# Patient Record
Sex: MALE | Race: WHITE | NOT HISPANIC OR LATINO | Employment: FULL TIME | URBAN - METROPOLITAN AREA
[De-identification: names, ages, dates, MRNs, and addresses within clinical notes are randomized per-mention and may not be internally consistent; named-entity substitution may affect disease eponyms.]

---

## 2020-03-12 ENCOUNTER — HOSPITAL ENCOUNTER (OUTPATIENT)
Dept: URGENT CARE | Facility: CLINIC | Age: 30
Discharge: HOME | End: 2020-03-12
Attending: FAMILY MEDICINE

## 2020-03-14 LAB — BACTERIA SPEC AEROBE CULT: NORMAL

## 2020-03-16 ENCOUNTER — HOSPITAL ENCOUNTER (OUTPATIENT)
Dept: URGENT CARE | Facility: CLINIC | Age: 30
Discharge: HOME | End: 2020-03-16

## 2020-03-18 LAB — BACTERIA SPEC AEROBE CULT: NORMAL

## 2020-03-21 ENCOUNTER — HOSPITAL ENCOUNTER (OUTPATIENT)
Dept: URGENT CARE | Facility: CLINIC | Age: 30
Discharge: HOME | End: 2020-03-21

## 2020-03-23 LAB — BACTERIA SPEC AEROBE CULT: NORMAL

## 2020-07-28 ENCOUNTER — TELEMEDICINE CONVERTED (OUTPATIENT)
Dept: GASTROENTEROLOGY | Facility: CLINIC | Age: 30
End: 2020-07-28
Attending: NURSE PRACTITIONER

## 2020-08-06 ENCOUNTER — HOSPITAL ENCOUNTER (OUTPATIENT)
Dept: ULTRASOUND IMAGING | Facility: HOSPITAL | Age: 30
Discharge: HOME | End: 2020-08-06
Attending: NURSE PRACTITIONER

## 2020-08-21 ENCOUNTER — HOSPITAL ENCOUNTER (OUTPATIENT)
Dept: URGENT CARE | Facility: CLINIC | Age: 30
Discharge: HOME | End: 2020-08-21

## 2020-09-25 ENCOUNTER — OFFICE VISIT CONVERTED (OUTPATIENT)
Dept: ORTHOPEDIC SURGERY | Facility: CLINIC | Age: 30
End: 2020-09-25
Attending: ORTHOPAEDIC SURGERY

## 2020-09-25 ENCOUNTER — CONVERSION ENCOUNTER (OUTPATIENT)
Dept: ORTHOPEDIC SURGERY | Facility: CLINIC | Age: 30
End: 2020-09-25

## 2020-10-14 ENCOUNTER — OFFICE VISIT CONVERTED (OUTPATIENT)
Dept: ORTHOPEDIC SURGERY | Facility: CLINIC | Age: 30
End: 2020-10-14
Attending: ORTHOPAEDIC SURGERY

## 2020-10-14 ENCOUNTER — CONVERSION ENCOUNTER (OUTPATIENT)
Dept: ORTHOPEDIC SURGERY | Facility: CLINIC | Age: 30
End: 2020-10-14

## 2020-10-20 ENCOUNTER — OFFICE VISIT CONVERTED (OUTPATIENT)
Dept: GASTROENTEROLOGY | Facility: CLINIC | Age: 30
End: 2020-10-20
Attending: NURSE PRACTITIONER

## 2021-05-10 NOTE — H&P
History and Physical      Patient Name: Arslan Butler   Patient ID: [id number]   Sex: Male   YOB: 1990    Primary Care Provider: Lee Grace MD   Referring Provider: Lee Grace MD    Visit Date: September 25, 2020    Provider: Dandy Rubio MD   Location: [facility]   Location Address: [address] [place] [address]  [id number]   Location Phone: [phone]          Chief Complaint  · Left Shoulder Pain      History Of Present Illness  Arslan Butler is a 29 year old /White male who presents today to [facility].      Patient presents today with a chief complaint of left shoulder pain. Patient presents today with XRAY of their left shoulder. Patient has a history of 2014 and 2016 shoulder arthroscopies. Patient complains of weakness in his left shoulder, patient feels like there is numbness and tingling but denies any neck pain.            Past Medical History  Anxiety and depression; Ulcer         Past Surgical History  Colonoscopy; Rotator Cuff repair         Medication List  Claritin 10 mg oral tablet; dicyclomine 10 mg oral capsule; Klonopin 0.5 mg oral tablet; Lamictal 25 mg oral tablet; lisinopril 5 mg oral tablet; metformin 1,000 mg oral tablet; omeprazole 20 mg oral capsule,delayed release(DR/EC); Ozempic 0.25 mg or 0.5 mg(2 mg/1.5 mL) subcutaneous pen injector; Vitamin D3 125 mcg (5,000 unit) oral tablet         Allergy List  Bactrim       Allergies Reconciled  Family Medical History  *No Known Family History         Social History  Alcohol Use (Never); lives with spouse; .; Recreational Drug Use (Never); Tobacco (Never); Unemployed.         Review of Systems  · Constitutional  o Denies  o : fever, chills, weight loss  · Cardiovascular  o Denies  o : chest pain, shortness of breath  · Gastrointestinal  o Denies  o : liver disease, heartburn, nausea, blood in stools  · Genitourinary  o Denies  o : painful urination, blood in  "urine  · Integument  o Denies  o : rash, itching  · Neurologic  o Denies  o : headache, weakness, loss of consciousness  · Musculoskeletal  o Denies  o : painful, swollen joints  · Psychiatric  o Denies  o : drug/alcohol addiction, anxiety, depression      Vitals  Date Time BP Position Site L\R Cuff Size HR RR TEMP (F) WT  HT  BMI kg/m2 BSA m2 O2 Sat        09/25/2020 03:21 PM      105 - R   360lbs 4oz 6'  4\" 43.85 2.96 93 %          Physical Examination  · Constitutional  o Appearance  o : well developed, well-nourished, no obvious deformities present  · Head and Face  o Head  o :   § Inspection  § : normocephalic  o Face  o :   § Inspection  § : no facial lesions  · Eyes  o Conjunctivae  o : conjunctivae normal  o Sclerae  o : sclerae white  · Ears, Nose, Mouth and Throat  o Ears  o :   § External Ears  § : appearance within normal limits  § Hearing  § : intact  o Nose  o :   § External Nose  § : appearance normal  · Neck  o Inspection/Palpation  o : normal appearance  o Range of Motion  o : full range of motion  · Respiratory  o Respiratory Effort  o : breathing unlabored  o Inspection of Chest  o : normal appearance  o Auscultation of Lungs  o : no audible wheezing or rales  · Cardiovascular  o Heart  o : regular rate  · Gastrointestinal  o Abdominal Examination  o : soft and non-tender  · Skin and Subcutaneous Tissue  o General Inspection  o : intact, no rashes  · Psychiatric  o General  o : Alert and oriented x3  o Judgement and Insight  o : judgment and insight intact  o Mood and Affect  o : mood normal, affect appropriate  · Left Shoulder  o Inspection  o : Sensation grossly intact. Neurovascular intact. Pulses are pleasant. No swelling, skin discoloration or atrophy. Well healed scars. Full ROM of shoulder. Weakness in strength. Patient has tremors and weakness when lifting his shoulder. RTC testing with weakness. Deltoid testing with weakness.   · Imaging  o Imaging  o : [XRAY LEFT SHOULDER] Subtle " degenerative change of the acromioclavicular joint, otherwise negative plain film assessment left shoulder.           Assessment  · Left shoulder pain, unspecified chronicity     719.41/M25.512      Plan  · Medications  o Medications have been Reconciled  o Transition of Care or Provider Policy  · Instructions  o Dr. Rubio saw and examined the patient and agrees with plan.   o X-rays reviewed by Dr. Rubio.  o Reviewed the patient's Past Medical, Social, and Family history as well as the ROS at today's visit, no changes.  o Call or return if worsening symptoms.  o Follow up after MRI.  o This note was transcribed by Adriana Jhaveri. rachel  o Discussed diagnosis and treatment options with the patient. Patient opted to get an MRI and will follow-up after getting results.             Electronically Signed by: Adriana Jhaveri-, Other -Author on September 28, 2020 09:58:15 AM  Electronically Co-signed by: Dandy Rubio MD -Reviewer on September 28, 2020 11:48:55 AM

## 2021-05-13 NOTE — PROGRESS NOTES
Progress Note      Patient Name: Arslan Butler   Patient ID: [id number]   Sex: Male   YOB: 1990    Primary Care Provider: Lee Grace MD   Referring Provider: Lee Grace MD    Visit Date: October 20, 2020    Provider: CORTNEY Kelley   Location: [facility]   Location Address: [address] [place] [address], [address] [place] [address]  [id number]   Location Phone: [phone]          Chief Complaint  · Follow up Abdominal pain       History Of Present Illness     Mr. Butler presents for follow-up of generalized abdominal pain, diarrhea, elevated LFTs, heartburn, rectal bleeding and morbid obesity.    7/29/2020 stool culture-normal.  [facility] IBD serology-not consistent with IBD.  CDF-negative occult blood stool-negative  Acute hepatitis panel-nonreactive  Iron profile: Total iron 67, iron saturation 20%.  CBC: Hemoglobin 15, hematocrit 44.2, platelets 219.  CMP: Creatinine 1.01, alk phos 86, ALT 66, AST 41, total bilirubin 0.6.    8/6/2020 right upper quadrant sound-diffuse fatty infiltration of the liver with liver coarsening.    ER visit reviewed from 8/21/2020: CBC: WBC 5.86, hemoglobin 14.9, hematocrit 44.9, platelets 248.  CMP: Creatinine 1.1, alk phos 75, ALT 46, AST 32, total bilirubin 0.3.  Lipase 28.    He reports that he's continuing to experience 3-4 loose bowel movements daily.  He reports that he experiences fecal urgency following meals and usually has a bowel movement following all meals.  States that rectal bleeding is resolved.  Reports pain that occurs often on left side of abdomen.      He is taking metformin daily with food.       Past Medical History  Anxiety and depression; Ulcer         Past Surgical History  Colonoscopy; Rotator Cuff repair         Medication List  Claritin 10 mg oral tablet; Klonopin 0.5 mg oral tablet; Lamictal 25 mg oral tablet; lisinopril 5 mg oral tablet; metformin 1,000 mg oral tablet; omeprazole 20 mg oral capsule,delayed  "release(DR/EC); Ozempic 0.25 mg or 0.5 mg(2 mg/1.5 mL) subcutaneous pen injector         Allergy List  Bactrim       Allergies Reconciled  Family Medical History  *No Known Family History         Social History  Alcohol Use (Never); lives with spouse; .; Recreational Drug Use (Never); Tobacco (Never); Unemployed.         Review of Systems  · Constitutional  o Denies  o : chills, fever  · Cardiovascular  o Denies  o : chest pain, irregular heart beats  · Respiratory  o Denies  o : cough, shortness of breath  · Gastrointestinal  o Admits  o : see HPI   · Endocrine  o Denies  o : weight gain, weight loss      Vitals  Date Time BP Position Site L\R Cuff Size HR RR TEMP (F) WT  HT  BMI kg/m2 BSA m2 O2 Sat FR L/min FiO2 HC       10/20/2020 01:47 /78 Sitting      98.2 352lbs 6oz 6'  4\" 42.89 2.93             Physical Examination  · Constitutional  o Appearance  o : Healthy-appearing, awake and alert in no acute distress  · Head and Face  o Head  o : Normocephalic with no worriesome skin lesions  · Eyes  o Vision  o :   § Visual Fields  § : eyes move symmetrical in all directions  o Sclerae  o : sclerae anicteric  o Pupils and Irises  o : pupils equal and symmetrical  · Neck  o Inspection/Palpation  o : Trachea is midline, no adenopathy  · Respiratory  o Respiratory Effort  o : Breathing is unlabored.  o Inspection of Chest  o : normal appearance  o Auscultation of Lungs  o : Chest is clear to auscultation bilaterally.  · Cardiovascular  o Heart  o :   § Auscultation of Heart  § : no murmurs, rubs, or gallops  o Peripheral Vascular System  o :   § Extremities  § : no cyanosis, clubbing or edema;   · Gastrointestinal  o Abdominal Examination  o : Abdomen is soft, nontender to palpation, with normal active bowel sounds, no appreciable hepatosplenomegaly.  o Digital Rectal Exam  o : deferred  · Skin and Subcutaneous Tissue  o General Inspection  o : without focal lesions; turgor is " normal  · Psychiatric  o General  o : Alert and oriented x3  o Mood and Affect  o : Mood and affect are appropriate to circumstances  · Extremities  o Extremities  o : No edema, no cyanosis          Assessment  · Abdominal Pain, LLQ     789.04/R10.32  · Diarrhea     787.91/R19.7      Plan  · Medications  o Apriso 0.375 gram oral capsule,extended release 24hr   SIG: take 4 capsules (1,500 mg) by oral route once daily in the morning for 30 days   DISP: (120) Capsule with 2 refills  Prescribed on 10/20/2020     o Medications have been Reconciled  · Instructions  o Information given on current diagnoses. Will try mesalamine given hx of colitis on previous colonoscopy.   · Disposition  o Follow up 2 months            Electronically Signed by: CORTNEY Kelley -Author on October 21, 2020 10:08:38 PM

## 2021-05-13 NOTE — PROGRESS NOTES
Progress Note      Patient Name: Arslan Butler   Patient ID: [id number]   Sex: Male   YOB: 1990    Primary Care Provider: Lee Grace MD   Referring Provider: Lee Grace MD    Visit Date: July 28, 2020    Provider: CORTNEY Kelley   Location: [facility]   Location Address: [address] [place] [address], [address]  [place], [place]  [id number]   Location Phone: [phone]          Chief Complaint     f/u LLQ pain, rectal bleeding and colitis       History Of Present Illness  Video Conferencing Visit  Arslan Butler is a 29 year old /White male who is presenting for evaluation via video conferencing via zoom. Verbal consent obtained before beginning visit.   The following staff were present during this visit: Janee Isabel MA      He was last evaluated in our office 10/2017.      07/12/2017 colonoscopy-multiple diffuse aphthous ulcers were seen in the distal sigmoid colon, rectosigmoid junction and rectum. Scattered aphthous ulceration continuous from 25 cm to the anal verge. Remaining colon appeared normal. Pathology-focal active colitis and lymphoid aggregates.    IBD serology was ordered, but was not completed.  He has been lost to f/u since that time.      He reports having diarrhea 3-4 times per day.  Describes stool to vary from a #6-7 on the [facility] stool chart.   Admits rectal bleeding frequently when wiping.  When evaluated in 2017, he was prescribed uceris foam and reports that it helped some.      He reports burning pain in stomach upon waking in the mornings.  PCP prescribed omeprazole and he reports improvement with this.      Admits abdominal cramping that he states is present most of the time.  No current medications for this.      Elevated liver enzymes - no ETOH, no illicit drug use or tattoos.    10/2020 CMP: Alk phos 98, AST 71, , total bilirubin 0.8.  Hemoglobin A1c 11.6       Past Medical History  Anxiety and depression; Ulcer         Past Surgical  "History  Colonoscopy; Rotator Cuff repair         Medication List  Claritin 10 mg oral tablet; Klonopin 0.5 mg oral tablet; Lamictal 25 mg oral tablet; lisinopril 5 mg oral tablet; metformin 1,000 mg oral tablet; omeprazole 20 mg oral capsule,delayed release(DR/EC); Ozempic 0.25 mg or 0.5 mg(2 mg/1.5 mL) subcutaneous pen injector; Vitamin D3 125 mcg (5,000 unit) oral tablet         Allergy List  Bactri         Family Medical History  *No Known Family History         Social History  Alcohol Use (Never); lives with spouse; .; Recreational Drug Use (Never); Tobacco (Never); Unemployed.         Review of Systems  · Constitutional  o Denies  o : chills, fever  · Cardiovascular  o Denies  o : chest pain, irregular heart beats  · Respiratory  o Denies  o : cough, shortness of breath  · Gastrointestinal  o Admits  o : see HPI   · Endocrine  o Denies  o : weight gain, weight loss      Vitals  Date Time BP Position Site L\R Cuff Size HR RR TEMP (F) WT  HT  BMI kg/m2 BSA m2 O2 Sat        07/28/2020 09:30 AM         369lbs 16oz 6'  4\" 45.04 3           Physical Examination  · Constitutional  o Appearance  o : morbidly obese, well developed, no obvious deformities present  · Head and Face  o Head  o : Normocephalic with no worriesome skin lesions  · Eyes  o Vision  o :   § Visual Fields  § : eyes move symmetrical in all directions  o Sclerae  o : sclerae anicteric  o Pupils and Irises  o : pupils equal and symmetrical  · Neck  o Inspection/Palpation  o : normal appearance, trachea midline  · Respiratory  o Respiratory Effort  o : Breathing is unlabored.  · Skin and Subcutaneous Tissue  o General Inspection  o : no lesions present, no areas of discoloration  · Psychiatric  o General  o : Alert and oriented x3  o Mood and Affect  o : Mood and affect are appropriate to circumstances          Assessment  · Abdominal Pain, Generalized     789.07/R10.84  · Diarrhea     787.91/R19.7  · Elevated " LFTs     790.6/R94.5  · Heartburn     787.1/R12  · Rectal Bleeding     569.3/K62.5  · Morbid obesity with BMI of 45.0-49.9, adult       Morbid (severe) obesity due to excess calories     278.01/E66.01  Body mass index (BMI) 45.0-49.9, adult     278.01/Z68.42      Plan  · Orders  o CBC with Auto Diff [facility] (03283) - - 07/28/2020  o CMP [facility] (66405) - - 07/28/2020  o C difficile Toxigenic Assay (PCR) [facility] (79025) - - 07/28/2020  o Lactoferrin (Fecal) Qualitative (61351) - - 07/28/2020  o Giardia and Cryptosporidium Enzyme Immunoassay [facility] (90993, 75732) - - 07/28/2020  o Stool culture and sensitivity (26686) - - 07/28/2020  o Occult blood (07589) - - 07/28/2020  o RUQ US (right upper quadrant ultrasound) (14335) - - 07/28/2020  o Iron + transferrin (TIBC, calculated % saturation) (04161) - - 07/28/2020  o Ferritin ser/plas (26359) - - 07/28/2020  o VIKY (antinuclear antibody profile) by enzyme immunoassay (13685) - - 07/28/2020  o Anti-mitochondrial antibody assay (77003) - - 07/28/2020  o Anti-Smooth Muscle Antibody (ASMA) [facility] (99839) - - 07/28/2020  o Serum immunofixation electrophoresis (52707) - - 07/28/2020  o Alpha-1-Antitrypsin/Phenotype [facility] (01247, 25669) - - 07/28/2020  o PT (06947) - - 07/28/2020  o Acute hepatitis panel (HAV IgM, HbcAb IgM, HbsAg, HCV) (02892, 18935, 06598, 99089, 52970) - - 07/28/2020  o [facility] (41861, 63548, 85011, 27577, 88139, 49764) - - 07/28/2020  · Medications  o dicyclomine 10 mg oral capsule   SIG: take 1 capsule by oral route 4 times a day prn abdominal cramping   DISP: (120) capsules with 2 refills  Prescribed on 07/28/2020     · Instructions  o Information given on current diagnoses. Discussed with patient importance of completing tests as ordered.  · Disposition  o Follow up 2 months            Electronically Signed by: CORTNEY Kelley -Author on July 28, 2020 01:17:47 PM

## 2021-05-13 NOTE — PROGRESS NOTES
Progress Note      Patient Name: Arslan Butler   Patient ID: [id number]   Sex: Male   YOB: 1990    Primary Care Provider: Lee Grace MD   Referring Provider: Lee Grace MD    Visit Date: October 14, 2020    Provider: Dandy Rubio MD   Location: [facility]   Location Address: [address] [place] [address]  [id number]   Location Phone: [phone]          Chief Complaint  · Left Shoulder Pain      History Of Present Illness  Arslan Butler is a 30 year old /White male who presents today to [facility].      Patient presents today with a follow-up of left shoulder pain. Patient has a history of 2014 and 2016 shoulder arthroscopies. Patient complains of weakness in his left shoulder, patient feels like there is numbness and tingling but denies any neck pain. Patient has been having pain and tenderness in his biceps. Patient presents today with MRI results of their left shoulder. Patient has been having left shoulder pain for 3 years. Patient states that over the course of 3 years his whole left arm has become weak.       Past Medical History  Anxiety and depression; Ulcer         Past Surgical History  Colonoscopy; Rotator Cuff repair         Medication List  Claritin 10 mg oral tablet; dicyclomine 10 mg oral capsule; Klonopin 0.5 mg oral tablet; Lamictal 25 mg oral tablet; lisinopril 5 mg oral tablet; metformin 1,000 mg oral tablet; omeprazole 20 mg oral capsule,delayed release(DR/EC); Ozempic 0.25 mg or 0.5 mg(2 mg/1.5 mL) subcutaneous pen injector; Vitamin D3 125 mcg (5,000 unit) oral tablet         Allergy List  Bactrim       Allergies Reconciled  Family Medical History  *No Known Family History         Social History  Alcohol Use (Never); lives with spouse; .; Recreational Drug Use (Never); Tobacco (Never); Unemployed.         Review of Systems  · Constitutional  o Denies  o : fever, chills, weight loss  · Cardiovascular  o Denies  o : chest pain,  "shortness of breath  · Gastrointestinal  o Denies  o : liver disease, heartburn, nausea, blood in stools  · Genitourinary  o Denies  o : painful urination, blood in urine  · Integument  o Denies  o : rash, itching  · Neurologic  o Denies  o : headache, weakness, loss of consciousness  · Musculoskeletal  o Denies  o : painful, swollen joints  · Psychiatric  o Denies  o : drug/alcohol addiction, anxiety, depression      Vitals  Date Time BP Position Site L\R Cuff Size HR RR TEMP (F) WT  HT  BMI kg/m2 BSA m2 O2 Sat FR L/min FiO2        10/14/2020 08:05 AM      102 - R   360lbs 0oz 6'  4\" 43.82 2.96 96 %            Physical Examination  · Constitutional  o Appearance  o : well developed, well-nourished, no obvious deformities present  · Head and Face  o Head  o :   § Inspection  § : normocephalic  o Face  o :   § Inspection  § : no facial lesions  · Eyes  o Conjunctivae  o : conjunctivae normal  o Sclerae  o : sclerae white  · Ears, Nose, Mouth and Throat  o Ears  o :   § External Ears  § : appearance within normal limits  § Hearing  § : intact  o Nose  o :   § External Nose  § : appearance normal  · Neck  o Inspection/Palpation  o : normal appearance  o Range of Motion  o : full range of motion  · Respiratory  o Respiratory Effort  o : breathing unlabored  o Inspection of Chest  o : normal appearance  o Auscultation of Lungs  o : no audible wheezing or rales  · Cardiovascular  o Heart  o : regular rate  · Gastrointestinal  o Abdominal Examination  o : soft and non-tender  · Skin and Subcutaneous Tissue  o General Inspection  o : intact, no rashes  · Psychiatric  o General  o : Alert and oriented x3  o Judgement and Insight  o : judgment and insight intact  o Mood and Affect  o : mood normal, affect appropriate  · Left Shoulder  o Inspection  o : Sensation grossly intact. Neurovascular intact. Pulses normal. No swelling and skin discoloration. Atrophy of left shoulder. Well healed scars. Weak strength. Patient has " tremors and weakness when lifting shoulder. RTC weakness. Deltoid weakness. Full ROM. Weakness with empty can testing. Weakness with drop arm testing. Skin intact. Good tone of deltoid, biceps, triceps, wrist extensors, and wrist flexors. Tender Biceps tendon.   · Imaging  o Imaging  o : [MRI] 1. Mild supraspinatus and infraspinatus tendinopathy. No evidence of a rotator cuff tear. However, there is diffuse fatty atrophy of visualized supraspinatus muscle. Correlation for any history of chronic denervation, brachial plexopathy, or Parsonage-Franklin syndrome recommended. Remainder of rotator cuff musculature is within normal limits. Correlation with operative history also be of benefit. 2. No significant labral pathology. 3. Mild AC joint arthrosis without evidence of significant outlet impingement or bursal inflammation. 4. Small area of fatty atrophy involving the lteral deltoid, which may reflect the sequelae of prior surgery or chronic denervation.           Assessment  · Left shoulder pain, unspecified chronicity     719.41/M25.512  · Shoulder weakness, left     719.61/R29.898      Plan  · Medications  o Medications have been Reconciled  o Transition of Care or Provider Policy  · Instructions  o Dr. Rubio saw and examined the patient and agrees with plan.   o X-rays reviewed by Dr. Rubio.  o Reviewed the patient's Past Medical, Social, and Family history as well as the ROS at today's visit, no changes.  o Call or return if worsening symptoms.  o Follow Up PRN.  o This note was transcribed by Adriana Jhaveri. rachel  o Discussed diagnosis and treatment options with the patient. Patient referred to a neurologist.             Electronically Signed by: Adriana Jhaveri-, Other -Author on October 15, 2020 01:58:19 PM  Electronically Co-signed by: Dandy Rubio MD -Reviewer on October 16, 2020 12:06:15 AM

## 2021-05-14 VITALS — OXYGEN SATURATION: 93 % | BODY MASS INDEX: 38.36 KG/M2 | HEART RATE: 105 BPM | HEIGHT: 76 IN | WEIGHT: 315 LBS

## 2021-05-14 VITALS
WEIGHT: 315 LBS | TEMPERATURE: 98.2 F | HEIGHT: 76 IN | DIASTOLIC BLOOD PRESSURE: 78 MMHG | BODY MASS INDEX: 38.36 KG/M2 | SYSTOLIC BLOOD PRESSURE: 132 MMHG

## 2021-05-14 VITALS — HEIGHT: 76 IN | BODY MASS INDEX: 38.36 KG/M2 | OXYGEN SATURATION: 96 % | WEIGHT: 315 LBS | HEART RATE: 102 BPM

## 2021-05-15 VITALS — WEIGHT: 315 LBS | HEIGHT: 76 IN | BODY MASS INDEX: 38.36 KG/M2

## 2021-12-17 ENCOUNTER — OFFICE VISIT (OUTPATIENT)
Dept: FAMILY MEDICINE CLINIC | Facility: CLINIC | Age: 31
End: 2021-12-17

## 2021-12-17 VITALS
HEIGHT: 74 IN | WEIGHT: 315 LBS | SYSTOLIC BLOOD PRESSURE: 120 MMHG | OXYGEN SATURATION: 96 % | DIASTOLIC BLOOD PRESSURE: 80 MMHG | TEMPERATURE: 97.3 F | BODY MASS INDEX: 40.43 KG/M2 | HEART RATE: 110 BPM

## 2021-12-17 DIAGNOSIS — I10 ESSENTIAL HYPERTENSION: Primary | ICD-10-CM

## 2021-12-17 DIAGNOSIS — E66.01 CLASS 3 SEVERE OBESITY WITH SERIOUS COMORBIDITY AND BODY MASS INDEX (BMI) OF 40.0 TO 44.9 IN ADULT, UNSPECIFIED OBESITY TYPE (HCC): ICD-10-CM

## 2021-12-17 DIAGNOSIS — Z11.59 NEED FOR HEPATITIS C SCREENING TEST: ICD-10-CM

## 2021-12-17 DIAGNOSIS — R40.0 DAYTIME SOMNOLENCE: ICD-10-CM

## 2021-12-17 DIAGNOSIS — R53.83 FATIGUE, UNSPECIFIED TYPE: ICD-10-CM

## 2021-12-17 DIAGNOSIS — R06.83 SNORING: ICD-10-CM

## 2021-12-17 DIAGNOSIS — E78.5 HYPERLIPIDEMIA, UNSPECIFIED HYPERLIPIDEMIA TYPE: ICD-10-CM

## 2021-12-17 DIAGNOSIS — F32.A ANXIETY AND DEPRESSION: ICD-10-CM

## 2021-12-17 DIAGNOSIS — E11.9 ENCOUNTER FOR DIABETIC FOOT EXAM (HCC): ICD-10-CM

## 2021-12-17 DIAGNOSIS — F31.30 BIPOLAR AFFECTIVE DISORDER, CURRENT EPISODE DEPRESSED, CURRENT EPISODE SEVERITY UNSPECIFIED (HCC): ICD-10-CM

## 2021-12-17 DIAGNOSIS — F41.9 ANXIETY AND DEPRESSION: ICD-10-CM

## 2021-12-17 DIAGNOSIS — E11.9 TYPE 2 DIABETES MELLITUS WITHOUT COMPLICATION, WITHOUT LONG-TERM CURRENT USE OF INSULIN (HCC): ICD-10-CM

## 2021-12-17 DIAGNOSIS — N52.9 ERECTILE DYSFUNCTION, UNSPECIFIED ERECTILE DYSFUNCTION TYPE: ICD-10-CM

## 2021-12-17 PROBLEM — E66.9 OBESITY: Status: ACTIVE | Noted: 2021-12-15

## 2021-12-17 PROBLEM — F31.9 BIPOLAR DISORDER: Status: ACTIVE | Noted: 2021-12-15

## 2021-12-17 PROBLEM — K21.9 GASTROESOPHAGEAL REFLUX DISEASE: Status: ACTIVE | Noted: 2021-12-15

## 2021-12-17 PROCEDURE — 99213 OFFICE O/P EST LOW 20 MIN: CPT | Performed by: NURSE PRACTITIONER

## 2021-12-17 RX ORDER — LORATADINE 10 MG/1
10 TABLET ORAL DAILY
COMMUNITY
End: 2022-01-14 | Stop reason: SDUPTHER

## 2021-12-17 RX ORDER — LITHIUM CARBONATE 450 MG
450 TABLET, EXTENDED RELEASE ORAL 2 TIMES DAILY
COMMUNITY
End: 2022-03-11

## 2021-12-17 RX ORDER — PROPRANOLOL HYDROCHLORIDE 10 MG/1
10 TABLET ORAL 3 TIMES DAILY
COMMUNITY
End: 2022-03-11

## 2021-12-17 RX ORDER — LISINOPRIL 10 MG/1
10 TABLET ORAL DAILY
COMMUNITY
End: 2022-03-11

## 2021-12-17 RX ORDER — BUPROPION HYDROCHLORIDE 150 MG/1
150 TABLET ORAL DAILY
COMMUNITY
End: 2022-08-05 | Stop reason: SDUPTHER

## 2021-12-17 RX ORDER — SEMAGLUTIDE 1.34 MG/ML
0.5 INJECTION, SOLUTION SUBCUTANEOUS WEEKLY
COMMUNITY
End: 2021-12-30 | Stop reason: SDUPTHER

## 2021-12-17 RX ORDER — OMEPRAZOLE 20 MG/1
20 CAPSULE, DELAYED RELEASE ORAL DAILY
COMMUNITY
End: 2022-01-14 | Stop reason: SDUPTHER

## 2021-12-17 RX ORDER — DIAZEPAM 5 MG/1
5 TABLET ORAL EVERY 12 HOURS SCHEDULED
COMMUNITY
End: 2021-12-30

## 2021-12-17 NOTE — PROGRESS NOTES
Chief Complaint  Fatigue    Subjective            Arslan Butler presents to [facility]  History of Present Illness     Establish care    He was hospitalized for 7 days in October due to SI. He has anxiety, depression, and bipolar disorder. He has been seeing psychiatry since 2019. Still under the care of psychiatry. He sees Ekta Benson at [facility] in [facility]. He is also seeing Teresa at [facility] for counseling. He is seeing Ekta every month for medication adjustments, and seeing Teresa 2-3 times per month.     He recently weaned off of Prozac. Has previously taken lamictal, seroquel, Rexulti, Lexapro, depakot, effexor, sertraline, Celexa.     His PHQ-9 score is 24 - he feels like his depression symptoms are stable. When they put him on lithium he noticed improvement in SI - but now having a lot of fatigue, and he has noticed a tremor as well. He is also having ED, which is affecting his relationship with his wife.    He does have trouble sleeping. He snores. He has never had a sleep study. He can be sitting on the couch, even just after having a full night of sleep, and just fall asleep. He has never fallen asleep driving, but he has noticed himself dozing while driving.     He has HTN and dyslipidemia, as well as DM. He was diagnosed with DM about three years ago. His last A1C was less than 6% to the best of his knowledge. His last labs were in October while in the hospital. He checks his blood sugar fasting daily - 80 to 100. At night it is always less than 160.     PHQ-9 Depression Screening  Little interest or pleasure in doing things? 3   Feeling down, depressed, or hopeless? 3   Trouble falling or staying asleep, or sleeping too much? 3   Feeling tired or having little energy? 3   Poor appetite or overeating? 3   Feeling bad about yourself - or that you are a failure or have let yourself or your family down? 3   Trouble concentrating on things, such as reading the newspaper or  watching television? 3   Moving or speaking so slowly that other people could have noticed? Or the opposite - being so fidgety or restless that you have been moving around a lot more than usual? 3   Thoughts that you would be better off dead, or of hurting yourself in some way? 0   PHQ-9 Total Score 24   If you checked off any problems, how difficult have these problems made it for you to do your work, take care of things at home, or get along with other people? Extremely dIfficult     PHQ-9 Total Score: 24    Past Medical History:   Diagnosis Date   • Anxiety and depression 12/17/2021   • Bipolar disorder (HCC) 12/15/2021   • Gastroesophageal reflux disease 12/15/2021   • Hyperlipidemia 12/15/2021   • Obesity 12/15/2021       Allergies   Allergen Reactions   • Sulfamethoxazole-Trimethoprim Hives        Past Surgical History:   Procedure Laterality Date   • ROTATOR CUFF REPAIR Left     has had it done twice     • TONSILLECTOMY          Social History     Tobacco Use   • Smoking status: Never Smoker   • Smokeless tobacco: Never Used   Vaping Use   • Vaping Use: Every day   • Substances: Nicotine, Flavoring   • Devices: Disposable, Refillable tank   Substance Use Topics   • Alcohol use: Not on file   • Drug use: Not on file       Family History   Problem Relation Age of Onset   • Heart attack Maternal Grandfather    • Diabetes Paternal Grandmother         Health Maintenance Due   Topic Date Due   • URINE MICROALBUMIN  Never done   • ANNUAL PHYSICAL  Never done   • Pneumococcal Vaccine 0-64 (1 of 2 - PPSV23) Never done   • TDAP/TD VACCINES (1 - Tdap) Never done   • HEPATITIS C SCREENING  Never done   • HEMOGLOBIN A1C  Never done   • DIABETIC EYE EXAM  Never done   • LIPID PANEL  Never done        Current Outpatient Medications on File Prior to Visit   Medication Sig   • buPROPion XL (WELLBUTRIN XL) 150 MG 24 hr tablet Take 150 mg by mouth Daily.   • diazePAM (VALIUM) 5 MG tablet Take 5 mg by mouth Every 12 (Twelve)  "Hours.   • lisinopril (PRINIVIL,ZESTRIL) 10 MG tablet Take 10 mg by mouth Daily.   • lithium (ESKALITH) 450 MG CR tablet Take 450 mg by mouth 2 (Two) Times a Day.   • loratadine (Claritin) 10 MG tablet Take 10 mg by mouth Daily.   • metFORMIN (GLUCOPHAGE) 1000 MG tablet Take 1,000 mg by mouth 2 (Two) Times a Day.   • omeprazole (priLOSEC) 20 MG capsule Take 20 mg by mouth Daily.   • propranolol (INDERAL) 10 MG tablet Take 10 mg by mouth 3 (Three) Times a Day.   • Semaglutide,0.25 or 0.5MG/DOS, (Ozempic, 0.25 or 0.5 MG/DOSE,) 2 MG/1.5ML solution pen-injector Inject 0.5 mL under the skin into the appropriate area as directed 1 (One) Time Per Week.   • SIMVASTATIN PO Take  by mouth.     No current facility-administered medications on file prior to visit.       Immunization History   Administered Date(s) Administered   • COVID-19 (MODERNA) 1st, 2nd, 3rd Dose Only 04/10/2021, 05/05/2021, 08/19/2021, 09/17/2021   • DTP 03/11/1996   • Hep B, Adolescent or Pediatric 06/22/2001, 07/23/2001, 08/02/2002   • MMR 03/11/1996   • OPV 03/11/1996       Review of Systems     Objective     /80   Pulse 110   Temp 97.3 °F (36.3 °C)   Ht 188 cm (74\")   Wt (!) 154 kg (340 lb)   SpO2 96%   BMI 43.65 kg/m²       Physical Exam  Vitals reviewed.   Constitutional:       General: He is not in acute distress.     Appearance: Normal appearance. He is well-developed. He is obese.   HENT:      Head: Normocephalic and atraumatic.   Eyes:      General: No scleral icterus.     Conjunctiva/sclera: Conjunctivae normal.   Neck:      Thyroid: No thyroid mass, thyromegaly or thyroid tenderness.      Vascular: No carotid bruit.      Trachea: Trachea normal.   Cardiovascular:      Rate and Rhythm: Normal rate and regular rhythm.      Pulses: Normal pulses.           Dorsalis pedis pulses are 2+ on the right side and 2+ on the left side.        Posterior tibial pulses are 2+ on the right side and 2+ on the left side.      Heart sounds: No murmur " heard.      Pulmonary:      Effort: Pulmonary effort is normal.      Breath sounds: Normal breath sounds. No wheezing, rhonchi or rales.   Musculoskeletal:         General: Normal range of motion.      Cervical back: Normal range of motion and neck supple.      Right lower leg: No edema.      Left lower leg: No edema.      Right foot: Normal range of motion. No deformity or bunion.      Left foot: Normal range of motion. No deformity or bunion.   Feet:      Right foot:      Protective Sensation: 10 sites tested. 10 sites sensed.      Skin integrity: Skin integrity normal. No ulcer, blister, callus or dry skin.      Toenail Condition: Right toenails are normal.      Left foot:      Protective Sensation: 10 sites tested. 10 sites sensed.      Skin integrity: Skin integrity normal. No ulcer, blister, callus or dry skin.      Toenail Condition: Left toenails are normal.      Comments:      Lymphadenopathy:      Cervical: No cervical adenopathy.   Skin:     General: Skin is warm and dry.   Neurological:      Mental Status: He is alert and oriented to person, place, and time.   Psychiatric:         Mood and Affect: Mood and affect normal.         Behavior: Behavior normal.         Thought Content: Thought content normal.         Judgment: Judgment normal.         Result Review :     The following data was reviewed by: CORTNEY Acosta on 12/17/2021:    Data reviewed: Radiologic studies ;   XR Spine Lumbar 2 or 3 View (03/22/2019 23:38)  XR Knee 3 View Left (03/22/2019 23:40)  XR Knee 3 View Left (10/30/2019 12:50)  XR Spine Lumbar 2 or 3 View (10/30/2019 13:44)           Assessment and Plan      Diagnoses and all orders for this visit:    1. Essential hypertension (Primary)  -     Comprehensive Metabolic Panel  -     Microalbumin / Creatinine Urine Ratio - Urine, Clean Catch    2. Hyperlipidemia, unspecified hyperlipidemia type  -     Comprehensive Metabolic Panel  -     Lipid Panel    3. Type 2 diabetes mellitus  without complication, without long-term current use of insulin (HCC)  -     Comprehensive Metabolic Panel  -     Hemoglobin A1c    4. Encounter for diabetic foot exam (HCC)    5. Anxiety and depression    6. Bipolar affective disorder, current episode depressed, current episode severity unspecified (HCC)    7. Class 3 severe obesity with serious comorbidity and body mass index (BMI) of 40.0 to 44.9 in adult, unspecified obesity type (HCC)  -     Ambulatory Referral to Sleep Medicine  -     Vitamin D 25 Hydroxy    8. Fatigue, unspecified type  -     Ambulatory Referral to Sleep Medicine  -     CBC Auto Differential  -     TSH+Free T4  -     Testosterone, Free, Total  -     Vitamin B12 & Folate  -     Iron Profile  -     Ferritin    9. Snoring  -     Ambulatory Referral to Sleep Medicine    10. Daytime somnolence  -     Ambulatory Referral to Sleep Medicine    11. Erectile dysfunction, unspecified erectile dysfunction type  -     Testosterone, Free, Total    12. Need for hepatitis C screening test  -     Hepatitis C Antibody            Follow Up     Return in about 10 days (around 12/27/2021).     We will discuss his labs at his follow up appointment.     I discussed with the patient that his fatigue could be multifactorial.  His fatigue could be related to his medication changes, but also untreated obstructive sleep apnea.  I am going to refer for sleep study to further assess.  Also advised that while ED could be a side effect of his psychiatric medication, ED could also be from poorly controlled diabetes, sleep apnea, or other causes.    Patient was given instructions and counseling regarding his condition or for health maintenance advice. Please see specific information pulled into the AVS if appropriate.     Arslan Butler  reports that he has never smoked. He has never used smokeless tobacco.

## 2021-12-23 ENCOUNTER — CLINICAL SUPPORT (OUTPATIENT)
Dept: FAMILY MEDICINE CLINIC | Facility: CLINIC | Age: 31
End: 2021-12-23

## 2021-12-23 DIAGNOSIS — I10 ESSENTIAL HYPERTENSION: ICD-10-CM

## 2021-12-23 LAB
25(OH)D3 SERPL-MCNC: 24.5 NG/ML (ref 30–100)
ALBUMIN SERPL-MCNC: 4.3 G/DL (ref 3.5–5.2)
ALBUMIN/GLOB SERPL: 1.4 G/DL
ALP SERPL-CCNC: 69 U/L (ref 39–117)
ALT SERPL W P-5'-P-CCNC: 53 U/L (ref 1–41)
ANION GAP SERPL CALCULATED.3IONS-SCNC: 14.4 MMOL/L (ref 5–15)
AST SERPL-CCNC: 32 U/L (ref 1–40)
BASOPHILS # BLD AUTO: 0.02 10*3/MM3 (ref 0–0.2)
BASOPHILS NFR BLD AUTO: 0.3 % (ref 0–1.5)
BILIRUB SERPL-MCNC: 0.5 MG/DL (ref 0–1.2)
BUN SERPL-MCNC: 11 MG/DL (ref 6–20)
BUN/CREAT SERPL: 16.7 (ref 7–25)
CALCIUM SPEC-SCNC: 9.6 MG/DL (ref 8.6–10.5)
CHLORIDE SERPL-SCNC: 103 MMOL/L (ref 98–107)
CHOLEST SERPL-MCNC: 123 MG/DL (ref 0–200)
CO2 SERPL-SCNC: 21.6 MMOL/L (ref 22–29)
CREAT SERPL-MCNC: 0.66 MG/DL (ref 0.76–1.27)
DEPRECATED RDW RBC AUTO: 41.5 FL (ref 37–54)
EOSINOPHIL # BLD AUTO: 0.14 10*3/MM3 (ref 0–0.4)
EOSINOPHIL NFR BLD AUTO: 2.3 % (ref 0.3–6.2)
ERYTHROCYTE [DISTWIDTH] IN BLOOD BY AUTOMATED COUNT: 13 % (ref 12.3–15.4)
FERRITIN SERPL-MCNC: 279 NG/ML (ref 30–400)
FOLATE SERPL-MCNC: 5.34 NG/ML (ref 4.78–24.2)
GFR SERPL CREATININE-BSD FRML MDRD: 142 ML/MIN/1.73
GLOBULIN UR ELPH-MCNC: 3 GM/DL
GLUCOSE SERPL-MCNC: 102 MG/DL (ref 65–99)
HBA1C MFR BLD: 5.65 % (ref 4.8–5.6)
HCT VFR BLD AUTO: 42.4 % (ref 37.5–51)
HCV AB SER DONR QL: NORMAL
HDLC SERPL-MCNC: 30 MG/DL (ref 40–60)
HGB BLD-MCNC: 14.5 G/DL (ref 13–17.7)
IMM GRANULOCYTES # BLD AUTO: 0.02 10*3/MM3 (ref 0–0.05)
IMM GRANULOCYTES NFR BLD AUTO: 0.3 % (ref 0–0.5)
IRON 24H UR-MRATE: 72 MCG/DL (ref 59–158)
IRON SATN MFR SERPL: 16 % (ref 20–50)
LDLC SERPL CALC-MCNC: 57 MG/DL (ref 0–100)
LDLC/HDLC SERPL: 1.64 {RATIO}
LYMPHOCYTES # BLD AUTO: 1.67 10*3/MM3 (ref 0.7–3.1)
LYMPHOCYTES NFR BLD AUTO: 27.6 % (ref 19.6–45.3)
MCH RBC QN AUTO: 29.9 PG (ref 26.6–33)
MCHC RBC AUTO-ENTMCNC: 34.2 G/DL (ref 31.5–35.7)
MCV RBC AUTO: 87.4 FL (ref 79–97)
MONOCYTES # BLD AUTO: 0.57 10*3/MM3 (ref 0.1–0.9)
MONOCYTES NFR BLD AUTO: 9.4 % (ref 5–12)
NEUTROPHILS NFR BLD AUTO: 3.62 10*3/MM3 (ref 1.7–7)
NEUTROPHILS NFR BLD AUTO: 60.1 % (ref 42.7–76)
NRBC BLD AUTO-RTO: 0 /100 WBC (ref 0–0.2)
PLATELET # BLD AUTO: 295 10*3/MM3 (ref 140–450)
PMV BLD AUTO: 11.2 FL (ref 6–12)
POTASSIUM SERPL-SCNC: 4.5 MMOL/L (ref 3.5–5.2)
PROT SERPL-MCNC: 7.3 G/DL (ref 6–8.5)
RBC # BLD AUTO: 4.85 10*6/MM3 (ref 4.14–5.8)
SODIUM SERPL-SCNC: 139 MMOL/L (ref 136–145)
T4 FREE SERPL-MCNC: 1.38 NG/DL (ref 0.93–1.7)
TIBC SERPL-MCNC: 443 MCG/DL (ref 298–536)
TRANSFERRIN SERPL-MCNC: 297 MG/DL (ref 200–360)
TRIGL SERPL-MCNC: 219 MG/DL (ref 0–150)
TSH SERPL DL<=0.05 MIU/L-ACNC: 2.49 UIU/ML (ref 0.27–4.2)
VIT B12 BLD-MCNC: 439 PG/ML (ref 211–946)
VLDLC SERPL-MCNC: 36 MG/DL (ref 5–40)
WBC NRBC COR # BLD: 6.04 10*3/MM3 (ref 3.4–10.8)

## 2021-12-23 PROCEDURE — 84403 ASSAY OF TOTAL TESTOSTERONE: CPT | Performed by: NURSE PRACTITIONER

## 2021-12-23 PROCEDURE — 82306 VITAMIN D 25 HYDROXY: CPT | Performed by: NURSE PRACTITIONER

## 2021-12-23 PROCEDURE — 83540 ASSAY OF IRON: CPT | Performed by: NURSE PRACTITIONER

## 2021-12-23 PROCEDURE — 86803 HEPATITIS C AB TEST: CPT | Performed by: NURSE PRACTITIONER

## 2021-12-23 PROCEDURE — 83036 HEMOGLOBIN GLYCOSYLATED A1C: CPT | Performed by: NURSE PRACTITIONER

## 2021-12-23 PROCEDURE — 82607 VITAMIN B-12: CPT | Performed by: NURSE PRACTITIONER

## 2021-12-23 PROCEDURE — 82728 ASSAY OF FERRITIN: CPT | Performed by: NURSE PRACTITIONER

## 2021-12-23 PROCEDURE — 84466 ASSAY OF TRANSFERRIN: CPT | Performed by: NURSE PRACTITIONER

## 2021-12-23 PROCEDURE — 36415 COLL VENOUS BLD VENIPUNCTURE: CPT | Performed by: FAMILY MEDICINE

## 2021-12-23 PROCEDURE — 84439 ASSAY OF FREE THYROXINE: CPT | Performed by: NURSE PRACTITIONER

## 2021-12-23 PROCEDURE — 82570 ASSAY OF URINE CREATININE: CPT | Performed by: NURSE PRACTITIONER

## 2021-12-23 PROCEDURE — 80050 GENERAL HEALTH PANEL: CPT | Performed by: NURSE PRACTITIONER

## 2021-12-23 PROCEDURE — 82746 ASSAY OF FOLIC ACID SERUM: CPT | Performed by: NURSE PRACTITIONER

## 2021-12-23 PROCEDURE — 80061 LIPID PANEL: CPT | Performed by: NURSE PRACTITIONER

## 2021-12-23 PROCEDURE — 82043 UR ALBUMIN QUANTITATIVE: CPT | Performed by: NURSE PRACTITIONER

## 2021-12-23 PROCEDURE — 84402 ASSAY OF FREE TESTOSTERONE: CPT | Performed by: NURSE PRACTITIONER

## 2021-12-23 NOTE — PROGRESS NOTES
Venipuncture Blood Specimen Collection  Venipuncture performed in left arm by Cassia Cole with good hemostasis. Patient tolerated the procedure well without complications.   12/23/21   Cassia Cole

## 2021-12-24 LAB
ALBUMIN UR-MCNC: <1.2 MG/DL
CREAT UR-MCNC: 165.2 MG/DL
MICROALBUMIN/CREAT UR: NORMAL MG/G{CREAT}

## 2021-12-27 LAB
TESTOST FREE SERPL-MCNC: 7.5 PG/ML (ref 8.7–25.1)
TESTOST SERPL-MCNC: 208 NG/DL (ref 264–916)

## 2021-12-27 NOTE — PROGRESS NOTES
Has follow up on 12/30 - will discuss at follow up    A1c is pre-diabetes    Vitamin D insufficient    ALT elevated    Lipids abnl    Iron saturation low, but CBC normal

## 2021-12-28 NOTE — PROGRESS NOTES
We will discuss at his follow up appointment - testosterone is low - will offer referral to urology, but they may want sleep study first (prior to replacing).

## 2021-12-30 ENCOUNTER — OFFICE VISIT (OUTPATIENT)
Dept: FAMILY MEDICINE CLINIC | Facility: CLINIC | Age: 31
End: 2021-12-30

## 2021-12-30 VITALS
BODY MASS INDEX: 43.65 KG/M2 | WEIGHT: 315 LBS | OXYGEN SATURATION: 98 % | DIASTOLIC BLOOD PRESSURE: 84 MMHG | HEART RATE: 97 BPM | SYSTOLIC BLOOD PRESSURE: 134 MMHG | TEMPERATURE: 96.9 F

## 2021-12-30 DIAGNOSIS — M54.50 LUMBAR BACK PAIN: ICD-10-CM

## 2021-12-30 DIAGNOSIS — R79.89 LOW TESTOSTERONE: ICD-10-CM

## 2021-12-30 DIAGNOSIS — R74.01 ELEVATED ALT MEASUREMENT: ICD-10-CM

## 2021-12-30 DIAGNOSIS — E61.1 IRON DEFICIENCY: ICD-10-CM

## 2021-12-30 DIAGNOSIS — E78.5 DYSLIPIDEMIA: ICD-10-CM

## 2021-12-30 DIAGNOSIS — E55.9 VITAMIN D INSUFFICIENCY: ICD-10-CM

## 2021-12-30 DIAGNOSIS — G89.29 CHRONIC LEFT SHOULDER PAIN: ICD-10-CM

## 2021-12-30 DIAGNOSIS — Z98.890 HISTORY OF SHOULDER SURGERY: ICD-10-CM

## 2021-12-30 DIAGNOSIS — M25.512 CHRONIC LEFT SHOULDER PAIN: ICD-10-CM

## 2021-12-30 DIAGNOSIS — E11.9 TYPE 2 DIABETES MELLITUS WITHOUT COMPLICATION, WITHOUT LONG-TERM CURRENT USE OF INSULIN (HCC): Primary | ICD-10-CM

## 2021-12-30 PROCEDURE — 99214 OFFICE O/P EST MOD 30 MIN: CPT | Performed by: NURSE PRACTITIONER

## 2021-12-30 RX ORDER — SIMVASTATIN 20 MG
20 TABLET ORAL NIGHTLY
Qty: 90 TABLET | Refills: 0 | Status: SHIPPED | OUTPATIENT
Start: 2021-12-30 | End: 2022-03-14

## 2021-12-30 RX ORDER — METHYLPREDNISOLONE 4 MG/1
TABLET ORAL
Qty: 1 EACH | Refills: 0 | Status: SHIPPED | OUTPATIENT
Start: 2021-12-30 | End: 2022-01-13

## 2021-12-30 RX ORDER — SEMAGLUTIDE 1.34 MG/ML
0.5 INJECTION, SOLUTION SUBCUTANEOUS WEEKLY
Qty: 3 PEN | Refills: 1 | Status: SHIPPED | OUTPATIENT
Start: 2021-12-30 | End: 2022-03-11 | Stop reason: SDUPTHER

## 2021-12-30 RX ORDER — MULTIVIT-MIN/IRON/FOLIC ACID/K 18-600-40
2000 CAPSULE ORAL DAILY
Qty: 90 CAPSULE | Refills: 0 | Status: SHIPPED | OUTPATIENT
Start: 2021-12-30 | End: 2022-03-10

## 2021-12-30 NOTE — PROGRESS NOTES
Chief Complaint  Hypertension (2 WEEK FOLLOW UP) and Fatigue    Subjective            Arslan Butler presents to [facility]  History of Present Illness     Follow up on labs     Testosterone was low - both free and total. He would like to see urology, especially in the setting of ED and fatigue. Has a sleep study consult on January 28, 2022.    A1c demonstrates good control of DM - he is currently taking metformin 1000mg BID and injecting Ozempic weekly - 0.5mg. He does need refills of those.     His vitamin D was in the insufficiency range - he is not currently taking a vitamin D supplement.     ALT was elevated, but reports (+) history of NAFLD.     He has dyslipidemia - elevated triglycerides and low HDL - he was taking simvastatin, but he has been out of that for two months. Uncertain of the dose - it was only given to him while hospitalized.     Iron saturation was low at 16% but no anemia on CBC, other iron indices normal, and ferritin normal.     Chronic left shoulder pain - he was working for [employer] in 2014 when a car door fell onto it. He ended up having surgery - they cleaned up the joint (bursitis) and did a rotator cuff repair. 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 He has been out of work for the past two years. He recent applied for disability for his mental health and his chronic shoulder and back pain, but was denied. He is going to try to go back to work to see how he does. He has been advised to try by his , but he isn't really sure what he is going to do.     He also has chronic lower back pain - since about 2017 - he isn't sure how he hurt his back, it just started hurting during that time. He has been told that he has DDD, ruptured disc, and 'sciatica'. When his back flares he will typically get a steroid shot. He states it is starting to flare. He currently denies any numbness or tingling in the  LEs. He states that when he gets into a flare he can have those symptoms. He denies any loss of bowel or bladder.       Past Medical History:   Diagnosis Date   • Anxiety and depression 12/17/2021   • Bipolar disorder (HCC) 12/15/2021   • Gastroesophageal reflux disease 12/15/2021   • Hyperlipidemia 12/15/2021   • Obesity 12/15/2021       Allergies   Allergen Reactions   • Sulfamethoxazole-Trimethoprim Hives        Past Surgical History:   Procedure Laterality Date   • ROTATOR CUFF REPAIR Left     has had it done twice     • TONSILLECTOMY          Social History     Tobacco Use   • Smoking status: Never Smoker   • Smokeless tobacco: Never Used   Vaping Use   • Vaping Use: Every day   • Substances: Nicotine, Flavoring   • Devices: Disposable, Refillable tank   Substance Use Topics   • Alcohol use: Not on file   • Drug use: Not on file       Family History   Problem Relation Age of Onset   • Heart attack Maternal Grandfather    • Diabetes Paternal Grandmother         Health Maintenance Due   Topic Date Due   • ANNUAL PHYSICAL  Never done   • Pneumococcal Vaccine 0-64 (1 of 2 - PPSV23) Never done   • TDAP/TD VACCINES (1 - Tdap) Never done   • DIABETIC EYE EXAM  Never done        Current Outpatient Medications on File Prior to Visit   Medication Sig   • buPROPion XL (WELLBUTRIN XL) 150 MG 24 hr tablet Take 150 mg by mouth Daily.   • lisinopril (PRINIVIL,ZESTRIL) 10 MG tablet Take 10 mg by mouth Daily.   • lithium (ESKALITH) 450 MG CR tablet Take 450 mg by mouth 2 (Two) Times a Day.   • loratadine (Claritin) 10 MG tablet Take 10 mg by mouth Daily.   • omeprazole (priLOSEC) 20 MG capsule Take 20 mg by mouth Daily.   • propranolol (INDERAL) 10 MG tablet Take 10 mg by mouth 3 (Three) Times a Day.   • [DISCONTINUED] metFORMIN (GLUCOPHAGE) 1000 MG tablet Take 1,000 mg by mouth 2 (Two) Times a Day.   • [DISCONTINUED] Semaglutide,0.25 or 0.5MG/DOS, (Ozempic, 0.25 or 0.5 MG/DOSE,) 2 MG/1.5ML solution pen-injector Inject 0.5 mL  under the skin into the appropriate area as directed 1 (One) Time Per Week.   • [DISCONTINUED] SIMVASTATIN PO Take  by mouth.   • [DISCONTINUED] diazePAM (VALIUM) 5 MG tablet Take 5 mg by mouth Every 12 (Twelve) Hours.     No current facility-administered medications on file prior to visit.       Immunization History   Administered Date(s) Administered   • COVID-19 (MODERNA) 1st, 2nd, 3rd Dose Only 04/10/2021, 05/05/2021, 08/19/2021, 09/17/2021   • DTP 03/11/1996   • Hep B, Adolescent or Pediatric 06/22/2001, 07/23/2001, 08/02/2002   • MMR 03/11/1996   • OPV 03/11/1996       Review of Systems     Objective     /84   Pulse 97   Temp 96.9 °F (36.1 °C)   Wt (!) 154 kg (340 lb)   SpO2 98%   BMI 43.65 kg/m²       Physical Exam  Vitals reviewed.   Constitutional:       General: He is not in acute distress.     Appearance: Normal appearance. He is well-developed. He is obese.   HENT:      Head: Normocephalic and atraumatic.   Eyes:      General: No scleral icterus.     Conjunctiva/sclera: Conjunctivae normal.   Neck:      Vascular: No carotid bruit.      Trachea: Trachea normal.   Cardiovascular:      Rate and Rhythm: Normal rate and regular rhythm.      Pulses: Normal pulses.      Heart sounds: No murmur heard.      Pulmonary:      Effort: Pulmonary effort is normal.      Breath sounds: Normal breath sounds. No wheezing or rhonchi.   Musculoskeletal:         General: Normal range of motion.      Cervical back: Normal range of motion and neck supple.      Right lower leg: No edema.      Left lower leg: No edema.      Comments: No gross shoulder deformity on the left. His ROM is impaired. He cannot extend his arm greater than 90 degrees. When he does hold his arm outstretched he shakes. Reports ROM is painful. TTP of the left shoulder.     Lumbar spine is without any gross deformity. Alignment appears normal. NTTP along the midline spine. He is TTP along the paraspinal muscles bilaterally.    Lymphadenopathy:       Cervical: No cervical adenopathy.   Skin:     General: Skin is warm and dry.   Neurological:      Mental Status: He is alert and oriented to person, place, and time.   Psychiatric:         Mood and Affect: Mood and affect normal.         Behavior: Behavior normal.         Thought Content: Thought content normal.         Judgment: Judgment normal.         Result Review :     The following data was reviewed by: CORTNEY Acosta on 12/30/2021:    CBC Auto Differential (12/23/2021 08:21)  Comprehensive Metabolic Panel (12/23/2021 08:21)  Hemoglobin A1c (12/23/2021 08:21)  Lipid Panel (12/23/2021 08:21)  TSH+Free T4 (12/23/2021 08:21)  Microalbumin / Creatinine Urine Ratio - Urine, Clean Catch (12/23/2021 08:21)  Testosterone, Free, Total (12/23/2021 08:21)  Vitamin B12 & Folate (12/23/2021 08:21)  Iron Profile (12/23/2021 08:21)  Ferritin (12/23/2021 08:21)  Vitamin D 25 Hydroxy (12/23/2021 08:21)  Hepatitis C Antibody (12/23/2021 08:21)      Data reviewed: Radiologic studies :   XR Spine Lumbar 2 or 3 View (03/22/2019 23:38)  XR Spine Lumbar 2 or 3 View (10/30/2019 13:44)  XR Shoulder 2+ View Left (In Office) (12/30/2021 13:49)  XR Spine Lumbar 2 or 3 View (In Office) (12/30/2021 13:49)         Assessment and Plan      Diagnoses and all orders for this visit:    1. Type 2 diabetes mellitus without complication, without long-term current use of insulin (HCC) (Primary)  -     metFORMIN (GLUCOPHAGE) 1000 MG tablet; Take 1 tablet by mouth 2 (Two) Times a Day With Meals.  Dispense: 180 tablet; Refill: 1  -     Semaglutide,0.25 or 0.5MG/DOS, (Ozempic, 0.25 or 0.5 MG/DOSE,) 2 MG/1.5ML solution pen-injector; Inject 0.5 mg under the skin into the appropriate area as directed 1 (One) Time Per Week.  Dispense: 3 pen; Refill: 1    2. Low testosterone  -     Ambulatory Referral to Urology    3. Vitamin D insufficiency  -     Cholecalciferol (Vitamin D) 50 MCG (2000 UT) capsule; Take 1 capsule by mouth Daily.  Dispense: 90  capsule; Refill: 0    4. Elevated ALT measurement  Comments:  has NAFLD - will monitor    5. Dyslipidemia  -     simvastatin (Zocor) 20 MG tablet; Take 1 tablet by mouth Every Night.  Dispense: 90 tablet; Refill: 0    6. Iron deficiency  Comments:  w/o anemia - other iron indices normal, including ferritin. will monitor for now    7. Chronic left shoulder pain  -     XR Shoulder 2+ View Left (In Office)  -     Ambulatory Referral to Orthopedic Surgery  -     MRI Shoulder Left Without Contrast; Future    8. History of shoulder surgery  -     Ambulatory Referral to Orthopedic Surgery  -     MRI Shoulder Left Without Contrast; Future    9. Lumbar back pain  -     XR Spine Lumbar 2 or 3 View (In Office)  -     methylPREDNISolone (MEDROL) 4 MG dose pack; Take as directed on package instructions.  Dispense: 1 each; Refill: 0            Follow Up     Return in about 3 months (around 3/30/2022) for Recheck.     Shoulder x-ray with no acute findings.  Lumbar spine x-ray with no acute findings.  I am going to attempt to order MRI of the left shoulder without contrast and refer to orthopedic surgery for further evaluation of his complaints.  For his lower back pain we will give a steroid pack at this time.  He can alternate Tylenol and ibuprofen over-the-counter.  Follow-up if no improvement; otherwise, we will see him back in approximately 3 months to repeat labs.    In the interim, he will follow through with sleep study evaluation and urology referral.  He will also continue psychiatric care with psychiatry.    Patient was given instructions and counseling regarding his condition or for health maintenance advice. Please see specific information pulled into the AVS if appropriate.     Arslan Butler  reports that he has never smoked. He has never used smokeless tobacco.

## 2022-01-12 ENCOUNTER — HOSPITAL ENCOUNTER (OUTPATIENT)
Dept: MRI IMAGING | Facility: HOSPITAL | Age: 32
Discharge: HOME | End: 2022-01-12
Admitting: NURSE PRACTITIONER

## 2022-01-12 DIAGNOSIS — Z98.890 HISTORY OF SHOULDER SURGERY: ICD-10-CM

## 2022-01-12 DIAGNOSIS — G89.29 CHRONIC LEFT SHOULDER PAIN: ICD-10-CM

## 2022-01-12 DIAGNOSIS — M25.512 CHRONIC LEFT SHOULDER PAIN: ICD-10-CM

## 2022-01-12 PROCEDURE — 73221 MRI JOINT UPR EXTREM W/O DYE: CPT

## 2022-01-13 ENCOUNTER — OFFICE VISIT (OUTPATIENT)
Dept: UROLOGY | Facility: CLINIC | Age: 32
End: 2022-01-13

## 2022-01-13 VITALS
SYSTOLIC BLOOD PRESSURE: 111 MMHG | HEIGHT: 76 IN | BODY MASS INDEX: 38.36 KG/M2 | TEMPERATURE: 98.6 F | HEART RATE: 76 BPM | WEIGHT: 315 LBS | DIASTOLIC BLOOD PRESSURE: 84 MMHG

## 2022-01-13 DIAGNOSIS — F31.75 BIPOLAR DISORDER, IN PARTIAL REMISSION, MOST RECENT EPISODE DEPRESSED: Primary | ICD-10-CM

## 2022-01-13 DIAGNOSIS — F52.4 PREMATURE EJACULATION: ICD-10-CM

## 2022-01-13 DIAGNOSIS — R79.89 LOW TESTOSTERONE: ICD-10-CM

## 2022-01-13 DIAGNOSIS — E66.9 OBESITY WITH BODY MASS INDEX GREATER THAN 30: ICD-10-CM

## 2022-01-13 DIAGNOSIS — N52.2 DRUG-INDUCED ERECTILE DYSFUNCTION: ICD-10-CM

## 2022-01-13 LAB
BILIRUB BLD-MCNC: NEGATIVE MG/DL
CLARITY, POC: CLEAR
COLOR UR: YELLOW
EXPIRATION DATE: NORMAL
GLUCOSE UR STRIP-MCNC: NEGATIVE MG/DL
KETONES UR QL: NEGATIVE
LEUKOCYTE EST, POC: NEGATIVE
Lab: NORMAL
NITRITE UR-MCNC: NEGATIVE MG/ML
PH UR: 6 [PH] (ref 5–8)
PROT UR STRIP-MCNC: NEGATIVE MG/DL
RBC # UR STRIP: NEGATIVE /UL
SP GR UR: 1.03 (ref 1–1.03)
UROBILINOGEN UR QL: NORMAL

## 2022-01-13 PROCEDURE — 99214 OFFICE O/P EST MOD 30 MIN: CPT | Performed by: NURSE PRACTITIONER

## 2022-01-13 RX ORDER — TADALAFIL 5 MG/1
TABLET ORAL
Qty: 30 TABLET | Refills: 4 | Status: SHIPPED | OUTPATIENT
Start: 2022-01-13 | End: 2022-05-20 | Stop reason: SDUPTHER

## 2022-01-13 RX ORDER — ALPRAZOLAM 0.5 MG/1
TABLET ORAL
COMMUNITY
Start: 2022-01-03 | End: 2022-03-11

## 2022-01-13 RX ORDER — TADALAFIL 5 MG/1
TABLET ORAL
Qty: 30 TABLET | Refills: 3 | Status: SHIPPED | OUTPATIENT
Start: 2022-01-13 | End: 2022-01-13 | Stop reason: SDUPTHER

## 2022-01-13 NOTE — PROGRESS NOTES
"Chief Complaint  Abnormal Lab (low testosterone level)    Subjective          Arslan Butler 31 y.o.very pleasant  male presents to [facility]  Referred per CORTNEY Banegas for low testosterone level found during routine health screening labs.  His total testosterone level was 208 on 12/23/2021.  Patient's main concern voiced was that of erectile dysfunction.  He reports having always had problems with premature ejaculation since very young age; however, in the past several months he has noticed difficulty obtaining and maintaining an erection.  Prior to that he reports did not have problems obtaining an erection.  When questioned on any changes in medical history or in health, he began  medications lithium and propanolol in October for his bipolar disorder and history of panic attacks. Patient during these episodes of severe depression, he can sleep for long periods of time.  Admits to problems sleeping during the night and naps during the day.  He has been on lisinopril for hypertension for approximately 1 year.  Patient's BMI is 41.6 currently around 342 pounds. He has lost some weight from 400 pounds previously.  He and his wife do plan on having more children and they have been attempting to get pregnant. Denies any past family history of prostate cancer. No history of recurrent uti or hematuria. Denies any previous  surgeries or testicular problems.     Review of Systems   Constitutional: Negative for chills and fever.   Respiratory: Negative.    Cardiovascular: Negative for chest pain.   Genitourinary: Negative for difficulty urinating, dysuria, hematuria, penile pain, scrotal swelling and testicular pain.   Psychiatric/Behavioral: Positive for sleep disturbance. Negative for suicidal ideas.        History of Bipolar disorder with depression      Objective   Vital Signs:   /84   Pulse 76   Temp 98.6 °F (37 °C)   Ht 193 cm (76\")   Wt (!) 155 kg (342 lb)   BMI 41.63 " kg/m²      Past Surgical History:   Procedure Laterality Date   • ROTATOR CUFF REPAIR Left     has had it done twice     • TONSILLECTOMY          Physical Exam  Vitals and nursing note reviewed.   Constitutional:       General: He is not in acute distress.     Appearance: He is well-developed and well-groomed. He is obese. He is not ill-appearing or toxic-appearing.      Comments: Ambulates without difficulty   Cardiovascular:      Rate and Rhythm: Normal rate and regular rhythm.   Pulmonary:      Effort: Pulmonary effort is normal.      Breath sounds: Normal breath sounds.   Abdominal:      General: Bowel sounds are normal. There is no distension.      Palpations: Abdomen is soft. There is no mass.      Tenderness: There is no abdominal tenderness. There is no guarding or rebound.      Hernia: No hernia is present. There is no hernia in the left inguinal area or right inguinal area.   Genitourinary:     Penis: Normal. No erythema, tenderness, discharge, swelling or lesions.       Testes: Normal.      Epididymis:      Right: Normal. No tenderness.      Left: Normal. No tenderness.   Musculoskeletal:         General: Normal range of motion.   Lymphadenopathy:      Lower Body: No right inguinal adenopathy. No left inguinal adenopathy.   Skin:     General: Skin is warm and dry.   Neurological:      Mental Status: He is alert and oriented to person, place, and time.   Psychiatric:         Mood and Affect: Mood normal.         Behavior: Behavior normal. Behavior is cooperative.         Thought Content: Thought content normal.         Judgment: Judgment normal.        Result Review :        POC Urinalysis Dipstick, Automated (01/13/2022 09:38)              Assessment and Plan    Diagnoses and all orders for this visit:    1. Low testosterone (Primary)  -     POC Urinalysis Dipstick, Automated    2. Drug-induced erectile dysfunction  -     Discontinue: tadalafil (CIALIS) 5 MG tablet; Take 1 tablet po daily  Dispense: 30  tablet; Refill: 3  -     tadalafil (CIALIS) 5 MG tablet; Take 1 tablet po daily  Dispense: 30 tablet; Refill: 4    3. Bipolar disorder, in partial remission, most recent episode depressed (HCC)    4. Premature ejaculation    5. Obesity with body mass index greater than 30    I will check full testosterone panel with associated labs to evaluate if possible candidate for possible anastrozole or clomid. Due to patient age and wanting to have children, testosterone would not be advised if needed since it may cause problems with fertility. Obesity primary causative factor for low testosterone levels in younger men. Physical exam normal and testes no abnormality and not atrophic. Encouraged improving diet with healthier foods and expecially encouraged physical activity which will help with weight loss and also with mood and depression. Advised to have sleep study as planned and discussed sleep hygiene. To not nap during day and try to maintain a regular sleep schedule. Also discussed premature ejaculation which is a different issue from difficulty obtain attaining erection.  I have approved provided him with educational information on techniques to prevent premature ejaculation.  We will try on low-dose tadalafil for his erection issues.  Instructions provided along with risk.  I have also included a lithium level and labs to be drawn to be sure that reference for the future to be sure not affected by tadalafil dosing.  I have also discussed side effects of beta-blockers such as propanolol and antihypertensive on vascular resistance and contributive factors of erectile dysfunction.  However with weight loss, often issues with hypertension resolve.  He will discuss the propanolol with his mental health provider since it does not help his anxiety.  I will see him back in 3 weeks to see how the medication and techniques help with erectile quality and then we will discuss if possible option of anastrozole.  If his estrogen  levels are elevated, Clomid may be also an option.  Fatigue also may be associated with severe depression.    Follow Up   Return in about 3 weeks (around 2/3/2022).  Patient was given instructions and counseling regarding his condition or for health maintenance advice. Please see specific information pulled into the AVS if appropriate.     Ariana Huynh, APRN

## 2022-01-14 ENCOUNTER — TELEPHONE (OUTPATIENT)
Dept: FAMILY MEDICINE CLINIC | Facility: CLINIC | Age: 32
End: 2022-01-14

## 2022-01-14 ENCOUNTER — LAB (OUTPATIENT)
Dept: LAB | Facility: HOSPITAL | Age: 32
End: 2022-01-14

## 2022-01-14 DIAGNOSIS — J30.9 ALLERGIC RHINITIS, UNSPECIFIED SEASONALITY, UNSPECIFIED TRIGGER: ICD-10-CM

## 2022-01-14 DIAGNOSIS — R79.89 LOW TESTOSTERONE: ICD-10-CM

## 2022-01-14 DIAGNOSIS — E66.9 OBESITY WITH BODY MASS INDEX GREATER THAN 30: ICD-10-CM

## 2022-01-14 DIAGNOSIS — F52.4 PREMATURE EJACULATION: ICD-10-CM

## 2022-01-14 DIAGNOSIS — F31.75 BIPOLAR DISORDER, IN PARTIAL REMISSION, MOST RECENT EPISODE DEPRESSED: ICD-10-CM

## 2022-01-14 DIAGNOSIS — K21.9 GASTROESOPHAGEAL REFLUX DISEASE, UNSPECIFIED WHETHER ESOPHAGITIS PRESENT: Primary | ICD-10-CM

## 2022-01-14 DIAGNOSIS — N52.2 DRUG-INDUCED ERECTILE DYSFUNCTION: ICD-10-CM

## 2022-01-14 LAB
FSH SERPL-ACNC: 2.6 MIU/ML
LH SERPL-ACNC: 3.77 MIU/ML
PROLACTIN SERPL-MCNC: 14.2 NG/ML (ref 4.04–15.2)

## 2022-01-14 PROCEDURE — 83001 ASSAY OF GONADOTROPIN (FSH): CPT

## 2022-01-14 PROCEDURE — 36415 COLL VENOUS BLD VENIPUNCTURE: CPT

## 2022-01-14 PROCEDURE — 84410 TESTOSTERONE BIOAVAILABLE: CPT

## 2022-01-14 PROCEDURE — 83002 ASSAY OF GONADOTROPIN (LH): CPT

## 2022-01-14 PROCEDURE — 84270 ASSAY OF SEX HORMONE GLOBUL: CPT

## 2022-01-14 PROCEDURE — 84146 ASSAY OF PROLACTIN: CPT

## 2022-01-14 PROCEDURE — 82672 ASSAY OF ESTROGEN: CPT

## 2022-01-14 PROCEDURE — 84402 ASSAY OF FREE TESTOSTERONE: CPT

## 2022-01-14 PROCEDURE — 84403 ASSAY OF TOTAL TESTOSTERONE: CPT

## 2022-01-14 RX ORDER — OMEPRAZOLE 20 MG/1
20 CAPSULE, DELAYED RELEASE ORAL DAILY
Qty: 90 CAPSULE | Refills: 1 | Status: SHIPPED | OUTPATIENT
Start: 2022-01-14 | End: 2022-06-13 | Stop reason: SDUPTHER

## 2022-01-14 RX ORDER — LORATADINE 10 MG/1
10 TABLET ORAL DAILY
Qty: 90 TABLET | Refills: 1 | Status: SHIPPED | OUTPATIENT
Start: 2022-01-14 | End: 2022-06-13 | Stop reason: SDUPTHER

## 2022-01-14 NOTE — TELEPHONE ENCOUNTER
Caller: Arslan Butler    Relationship: Self    Best call back number: [phone]    Requested Prescriptions: loratadine (Claritin) 10 MG tablet AND omeprazole (priLOSEC) 20 MG capsule  Requested Prescriptions      No prescriptions requested or ordered in this encounter        Pharmacy where request should be sent:     [facility] [address] [place] [address] - [phone]  - [phone]   [phone]     Additional details provided by patient: YES TOTALLY OUT    Does the patient have less than a 3 day supply:  [x] Yes  [] No    Yoel Lopez Rep   01/14/22 10:14 EST       PATIENT IS NOW USING [facility] SO PLEASE REMOVE [facility] FROM PROFILE

## 2022-01-16 LAB
SHBG SERPL-SCNC: 16.5 NMOL/L (ref 16.5–55.9)
TESTOST FREE SERPL-MCNC: 7.1 PG/ML (ref 8.7–25.1)
TESTOST SERPL-MCNC: 324 NG/DL (ref 264–916)

## 2022-01-17 ENCOUNTER — OFFICE VISIT (OUTPATIENT)
Dept: ORTHOPEDIC SURGERY | Facility: CLINIC | Age: 32
End: 2022-01-17

## 2022-01-17 VITALS — BODY MASS INDEX: 38.36 KG/M2 | HEIGHT: 76 IN | OXYGEN SATURATION: 97 % | WEIGHT: 315 LBS | HEART RATE: 87 BPM

## 2022-01-17 DIAGNOSIS — R20.0 LEFT ARM NUMBNESS: Primary | ICD-10-CM

## 2022-01-17 DIAGNOSIS — M75.102 TEAR OF LEFT ROTATOR CUFF, UNSPECIFIED TEAR EXTENT, UNSPECIFIED WHETHER TRAUMATIC: ICD-10-CM

## 2022-01-17 PROCEDURE — 99213 OFFICE O/P EST LOW 20 MIN: CPT | Performed by: STUDENT IN AN ORGANIZED HEALTH CARE EDUCATION/TRAINING PROGRAM

## 2022-01-17 NOTE — PROGRESS NOTES
Chief Complaint  Initial Evaluation and Pain of the Left Shoulder    Subjective          Arslan Butler presents to [facility] for   History of Present Illness    Arslan presents for evaluation of his left shoulder.  He has a long history related to his left shoulder.  He had an injury at work where he caught a door that was falling and tore his left rotator cuff.  He had surgery in 2014 and a repeat shoulder arthroscopy in 2016, both of which were performed in [place].  He describes persistent pain and weakness in the arm since his injury.  He did not notice much relief with surgical management.  He also describes radiating numbness and pain extending into the hand.  He has weakness with  in the hand.  He has tremulous motions with the arm.  He denies any new falls or trauma.  He is not currently employed.  He is right-hand dominant.  He previously saw Dr. Rubio and was referred to neurology.  He reports he has not had an EMG study performed.  He has since had a repeat MRI of his left shoulder.    Allergies   Allergen Reactions   • Sulfamethoxazole-Trimethoprim Hives        Social History     Socioeconomic History   • Marital status:    Tobacco Use   • Smoking status: Never Smoker   • Smokeless tobacco: Never Used   Vaping Use   • Vaping Use: Some days   • Substances: Nicotine, Flavoring   • Devices: Disposable, Refillable tank   Substance and Sexual Activity   • Alcohol use: Yes     Comment: ocassionally    • Drug use: Never   • Sexual activity: Defer        I reviewed the patient's chief complaint, history of present illness, review of systems, past medical history, surgical history, family history, social history, medications, and allergy list.     REVIEW OF SYSTEMS    Constitutional: Denies fevers, chills, weight loss  Cardiovascular: Denies chest pain, shortness of breath  Skin: Denies rashes, acute skin changes  Neurologic: Denies headache, loss of consciousness  MSK:  "Left shoulder pain, paresthesia      Objective   Vital Signs:   Pulse 87   Ht 193 cm (76\")   Wt (!) 155 kg (342 lb)   SpO2 97%   BMI 41.63 kg/m²     Body mass index is 41.63 kg/m².    Physical Exam    General: Alert. No acute distress.   Cervical spine: No pain with cervical range of motion  Left upper extremity: Well-healed arthroscopy portals about the shoulder.  Hypersensitive to light touch about the left shoulder.  Active elevation to 90 degrees.  With assistance can elevate to 160 degrees with increasing pain and shaking of the arm with elevation.  External rotation to 30 degrees.  Internal rotation to the lower lumbar spine.  4- out of 5 with rotator cuff testing.  Pain and shaking with rotator cuff testing.  Sensation intact in the axillary nerve distribution.  Paresthesias in the hand but sensation remains intact in the median, radial, ulnar nerve distribution.  Full active finger range of motion.  Negative Kiersten.  Palpable radial pulse.    Procedures    Imaging Results (Most Recent)     None                   Assessment and Plan    Diagnoses and all orders for this visit:    1. Left arm numbness (Primary)  -     EMG & Nerve Conduction Test; Future    2. Tear of left rotator cuff, unspecified tear extent, unspecified whether traumatic        Arslan has a history of a left rotator cuff tear with subsequent arthroscopic surgery x2 performed in [place].  The most recent surgery was 2016.  He has persistent pain, stiffness, and paresthesias in the left upper extremity.  He also has a shaking/tremulousness that presents itself with attempted active elevation.  His repeat MRI shows severe fatty atrophy of the supraspinatus muscle belly.  He also has tearing at the posterior aspect of the supraspinatus tendon near the infraspinatus junction.  There appears to be labral tearing within the shoulder as well.  He has a challenging presentation overall.  I do think there may be a nerve component.  We will " order an EMG study.  We briefly discussed his fatty atrophy and the challenges that presents.  He may require muscle transfer or SCR in the future.  We will review his EMG results and discuss additional treatment when he returns.  No x-rays are needed.    Call or return if symptoms worsen or patient has any concerns.   .     Scribed for Arian Steen MD by Arian Steen MD  01/17/2022   08:57 EST         Follow Up   Return for After EMG results .  Patient was given instructions and counseling regarding his condition or for health maintenance advice. Please see specific information pulled into the AVS if appropriate.       I have personally performed the services described in this document as scribed by the above individual and it is both accurate and complete.     Arian Steen MD  01/17/22  12:20 EST

## 2022-01-20 ENCOUNTER — PATIENT ROUNDING (BHMG ONLY) (OUTPATIENT)
Dept: UROLOGY | Facility: CLINIC | Age: 32
End: 2022-01-20

## 2022-01-20 LAB — ESTROGEN SERPL-MCNC: 48 PG/ML (ref 56–213)

## 2022-01-20 NOTE — PROGRESS NOTES
January 20, 2022    Hello, may I speak with Arslan Butler?    My name is Yojana    I am  with [facility]  [address]  [address]  [place] [address] [phone].    Before we get started may I verify your date of birth? 1990    I am calling to officially welcome you to our practice and ask about your recent visit. Is this a good time to talk? No, left message    Tell me about your visit with us. What things went well? left message       We're always looking for ways to make our patients' experiences even better. Do you have recommendations on ways we may improve?      Overall were you satisfied with your first visit to our practice?       I appreciate you taking the time to speak with me today. Is there anything else I can do for you?       Thank you, and have a great day.

## 2022-01-22 LAB
TESTOST SERPL-MCNC: 313 NG/DL
TESTOSTERONE.FREE+WB MFR SERPL: 50.4 %
TESTOSTERONE.FREE+WB SERPL-MCNC: 158 NG/DL

## 2022-01-28 ENCOUNTER — OFFICE VISIT (OUTPATIENT)
Dept: SLEEP MEDICINE | Facility: HOSPITAL | Age: 32
End: 2022-01-28

## 2022-01-28 VITALS
TEMPERATURE: 97.3 F | OXYGEN SATURATION: 97 % | SYSTOLIC BLOOD PRESSURE: 131 MMHG | DIASTOLIC BLOOD PRESSURE: 88 MMHG | HEIGHT: 72 IN | BODY MASS INDEX: 42.66 KG/M2 | WEIGHT: 315 LBS | HEART RATE: 88 BPM

## 2022-01-28 DIAGNOSIS — E66.01 MORBID OBESITY: ICD-10-CM

## 2022-01-28 DIAGNOSIS — G47.8 NON-RESTORATIVE SLEEP: ICD-10-CM

## 2022-01-28 DIAGNOSIS — G47.10 HYPERSOMNIA: ICD-10-CM

## 2022-01-28 DIAGNOSIS — G47.30 SLEEP APNEA, UNSPECIFIED TYPE: Primary | ICD-10-CM

## 2022-01-28 PROCEDURE — 99214 OFFICE O/P EST MOD 30 MIN: CPT | Performed by: FAMILY MEDICINE

## 2022-01-28 PROCEDURE — G0463 HOSPITAL OUTPT CLINIC VISIT: HCPCS | Performed by: FAMILY MEDICINE

## 2022-01-28 NOTE — PROGRESS NOTES
[facility] New Patient/Consultation       Reason for Consultation: Fatigue snoring daytime somnolence      Patient Care Team:  Mandy Bonilla APRN as PCP - General (Nurse Practitioner)  Jose Lennon MD as Consulting Physician (Sleep Medicine)      History of present illness:  Thank you for asking me to see your patient.  The patient is a 31 y.o. male with hyperlipidemia type 2 diabetes mellitus GERD anxiety depression bipolar disorder presents with concern for sleep disorder.  Patient reports hypersomnia nonrestorative sleep snoring witnessed apneas waking with morning headache waking with dry mouth setups to sleep during the day nocturia up to 3 times a night and more sleepy when he increases sleep time.  Has gained 50 to 100 pounds in the past 5 years.  No history of prior sleep study or tonsillectomy.  Has difficulty driving due to sleepiness.  No family history of sleep apnea he is aware of.  Patient is morbidly obese with BMI 47.5.    Sleep Schedule:  Bed time: 10 PM to 12 AM  Sleep latency: 20 minutes to 1 hour  Wake time: Every couple of hours  Average hours slept: Varies  Non-restorative sleep: Yes  Number of naps per day: 1-2  Rotating shifts?:  No  Nocturia: Up to 3 times a night  Electronics in bedroom: No    Excessive daytime sleepiness or drowsiness:Y  Any accidents at work due to sleepiness in the last 5 years:Y  Any difficulty driving due to sleepiness or being drowsy: Y  Weight changed in the last 5 years:Y - GAINED  LBS    Snoring:Y  Witnessed apneas:Y  Have you ever awakened gasping for breath, coughing, choking or respiratory discomfort: N  Morning headaches: Y  Awaken with a sore throat or dry mouth: Y    Any reports of leg jerking at night: N  Urge sensations: N  Does pain disrupt sleep: N  Sweating during sleep: N  Teeth grinding: N    Any sudden episodes of sleep during the day: Y  Sleep paralysis/hallucinations: N  Muscle weakness with laughing/anger: N  Nightmares:  "N  Sleep walking: N    Are you sleepy when you increase your sleep time: Y  Do you sleep better away from your own bed: N    Social History: Unemployed e-cigarette use social alcohol use 3-4 caffeinated beverages a day    Allergies:  Sulfamethoxazole-trimethoprim    Family History: DONAVAN no       Current Outpatient Medications:   •  ALPRAZolam (XANAX) 0.5 MG tablet, , Disp: , Rfl:   •  buPROPion XL (WELLBUTRIN XL) 150 MG 24 hr tablet, Take 150 mg by mouth Daily., Disp: , Rfl:   •  Cholecalciferol (Vitamin D) 50 MCG (2000 UT) capsule, Take 1 capsule by mouth Daily., Disp: 90 capsule, Rfl: 0  •  lisinopril (PRINIVIL,ZESTRIL) 10 MG tablet, Take 10 mg by mouth Daily., Disp: , Rfl:   •  lithium (ESKALITH) 450 MG CR tablet, Take 450 mg by mouth 2 (Two) Times a Day., Disp: , Rfl:   •  loratadine (Claritin) 10 MG tablet, Take 1 tablet by mouth Daily., Disp: 90 tablet, Rfl: 1  •  metFORMIN (GLUCOPHAGE) 1000 MG tablet, Take 1 tablet by mouth 2 (Two) Times a Day With Meals., Disp: 180 tablet, Rfl: 1  •  omeprazole (priLOSEC) 20 MG capsule, Take 1 capsule by mouth Daily., Disp: 90 capsule, Rfl: 1  •  propranolol (INDERAL) 10 MG tablet, Take 10 mg by mouth 3 (Three) Times a Day., Disp: , Rfl:   •  Semaglutide,0.25 or 0.5MG/DOS, (Ozempic, 0.25 or 0.5 MG/DOSE,) 2 MG/1.5ML solution pen-injector, Inject 0.5 mg under the skin into the appropriate area as directed 1 (One) Time Per Week., Disp: 3 pen, Rfl: 1  •  simvastatin (Zocor) 20 MG tablet, Take 1 tablet by mouth Every Night., Disp: 90 tablet, Rfl: 0  •  tadalafil (CIALIS) 5 MG tablet, Take 1 tablet po daily, Disp: 30 tablet, Rfl: 4    Vital Signs:    Vitals:    01/28/22 1300   BP: 131/88   Pulse: 88   Temp: 97.3 °F (36.3 °C)   SpO2: 97%   Weight: (!) 159 kg (350 lb)   Height: 182.9 cm (72\")      Body mass index is 47.47 kg/m².         REVIEW OF SYSTEMS.  Full review of systems available on the intake form which is scanned in the media tab.  The relevant positive are noted " "below  1. Daytime excessive sleepiness with [place] Sleepiness Scale :Total score: 17   2. Snoring  3. Nasal congestion neck pain anxiety depression frequent heartburn      Physical exam:  Vitals:    01/28/22 1300   BP: 131/88   Pulse: 88   Temp: 97.3 °F (36.3 °C)   SpO2: 97%   Weight: (!) 159 kg (350 lb)   Height: 182.9 cm (72\")    Body mass index is 47.47 kg/m².    HEENT: Head is atraumatic, normocephalic  Eyes: pupils are round equal and reacting to light and accommodation, conjunctiva normal  Nose: no nasal septal defects or deviation and the nasal passages are clear, no nasal polyps,  Throat: tongue normal, oral airway Mallampati class IV  NECK: , trachea is in the midline, thyroid not enlarged  RESPIRATORY SYSTEM: Breath sounds are equal on both sides, there are no wheezes   CARDIOVASULAR SYSTEM: Heart sounds are regular rhythm and gurmeet rate, no edema  EXTREMITES: No cyanosis, clubbing  NEUROLOGICAL SYSTEM: Oriented x 3, no gross motor defects, gait normal      Impression:  1. Sleep apnea, unspecified type    2. Hypersomnia    3. Non-restorative sleep    4. Morbid obesity (HCC)        Plan:    Good sleep hygiene measures should be maintained.  Weight loss would be beneficial in this patient who is morbidly obese BMI 47.5.    I discussed the pathophysiology of obstructive sleep apnea with the patient.  We discussed the adverse outcomes associated with untreated sleep-disordered breathing.  We discussed treatment modalities of obstructive sleep apnea including CPAP device as well as oral mandibular advancement device. Sleep study will be scheduled to establish definitive diagnosis of sleep disorder breathing.  Weight loss will be strongly beneficial in order to reduce the severity of sleep-disordered breathing.  Patient has narrow oropharyngeal structure.  Caution during activities that require prolonged concentration is strongly advised.  Patient will be notified of sleep study results after sleep study is " completed.  If sleep apnea is only mild,  oral mandibular advancement device may be one of the treatment options.  However if sleep apnea is moderately severe, CPAP treatment will be strongly encouraged.  The patient is not opposed to treatment with CPAP device if we confirm significant obstructive sleep apnea on polysomnography.     Thank you for allowing me to participate in your patient's care.    Jose Lennon MD  Sleep Medicine  01/28/22  13:55 EST

## 2022-01-31 DIAGNOSIS — E55.9 VITAMIN D INSUFFICIENCY: ICD-10-CM

## 2022-01-31 RX ORDER — MULTIVIT-MIN/IRON/FOLIC ACID/K 18-600-40
2000 CAPSULE ORAL DAILY
Qty: 90 CAPSULE | Refills: 0 | OUTPATIENT
Start: 2022-01-31

## 2022-02-08 ENCOUNTER — OFFICE VISIT (OUTPATIENT)
Dept: FAMILY MEDICINE CLINIC | Facility: CLINIC | Age: 32
End: 2022-02-08

## 2022-02-08 ENCOUNTER — OFFICE VISIT (OUTPATIENT)
Dept: UROLOGY | Facility: CLINIC | Age: 32
End: 2022-02-08

## 2022-02-08 VITALS
SYSTOLIC BLOOD PRESSURE: 120 MMHG | TEMPERATURE: 97.7 F | DIASTOLIC BLOOD PRESSURE: 88 MMHG | HEART RATE: 83 BPM | WEIGHT: 315 LBS | BODY MASS INDEX: 42.66 KG/M2 | HEIGHT: 72 IN

## 2022-02-08 VITALS
WEIGHT: 315 LBS | HEART RATE: 104 BPM | SYSTOLIC BLOOD PRESSURE: 126 MMHG | BODY MASS INDEX: 45.9 KG/M2 | DIASTOLIC BLOOD PRESSURE: 84 MMHG | OXYGEN SATURATION: 97 % | TEMPERATURE: 97.1 F

## 2022-02-08 DIAGNOSIS — M54.42 ACUTE LEFT-SIDED LOW BACK PAIN WITH LEFT-SIDED SCIATICA: Primary | ICD-10-CM

## 2022-02-08 DIAGNOSIS — F31.75 BIPOLAR DISORDER, IN PARTIAL REMISSION, MOST RECENT EPISODE DEPRESSED: ICD-10-CM

## 2022-02-08 DIAGNOSIS — N52.1 ERECTILE DYSFUNCTION DUE TO DISEASES CLASSIFIED ELSEWHERE: Primary | ICD-10-CM

## 2022-02-08 DIAGNOSIS — R53.83 FATIGUE, UNSPECIFIED TYPE: ICD-10-CM

## 2022-02-08 LAB
BILIRUB BLD-MCNC: NEGATIVE MG/DL
CLARITY, POC: CLEAR
COLOR UR: YELLOW
EXPIRATION DATE: NORMAL
GLUCOSE UR STRIP-MCNC: NEGATIVE MG/DL
KETONES UR QL: NEGATIVE
LEUKOCYTE EST, POC: NEGATIVE
Lab: NORMAL
NITRITE UR-MCNC: NEGATIVE MG/ML
PH UR: 5 [PH] (ref 5–8)
PROT UR STRIP-MCNC: NEGATIVE MG/DL
RBC # UR STRIP: NEGATIVE /UL
SP GR UR: 1.02 (ref 1–1.03)
UROBILINOGEN UR QL: NORMAL

## 2022-02-08 PROCEDURE — 96372 THER/PROPH/DIAG INJ SC/IM: CPT | Performed by: FAMILY MEDICINE

## 2022-02-08 PROCEDURE — 99213 OFFICE O/P EST LOW 20 MIN: CPT | Performed by: FAMILY MEDICINE

## 2022-02-08 PROCEDURE — 99214 OFFICE O/P EST MOD 30 MIN: CPT | Performed by: NURSE PRACTITIONER

## 2022-02-08 RX ORDER — DEXAMETHASONE SODIUM PHOSPHATE 4 MG/ML
4 INJECTION, SOLUTION INTRA-ARTICULAR; INTRALESIONAL; INTRAMUSCULAR; INTRAVENOUS; SOFT TISSUE ONCE
Status: COMPLETED | OUTPATIENT
Start: 2022-02-08 | End: 2022-02-08

## 2022-02-08 RX ORDER — TIZANIDINE 2 MG/1
2 TABLET ORAL EVERY 8 HOURS PRN
Qty: 30 TABLET | Refills: 1 | Status: SHIPPED | OUTPATIENT
Start: 2022-02-08 | End: 2022-03-11

## 2022-02-08 RX ADMIN — DEXAMETHASONE SODIUM PHOSPHATE 4 MG: 4 INJECTION, SOLUTION INTRA-ARTICULAR; INTRALESIONAL; INTRAMUSCULAR; INTRAVENOUS; SOFT TISSUE at 11:45

## 2022-02-08 NOTE — PROGRESS NOTES
Chief Complaint  Back Pain (onset 2-4-22 but has hurt off/on since 2016)    Subjective          Arslan Butler presents to [facility]  BG has been 130-150, per pt  Reviewed previous lumbar x-ray report from 12/30/2021    Back Pain  This is a new problem. The current episode started in the past 7 days. The problem occurs constantly. The problem is unchanged. The pain is present in the lumbar spine (left side). The quality of the pain is described as aching. The pain radiates to the left foot, left thigh and left knee. The pain is moderate. The symptoms are aggravated by bending and twisting. Pertinent negatives include no abdominal pain, bladder incontinence, bowel incontinence, chest pain, dysuria, fever, headaches, leg pain, numbness, paresis, paresthesias, pelvic pain, perianal numbness, tingling, weakness or weight loss. Treatments tried: tylenol. The treatment provided no relief.       Objective   Allergies   Allergen Reactions   • Sulfamethoxazole-Trimethoprim Hives     Immunization History   Administered Date(s) Administered   • COVID-19 (MODERNA) 1st, 2nd, 3rd Dose Only 04/10/2021, 05/05/2021, 08/19/2021, 09/17/2021   • DTP 03/11/1996   • Hep B, Adolescent or Pediatric 06/22/2001, 07/23/2001, 08/02/2002   • MMR 03/11/1996   • OPV 03/11/1996     Past Medical History:   Diagnosis Date   • Anxiety and depression 12/17/2021   • Bipolar disorder (HCC) 12/15/2021   • Gastroesophageal reflux disease 12/15/2021   • Hyperlipidemia 12/15/2021   • Obesity 12/15/2021      Past Surgical History:   Procedure Laterality Date   • ROTATOR CUFF REPAIR Left     has had it done twice     • TONSILLECTOMY        Social History     Socioeconomic History   • Marital status:    Tobacco Use   • Smoking status: Never Smoker   • Smokeless tobacco: Never Used   Vaping Use   • Vaping Use: Some days   • Substances: Nicotine, Flavoring   • Devices: Disposable, Refillable tank   Substance and Sexual  Activity   • Alcohol use: Yes     Comment: ocassionally    • Drug use: Never   • Sexual activity: Defer        Current Outpatient Medications:   •  ALPRAZolam (XANAX) 0.5 MG tablet, , Disp: , Rfl:   •  buPROPion XL (WELLBUTRIN XL) 150 MG 24 hr tablet, Take 150 mg by mouth Daily., Disp: , Rfl:   •  Cholecalciferol (Vitamin D) 50 MCG (2000 UT) capsule, Take 1 capsule by mouth Daily., Disp: 90 capsule, Rfl: 0  •  lisinopril (PRINIVIL,ZESTRIL) 10 MG tablet, Take 10 mg by mouth Daily., Disp: , Rfl:   •  lithium (ESKALITH) 450 MG CR tablet, Take 450 mg by mouth 2 (Two) Times a Day., Disp: , Rfl:   •  loratadine (Claritin) 10 MG tablet, Take 1 tablet by mouth Daily., Disp: 90 tablet, Rfl: 1  •  metFORMIN (GLUCOPHAGE) 1000 MG tablet, Take 1 tablet by mouth 2 (Two) Times a Day With Meals., Disp: 180 tablet, Rfl: 1  •  omeprazole (priLOSEC) 20 MG capsule, Take 1 capsule by mouth Daily., Disp: 90 capsule, Rfl: 1  •  propranolol (INDERAL) 10 MG tablet, Take 10 mg by mouth 3 (Three) Times a Day., Disp: , Rfl:   •  Semaglutide,0.25 or 0.5MG/DOS, (Ozempic, 0.25 or 0.5 MG/DOSE,) 2 MG/1.5ML solution pen-injector, Inject 0.5 mg under the skin into the appropriate area as directed 1 (One) Time Per Week., Disp: 3 pen, Rfl: 1  •  simvastatin (Zocor) 20 MG tablet, Take 1 tablet by mouth Every Night., Disp: 90 tablet, Rfl: 0  •  tadalafil (CIALIS) 5 MG tablet, Take 1 tablet po daily, Disp: 30 tablet, Rfl: 4  •  tiZANidine (ZANAFLEX) 2 MG tablet, Take 1 tablet by mouth Every 8 (Eight) Hours As Needed for Muscle Spasms., Disp: 30 tablet, Rfl: 1    Current Facility-Administered Medications:   •  dexamethasone (DECADRON) injection 4 mg, 4 mg, Intramuscular, Once, Larry Mccarthy MD   Family History   Problem Relation Age of Onset   • Heart attack Maternal Grandfather    • Diabetes Paternal Grandmother           Vital Signs:   Vitals:    02/08/22 1120   BP: 126/84   Pulse: 104   Temp: 97.1 °F (36.2 °C)   SpO2: 97%   Weight: (!) 153 kg  (338 lb 6.4 oz)       Physical Exam  Vitals reviewed.   Constitutional:       Appearance: Normal appearance. He is well-developed.   HENT:      Head: Normocephalic and atraumatic.      Right Ear: External ear normal.      Left Ear: External ear normal.      Mouth/Throat:      Pharynx: No oropharyngeal exudate.   Eyes:      Conjunctiva/sclera: Conjunctivae normal.      Pupils: Pupils are equal, round, and reactive to light.   Cardiovascular:      Rate and Rhythm: Normal rate and regular rhythm.      Pulses: Normal pulses.      Heart sounds: Normal heart sounds. No murmur heard.  No friction rub. No gallop.    Pulmonary:      Effort: Pulmonary effort is normal.      Breath sounds: Normal breath sounds. No wheezing or rhonchi.   Abdominal:      General: Abdomen is flat. Bowel sounds are normal. There is no distension.      Palpations: Abdomen is soft. There is no mass.      Tenderness: There is no abdominal tenderness. There is no right CVA tenderness, left CVA tenderness, guarding or rebound.      Hernia: No hernia is present.   Musculoskeletal:         General: Normal range of motion.      Comments: Left lower back paraspinal muscle tenderness, neg straight leg raise.  No vertebrae tenderness.  No redness, warmth, bruising, or swelling.   Skin:     General: Skin is warm and dry.      Capillary Refill: Capillary refill takes less than 2 seconds.   Neurological:      General: No focal deficit present.      Mental Status: He is alert and oriented to person, place, and time.      Cranial Nerves: No cranial nerve deficit.   Psychiatric:         Mood and Affect: Mood and affect normal.         Behavior: Behavior normal.         Thought Content: Thought content normal.         Judgment: Judgment normal.        Result Review :                 Assessment and Plan    Diagnoses and all orders for this visit:    1. Acute left-sided low back pain with left-sided sciatica (Primary)  -     Ambulatory Referral to Physical Therapy  -      tiZANidine (ZANAFLEX) 2 MG tablet; Take 1 tablet by mouth Every 8 (Eight) Hours As Needed for Muscle Spasms.  Dispense: 30 tablet; Refill: 1  -     dexamethasone (DECADRON) injection 4 mg            Follow Up   Return in about 3 weeks (around 3/1/2022), or if symptoms worsen or fail to improve, for Recheck.  Patient was given instructions and counseling regarding his condition or for health maintenance advice. Please see specific information pulled into the AVS if appropriate.   Pt will monitor BG daily and if BG gets to 300, or higher, he will go to ER.  Discussed using ice in PM and moist heat in AM daily for back.

## 2022-02-08 NOTE — PROGRESS NOTES
"Chief Complaint  Hypogonadism    Subjective          Arslan Butler 31 y.o. male presents to [facility]  Patient here for follow up on testosterone lab results and reports that the daily tadalafil is effective for erectile dysfunction. Testosterone repeat am level was above 300 on 1-14-22 which is considered within acceptable range per guidelines. Testosterone is not an option since he is planning on having children. I had considered possibly trying on clomid however is estrogen level was low and would also pose a problem with needed estrogen.  Patient's main concern with erectile dysfunction resolved. Some of patients fatigue associated with his depression and has not been as active. Unable to work due to problems with his shoulder and pain.     Review of Systems   Constitutional: Negative for fever.   Musculoskeletal: Positive for joint swelling and myalgias (  left shoulder pain and muscle atrophy).   Psychiatric/Behavioral: Negative for suicidal ideas.        History of depression and bipolar disorder      Objective   Vital Signs:   /88   Pulse 83   Temp 97.7 °F (36.5 °C)   Ht 182.9 cm (72\")   Wt (!) 159 kg (350 lb)   BMI 47.47 kg/m²      Past Surgical History:   Procedure Laterality Date   • ROTATOR CUFF REPAIR Left     has had it done twice     • TONSILLECTOMY          Physical Exam  Vitals and nursing note reviewed.   Constitutional:       General: He is not in acute distress.     Appearance: He is well-developed, well-groomed and overweight. He is not ill-appearing.      Comments: Ambulates without difficulty.   Cardiovascular:      Rate and Rhythm: Normal rate and regular rhythm.   Pulmonary:      Effort: Pulmonary effort is normal.   Neurological:      Mental Status: He is alert.   Psychiatric:         Behavior: Behavior is cooperative.        Result Review :                 Assessment and Plan    Diagnoses and all orders for this visit:    1. Erectile dysfunction due to " diseases classified elsewhere (Primary)  -     POC Urinalysis Dipstick, Automated  -     Testosterone (Free & Total), LC / MS; Future  -     Estradiol; Future  -     Estrogens, Total; Future    2. Bipolar disorder, in partial remission, most recent episode depressed (HCC)    3. Fatigue, unspecified type  -     Testosterone (Free & Total), LC / MS; Future  -     Estradiol; Future  -     Estrogens, Total; Future    Patient testosterone level recheck was above 300 and estrogen level slightly low. Had discussed possible clomid however may cause decrease in estrogen which is needed. I believe his recent weight loss affecting level. Will recheck in 2.5 months to include estradiol prior to follow up 3 months. Patient testosterone level is considered wnl and patient still plans for having children, testosterone not advised due to may cause fertility problems. Also discussed lithium and patient states plan to stop because unable to take motrin etc and his left shoulder is too painful. May discuss possible change in medication, possible newer medication  trintellex if an option. Patient is having some financial concerns. Daily tadalafil helping erectile problems and will continue.     Follow Up   Return in about 3 months (around 5/8/2022) for labs to be collected week prior to follow up visit..  Patient was given instructions and counseling regarding his condition or for health maintenance advice. Please see specific information pulled into the AVS if appropriate.     CORTNEY Ruby

## 2022-02-22 ENCOUNTER — HOSPITAL ENCOUNTER (OUTPATIENT)
Dept: SLEEP MEDICINE | Facility: HOSPITAL | Age: 32
Discharge: HOME | End: 2022-02-22
Admitting: FAMILY MEDICINE

## 2022-02-22 DIAGNOSIS — G47.10 HYPERSOMNIA: ICD-10-CM

## 2022-02-22 DIAGNOSIS — G47.8 NON-RESTORATIVE SLEEP: ICD-10-CM

## 2022-02-22 DIAGNOSIS — G47.30 SLEEP APNEA, UNSPECIFIED TYPE: ICD-10-CM

## 2022-02-22 DIAGNOSIS — E66.01 MORBID OBESITY: ICD-10-CM

## 2022-02-22 PROCEDURE — 95806 SLEEP STUDY UNATT&RESP EFFT: CPT

## 2022-02-22 PROCEDURE — 95806 SLEEP STUDY UNATT&RESP EFFT: CPT | Performed by: FAMILY MEDICINE

## 2022-02-24 ENCOUNTER — PROCEDURE VISIT (OUTPATIENT)
Dept: NEUROLOGY | Facility: CLINIC | Age: 32
End: 2022-02-24

## 2022-02-24 VITALS
HEIGHT: 76 IN | WEIGHT: 315 LBS | SYSTOLIC BLOOD PRESSURE: 148 MMHG | HEART RATE: 82 BPM | BODY MASS INDEX: 38.36 KG/M2 | DIASTOLIC BLOOD PRESSURE: 106 MMHG

## 2022-02-24 DIAGNOSIS — G89.29 CHRONIC LEFT SHOULDER PAIN: ICD-10-CM

## 2022-02-24 DIAGNOSIS — R29.898 LEFT ARM WEAKNESS: Primary | ICD-10-CM

## 2022-02-24 DIAGNOSIS — M25.512 CHRONIC LEFT SHOULDER PAIN: ICD-10-CM

## 2022-02-24 DIAGNOSIS — R20.0 LEFT ARM NUMBNESS: ICD-10-CM

## 2022-02-24 PROCEDURE — 99202 OFFICE O/P NEW SF 15 MIN: CPT | Performed by: PSYCHIATRY & NEUROLOGY

## 2022-02-24 PROCEDURE — 95908 NRV CNDJ TST 3-4 STUDIES: CPT | Performed by: PSYCHIATRY & NEUROLOGY

## 2022-02-24 PROCEDURE — 95886 MUSC TEST DONE W/N TEST COMP: CPT | Performed by: PSYCHIATRY & NEUROLOGY

## 2022-02-24 NOTE — PROGRESS NOTES
"Chief Complaint  Numbness (LUE), Pain (LUE), and Extremity Weakness (LUE)    Subjective          Arslan Butler is a 31 y.o. male who presents to [facility]  History of Present Illness  31-year-old man with history of left shoulder injury status post surgery in 2014 and 2016. He is here for EMG nerve conduction. He states that his left shoulder is painful and then when it is painful there is numbness going down his arm. He does not have any numbness unless there is severe pain in his left shoulder. He has tremors in his arm. He has significant limited range of motion.  Objective   Vital Signs:   BP (!) 148/106   Pulse 82   Ht 193 cm (76\")   Wt (!) 155 kg (342 lb)   BMI 41.63 kg/m²     Physical Exam   There is no atrophy of muscles noted in the left upper extremity. The strength in his left deltoids, biceps, triceps, pronator teres, extensors of the wrist is proportional to effort. He has tremors noted with increasing effort. Reflexes are symmetrically decreased in biceps, triceps, brachioradialis. Range of motion the left shoulder significantly limited.        Assessment and Plan  Diagnoses and all orders for this visit:    1. Chronic left shoulder pain (Primary)    2. Left arm numbness  -     EMG & Nerve Conduction Test    3. Left arm weakness  Assessment & Plan:  Nerve conduction and EMG study is normal and does not show electrophysiologic evidence for brachial plexopathy, cervical radiculopathy. There is no evidence of denervation or reinnervation in the muscles tested. The motor unit potentials are normal. The left arm weakness is secondary to his left shoulder pain. The left arm numbness is secondary to referred symptomatology.         Nerve Conduction Study:  4 nerves     EMG:  Complete    Total time spent with the patient and coordinating patient care was 15 minutes.    Follow Up  No follow-ups on file.  Patient was given instructions and counseling regarding his " condition or for health maintenance advice. Please see specific information pulled into the AVS if appropriate.

## 2022-02-24 NOTE — ASSESSMENT & PLAN NOTE
Nerve conduction and EMG study is normal and does not show electrophysiologic evidence for brachial plexopathy, cervical radiculopathy. There is no evidence of denervation or reinnervation in the muscles tested. The motor unit potentials are normal. The left arm weakness is secondary to his left shoulder pain. The left arm numbness is secondary to referred symptomatology.

## 2022-02-28 ENCOUNTER — OFFICE VISIT (OUTPATIENT)
Dept: ORTHOPEDIC SURGERY | Facility: CLINIC | Age: 32
End: 2022-02-28

## 2022-02-28 VITALS — HEART RATE: 88 BPM | WEIGHT: 315 LBS | BODY MASS INDEX: 38.36 KG/M2 | OXYGEN SATURATION: 97 % | HEIGHT: 76 IN

## 2022-02-28 DIAGNOSIS — R20.0 LEFT ARM NUMBNESS: ICD-10-CM

## 2022-02-28 DIAGNOSIS — M75.102 TEAR OF LEFT ROTATOR CUFF, UNSPECIFIED TEAR EXTENT, UNSPECIFIED WHETHER TRAUMATIC: Primary | ICD-10-CM

## 2022-02-28 DIAGNOSIS — M12.812 ROTATOR CUFF ARTHROPATHY, LEFT: ICD-10-CM

## 2022-02-28 PROCEDURE — 99213 OFFICE O/P EST LOW 20 MIN: CPT | Performed by: STUDENT IN AN ORGANIZED HEALTH CARE EDUCATION/TRAINING PROGRAM

## 2022-02-28 NOTE — PROGRESS NOTES
"Chief Complaint  Pain of the Left Shoulder    Subjective          Arslan Butler presents to [facility] for   History of Present Illness    Arslan presents for follow up evaluation of his left shoulder.  He has a long history related to his left shoulder.  He had an injury at work where he caught a door that was falling and tore his left rotator cuff.  He had surgery in 2014 and a repeat shoulder arthroscopy in 2016, both of which were performed in [place].  He describes persistent pain and weakness in the arm since his injury.  He did not notice much relief with surgical management.  He also describes radiating numbness and pain extending into the hand.  He has weakness with  in the hand.  He has tremulous motions with the arm.  He denies any new falls or trauma.  He is not currently employed.  He is right-hand dominant. He recently had an EMG and is here today for those results.   Allergies   Allergen Reactions   • Sulfamethoxazole-Trimethoprim Hives        Social History     Socioeconomic History   • Marital status:    Tobacco Use   • Smoking status: Never Smoker   • Smokeless tobacco: Never Used   Vaping Use   • Vaping Use: Some days   • Substances: Nicotine, Flavoring   • Devices: Disposable, Refillable tank   Substance and Sexual Activity   • Alcohol use: Yes     Comment: ocassionally    • Drug use: Never   • Sexual activity: Defer        I reviewed the patient's chief complaint, history of present illness, review of systems, past medical history, surgical history, family history, social history, medications, and allergy list.     REVIEW OF SYSTEMS    Constitutional: Denies fevers, chills, weight loss  Cardiovascular: Denies chest pain, shortness of breath  Skin: Denies rashes, acute skin changes  Neurologic: Denies headache, loss of consciousness  MSK: Left shoulder pain      Objective   Vital Signs:   Pulse 88   Ht 193 cm (76\")   Wt (!) 155 kg (342 lb)   SpO2 97%  "  BMI 41.63 kg/m²     Body mass index is 41.63 kg/m².    Physical Exam    General: Alert. No acute distress.   Cervical spine: No pain with cervical range of motion  Left upper extremity: Well-healed arthroscopy portals about the shoulder.  Hypersensitive to light touch about the left shoulder.  Active elevation to 90 degrees.  With assistance can elevate to 160 degrees with increasing pain and shaking of the arm with elevation.  External rotation to 30 degrees.  Internal rotation to the lower lumbar spine.  4- out of 5 with rotator cuff testing.  Pain and shaking with rotator cuff testing.  Sensation intact in the axillary nerve distribution.  Paresthesias in the hand but sensation remains intact in the median, radial, ulnar nerve distribution.  Full active finger range of motion.  Negative Kiersten.  Palpable radial pulse.     Procedures    Imaging Results (Most Recent)     None         EMG- normal EMG studies.          Assessment and Plan    Diagnoses and all orders for this visit:    1. Tear of left rotator cuff, unspecified tear extent, unspecified whether traumatic (Primary)    2. Left arm numbness    3. Rotator cuff arthropathy, left        Discussed the treatment plan with the patient.  Due to his muscle atrophy we discussed the Plan for a referral to a Specialist in [place] for evaluation of possible SCR or muscle transfer. The patient expressed understanding and wished to proceed.     Call or return if symptoms worsen or patient has any concerns.       Scribed for Arian Steen MD by Nayeli Holcomb  02/28/2022   08:15 EST         Follow Up   Return if symptoms worsen or fail to improve.  Patient was given instructions and counseling regarding his condition or for health maintenance advice. Please see specific information pulled into the AVS if appropriate.       I have personally performed the services described in this document as scribed by the above individual and it is both accurate and  complete.     Arian Steen MD  02/28/22  08:20 EST

## 2022-03-07 ENCOUNTER — TELEPHONE (OUTPATIENT)
Dept: ORTHOPEDIC SURGERY | Facility: CLINIC | Age: 32
End: 2022-03-07

## 2022-03-07 NOTE — TELEPHONE ENCOUNTER
Caller: TUSHAR MIDDLETON     Relationship to patient: SELF     Best call back number: [phone]    Patient is needing: PATIENT CALLED AND STATED THAT HE IS NEEDING THE ORDER PLACED ON 02/28/2022 WITH  TO BE REFAXED

## 2022-03-08 NOTE — TELEPHONE ENCOUNTER
Patient notified [facility]oney not except [employer] [place] insurance. Advised patient to contact his insurance company to find a therapy facility that accepts his insurance and performs the FCE once he has a facility name contact our office and we will fax oders to therapy facility. Patient verbalized understanding.

## 2022-03-10 ENCOUNTER — TELEPHONE (OUTPATIENT)
Dept: SLEEP MEDICINE | Facility: HOSPITAL | Age: 32
End: 2022-03-10

## 2022-03-10 DIAGNOSIS — G47.33 OBSTRUCTIVE SLEEP APNEA: Primary | ICD-10-CM

## 2022-03-10 DIAGNOSIS — G47.8 NON-RESTORATIVE SLEEP: ICD-10-CM

## 2022-03-10 DIAGNOSIS — E55.9 VITAMIN D INSUFFICIENCY: ICD-10-CM

## 2022-03-10 DIAGNOSIS — G47.10 HYPERSOMNIA: ICD-10-CM

## 2022-03-10 DIAGNOSIS — E66.01 MORBID OBESITY: ICD-10-CM

## 2022-03-10 RX ORDER — ACETAMINOPHEN 160 MG
TABLET,DISINTEGRATING ORAL
Qty: 90 CAPSULE | Refills: 0 | Status: SHIPPED | OUTPATIENT
Start: 2022-03-10 | End: 2022-06-13 | Stop reason: SDUPTHER

## 2022-03-11 ENCOUNTER — OFFICE VISIT (OUTPATIENT)
Dept: FAMILY MEDICINE CLINIC | Facility: CLINIC | Age: 32
End: 2022-03-11

## 2022-03-11 ENCOUNTER — TELEPHONE (OUTPATIENT)
Dept: SLEEP MEDICINE | Facility: HOSPITAL | Age: 32
End: 2022-03-11

## 2022-03-11 VITALS
SYSTOLIC BLOOD PRESSURE: 140 MMHG | DIASTOLIC BLOOD PRESSURE: 90 MMHG | HEART RATE: 138 BPM | BODY MASS INDEX: 38.36 KG/M2 | WEIGHT: 315 LBS | HEIGHT: 76 IN | TEMPERATURE: 96.6 F | OXYGEN SATURATION: 95 %

## 2022-03-11 DIAGNOSIS — E66.01 CLASS 3 SEVERE OBESITY DUE TO EXCESS CALORIES WITH SERIOUS COMORBIDITY AND BODY MASS INDEX (BMI) OF 40.0 TO 44.9 IN ADULT: ICD-10-CM

## 2022-03-11 DIAGNOSIS — E11.9 TYPE 2 DIABETES MELLITUS WITHOUT COMPLICATION, WITHOUT LONG-TERM CURRENT USE OF INSULIN: ICD-10-CM

## 2022-03-11 DIAGNOSIS — Z71.3 ENCOUNTER FOR WEIGHT LOSS COUNSELING: Primary | ICD-10-CM

## 2022-03-11 PROCEDURE — 99214 OFFICE O/P EST MOD 30 MIN: CPT | Performed by: NURSE PRACTITIONER

## 2022-03-11 RX ORDER — ALPRAZOLAM 1 MG/1
1 TABLET ORAL 2 TIMES DAILY
COMMUNITY
Start: 2022-03-07 | End: 2022-08-05

## 2022-03-11 RX ORDER — SEMAGLUTIDE 1.34 MG/ML
1 INJECTION, SOLUTION SUBCUTANEOUS WEEKLY
Qty: 6 PEN | Refills: 0 | Status: SHIPPED | OUTPATIENT
Start: 2022-03-11 | End: 2022-03-30

## 2022-03-11 RX ORDER — CARIPRAZINE 1.5 MG/1
CAPSULE, GELATIN COATED ORAL
COMMUNITY
Start: 2022-03-07 | End: 2022-04-19

## 2022-03-11 NOTE — PROGRESS NOTES
Chief Complaint  Obesity (Discuss weight gain )    Subjective            Arslan Butler presents to [facility]  History of Present Illness     He is here today to discuss weight loss - The most he has ever weighed is 410 pounds and that was approx. 1 year ago. He is currently 347 pounds, but was as low as 330 pounds a few weeks ago. That is going by his home scale.     He lost his weight initially by removing soda, breads, and watched his sugar intake. He still does not really indulge in sugary foods d/t his diabetes. Last A1C was less than 6%. He is prescribed Ozempic and metformin for his diabetes.     He is worried about weight gain d/t taking Vraylar - he has been taking it for one week now, but his psychiatrist warned him regarding the weight gain. She went with Vraylar d/t she has tried several other options for treatment of his anxiety, depression, and bipolar d/o and nothing else has worked. He really can't tell if this is working yet. He has been going to them for 3 years. He was getting TMS for two weeks, every day, but he didn't see a difference and it was very expensive for travel.     He is also concerned with weight/weight gain d/t he is going to have surgery on the left shoulder - muscle grafting/transfer related to prior history of surgery x2. He was referred by Brian Vargas - Dr. Reji Rai.     PHQ-9 Total Score: 25    Past Medical History:   Diagnosis Date   • Anxiety and depression 12/17/2021   • Bipolar disorder (HCC) 12/15/2021   • Gastroesophageal reflux disease 12/15/2021   • Hyperlipidemia 12/15/2021   • Obesity 12/15/2021       Allergies   Allergen Reactions   • Sulfamethoxazole-Trimethoprim Hives        Past Surgical History:   Procedure Laterality Date   • ROTATOR CUFF REPAIR Left     has had it done twice     • TONSILLECTOMY          Social History     Tobacco Use   • Smoking status: Never Smoker   • Smokeless tobacco: Never Used   Vaping Use   • Vaping  Use: Some days   • Substances: Nicotine, Flavoring   • Devices: Disposable, Refillable tank   Substance Use Topics   • Alcohol use: Yes     Comment: ocassionally    • Drug use: Never       Family History   Problem Relation Age of Onset   • Heart attack Maternal Grandfather    • Diabetes Paternal Grandmother         Health Maintenance Due   Topic Date Due   • ANNUAL PHYSICAL  Never done   • Pneumococcal Vaccine 0-64 (1 of 2 - PPSV23) Never done   • TDAP/TD VACCINES (1 - Tdap) Never done   • DIABETIC EYE EXAM  Never done        Current Outpatient Medications on File Prior to Visit   Medication Sig   • acetaminophen (TYLENOL) 500 MG tablet Take 1 tablet by mouth Every 6 (Six) Hours As Needed for Mild Pain .   • ALPRAZolam (XANAX) 1 MG tablet 1 mg 2 (Two) Times a Day.   • buPROPion XL (WELLBUTRIN XL) 150 MG 24 hr tablet Take 150 mg by mouth Daily.   • Cholecalciferol (Vitamin D3) 50 MCG (2000 UT) capsule TAKE ONE CAPSULE BY MOUTH ONCE DAILY   • loratadine (Claritin) 10 MG tablet Take 1 tablet by mouth Daily.   • metFORMIN (GLUCOPHAGE) 1000 MG tablet Take 1 tablet by mouth 2 (Two) Times a Day With Meals.   • methocarbamol (ROBAXIN) 500 MG tablet Take 2 tablets by mouth 4 (Four) Times a Day.   • omeprazole (priLOSEC) 20 MG capsule Take 1 capsule by mouth Daily.   • simvastatin (Zocor) 20 MG tablet Take 1 tablet by mouth Every Night.   • tadalafil (CIALIS) 5 MG tablet Take 1 tablet po daily   • Vraylar 1.5 MG capsule capsule    • [DISCONTINUED] Semaglutide,0.25 or 0.5MG/DOS, (Ozempic, 0.25 or 0.5 MG/DOSE,) 2 MG/1.5ML solution pen-injector Inject 0.5 mg under the skin into the appropriate area as directed 1 (One) Time Per Week.   • [DISCONTINUED] ALPRAZolam (XANAX) 0.5 MG tablet    • [DISCONTINUED] lisinopril (PRINIVIL,ZESTRIL) 10 MG tablet Take 10 mg by mouth Daily.   • [DISCONTINUED] lithium (ESKALITH) 450 MG CR tablet Take 450 mg by mouth 2 (Two) Times a Day.   • [DISCONTINUED] methylPREDNISolone (MEDROL) 4 MG dose pack  "Take as directed on package instructions.   • [DISCONTINUED] propranolol (INDERAL) 10 MG tablet Take 10 mg by mouth 3 (Three) Times a Day.   • [DISCONTINUED] tiZANidine (ZANAFLEX) 2 MG tablet Take 1 tablet by mouth Every 8 (Eight) Hours As Needed for Muscle Spasms.     No current facility-administered medications on file prior to visit.       Immunization History   Administered Date(s) Administered   • COVID-19 (MODERNA) 1st, 2nd, 3rd Dose Only 04/10/2021, 05/05/2021, 08/19/2021, 09/17/2021   • DTP 03/11/1996   • Hep B, Adolescent or Pediatric 06/22/2001, 07/23/2001, 08/02/2002   • MMR 03/11/1996   • OPV 03/11/1996       Review of Systems     Objective     /90   Pulse (!) 138   Temp 96.6 °F (35.9 °C)   Ht 193 cm (76\")   Wt (!) 157 kg (347 lb)   SpO2 95%   BMI 42.24 kg/m²       Physical Exam  Vitals reviewed.   Constitutional:       General: He is not in acute distress.     Appearance: He is well-developed. He is obese.   HENT:      Head: Normocephalic and atraumatic.   Eyes:      General: No scleral icterus.     Extraocular Movements: Extraocular movements intact.      Conjunctiva/sclera: Conjunctivae normal.   Neck:      Trachea: Trachea normal.   Cardiovascular:      Rate and Rhythm: Normal rate and regular rhythm.      Pulses: Normal pulses.      Heart sounds: No murmur heard.  Pulmonary:      Effort: Pulmonary effort is normal. No respiratory distress.      Breath sounds: Normal breath sounds. No wheezing, rhonchi or rales.   Musculoskeletal:         General: Normal range of motion.      Cervical back: Normal range of motion and neck supple.      Right lower leg: No edema.      Left lower leg: No edema.   Lymphadenopathy:      Cervical: No cervical adenopathy.   Skin:     General: Skin is warm and dry.   Neurological:      Mental Status: He is alert and oriented to person, place, and time.   Psychiatric:         Mood and Affect: Mood and affect normal.         Behavior: Behavior normal.         " Thought Content: Thought content normal.         Judgment: Judgment normal.         Result Review :     The following data was reviewed by: CORTNEY Acosta on 03/11/2022:    CMP    CMP 12/23/21   Glucose 102 (A)   BUN 11   Creatinine 0.66 (A)   eGFR Non African Am 142   Sodium 139   Potassium 4.5   Chloride 103   Calcium 9.6   Albumin 4.30   Total Bilirubin 0.5   Alkaline Phosphatase 69   AST (SGOT) 32   ALT (SGPT) 53 (A)   (A) Abnormal value            CBC    CBC 12/23/21   WBC 6.04   RBC 4.85   Hemoglobin 14.5   Hematocrit 42.4   MCV 87.4   MCH 29.9   MCHC 34.2   RDW 13.0   Platelets 295           Lipid Panel    Lipid Panel 12/23/21   Total Cholesterol 123   Triglycerides 219 (A)   HDL Cholesterol 30 (A)   VLDL Cholesterol 36   LDL Cholesterol  57   LDL/HDL Ratio 1.64   (A) Abnormal value            TSH    TSH 12/23/21   TSH 2.490           A1C Last 3 Results    HGBA1C Last 3 Results 12/23/21   Hemoglobin A1C 5.65 (A)   (A) Abnormal value            Microalbumin    Microalbumin 12/23/21   Microalbumin, Urine <1.2                       Assessment and Plan      Diagnoses and all orders for this visit:    1. Encounter for weight loss counseling (Primary)  -     Ambulatory Referral to Nutrition Services    2. Class 3 severe obesity due to excess calories with serious comorbidity and body mass index (BMI) of 40.0 to 44.9 in adult (HCC)  -     Ambulatory Referral to Nutrition Services    3. Type 2 diabetes mellitus without complication, without long-term current use of insulin ([facility])  -     Ambulatory Referral to Nutrition Services  -     Semaglutide,0.25 or 0.5MG/DOS, (Ozempic, 0.25 or 0.5 MG/DOSE,) 2 MG/1.5ML solution pen-injector; Inject 1 mg under the skin into the appropriate area as directed 1 (One) Time Per Week.  Dispense: 6 pen; Refill: 0            Follow Up     Return in about 18 days (around 3/29/2022) for Next scheduled follow up.     I am going to maximize his Ozempic - 1mg Qweekly - to see if  this will help curb his appetite and help with weight loss. He is already taking Wellbutrin from psychiatry, so Contrave would not be advisable at this time. We also discussed Saxenda; however, since he is already taking Ozempic for his diabetes, this would not be indicated. He has phentermine in the past, but notes that when he stopped taking it he had significant weight gain.  He does not wish to take phentermine.  He has not tried orlistat/hans.    He is going to call his pharmacy benefit with his insurance to see if there are any covered weight loss medications.  We also discussed bariatric surgery referral.  We are going to defer that at this time, but instead referred to dietitian for counseling.     We discussed food tracking in an helen such as [employer], and also increasing physical activity - walking to increase daily steps as a start. Encouraged patient to call his pharmacy benefit to see if they cover anything such as a scale, fitness tracker, etc.     Patient was given instructions and counseling regarding his condition or for health maintenance advice. Please see specific information pulled into the AVS if appropriate.

## 2022-03-12 DIAGNOSIS — E78.5 DYSLIPIDEMIA: ICD-10-CM

## 2022-03-14 RX ORDER — SIMVASTATIN 20 MG
20 TABLET ORAL NIGHTLY
Qty: 90 TABLET | Refills: 0 | Status: SHIPPED | OUTPATIENT
Start: 2022-03-14 | End: 2022-06-10

## 2022-03-15 ENCOUNTER — TELEPHONE (OUTPATIENT)
Dept: FAMILY MEDICINE CLINIC | Facility: CLINIC | Age: 32
End: 2022-03-15

## 2022-03-15 ENCOUNTER — NUTRITION (OUTPATIENT)
Dept: NUTRITION | Facility: HOSPITAL | Age: 32
End: 2022-03-15

## 2022-03-15 VITALS — BODY MASS INDEX: 38.36 KG/M2 | WEIGHT: 315 LBS | HEIGHT: 76 IN

## 2022-03-15 PROCEDURE — 97802 MEDICAL NUTRITION INDIV IN: CPT

## 2022-03-15 NOTE — TELEPHONE ENCOUNTER
Caller: Arslan Butler    Relationship: Self    Best call back number: [phone]     What medication are you requesting: FOR WEIGHTLOSS    If a prescription is needed, what is your preferred pharmacy and phone number:      [id number] [place] [place] [address] AT [facility] - [phone]  - [phone]     Additional notes: PATIENT VERBALIZED THAT OZEMPIC IS NOT GOING TO BE COVERED BY INSURANCE. HE WOULD LIKE TO KNOW IF CORTNEY CANTOR CAN SEND A DIFFERENT MEDICATION IN FOR WEIGHT LOSS.

## 2022-03-15 NOTE — CONSULTS
"Nutrition Services    Patient Name: Arslan Butler  YOB: 1990  MRN: [id number]  Appointment: 03/15/22 14:40 EDT    Nutrition Assessment      Reason for Assessment Arslan Butler is a 31 y.o. male being seen as initial appointment for DM and weight management.      H&P:    Past Medical History:   Diagnosis Date   • Anxiety and depression 12/17/2021   • Bipolar disorder (HCC) 12/15/2021   • Gastroesophageal reflux disease 12/15/2021   • Hyperlipidemia 12/15/2021   • Obesity 12/15/2021          Labs/Medications         Pertinent Labs Reviewed.         Invalid input(s): SONUKIRKPENNY      Coronavirus (COVID-19)   Date Value Ref Range Status   12/05/2020 NOT DETECTED NA Final     Comment:     The SARS-CoV-2 assay is a real-time, RT-PCR test intended  for the qualitative detection of nucleic acid from the  SARS-CoV-2 in respiratory specimens from individuals,  testing performed at [facility].       Lab Results   Component Value Date    HGBA1C 5.65 (H) 12/23/2021         Pertinent Medications Reviewed.     Nutrition/Diet History         Narrative     Pt referred for wt loss and diabetes. He is concerned about beginning to regain wt previously lost.  Pt inquired about carbohydrate counting. Has been living with diabetes for 3 years. Currently on  0.5 Ozempic. Last A1c was 5.6 per pt report. Tests blood glucose premeal at dinnertime, usually it is . Pt is in the process of applying for disability. Lives with wife and 4 y/o daughter.   Usual Intake Protein shake or yogurt    Fruit or yogurt    Meat with mac and cheese, mashed potatoes, bread  Or may eat out    Snacks on ice cream, poptarts, cereal   Factors Affecting Intake Pt does not have issues chewing or swallowing   Support System Spouse   Activity Level Low   Motivation/Barriers Pt is in preparation stage     Anthropometrics         Current Height, Weight Height: 193 cm (76\")  Weight: (!) 158 kg (349 lb 3.3 oz)    Current BMI Body mass " index is 42.51 kg/m².         Weight Hx  Wt Readings from Last 30 Encounters:   03/15/22 1439 (!) 158 kg (349 lb 3.3 oz)   03/11/22 1506 (!) 157 kg (347 lb)   02/28/22 0804 (!) 155 kg (342 lb)   02/24/22 1102 (!) 155 kg (342 lb)   02/23/22 1259 (!) 154 kg (340 lb)   02/08/22 1120 (!) 153 kg (338 lb 6.4 oz)   02/08/22 0937 (!) 159 kg (350 lb)   01/28/22 1300 (!) 159 kg (350 lb)   01/17/22 0853 (!) 155 kg (342 lb)   01/13/22 0938 (!) 155 kg (342 lb)   01/12/22 1443 (!) 156 kg (345 lb)   12/30/21 1323 (!) 154 kg (340 lb)   12/17/21 1438 (!) 154 kg (340 lb)   10/20/20 0000 (!) 160 kg (352 lb 5.9 oz)   10/14/20 0000 (!) 163 kg (359 lb 15.9 oz)   09/25/20 0000 (!) 163 kg (360 lb 3.9 oz)   07/28/20 0000 (!) 168 kg (369 lb 15.9 oz)           Wt Change Observation +4kg in 3 months     Estimated/Assessed Needs    Using IBW of 92kg    Calories 2760 kcal (30 kcal/kg)    Protein 92 gPro (1.0 g/kg)    Fluid 2760 ml (30 ml/kg)     Nutrition Diagnosis         PES Overweight/Obesity related to lack of prior nutrition related education as evidenced by patient report.       Nutrition Intervention        RD Action Provided Medical Nutrition Therapy for DM, Obesity   Goals Pt established the following goals:  1. Increase vegetables at dinnertime, decreasing carbs, daily  2. Log foods in MyFitnessPal, focusing on carbohydrate amount  3. 45-60g carbohyrdates per meal with 2 30g carbohydrate snacks daily    Adapted based on patient readiness, skills, resources, culture, and lifestyle    Established intervention with patient collaboration    Offered action strategies and steps to help patient reach nutrition related goals     Medical Nutrition Therapy/Nutrition Education       Learner   Readiness Patient  Eager   Method   Response Explanation, Teachback, Demonstration and Written Material  Verbalizes understanding      Monitor/Evaluation         Copy of current note sent to referring physician and Pt to self-monitor       Electronically  signed by:  Virginia Staples RD  03/15/22 14:40 EDT  Pt seen from: [id number]

## 2022-03-21 ENCOUNTER — CLINICAL SUPPORT (OUTPATIENT)
Dept: FAMILY MEDICINE CLINIC | Facility: CLINIC | Age: 32
End: 2022-03-21

## 2022-03-21 DIAGNOSIS — R79.89 LOW VITAMIN D LEVEL: Primary | ICD-10-CM

## 2022-03-21 DIAGNOSIS — F52.4 PREMATURE EJACULATION: ICD-10-CM

## 2022-03-21 DIAGNOSIS — R79.89 LOW TESTOSTERONE: ICD-10-CM

## 2022-03-21 DIAGNOSIS — R53.83 FATIGUE, UNSPECIFIED TYPE: ICD-10-CM

## 2022-03-21 DIAGNOSIS — N52.2 DRUG-INDUCED ERECTILE DYSFUNCTION: ICD-10-CM

## 2022-03-21 DIAGNOSIS — N52.1 ERECTILE DYSFUNCTION DUE TO DISEASES CLASSIFIED ELSEWHERE: ICD-10-CM

## 2022-03-21 DIAGNOSIS — R73.03 PRE-DIABETES: ICD-10-CM

## 2022-03-21 DIAGNOSIS — F31.75 BIPOLAR DISORDER, IN PARTIAL REMISSION, MOST RECENT EPISODE DEPRESSED: ICD-10-CM

## 2022-03-21 DIAGNOSIS — E66.9 OBESITY WITH BODY MASS INDEX GREATER THAN 30: ICD-10-CM

## 2022-03-21 LAB
25(OH)D3 SERPL-MCNC: 28.1 NG/ML (ref 30–100)
ALBUMIN SERPL-MCNC: 4.2 G/DL (ref 3.5–5.2)
ALBUMIN/GLOB SERPL: 1.7 G/DL
ALP SERPL-CCNC: 60 U/L (ref 39–117)
ALT SERPL W P-5'-P-CCNC: 38 U/L (ref 1–41)
ANION GAP SERPL CALCULATED.3IONS-SCNC: 9.5 MMOL/L (ref 5–15)
AST SERPL-CCNC: 23 U/L (ref 1–40)
BASOPHILS # BLD AUTO: 0.01 10*3/MM3 (ref 0–0.2)
BASOPHILS NFR BLD AUTO: 0.2 % (ref 0–1.5)
BILIRUB SERPL-MCNC: 0.4 MG/DL (ref 0–1.2)
BUN SERPL-MCNC: 9 MG/DL (ref 6–20)
BUN/CREAT SERPL: 11.5 (ref 7–25)
CALCIUM SPEC-SCNC: 9.4 MG/DL (ref 8.6–10.5)
CHLORIDE SERPL-SCNC: 106 MMOL/L (ref 98–107)
CHOLEST SERPL-MCNC: 88 MG/DL (ref 0–200)
CO2 SERPL-SCNC: 24.5 MMOL/L (ref 22–29)
CREAT SERPL-MCNC: 0.78 MG/DL (ref 0.76–1.27)
DEPRECATED RDW RBC AUTO: 41.8 FL (ref 37–54)
EGFRCR SERPLBLD CKD-EPI 2021: 123 ML/MIN/1.73
EOSINOPHIL # BLD AUTO: 0.07 10*3/MM3 (ref 0–0.4)
EOSINOPHIL NFR BLD AUTO: 1.6 % (ref 0.3–6.2)
ERYTHROCYTE [DISTWIDTH] IN BLOOD BY AUTOMATED COUNT: 13.3 % (ref 12.3–15.4)
ESTRADIOL SERPL HS-MCNC: 30.3 PG/ML
FERRITIN SERPL-MCNC: 124 NG/ML (ref 30–400)
GLOBULIN UR ELPH-MCNC: 2.5 GM/DL
GLUCOSE SERPL-MCNC: 114 MG/DL (ref 65–99)
HBA1C MFR BLD: 6 % (ref 4.8–5.6)
HCT VFR BLD AUTO: 42.1 % (ref 37.5–51)
HDLC SERPL-MCNC: 34 MG/DL (ref 40–60)
HGB BLD-MCNC: 14 G/DL (ref 13–17.7)
IMM GRANULOCYTES # BLD AUTO: 0.01 10*3/MM3 (ref 0–0.05)
IMM GRANULOCYTES NFR BLD AUTO: 0.2 % (ref 0–0.5)
IRON 24H UR-MRATE: 73 MCG/DL (ref 59–158)
IRON SATN MFR SERPL: 17 % (ref 20–50)
LDLC SERPL CALC-MCNC: 32 MG/DL (ref 0–100)
LDLC/HDLC SERPL: 0.89 {RATIO}
LITHIUM SERPL-SCNC: <0.1 MMOL/L (ref 0.6–1.2)
LYMPHOCYTES # BLD AUTO: 1.4 10*3/MM3 (ref 0.7–3.1)
LYMPHOCYTES NFR BLD AUTO: 32.5 % (ref 19.6–45.3)
MCH RBC QN AUTO: 29 PG (ref 26.6–33)
MCHC RBC AUTO-ENTMCNC: 33.3 G/DL (ref 31.5–35.7)
MCV RBC AUTO: 87.3 FL (ref 79–97)
MONOCYTES # BLD AUTO: 0.35 10*3/MM3 (ref 0.1–0.9)
MONOCYTES NFR BLD AUTO: 8.1 % (ref 5–12)
NEUTROPHILS NFR BLD AUTO: 2.47 10*3/MM3 (ref 1.7–7)
NEUTROPHILS NFR BLD AUTO: 57.4 % (ref 42.7–76)
NRBC BLD AUTO-RTO: 0 /100 WBC (ref 0–0.2)
PLATELET # BLD AUTO: 227 10*3/MM3 (ref 140–450)
PMV BLD AUTO: 10.9 FL (ref 6–12)
POTASSIUM SERPL-SCNC: 4.2 MMOL/L (ref 3.5–5.2)
PROT SERPL-MCNC: 6.7 G/DL (ref 6–8.5)
RBC # BLD AUTO: 4.82 10*6/MM3 (ref 4.14–5.8)
SODIUM SERPL-SCNC: 140 MMOL/L (ref 136–145)
TIBC SERPL-MCNC: 432 MCG/DL (ref 298–536)
TRANSFERRIN SERPL-MCNC: 290 MG/DL (ref 200–360)
TRIGL SERPL-MCNC: 118 MG/DL (ref 0–150)
VLDLC SERPL-MCNC: 22 MG/DL (ref 5–40)
WBC NRBC COR # BLD: 4.31 10*3/MM3 (ref 3.4–10.8)

## 2022-03-21 PROCEDURE — 83036 HEMOGLOBIN GLYCOSYLATED A1C: CPT | Performed by: NURSE PRACTITIONER

## 2022-03-21 PROCEDURE — 82728 ASSAY OF FERRITIN: CPT | Performed by: NURSE PRACTITIONER

## 2022-03-21 PROCEDURE — 83540 ASSAY OF IRON: CPT | Performed by: NURSE PRACTITIONER

## 2022-03-21 PROCEDURE — 85025 COMPLETE CBC W/AUTO DIFF WBC: CPT | Performed by: NURSE PRACTITIONER

## 2022-03-21 PROCEDURE — 80061 LIPID PANEL: CPT | Performed by: NURSE PRACTITIONER

## 2022-03-21 PROCEDURE — 84403 ASSAY OF TOTAL TESTOSTERONE: CPT | Performed by: NURSE PRACTITIONER

## 2022-03-21 PROCEDURE — 36415 COLL VENOUS BLD VENIPUNCTURE: CPT | Performed by: NURSE PRACTITIONER

## 2022-03-21 PROCEDURE — 82670 ASSAY OF TOTAL ESTRADIOL: CPT | Performed by: NURSE PRACTITIONER

## 2022-03-21 PROCEDURE — 80053 COMPREHEN METABOLIC PANEL: CPT | Performed by: NURSE PRACTITIONER

## 2022-03-21 PROCEDURE — 82672 ASSAY OF ESTROGEN: CPT | Performed by: NURSE PRACTITIONER

## 2022-03-21 PROCEDURE — 84466 ASSAY OF TRANSFERRIN: CPT | Performed by: NURSE PRACTITIONER

## 2022-03-21 PROCEDURE — 82306 VITAMIN D 25 HYDROXY: CPT | Performed by: NURSE PRACTITIONER

## 2022-03-21 PROCEDURE — 84402 ASSAY OF FREE TESTOSTERONE: CPT | Performed by: NURSE PRACTITIONER

## 2022-03-21 PROCEDURE — 80178 ASSAY OF LITHIUM: CPT | Performed by: NURSE PRACTITIONER

## 2022-03-21 NOTE — PROGRESS NOTES
Venipuncture Blood Specimen Collection  Venipuncture performed in left arm by Cassia Cole with good hemostasis. Patient tolerated the procedure well without complications.   03/21/22   Cassia Cole

## 2022-03-23 LAB — ESTROGEN SERPL-MCNC: 91 PG/ML (ref 56–213)

## 2022-03-29 ENCOUNTER — OFFICE VISIT (OUTPATIENT)
Dept: FAMILY MEDICINE CLINIC | Facility: CLINIC | Age: 32
End: 2022-03-29

## 2022-03-29 VITALS
WEIGHT: 315 LBS | HEART RATE: 111 BPM | DIASTOLIC BLOOD PRESSURE: 86 MMHG | OXYGEN SATURATION: 100 % | TEMPERATURE: 97 F | SYSTOLIC BLOOD PRESSURE: 136 MMHG | BODY MASS INDEX: 42.73 KG/M2

## 2022-03-29 DIAGNOSIS — E61.1 IRON DEFICIENCY: ICD-10-CM

## 2022-03-29 DIAGNOSIS — E11.9 TYPE 2 DIABETES MELLITUS WITHOUT COMPLICATION, WITHOUT LONG-TERM CURRENT USE OF INSULIN: Primary | ICD-10-CM

## 2022-03-29 DIAGNOSIS — R79.89 LOW VITAMIN D LEVEL: ICD-10-CM

## 2022-03-29 DIAGNOSIS — E66.01 CLASS 3 SEVERE OBESITY DUE TO EXCESS CALORIES WITH SERIOUS COMORBIDITY AND BODY MASS INDEX (BMI) OF 40.0 TO 44.9 IN ADULT: ICD-10-CM

## 2022-03-29 PROCEDURE — 99214 OFFICE O/P EST MOD 30 MIN: CPT | Performed by: NURSE PRACTITIONER

## 2022-03-29 RX ORDER — FERROUS SULFATE 325(65) MG
325 TABLET ORAL
Qty: 90 TABLET | Refills: 0 | Status: SHIPPED | OUTPATIENT
Start: 2022-03-29 | End: 2022-06-13 | Stop reason: SDUPTHER

## 2022-03-29 NOTE — PROGRESS NOTES
Chief Complaint  Diabetes (Check up )    Subjective            Arslan Butler presents to [facility]  History of Present Illness     Patient presents to the office today to follow-up on recent labs.    He had labs last week.  CMP only showed an elevation in blood sugar.  The rest of his CMP was unremarkable.  His A1c is currently 6%.  He is currently prescribed Metformin 1000 mg twice daily, and Ozempic 1 mg daily.  He has not yet increased to the 1 mg dose due to the pharmacy telling him that it is too soon to refill his medications.  He is currently still only injecting 0.5 mg weekly on Wednesdays.  His last shot is tomorrow.  He will need a refill after tomorrow.    He saw the dietitian for weight loss counseling.  They calculated his caloric need is 2700 karrie.  She recommended 92 g of protein based on ideal body weight (1g/kg, approximately 200 pounds).  She wanted him to start logging his food into [employer] / [employer], focusing on carbohydrates.  She recommended 45 to 60 g of carbohydrates per meal and 30 g of carbohydrates per snack.  She recommended 2 snacks per day.  His wife has been meal prepping for them since the visit.  He has not been diligent with logging, but has started.  He was able to pull up his profile on [employer].    He is currently not active.  Sites his depression as being the reason for not being active.  He does not work.  He spends his days at home.  His wife works at a local .  She is generally off on Mondays, but works for 10-hour days the rest of the week.  His daughter is 5 and is outside of the home during the day.    Lipid profile was improved from previous.  Triglycerides now normal.  HDL remains low at 34.  He is taking simvastatin 20 mg nightly as prescribed.    Vitamin D remains low, but is improved from previous.  He is taking 2000 international units daily.    Iron saturation is still low, but increased by 1% from previous.  He is not  currently taking an iron supplement.    He tells me that his psychiatrist thinks that he may have borderline personality disorder.  They have discussed adding an antipsychotic medication for treatment, but he is concerned about additional weight gain, as he is already trying to prevent weight gain since the addition of Vraylar.      Past Medical History:   Diagnosis Date   • Anxiety and depression 12/17/2021   • Bipolar disorder (HCC) 12/15/2021   • Gastroesophageal reflux disease 12/15/2021   • Hyperlipidemia 12/15/2021   • Obesity 12/15/2021       Allergies   Allergen Reactions   • Sulfamethoxazole-Trimethoprim Hives        Past Surgical History:   Procedure Laterality Date   • ROTATOR CUFF REPAIR Left     has had it done twice     • TONSILLECTOMY          Social History     Tobacco Use   • Smoking status: Never Smoker   • Smokeless tobacco: Never Used   Vaping Use   • Vaping Use: Some days   • Substances: Nicotine, Flavoring   • Devices: Disposable, Refillable tank   Substance Use Topics   • Alcohol use: Yes     Comment: ocassionally    • Drug use: Never       Family History   Problem Relation Age of Onset   • Heart attack Maternal Grandfather    • Diabetes Paternal Grandmother         Health Maintenance Due   Topic Date Due   • ANNUAL PHYSICAL  Never done   • Pneumococcal Vaccine 0-64 (1 of 2 - PPSV23) Never done   • TDAP/TD VACCINES (1 - Tdap) Never done   • DIABETIC EYE EXAM  Never done        Current Outpatient Medications on File Prior to Visit   Medication Sig   • acetaminophen (TYLENOL) 500 MG tablet Take 1 tablet by mouth Every 6 (Six) Hours As Needed for Mild Pain .   • ALPRAZolam (XANAX) 1 MG tablet 1 mg 2 (Two) Times a Day.   • buPROPion XL (WELLBUTRIN XL) 150 MG 24 hr tablet Take 150 mg by mouth Daily.   • Cholecalciferol (Vitamin D3) 50 MCG (2000 UT) capsule TAKE ONE CAPSULE BY MOUTH ONCE DAILY   • loratadine (Claritin) 10 MG tablet Take 1 tablet by mouth Daily.   • methocarbamol (ROBAXIN) 500 MG  tablet Take 2 tablets by mouth 4 (Four) Times a Day.   • omeprazole (priLOSEC) 20 MG capsule Take 1 capsule by mouth Daily.   • Semaglutide,0.25 or 0.5MG/DOS, (Ozempic, 0.25 or 0.5 MG/DOSE,) 2 MG/1.5ML solution pen-injector Inject 1 mg under the skin into the appropriate area as directed 1 (One) Time Per Week.   • simvastatin (ZOCOR) 20 MG tablet TAKE 1 TABLET BY MOUTH EVERY NIGHT   • tadalafil (CIALIS) 5 MG tablet Take 1 tablet po daily   • Vraylar 1.5 MG capsule capsule    • [DISCONTINUED] metFORMIN (GLUCOPHAGE) 1000 MG tablet Take 1 tablet by mouth 2 (Two) Times a Day With Meals.     No current facility-administered medications on file prior to visit.       Immunization History   Administered Date(s) Administered   • COVID-19 (MODERNA) 1st, 2nd, 3rd Dose Only 04/10/2021, 05/05/2021, 08/19/2021, 09/17/2021   • DTP 03/11/1996   • Hep B, Adolescent or Pediatric 06/22/2001, 07/23/2001, 08/02/2002   • MMR 03/11/1996   • OPV 03/11/1996       Review of Systems     Objective     /86   Pulse 111   Temp 97 °F (36.1 °C)   Wt (!) 159 kg (351 lb)   SpO2 100%   BMI 42.73 kg/m²       Physical Exam  Vitals reviewed.   Constitutional:       General: He is not in acute distress.     Appearance: He is well-developed. He is obese.   HENT:      Head: Normocephalic and atraumatic.   Eyes:      General: No scleral icterus.     Extraocular Movements: Extraocular movements intact.      Conjunctiva/sclera: Conjunctivae normal.   Neck:      Thyroid: No thyroid mass, thyromegaly or thyroid tenderness.      Trachea: Trachea normal.   Cardiovascular:      Rate and Rhythm: Normal rate and regular rhythm.      Pulses: Normal pulses.      Heart sounds: No murmur heard.  Pulmonary:      Effort: Pulmonary effort is normal. No respiratory distress.      Breath sounds: Normal breath sounds. No wheezing, rhonchi or rales.   Musculoskeletal:         General: Normal range of motion.      Cervical back: Normal range of motion and neck supple.       Right lower leg: No edema.      Left lower leg: No edema.   Lymphadenopathy:      Cervical: No cervical adenopathy.   Skin:     General: Skin is warm and dry.   Neurological:      Mental Status: He is alert and oriented to person, place, and time.   Psychiatric:         Mood and Affect: Mood normal. Affect is flat.         Behavior: Behavior normal.         Thought Content: Thought content normal.         Judgment: Judgment normal.         Result Review :     The following data was reviewed by: CORTNEY Acosta on 03/29/2022:    CMP    CMP 12/23/21 3/21/22   Glucose 102 (A) 114 (A)   BUN 11 9   Creatinine 0.66 (A) 0.78   eGFR Non African Am 142    Sodium 139 140   Potassium 4.5 4.2   Chloride 103 106   Calcium 9.6 9.4   Albumin 4.30 4.20   Total Bilirubin 0.5 0.4   Alkaline Phosphatase 69 60   AST (SGOT) 32 23   ALT (SGPT) 53 (A) 38   (A) Abnormal value            CBC    CBC 12/23/21 3/21/22   WBC 6.04 4.31   RBC 4.85 4.82   Hemoglobin 14.5 14.0   Hematocrit 42.4 42.1   MCV 87.4 87.3   MCH 29.9 29.0   MCHC 34.2 33.3   RDW 13.0 13.3   Platelets 295 227           Lipid Panel    Lipid Panel 12/23/21 3/21/22   Total Cholesterol 123 88   Triglycerides 219 (A) 118   HDL Cholesterol 30 (A) 34 (A)   VLDL Cholesterol 36 22   LDL Cholesterol  57 32   LDL/HDL Ratio 1.64 0.89   (A) Abnormal value            TSH    TSH 12/23/21   TSH 2.490           A1C Last 3 Results    HGBA1C Last 3 Results 12/23/21 3/21/22   Hemoglobin A1C 5.65 (A) 6.00 (A)   (A) Abnormal value            Microalbumin    Microalbumin 12/23/21   Microalbumin, Urine <1.2             Data reviewed: Consultant notes :   Nutrition with Virginia Staples RD (03/15/2022)             Assessment and Plan      Diagnoses and all orders for this visit:    1. Type 2 diabetes mellitus without complication, without long-term current use of insulin (HCC) (Primary)  Comments:  continue current meds; A1c at goal, less than 7% - call if unable to get Ozempic 1mg  weekly  Orders:  -     metFORMIN (GLUCOPHAGE) 1000 MG tablet; Take 1 tablet by mouth 2 (Two) Times a Day With Meals.  Dispense: 180 tablet; Refill: 0    2. Class 3 severe obesity due to excess calories with serious comorbidity and body mass index (BMI) of 40.0 to 44.9 in adult (HCC)    3. Low vitamin D level  Comments:  continue vitamin d 2000 iu daily    4. Iron deficiency  -     ferrous sulfate 325 (65 FE) MG tablet; Take 1 tablet by mouth Daily With Breakfast.  Dispense: 90 tablet; Refill: 0            Follow Up     Return in about 1 month (around 4/29/2022) for Next scheduled follow up.     We reviewed dietitian notes.  I did ask him to speak with the dietitian to clarify if his caloric need was his calorie goal for the day.  If 2700 karrie was his goal for the day, and carbohydrate goal was 240 g carbs per day, and protein goal is 92 g/day, and that would mean he would need at least 150 g of fat per day.  I think it would help him to have more concrete parameters to follow with all macros included.    I did also discuss with him the importance of being physically active and how this will most likely help with his depression symptoms as well.  I encouraged him to start with just 5 to 10 minutes daily, either first thing in the morning, or as soon as his wife comes home from work.    Patient was given instructions and counseling regarding his condition or for health maintenance advice. Please see specific information pulled into the AVS if appropriate.

## 2022-03-30 DIAGNOSIS — E11.9 TYPE 2 DIABETES MELLITUS WITHOUT COMPLICATION, WITHOUT LONG-TERM CURRENT USE OF INSULIN: ICD-10-CM

## 2022-04-02 LAB
TESTOST FREE SERPL-MCNC: 7.7 PG/ML (ref 8.7–25.1)
TESTOST SERPL-MCNC: 231.9 NG/DL (ref 264–916)

## 2022-04-05 ENCOUNTER — TELEPHONE (OUTPATIENT)
Dept: ORTHOPEDIC SURGERY | Facility: CLINIC | Age: 32
End: 2022-04-05

## 2022-04-05 NOTE — TELEPHONE ENCOUNTER
Caller: TUSHAR MIDDLETON     Relationship: SELF     Best call back number: [phone]    What form or medical record are you requesting: WORK NOTE WITH RESTRICTIONS FROM OFFICE VISIT 02/28/2022    Who is requesting this form or medical record from you: PATIENT     How would you like to receive the form or medical records (pick-up, mail, fax):      Timeframe paperwork needed: ASAP

## 2022-04-05 NOTE — TELEPHONE ENCOUNTER
PATIENT NOTIFIED DR RODRIGUEZ IS OUT OF OFFICE TODAY IN SURGERY. AFTER REVIEWING PATIENT CHART A WORK NOTE WAS NOT COMPLETED FOR OFFICE VISIT 02/28/22. PATIENT ADVISED I WILL GEY WITH DR RODRIGUEZ TOMORROW REGARDING NEED FOR WORK NOTE AND CONTACT HIM ONCE IT IS READY TO BE PICKED UP. PATIENT REQUESTED COMPLETED WORK NOTE BE FAXED TO HIM AT [phone].

## 2022-04-26 ENCOUNTER — OFFICE VISIT (OUTPATIENT)
Dept: FAMILY MEDICINE CLINIC | Facility: CLINIC | Age: 32
End: 2022-04-26

## 2022-04-26 VITALS
WEIGHT: 315 LBS | SYSTOLIC BLOOD PRESSURE: 144 MMHG | TEMPERATURE: 96.5 F | DIASTOLIC BLOOD PRESSURE: 94 MMHG | OXYGEN SATURATION: 97 % | BODY MASS INDEX: 42.6 KG/M2 | HEART RATE: 105 BPM

## 2022-04-26 DIAGNOSIS — R79.89 LOW TESTOSTERONE: ICD-10-CM

## 2022-04-26 DIAGNOSIS — E66.01 CLASS 3 SEVERE OBESITY DUE TO EXCESS CALORIES WITH SERIOUS COMORBIDITY AND BODY MASS INDEX (BMI) OF 40.0 TO 44.9 IN ADULT: ICD-10-CM

## 2022-04-26 DIAGNOSIS — E61.1 IRON DEFICIENCY: ICD-10-CM

## 2022-04-26 DIAGNOSIS — R79.89 LOW VITAMIN D LEVEL: ICD-10-CM

## 2022-04-26 DIAGNOSIS — E11.9 TYPE 2 DIABETES MELLITUS WITHOUT COMPLICATION, WITHOUT LONG-TERM CURRENT USE OF INSULIN: Primary | ICD-10-CM

## 2022-04-26 PROCEDURE — 99214 OFFICE O/P EST MOD 30 MIN: CPT | Performed by: NURSE PRACTITIONER

## 2022-04-26 NOTE — PROGRESS NOTES
Chief Complaint  Weight Check    Subjective            Arslan Butler presents to [facility]  History of Present Illness     Follow up on weight loss -     He was able to get Ozempic in the 1mg dose - he is tolerating it thus far. He started it about two weeks ago. He cannot really see any change in appetite. He does endorse feeling hungry all of the time. His wife is trying to meal prep for him, but he doesn't feel like it is enough. He is hungry all of the time and sleeping a lot. He 'tries' to track in [employer]. He seemingly does well logging breakfast and lunch, but falls off for dinner time tracking.     When tracking breakfast and lunch he is consuming approximately 1000 calories. He had 1500 calories remaining yesterday - but did not log snacks or dinner. He had a steak and salad for dinner. He ate some popcorn and a cookie for a snack. States he just grabs random stuff and doesn't really think about. He is drinking water or zero sugar lemonade/crystal light; but mostly just water.     He has not seen the dietician since March - he had an appointment this month, but he didn't have a ride 'or something' - it conflicted with his wife's schedule, and he hasn't been able to get it r/s.     In regards to exercise - he states that is 'really, really hard for me to get out of the house' - he does have a long drive way and will walk to get the mail occasionally, maybe 2-3 days per week. He does chores around the house. He has not looked into any in-home exercise henry - such as those on [email or web address].     He continues to work with psychiatry regarding treatment of his anxiety and depression - she has increased Vraylar thus far - he is seeing a nurse practitioner. He seems to be more treatment resistant - has tried 'all the benzos' and none of those work for his anxiety. Valium worked for a while, but then insurance stopped paying for it. He is on Xanax right now but it works for only a  short duration; wears off really fast.    He will see urology in May regarding the low testosterone.     He will see ortho surgery tomorrow regarding the shoulder - at [facility].        Past Medical History:   Diagnosis Date   • Anxiety and depression 12/17/2021   • Bipolar disorder (HCC) 12/15/2021   • Diabetes mellitus (HCC)    • Gastroesophageal reflux disease 12/15/2021   • Hyperlipidemia 12/15/2021   • Obesity 12/15/2021       Allergies   Allergen Reactions   • Sulfamethoxazole-Trimethoprim Hives        Past Surgical History:   Procedure Laterality Date   • ROTATOR CUFF REPAIR Left     has had it done twice     • TONSILLECTOMY          Social History     Tobacco Use   • Smoking status: Never Smoker   • Smokeless tobacco: Never Used   Vaping Use   • Vaping Use: Some days   • Substances: Nicotine, Flavoring   • Devices: Disposable, Refillable tank   Substance Use Topics   • Alcohol use: Yes     Comment: ocassionally    • Drug use: Never       Family History   Problem Relation Age of Onset   • Heart attack Maternal Grandfather    • Diabetes Paternal Grandmother         Health Maintenance Due   Topic Date Due   • ANNUAL PHYSICAL  Never done   • Pneumococcal Vaccine 0-64 (1 - PCV) Never done   • TDAP/TD VACCINES (1 - Tdap) Never done   • DIABETIC EYE EXAM  Never done        Current Outpatient Medications on File Prior to Visit   Medication Sig   • acetaminophen (TYLENOL) 500 MG tablet Take 1 tablet by mouth Every 6 (Six) Hours As Needed for Mild Pain .   • ALPRAZolam (XANAX) 1 MG tablet 1 mg 2 (Two) Times a Day.   • buPROPion XL (WELLBUTRIN XL) 150 MG 24 hr tablet Take 150 mg by mouth Daily.   • Cholecalciferol (Vitamin D3) 50 MCG (2000 UT) capsule TAKE ONE CAPSULE BY MOUTH ONCE DAILY   • cyclobenzaprine (FLEXERIL) 10 MG tablet Take 1 tablet by mouth 2 (Two) Times a Day As Needed for Muscle Spasms for up to 10 days.   • ferrous sulfate 325 (65 FE) MG tablet Take 1 tablet by mouth Daily With Breakfast.   • lidocaine  (LIDODERM) 5 % Place 1 patch on the skin as directed by provider Daily for 30 days. Remove & Discard patch within 12 hours or as directed by MD   • loratadine (Claritin) 10 MG tablet Take 1 tablet by mouth Daily.   • metFORMIN (GLUCOPHAGE) 1000 MG tablet Take 1 tablet by mouth 2 (Two) Times a Day With Meals.   • methocarbamol (ROBAXIN) 500 MG tablet Take 2 tablets by mouth 4 (Four) Times a Day.   • omeprazole (priLOSEC) 20 MG capsule Take 1 capsule by mouth Daily.   • Semaglutide, 1 MG/DOSE, 4 MG/3ML solution pen-injector Inject 1 mg under the skin into the appropriate area as directed 1 (One) Time Per Week.   • simvastatin (ZOCOR) 20 MG tablet TAKE 1 TABLET BY MOUTH EVERY NIGHT   • tadalafil (CIALIS) 5 MG tablet Take 1 tablet po daily   • Vraylar 3 MG capsule capsule      No current facility-administered medications on file prior to visit.       Immunization History   Administered Date(s) Administered   • COVID-19 (MODERNA) 1st, 2nd, 3rd Dose Only 04/10/2021, 05/05/2021, 08/19/2021, 09/17/2021   • DTP 03/11/1996   • Hep B, Adolescent or Pediatric 06/22/2001, 07/23/2001, 08/02/2002   • MMR 03/11/1996   • OPV 03/11/1996       Review of Systems     Objective     /94   Pulse 105   Temp 96.5 °F (35.8 °C)   Wt (!) 159 kg (350 lb)   SpO2 97%   BMI 42.60 kg/m²       Physical Exam  Vitals reviewed.   Constitutional:       General: He is not in acute distress.     Appearance: He is well-developed. He is obese.   HENT:      Head: Normocephalic and atraumatic.   Eyes:      General: No scleral icterus.     Extraocular Movements: Extraocular movements intact.      Conjunctiva/sclera: Conjunctivae normal.   Cardiovascular:      Rate and Rhythm: Normal rate and regular rhythm.      Pulses: Normal pulses.      Heart sounds: No murmur heard.  Pulmonary:      Effort: Pulmonary effort is normal. No respiratory distress.      Breath sounds: Normal breath sounds. No wheezing, rhonchi or rales.   Musculoskeletal:          "General: Normal range of motion.      Right lower leg: No edema.      Left lower leg: No edema.   Skin:     General: Skin is warm and dry.   Neurological:      Mental Status: He is alert and oriented to person, place, and time.   Psychiatric:         Mood and Affect: Mood and affect normal.         Behavior: Behavior normal.         Thought Content: Thought content normal.         Judgment: Judgment normal.         Result Review :     The following data was reviewed by: CORTNEY Acosta on 04/26/2022:    Lithium Level (03/21/2022 08:08)  Testosterone (Free & Total), LC / MS (03/21/2022 08:08)  Estrogens, Total (03/21/2022 08:08)  CBC & Differential (03/21/2022 08:14)  Comprehensive Metabolic Panel (03/21/2022 08:14)  Estradiol (03/21/2022 08:14)  Lipid panel (03/21/2022 08:14)  Hemoglobin A1c (03/21/2022 08:14)  Vitamin D 25 hydroxy (03/21/2022 08:14)  Iron and TIBC (03/21/2022 08:14)  Ferritin (03/21/2022 08:14)                Assessment and Plan      Diagnoses and all orders for this visit:    1. Type 2 diabetes mellitus without complication, without long-term current use of insulin ([facility]) (Primary)  Comments:  continue Ozempic 1mg weekly    2. Class 3 severe obesity due to excess calories with serious comorbidity and body mass index (BMI) of 40.0 to 44.9 in adult (HCC)  Comments:  Reschedule appt with dietician - log food daily - \"if you bite it, write it\" - aim for physical activity 10-20 minutes daily to start    3. Low vitamin D level    4. Iron deficiency    5. Low testosterone  Comments:  Follow up with urology in May            Follow Up     Return in about 7 weeks (around 6/13/2022) for Next scheduled follow up.     Continue Ozempic 1 mg weekly.  He only started this 2 weeks ago, so we will reassess at his follow-up.  Around that time he should be due for repeat A1c.  I am hoping that by maximizing Ozempic that will aid in weight loss.    I have encouraged him to log everything that he eats into " [place].  Physical activity has also been a challenge for him which is most likely related to his depression which has been difficult to treat.  He will continue to work with psychiatry for management of his depression and anxiety.  I have asked that he reschedule with the nutritionist regarding his weight management as well.    Certainly if he has low testosterone this could be contributing to his fatigue.  He will find out in May if there are any plans to replace testosterone.  He will see sleep medicine in June.  I have asked that he follow-up with me after his appointments with the specialists.    Patient was given instructions and counseling regarding his condition or for health maintenance advice. Please see specific information pulled into the AVS if appropriate.     Arslan Butler  reports that he has never smoked. He has never used smokeless tobacco.

## 2022-05-09 ENCOUNTER — OFFICE VISIT (OUTPATIENT)
Dept: UROLOGY | Facility: CLINIC | Age: 32
End: 2022-05-09

## 2022-05-09 VITALS
WEIGHT: 315 LBS | DIASTOLIC BLOOD PRESSURE: 86 MMHG | SYSTOLIC BLOOD PRESSURE: 123 MMHG | TEMPERATURE: 98.7 F | HEART RATE: 100 BPM | HEIGHT: 76 IN | BODY MASS INDEX: 38.36 KG/M2

## 2022-05-09 DIAGNOSIS — R53.83 OTHER FATIGUE: ICD-10-CM

## 2022-05-09 DIAGNOSIS — N52.1 ERECTILE DYSFUNCTION DUE TO DISEASES CLASSIFIED ELSEWHERE: Primary | ICD-10-CM

## 2022-05-09 DIAGNOSIS — E29.1 HYPOGONADISM IN MALE: ICD-10-CM

## 2022-05-09 LAB
BILIRUB BLD-MCNC: NEGATIVE MG/DL
CLARITY, POC: CLEAR
COLOR UR: YELLOW
EXPIRATION DATE: NORMAL
GLUCOSE UR STRIP-MCNC: NEGATIVE MG/DL
KETONES UR QL: NEGATIVE
LEUKOCYTE EST, POC: NEGATIVE
Lab: NORMAL
NITRITE UR-MCNC: NEGATIVE MG/ML
PH UR: 5.5 [PH] (ref 5–8)
PROT UR STRIP-MCNC: NEGATIVE MG/DL
RBC # UR STRIP: NEGATIVE /UL
SP GR UR: 1.02 (ref 1–1.03)
UROBILINOGEN UR QL: NORMAL

## 2022-05-09 PROCEDURE — 99213 OFFICE O/P EST LOW 20 MIN: CPT | Performed by: NURSE PRACTITIONER

## 2022-05-09 NOTE — PROGRESS NOTES
"Chief Complaint  Erectile Dysfunction     Subjective          Arslan Butler 31 y.o. male presents to [facility]  Follow up of recheck lab results and  erectile dysfunction. His testosterone level this time lower at 231.  Previous check, it was 1-14-22 testosterone total level 324 and 313.  Plans of having children and testosterone therapy not advised due to risk of decreasing sperm production and infertility. Estrogen level has increased yet still within normal limits. Tadalafil effective for the erectile dysfunction. Patient and spouse are wanting to have a child and reports that his spouses visit to ob/gyn had recommended that he may want to have fertility testing but \"My insurance won't pay for it\". Previously discussed that clomid may help increase testosterone levels and often used to help increase sperm count in men for fertility. Patient continues to have fatigue which has been determined partially due to his depression. He relayed that he did see the surgeon regarding his left shoulder and states that he was informed that the torn rotator cuff in inoperable and that if attempted to repair, It would be worse. Recommended patient change his line of work since he is unable to lift his arm above shoulder level. He relayed applying for jobs after our appointment today.     Review of Systems   Constitutional: Negative for chills and fever.   Genitourinary: Negative.  Negative for difficulty urinating.   Musculoskeletal: Positive for arthralgias.        Weakness and pain left shoulder and arm   Neurological:        Left upper extremity   Psychiatric/Behavioral: Negative for suicidal ideas.      Objective   Vital Signs:   /86   Pulse 100   Temp 98.7 °F (37.1 °C)   Ht 193 cm (76\")   Wt (!) 157 kg (347 lb)   BMI 42.24 kg/m²      Past Surgical History:   Procedure Laterality Date   • ROTATOR CUFF REPAIR Left     has had it done twice     • TONSILLECTOMY          Physical " Exam  Vitals and nursing note reviewed.   Constitutional:       General: He is not in acute distress.     Appearance: Normal appearance. He is well-developed and well-groomed. He is not ill-appearing.      Comments: Ambulates without difficulty. Limited rom left upper extremity   Cardiovascular:      Rate and Rhythm: Normal rate.   Pulmonary:      Effort: Pulmonary effort is normal.      Breath sounds: Normal breath sounds.   Neurological:      Mental Status: He is alert and oriented to person, place, and time.   Psychiatric:         Behavior: Behavior normal. Behavior is cooperative.         Thought Content: Thought content normal.         Judgment: Judgment normal.      Comments:  depressed        Result Review :        POC Urinalysis Dipstick, Automated (05/09/2022 11:05)  Testosterone (Free & Total), LC / MS (03/21/2022 08:08)  Estrogens, Total (03/21/2022 08:08)  Prolactin (01/14/2022 09:27)              Assessment and Plan    Diagnoses and all orders for this visit:    1. Erectile dysfunction due to diseases classified elsewhere (Primary)  -     POC Urinalysis Dipstick, Automated  -     clomiPHENE (CLOMID) 50 MG tablet; 1/2 of 50mg tablet po every other day ( 25 mg every other day)  Dispense: 15 tablet; Refill: 2    2. Hypogonadism in male  -     clomiPHENE (CLOMID) 50 MG tablet; 1/2 of 50mg tablet po every other day ( 25 mg every other day)  Dispense: 15 tablet; Refill: 2    3. Other fatigue    4. Erectile dysfunction, unspecified erectile dysfunction type    Labs reviewed with patient and discussed.Patient agree Will begin clomid to hopefully improve testosterone level and it will help with increasing sperm count.Good rx coupon applied. He and spouse attempting to have a child. Follow up in about 3 months to see if helpful with energy and plan to recheck testosterone level. May continue tadalafil for erectile dysfunction. If not using 5mg daily, may increase to 2 tablets as needed prn hour prior to sexual  activity. Not to exceed once daily and max dose 20mg.    Follow Up   Return in about 3 months (around 8/9/2022) for Recheck.  Patient was given instructions and counseling regarding his condition or for health maintenance advice. Please see specific information pulled into the AVS if appropriate.     CORTNEY Ruby

## 2022-05-11 PROBLEM — R53.83 OTHER FATIGUE: Status: ACTIVE | Noted: 2022-05-11

## 2022-05-11 PROBLEM — N52.1 ERECTILE DYSFUNCTION DUE TO DISEASES CLASSIFIED ELSEWHERE: Status: ACTIVE | Noted: 2022-05-11

## 2022-05-11 PROBLEM — E29.1 HYPOGONADISM IN MALE: Status: ACTIVE | Noted: 2022-05-11

## 2022-05-20 DIAGNOSIS — N52.2 DRUG-INDUCED ERECTILE DYSFUNCTION: ICD-10-CM

## 2022-05-20 RX ORDER — TADALAFIL 5 MG/1
TABLET ORAL
Qty: 30 TABLET | Refills: 4 | Status: SHIPPED | OUTPATIENT
Start: 2022-05-20 | End: 2022-12-27

## 2022-05-20 NOTE — TELEPHONE ENCOUNTER
Called PT and he did say he wanted meds sent to [facility] and I have changed it over.

## 2022-06-10 DIAGNOSIS — E78.5 DYSLIPIDEMIA: ICD-10-CM

## 2022-06-10 RX ORDER — SIMVASTATIN 20 MG
20 TABLET ORAL NIGHTLY
Qty: 30 TABLET | Refills: 0 | Status: SHIPPED | OUTPATIENT
Start: 2022-06-10 | End: 2022-06-13 | Stop reason: SDUPTHER

## 2022-06-13 ENCOUNTER — OFFICE VISIT (OUTPATIENT)
Dept: FAMILY MEDICINE CLINIC | Facility: CLINIC | Age: 32
End: 2022-06-13

## 2022-06-13 VITALS
HEART RATE: 101 BPM | OXYGEN SATURATION: 98 % | BODY MASS INDEX: 38.36 KG/M2 | TEMPERATURE: 98 F | HEIGHT: 76 IN | SYSTOLIC BLOOD PRESSURE: 122 MMHG | WEIGHT: 315 LBS | DIASTOLIC BLOOD PRESSURE: 84 MMHG

## 2022-06-13 DIAGNOSIS — E55.9 VITAMIN D INSUFFICIENCY: ICD-10-CM

## 2022-06-13 DIAGNOSIS — E11.9 TYPE 2 DIABETES MELLITUS WITHOUT COMPLICATION, WITHOUT LONG-TERM CURRENT USE OF INSULIN: Primary | ICD-10-CM

## 2022-06-13 DIAGNOSIS — E78.5 DYSLIPIDEMIA: ICD-10-CM

## 2022-06-13 DIAGNOSIS — G89.29 CHRONIC LEFT SHOULDER PAIN: ICD-10-CM

## 2022-06-13 DIAGNOSIS — K21.9 GASTROESOPHAGEAL REFLUX DISEASE, UNSPECIFIED WHETHER ESOPHAGITIS PRESENT: ICD-10-CM

## 2022-06-13 DIAGNOSIS — J30.9 ALLERGIC RHINITIS, UNSPECIFIED SEASONALITY, UNSPECIFIED TRIGGER: ICD-10-CM

## 2022-06-13 DIAGNOSIS — E61.1 IRON DEFICIENCY: ICD-10-CM

## 2022-06-13 DIAGNOSIS — M54.42 CHRONIC LEFT-SIDED LOW BACK PAIN WITH LEFT-SIDED SCIATICA: ICD-10-CM

## 2022-06-13 DIAGNOSIS — G89.29 CHRONIC LEFT-SIDED LOW BACK PAIN WITH LEFT-SIDED SCIATICA: ICD-10-CM

## 2022-06-13 DIAGNOSIS — E66.01 CLASS 3 SEVERE OBESITY DUE TO EXCESS CALORIES WITH SERIOUS COMORBIDITY AND BODY MASS INDEX (BMI) OF 40.0 TO 44.9 IN ADULT: ICD-10-CM

## 2022-06-13 DIAGNOSIS — M25.512 CHRONIC LEFT SHOULDER PAIN: ICD-10-CM

## 2022-06-13 LAB
25(OH)D3 SERPL-MCNC: 39.6 NG/ML (ref 30–100)
ALBUMIN SERPL-MCNC: 4.5 G/DL (ref 3.5–5.2)
ALBUMIN UR-MCNC: 1.5 MG/DL
ALBUMIN/GLOB SERPL: 2 G/DL
ALP SERPL-CCNC: 56 U/L (ref 39–117)
ALT SERPL W P-5'-P-CCNC: 46 U/L (ref 1–41)
ANION GAP SERPL CALCULATED.3IONS-SCNC: 12 MMOL/L (ref 5–15)
AST SERPL-CCNC: 27 U/L (ref 1–40)
BASOPHILS # BLD AUTO: 0.01 10*3/MM3 (ref 0–0.2)
BASOPHILS NFR BLD AUTO: 0.2 % (ref 0–1.5)
BILIRUB SERPL-MCNC: 0.6 MG/DL (ref 0–1.2)
BUN SERPL-MCNC: 7 MG/DL (ref 6–20)
BUN/CREAT SERPL: 6.8 (ref 7–25)
CALCIUM SPEC-SCNC: 9 MG/DL (ref 8.6–10.5)
CHLORIDE SERPL-SCNC: 110 MMOL/L (ref 98–107)
CHOLEST SERPL-MCNC: 74 MG/DL (ref 0–200)
CO2 SERPL-SCNC: 20 MMOL/L (ref 22–29)
CREAT SERPL-MCNC: 1.03 MG/DL (ref 0.76–1.27)
CREAT UR-MCNC: 270.8 MG/DL
DEPRECATED RDW RBC AUTO: 43.4 FL (ref 37–54)
EGFRCR SERPLBLD CKD-EPI 2021: 100.2 ML/MIN/1.73
EOSINOPHIL # BLD AUTO: 0.07 10*3/MM3 (ref 0–0.4)
EOSINOPHIL NFR BLD AUTO: 1.4 % (ref 0.3–6.2)
ERYTHROCYTE [DISTWIDTH] IN BLOOD BY AUTOMATED COUNT: 14.4 % (ref 12.3–15.4)
FERRITIN SERPL-MCNC: 164 NG/ML (ref 30–400)
GLOBULIN UR ELPH-MCNC: 2.3 GM/DL
GLUCOSE SERPL-MCNC: 96 MG/DL (ref 65–99)
HBA1C MFR BLD: 5.6 % (ref 4.8–5.6)
HCT VFR BLD AUTO: 44.4 % (ref 37.5–51)
HDLC SERPL-MCNC: 26 MG/DL (ref 40–60)
HGB BLD-MCNC: 15 G/DL (ref 13–17.7)
IMM GRANULOCYTES # BLD AUTO: 0.01 10*3/MM3 (ref 0–0.05)
IMM GRANULOCYTES NFR BLD AUTO: 0.2 % (ref 0–0.5)
IRON 24H UR-MRATE: 70 MCG/DL (ref 59–158)
IRON SATN MFR SERPL: 16 % (ref 20–50)
LDLC SERPL CALC-MCNC: 29 MG/DL (ref 0–100)
LDLC/HDLC SERPL: 1.1 {RATIO}
LYMPHOCYTES # BLD AUTO: 1.39 10*3/MM3 (ref 0.7–3.1)
LYMPHOCYTES NFR BLD AUTO: 28.1 % (ref 19.6–45.3)
MCH RBC QN AUTO: 28.7 PG (ref 26.6–33)
MCHC RBC AUTO-ENTMCNC: 33.8 G/DL (ref 31.5–35.7)
MCV RBC AUTO: 85.1 FL (ref 79–97)
MICROALBUMIN/CREAT UR: 5.5 MG/G
MONOCYTES # BLD AUTO: 0.33 10*3/MM3 (ref 0.1–0.9)
MONOCYTES NFR BLD AUTO: 6.7 % (ref 5–12)
NEUTROPHILS NFR BLD AUTO: 3.14 10*3/MM3 (ref 1.7–7)
NEUTROPHILS NFR BLD AUTO: 63.4 % (ref 42.7–76)
NRBC BLD AUTO-RTO: 0 /100 WBC (ref 0–0.2)
PLATELET # BLD AUTO: 209 10*3/MM3 (ref 140–450)
PMV BLD AUTO: 11.1 FL (ref 6–12)
POTASSIUM SERPL-SCNC: 4.2 MMOL/L (ref 3.5–5.2)
PROT SERPL-MCNC: 6.8 G/DL (ref 6–8.5)
RBC # BLD AUTO: 5.22 10*6/MM3 (ref 4.14–5.8)
SODIUM SERPL-SCNC: 142 MMOL/L (ref 136–145)
T4 FREE SERPL-MCNC: 1.45 NG/DL (ref 0.93–1.7)
TIBC SERPL-MCNC: 425 MCG/DL (ref 298–536)
TRANSFERRIN SERPL-MCNC: 285 MG/DL (ref 200–360)
TRIGL SERPL-MCNC: 97 MG/DL (ref 0–150)
TSH SERPL DL<=0.05 MIU/L-ACNC: 0.56 UIU/ML (ref 0.27–4.2)
VLDLC SERPL-MCNC: 19 MG/DL (ref 5–40)
WBC NRBC COR # BLD: 4.95 10*3/MM3 (ref 3.4–10.8)

## 2022-06-13 PROCEDURE — 83540 ASSAY OF IRON: CPT | Performed by: NURSE PRACTITIONER

## 2022-06-13 PROCEDURE — 84439 ASSAY OF FREE THYROXINE: CPT | Performed by: NURSE PRACTITIONER

## 2022-06-13 PROCEDURE — 84466 ASSAY OF TRANSFERRIN: CPT | Performed by: NURSE PRACTITIONER

## 2022-06-13 PROCEDURE — 80050 GENERAL HEALTH PANEL: CPT | Performed by: NURSE PRACTITIONER

## 2022-06-13 PROCEDURE — 82043 UR ALBUMIN QUANTITATIVE: CPT | Performed by: NURSE PRACTITIONER

## 2022-06-13 PROCEDURE — 82728 ASSAY OF FERRITIN: CPT | Performed by: NURSE PRACTITIONER

## 2022-06-13 PROCEDURE — 99214 OFFICE O/P EST MOD 30 MIN: CPT | Performed by: NURSE PRACTITIONER

## 2022-06-13 PROCEDURE — 82570 ASSAY OF URINE CREATININE: CPT | Performed by: NURSE PRACTITIONER

## 2022-06-13 PROCEDURE — 80061 LIPID PANEL: CPT | Performed by: NURSE PRACTITIONER

## 2022-06-13 PROCEDURE — 83036 HEMOGLOBIN GLYCOSYLATED A1C: CPT | Performed by: NURSE PRACTITIONER

## 2022-06-13 PROCEDURE — 82306 VITAMIN D 25 HYDROXY: CPT | Performed by: NURSE PRACTITIONER

## 2022-06-13 RX ORDER — OMEPRAZOLE 20 MG/1
20 CAPSULE, DELAYED RELEASE ORAL DAILY
Qty: 90 CAPSULE | Refills: 1 | Status: SHIPPED | OUTPATIENT
Start: 2022-06-13 | End: 2022-12-27

## 2022-06-13 RX ORDER — LORATADINE 10 MG/1
10 TABLET ORAL DAILY
Qty: 90 TABLET | Refills: 1 | Status: SHIPPED | OUTPATIENT
Start: 2022-06-13 | End: 2023-02-21 | Stop reason: SDUPTHER

## 2022-06-13 RX ORDER — ACETAMINOPHEN 160 MG
2000 TABLET,DISINTEGRATING ORAL DAILY
Qty: 90 CAPSULE | Refills: 0 | Status: SHIPPED | OUTPATIENT
Start: 2022-06-13 | End: 2022-08-05

## 2022-06-13 RX ORDER — FERROUS SULFATE 325(65) MG
325 TABLET ORAL
Qty: 90 TABLET | Refills: 0 | Status: SHIPPED | OUTPATIENT
Start: 2022-06-13 | End: 2022-11-18

## 2022-06-13 RX ORDER — SIMVASTATIN 20 MG
20 TABLET ORAL NIGHTLY
Qty: 90 TABLET | Refills: 1 | Status: SHIPPED | OUTPATIENT
Start: 2022-06-13 | End: 2022-11-18

## 2022-06-13 NOTE — PROGRESS NOTES
Venipuncture Blood Specimen Collection  Venipuncture performed in left arm by Cassia Cole with good hemostasis. Patient tolerated the procedure well without complications.   06/13/22   Cassia Cole

## 2022-06-13 NOTE — PROGRESS NOTES
Chief Complaint  Diabetes (Refills and labs, patient is fasting) and Shoulder Pain (Left shoulder and lower back pain)    Subjective            Arslan Butler presents to [facility]  History of Present Illness     Patient presents to the office today to follow-up on his chronic health conditions and for medication refills.  He would also like to discuss his shoulder and back pain.    He is currently prescribed Claritin once daily for treatment of allergic rhinitis.  Symptoms currently well controlled.  Denies any nasal congestion or rhinorrhea.  Denies any sneeze or cough.    He is currently taking a vitamin D supplement due to vitamin D deficiency, but also obesity.  He has been actively working on losing weight.  He is down to 333 pounds from 350 pounds just since April.  He has not been exercising as discussed/recommended; however, he has cut out bread, sugars, and other sweets.  That has seemingly helped the most with his weight loss so far.    He is currently taking metformin 1000 mg twice daily for treatment of diabetes.  He is also injecting Ozempic 1 mg weekly.  His A1c has been less than 7% in the recent past.  He endorses frequent thirst and urination, but denies frequent hunger.  Denies any neuropathy symptoms of his fingers and toes.    He takes omeprazole 20 mg once daily for treatment of GERD.  Symptoms currently stable.  Denies dysphagia, nausea, vomiting, abdominal pain, melena, or bright red blood per rectum.    Chronic left shoulder pain - they left it up to him whether or not to have shoulder surgery - torn rotator cuff and tendons are thin and missing muscle - they told him that they could try to do surgery, but they do not see it being very successful.     He also has chronic lower back pain - since about 2017.  Denies any acute injury to precipitate his back pain.  He states that it just started hurting at that time.  Previously, he has been told that he had  DDD, ruptured disc, and 'sciatica'. He currently denies any numbness or tingling in the LEs, but when his back pain flares he does get radiculopathy of the left side.  He currently denies any fecal or urinary incontinence. Lumbar spine x-ray in December showed no acute findings - no mention of DDD. He has not been to PT for his LBP in a few years.     He is still under the care of psychiatry for his bipolar disorder, anxiety, and depression.  He is currently taking Wellbutrin, Vraylar, and Xanax.  He is no longer taking lithium.  He states in the past he was taking diclofenac for his musculoskeletal pain, but he had to stop that when psychiatry initiated him on lithium.  He would like to go back to taking diclofenac if he is able.      Past Medical History:   Diagnosis Date   • Anxiety and depression 12/17/2021   • Bipolar disorder (HCC) 12/15/2021   • Diabetes mellitus (HCC)    • Gastroesophageal reflux disease 12/15/2021   • Hyperlipidemia 12/15/2021   • Obesity 12/15/2021       Allergies   Allergen Reactions   • Sulfamethoxazole-Trimethoprim Hives        Past Surgical History:   Procedure Laterality Date   • ROTATOR CUFF REPAIR Left     has had it done twice     • TONSILLECTOMY          Social History     Tobacco Use   • Smoking status: Never Smoker   • Smokeless tobacco: Never Used   Vaping Use   • Vaping Use: Some days   • Substances: Nicotine, Flavoring   • Devices: Disposable, Refillable tank   Substance Use Topics   • Alcohol use: Yes     Comment: ocassionally    • Drug use: Never       Family History   Problem Relation Age of Onset   • Heart attack Maternal Grandfather    • Diabetes Paternal Grandmother         Health Maintenance Due   Topic Date Due   • ANNUAL PHYSICAL  Never done   • Pneumococcal Vaccine 0-64 (1 - PCV) Never done   • TDAP/TD VACCINES (1 - Tdap) Never done   • DIABETIC EYE EXAM  Never done        Current Outpatient Medications on File Prior to Visit   Medication Sig   • ALPRAZolam (XANAX)  "1 MG tablet 1 mg 2 (Two) Times a Day.   • buPROPion XL (WELLBUTRIN XL) 150 MG 24 hr tablet Take 150 mg by mouth Daily.   • clomiPHENE (CLOMID) 50 MG tablet 1/2 of 50mg tablet po daily ( 25 mg daily)   • tadalafil (CIALIS) 5 MG tablet Take 1 tablet po daily   • Vraylar 3 MG capsule capsule    • [DISCONTINUED] Cholecalciferol (Vitamin D3) 50 MCG (2000 UT) capsule TAKE ONE CAPSULE BY MOUTH ONCE DAILY   • [DISCONTINUED] ferrous sulfate 325 (65 FE) MG tablet Take 1 tablet by mouth Daily With Breakfast.   • [DISCONTINUED] loratadine (Claritin) 10 MG tablet Take 1 tablet by mouth Daily.   • [DISCONTINUED] metFORMIN (GLUCOPHAGE) 1000 MG tablet Take 1 tablet by mouth 2 (Two) Times a Day With Meals.   • [DISCONTINUED] omeprazole (priLOSEC) 20 MG capsule Take 1 capsule by mouth Daily.   • [DISCONTINUED] Semaglutide, 1 MG/DOSE, 4 MG/3ML solution pen-injector Inject 1 mg under the skin into the appropriate area as directed 1 (One) Time Per Week.   • [DISCONTINUED] simvastatin (ZOCOR) 20 MG tablet TAKE 1 TABLET BY MOUTH EVERY NIGHT   • [DISCONTINUED] acetaminophen (TYLENOL) 500 MG tablet Take 1 tablet by mouth Every 6 (Six) Hours As Needed for Mild Pain .   • [DISCONTINUED] methocarbamol (ROBAXIN) 500 MG tablet Take 2 tablets by mouth 4 (Four) Times a Day.     No current facility-administered medications on file prior to visit.       Immunization History   Administered Date(s) Administered   • COVID-19 (MODERNA) 1st, 2nd, 3rd Dose Only 04/10/2021, 05/05/2021, 08/19/2021, 09/17/2021   • DTP 03/11/1996   • Hep B, Adolescent or Pediatric 06/22/2001, 07/23/2001, 08/02/2002   • MMR 03/11/1996   • OPV 03/11/1996       Review of Systems     Objective     /84 (BP Location: Left arm)   Pulse 101   Temp 98 °F (36.7 °C)   Ht 193 cm (76\")   Wt (!) 151 kg (333 lb)   SpO2 98%   BMI 40.53 kg/m²       Physical Exam  Vitals reviewed.   Constitutional:       General: He is not in acute distress.     Appearance: He is well-developed. He " is obese.   HENT:      Head: Normocephalic and atraumatic.   Eyes:      General: No scleral icterus.     Extraocular Movements: Extraocular movements intact.      Conjunctiva/sclera: Conjunctivae normal.   Neck:      Thyroid: No thyroid mass, thyromegaly or thyroid tenderness.      Vascular: No carotid bruit.      Trachea: Trachea normal.   Cardiovascular:      Rate and Rhythm: Normal rate and regular rhythm.      Pulses: Normal pulses.      Heart sounds: No murmur heard.  Pulmonary:      Effort: Pulmonary effort is normal. No respiratory distress.      Breath sounds: Normal breath sounds. No wheezing, rhonchi or rales.   Musculoskeletal:         General: Normal range of motion.      Cervical back: Normal range of motion and neck supple.      Right lower leg: No edema.      Left lower leg: No edema.   Lymphadenopathy:      Cervical: No cervical adenopathy.   Skin:     General: Skin is warm and dry.   Neurological:      Mental Status: He is alert and oriented to person, place, and time.   Psychiatric:         Mood and Affect: Mood and affect normal.         Behavior: Behavior normal.         Thought Content: Thought content normal.         Judgment: Judgment normal.         Result Review :     The following data was reviewed by: CORTNEY Acosta on 06/13/2022:    CMP    CMP 12/23/21 3/21/22   Glucose 102 (A) 114 (A)   BUN 11 9   Creatinine 0.66 (A) 0.78   eGFR Non African Am 142    Sodium 139 140   Potassium 4.5 4.2   Chloride 103 106   Calcium 9.6 9.4   Albumin 4.30 4.20   Total Bilirubin 0.5 0.4   Alkaline Phosphatase 69 60   AST (SGOT) 32 23   ALT (SGPT) 53 (A) 38   (A) Abnormal value            CBC    CBC 12/23/21 3/21/22   WBC 6.04 4.31   RBC 4.85 4.82   Hemoglobin 14.5 14.0   Hematocrit 42.4 42.1   MCV 87.4 87.3   MCH 29.9 29.0   MCHC 34.2 33.3   RDW 13.0 13.3   Platelets 295 227           Lipid Panel    Lipid Panel 12/23/21 3/21/22   Total Cholesterol 123 88   Triglycerides 219 (A) 118   HDL  Cholesterol 30 (A) 34 (A)   VLDL Cholesterol 36 22   LDL Cholesterol  57 32   LDL/HDL Ratio 1.64 0.89   (A) Abnormal value            TSH    TSH 12/23/21   TSH 2.490           A1C Last 3 Results    HGBA1C Last 3 Results 12/23/21 3/21/22   Hemoglobin A1C 5.65 (A) 6.00 (A)   (A) Abnormal value            Microalbumin    Microalbumin 12/23/21   Microalbumin, Urine <1.2           Data reviewed: Radiologic studies :   XR Spine Lumbar 2 or 3 View (In Office) (12/30/2021 14:18)  XR Shoulder 2+ View Left (In Office) (12/30/2021 14:19)  MRI Shoulder Left Without Contrast (01/12/2022 14:44)  XR Shoulder Comp Min 2 Vw LT (04/04/2022 12:04)  FL Arthrogram Shoulder LT S&I (04/11/2022 14:31)  MRI Arthrogram Shoulder LT (04/11/2022 14:33)         Assessment and Plan      Diagnoses and all orders for this visit:    1. Type 2 diabetes mellitus without complication, without long-term current use of insulin (HCC) (Primary)  -     Comprehensive Metabolic Panel  -     Hemoglobin A1c  -     TSH+Free T4  -     Microalbumin / Creatinine Urine Ratio - Urine, Clean Catch  -     metFORMIN (GLUCOPHAGE) 1000 MG tablet; Take 1 tablet by mouth 2 (Two) Times a Day With Meals.  Dispense: 180 tablet; Refill: 1  -     Semaglutide, 1 MG/DOSE, 4 MG/3ML solution pen-injector; Inject 1 mg under the skin into the appropriate area as directed 1 (One) Time Per Week.  Dispense: 3 pen; Refill: 1    2. Dyslipidemia  -     Comprehensive Metabolic Panel  -     Lipid Panel  -     simvastatin (ZOCOR) 20 MG tablet; Take 1 tablet by mouth Every Night.  Dispense: 90 tablet; Refill: 1    3. Gastroesophageal reflux disease, unspecified whether esophagitis present  -     omeprazole (priLOSEC) 20 MG capsule; Take 1 capsule by mouth Daily.  Dispense: 90 capsule; Refill: 1    4. Allergic rhinitis, unspecified seasonality, unspecified trigger  -     loratadine (Claritin) 10 MG tablet; Take 1 tablet by mouth Daily.  Dispense: 90 tablet; Refill: 1    5. Iron deficiency  -      CBC Auto Differential  -     Iron Profile  -     Ferritin  -     ferrous sulfate 325 (65 FE) MG tablet; Take 1 tablet by mouth Daily With Breakfast.  Dispense: 90 tablet; Refill: 0    6. Vitamin D insufficiency  -     Vitamin D 25 Hydroxy  -     Cholecalciferol (Vitamin D3) 50 MCG (2000 UT) capsule; Take 1 capsule by mouth Daily.  Dispense: 90 capsule; Refill: 0    7. Class 3 severe obesity due to excess calories with serious comorbidity and body mass index (BMI) of 40.0 to 44.9 in adult (HCC)  Comments:  Patient is down 17 pounds since April.  Encouraged continued weight loss efforts.  Again, encouraged increased physical activity.  Orders:  -     Vitamin D 25 Hydroxy    8. Chronic left-sided low back pain with left-sided sciatica  -     MRI Lumbar Spine Without Contrast; Future  -     diclofenac (VOLTAREN) 50 MG EC tablet; Take 1 tablet by mouth 2 (Two) Times a Day As Needed (back/shoulder pain).  Dispense: 180 tablet; Refill: 0    9. Chronic left shoulder pain  -     diclofenac (VOLTAREN) 50 MG EC tablet; Take 1 tablet by mouth 2 (Two) Times a Day As Needed (back/shoulder pain).  Dispense: 180 tablet; Refill: 0            Follow Up     Return for recheck in 3-6 months pending outcome of A1c - if less than 7%, then Q6 month follow up recommended.    Patient was given instructions and counseling regarding his condition or for health maintenance advice. Please see specific information pulled into the AVS if appropriate.     Arslan Butler  reports that he has never smoked. He has never used smokeless tobacco.

## 2022-06-24 ENCOUNTER — APPOINTMENT (OUTPATIENT)
Dept: MRI IMAGING | Facility: HOSPITAL | Age: 32
End: 2022-06-24

## 2022-08-05 ENCOUNTER — OFFICE VISIT (OUTPATIENT)
Dept: INTERNAL MEDICINE | Facility: CLINIC | Age: 32
End: 2022-08-05

## 2022-08-05 VITALS
WEIGHT: 314 LBS | RESPIRATION RATE: 18 BRPM | BODY MASS INDEX: 40.3 KG/M2 | HEIGHT: 74 IN | OXYGEN SATURATION: 95 % | HEART RATE: 96 BPM | DIASTOLIC BLOOD PRESSURE: 94 MMHG | SYSTOLIC BLOOD PRESSURE: 124 MMHG | TEMPERATURE: 97.5 F

## 2022-08-05 DIAGNOSIS — E11.9 TYPE 2 DIABETES MELLITUS WITHOUT COMPLICATION, WITHOUT LONG-TERM CURRENT USE OF INSULIN: ICD-10-CM

## 2022-08-05 DIAGNOSIS — E29.1 TESTOSTERONE DEFICIENCY IN MALE: ICD-10-CM

## 2022-08-05 DIAGNOSIS — F31.62 BIPOLAR DISORDER, CURRENT EPISODE MIXED, MODERATE: ICD-10-CM

## 2022-08-05 DIAGNOSIS — Z00.00 HEALTHCARE MAINTENANCE: Primary | ICD-10-CM

## 2022-08-05 DIAGNOSIS — E66.01 CLASS 3 SEVERE OBESITY DUE TO EXCESS CALORIES WITH SERIOUS COMORBIDITY AND BODY MASS INDEX (BMI) OF 40.0 TO 44.9 IN ADULT: ICD-10-CM

## 2022-08-05 DIAGNOSIS — D50.8 IRON DEFICIENCY ANEMIA SECONDARY TO INADEQUATE DIETARY IRON INTAKE: ICD-10-CM

## 2022-08-05 PROBLEM — F41.1 GENERALIZED ANXIETY DISORDER: Status: ACTIVE | Noted: 2021-12-17

## 2022-08-05 PROBLEM — E66.813 CLASS 3 SEVERE OBESITY DUE TO EXCESS CALORIES WITH SERIOUS COMORBIDITY AND BODY MASS INDEX (BMI) OF 40.0 TO 44.9 IN ADULT: Status: ACTIVE | Noted: 2021-12-15

## 2022-08-05 PROBLEM — R20.0 LEFT ARM NUMBNESS: Status: RESOLVED | Noted: 2022-01-17 | Resolved: 2022-08-05

## 2022-08-05 PROBLEM — M75.102 LEFT ROTATOR CUFF TEAR: Status: RESOLVED | Noted: 2022-01-17 | Resolved: 2022-08-05

## 2022-08-05 LAB
ALBUMIN SERPL-MCNC: 4.7 G/DL (ref 3.5–5.2)
ALBUMIN/CREATININE RATIO, URINE: <30
ALBUMIN/GLOB SERPL: 2.1 G/DL
ALP SERPL-CCNC: 63 U/L (ref 39–117)
ALT SERPL W P-5'-P-CCNC: 100 U/L (ref 1–41)
ANION GAP SERPL CALCULATED.3IONS-SCNC: 14 MMOL/L (ref 5–15)
AST SERPL-CCNC: 54 U/L (ref 1–40)
BILIRUB SERPL-MCNC: 0.6 MG/DL (ref 0–1.2)
BUN SERPL-MCNC: 7 MG/DL (ref 6–20)
BUN/CREAT SERPL: 6.7 (ref 7–25)
CALCIUM SPEC-SCNC: 9.5 MG/DL (ref 8.6–10.5)
CHLORIDE SERPL-SCNC: 107 MMOL/L (ref 98–107)
CHOLEST SERPL-MCNC: 131 MG/DL (ref 0–200)
CO2 SERPL-SCNC: 21 MMOL/L (ref 22–29)
CREAT SERPL-MCNC: 1.04 MG/DL (ref 0.76–1.27)
DEPRECATED RDW RBC AUTO: 45.3 FL (ref 37–54)
EGFRCR SERPLBLD CKD-EPI 2021: 99.1 ML/MIN/1.73
EOSINOPHIL # BLD MANUAL: 0.05 10*3/MM3 (ref 0–0.4)
EOSINOPHIL NFR BLD MANUAL: 1 % (ref 0.3–6.2)
ERYTHROCYTE [DISTWIDTH] IN BLOOD BY AUTOMATED COUNT: 14.3 % (ref 12.3–15.4)
EXPIRATION DATE: NORMAL
EXPIRATION DATE: NORMAL
FERRITIN SERPL-MCNC: 376 NG/ML (ref 30–400)
FSH SERPL-ACNC: 7.46 MIU/ML
GLOBULIN UR ELPH-MCNC: 2.2 GM/DL
GLUCOSE SERPL-MCNC: 93 MG/DL (ref 65–99)
HBA1C MFR BLD: 5.1 %
HCT VFR BLD AUTO: 47.1 % (ref 37.5–51)
HCV AB SER DONR QL: NORMAL
HDLC SERPL-MCNC: 27 MG/DL (ref 40–60)
HGB BLD-MCNC: 15.9 G/DL (ref 13–17.7)
HIV1+2 AB SER QL: NORMAL
LDLC SERPL CALC-MCNC: 81 MG/DL (ref 0–100)
LDLC/HDLC SERPL: 2.9 {RATIO}
LH SERPL-ACNC: 10.6 MIU/ML
LYMPHOCYTES # BLD MANUAL: 1.04 10*3/MM3 (ref 0.7–3.1)
LYMPHOCYTES NFR BLD MANUAL: 8 % (ref 5–12)
Lab: NORMAL
Lab: NORMAL
MCH RBC QN AUTO: 29.1 PG (ref 26.6–33)
MCHC RBC AUTO-ENTMCNC: 33.8 G/DL (ref 31.5–35.7)
MCV RBC AUTO: 86.1 FL (ref 79–97)
MONOCYTES # BLD: 0.38 10*3/MM3 (ref 0.1–0.9)
NEUTROPHILS # BLD AUTO: 3.26 10*3/MM3 (ref 1.7–7)
NEUTROPHILS NFR BLD MANUAL: 69 % (ref 42.7–76)
PLAT MORPH BLD: NORMAL
PLATELET # BLD AUTO: 160 10*3/MM3 (ref 140–450)
PMV BLD AUTO: 12.3 FL (ref 6–12)
POC CREATININE URINE: 300
POC MICROALBUMIN URINE: 30
POTASSIUM SERPL-SCNC: 4.6 MMOL/L (ref 3.5–5.2)
PROT SERPL-MCNC: 6.9 G/DL (ref 6–8.5)
RBC # BLD AUTO: 5.47 10*6/MM3 (ref 4.14–5.8)
RBC MORPH BLD: NORMAL
SODIUM SERPL-SCNC: 142 MMOL/L (ref 136–145)
TESTOST SERPL-MCNC: 626 NG/DL (ref 249–836)
TRIGL SERPL-MCNC: 128 MG/DL (ref 0–150)
VARIANT LYMPHS NFR BLD MANUAL: 22 % (ref 19.6–45.3)
VLDLC SERPL-MCNC: 23 MG/DL (ref 5–40)
WBC MORPH BLD: NORMAL
WBC NRBC COR # BLD: 4.73 10*3/MM3 (ref 3.4–10.8)

## 2022-08-05 PROCEDURE — 90472 IMMUNIZATION ADMIN EACH ADD: CPT | Performed by: STUDENT IN AN ORGANIZED HEALTH CARE EDUCATION/TRAINING PROGRAM

## 2022-08-05 PROCEDURE — 82044 UR ALBUMIN SEMIQUANTITATIVE: CPT | Performed by: STUDENT IN AN ORGANIZED HEALTH CARE EDUCATION/TRAINING PROGRAM

## 2022-08-05 PROCEDURE — G0432 EIA HIV-1/HIV-2 SCREEN: HCPCS | Performed by: STUDENT IN AN ORGANIZED HEALTH CARE EDUCATION/TRAINING PROGRAM

## 2022-08-05 PROCEDURE — 90715 TDAP VACCINE 7 YRS/> IM: CPT | Performed by: STUDENT IN AN ORGANIZED HEALTH CARE EDUCATION/TRAINING PROGRAM

## 2022-08-05 PROCEDURE — 3008F BODY MASS INDEX DOCD: CPT | Performed by: STUDENT IN AN ORGANIZED HEALTH CARE EDUCATION/TRAINING PROGRAM

## 2022-08-05 PROCEDURE — 83002 ASSAY OF GONADOTROPIN (LH): CPT | Performed by: STUDENT IN AN ORGANIZED HEALTH CARE EDUCATION/TRAINING PROGRAM

## 2022-08-05 PROCEDURE — 83036 HEMOGLOBIN GLYCOSYLATED A1C: CPT | Performed by: STUDENT IN AN ORGANIZED HEALTH CARE EDUCATION/TRAINING PROGRAM

## 2022-08-05 PROCEDURE — 2014F MENTAL STATUS ASSESS: CPT | Performed by: STUDENT IN AN ORGANIZED HEALTH CARE EDUCATION/TRAINING PROGRAM

## 2022-08-05 PROCEDURE — 85007 BL SMEAR W/DIFF WBC COUNT: CPT | Performed by: STUDENT IN AN ORGANIZED HEALTH CARE EDUCATION/TRAINING PROGRAM

## 2022-08-05 PROCEDURE — 99214 OFFICE O/P EST MOD 30 MIN: CPT | Performed by: STUDENT IN AN ORGANIZED HEALTH CARE EDUCATION/TRAINING PROGRAM

## 2022-08-05 PROCEDURE — 90471 IMMUNIZATION ADMIN: CPT | Performed by: STUDENT IN AN ORGANIZED HEALTH CARE EDUCATION/TRAINING PROGRAM

## 2022-08-05 PROCEDURE — 80053 COMPREHEN METABOLIC PANEL: CPT | Performed by: STUDENT IN AN ORGANIZED HEALTH CARE EDUCATION/TRAINING PROGRAM

## 2022-08-05 PROCEDURE — 80061 LIPID PANEL: CPT | Performed by: STUDENT IN AN ORGANIZED HEALTH CARE EDUCATION/TRAINING PROGRAM

## 2022-08-05 PROCEDURE — 84403 ASSAY OF TOTAL TESTOSTERONE: CPT | Performed by: STUDENT IN AN ORGANIZED HEALTH CARE EDUCATION/TRAINING PROGRAM

## 2022-08-05 PROCEDURE — 82728 ASSAY OF FERRITIN: CPT | Performed by: STUDENT IN AN ORGANIZED HEALTH CARE EDUCATION/TRAINING PROGRAM

## 2022-08-05 PROCEDURE — 99395 PREV VISIT EST AGE 18-39: CPT | Performed by: STUDENT IN AN ORGANIZED HEALTH CARE EDUCATION/TRAINING PROGRAM

## 2022-08-05 PROCEDURE — 83001 ASSAY OF GONADOTROPIN (FSH): CPT | Performed by: STUDENT IN AN ORGANIZED HEALTH CARE EDUCATION/TRAINING PROGRAM

## 2022-08-05 PROCEDURE — 90732 PPSV23 VACC 2 YRS+ SUBQ/IM: CPT | Performed by: STUDENT IN AN ORGANIZED HEALTH CARE EDUCATION/TRAINING PROGRAM

## 2022-08-05 PROCEDURE — 86803 HEPATITIS C AB TEST: CPT | Performed by: STUDENT IN AN ORGANIZED HEALTH CARE EDUCATION/TRAINING PROGRAM

## 2022-08-05 PROCEDURE — 85025 COMPLETE CBC W/AUTO DIFF WBC: CPT | Performed by: STUDENT IN AN ORGANIZED HEALTH CARE EDUCATION/TRAINING PROGRAM

## 2022-08-05 PROCEDURE — 3044F HG A1C LEVEL LT 7.0%: CPT | Performed by: STUDENT IN AN ORGANIZED HEALTH CARE EDUCATION/TRAINING PROGRAM

## 2022-08-05 RX ORDER — DIAZEPAM 2 MG/1
2 TABLET ORAL 3 TIMES DAILY
COMMUNITY
Start: 2022-07-23 | End: 2022-11-18

## 2022-08-05 RX ORDER — BUPROPION HYDROCHLORIDE 300 MG/1
300 TABLET ORAL EVERY MORNING
Qty: 45 TABLET | Refills: 1 | Status: SHIPPED | OUTPATIENT
Start: 2022-08-05 | End: 2022-08-05

## 2022-08-05 RX ORDER — BUPROPION HYDROCHLORIDE 300 MG/1
300 TABLET ORAL EVERY MORNING
Qty: 45 TABLET | Refills: 1 | Status: SHIPPED | OUTPATIENT
Start: 2022-08-05 | End: 2022-11-01

## 2022-08-05 NOTE — ASSESSMENT & PLAN NOTE
Psychological condition is worsening.  Regular aerobic exercise.  Medication changes per orders.   -   Continue to follow with psychology for counseling every week to every 2 weeks for comprehensive psychotherapy  -   Continue to follow with psychiatry for additional medication changes  -   Increased on 8/5/2022: Bupropion 150 mg extended release daily to 300 mg extended release daily  -   Continue Vraylar as initiated by psychiatrist  -   Patient counseled regarding return precautions and emergency department precautions in the setting of suicidal ideation/homicidal ideation/manic episodes  Psychological condition  will be reassessed at the next regular appointment.

## 2022-08-05 NOTE — ASSESSMENT & PLAN NOTE
Patient's (Body mass index is 40.32 kg/m².) indicates that they are obese (BMI >30) with health conditions that include diabetes mellitus . Weight is improving with lifestyle modifications. BMI is is above average; BMI management plan is completed. We discussed low calorie, low carb based diet program, portion control, increasing exercise and joining a fitness center or start home based exercise program.

## 2022-08-05 NOTE — PATIENT INSTRUCTIONS

## 2022-08-05 NOTE — ASSESSMENT & PLAN NOTE
Diabetes is improving with treatment.   Dietary recommendations for ADA diet.  Regular aerobic exercise.  Discussed foot care.  Reminded to get yearly retinal exam.  Medication changes per orders.  Ophthalmology referral.   -   Increased on 8/5/2022: Ozempic 1 mg every week to 2 mg every week  -   Continue metformin 1000 mg twice daily  Diabetes will be reassessed in 3 months.

## 2022-08-05 NOTE — ASSESSMENT & PLAN NOTE
-   Was noted to have symptoms years prior to clinical exam on 8/5/2022  -   Has been taking ferrous sulfate for improvement of iron deficiency anemia for an unknown period of time prior to establishing care  -   As noted above, fatigue overlaps with multiple comorbidities including mood disorder and testosterone deficiency  -   No specific hematochezia or hematemesis currently  Plan:  -   Additional work-up completed as noted below  -   Consider discontinuing ferrous sulfate with normal range ferritin and hemoglobin

## 2022-08-05 NOTE — ASSESSMENT & PLAN NOTE
-   Noted to be diagnosed several years prior to clinical exam on 8/5/2022  -   Was treated with clomiphene by his previous provider prior to establishing care, although patient notes that he is not currently taking and was only taking intermittently prior to establishing care  -   Notes that he continues to experience fatigue, although there is overlapping symptoms with his mood disorder currently  -   Erectile dysfunction currently improved with medication regimen  Plan:  -   Discontinue clomiphene  -   Additional testosterone deficiency work-up completed as noted below  -   Consider additional treatment per protocol if needed following work-up completion

## 2022-08-05 NOTE — PROGRESS NOTES
New Patient Office Visit      Date: 2022   Patient Name: Arslan Butler  : 1990   MRN: [id number]     Chief Complaint:    Chief Complaint   Patient presents with   • Fatigue     Establish care   • Annual Exam       History of Present Illness: Arslan Butler is a 31 y.o. male who is here today to establish care.    Class 3 Obesity  -   Notes that has been difficult to lose weight recently  -   Notes improved symptoms with use of Ozempic and lifestyle changes  -   Denies side effects associated with Ozempic currently  -   Declined follow-up with other providers or resources at this time    Diabetes Mellitus  - notes most recent A1c was in the 6-7% range  -   Denies significant side effects associated with this current medication regimen  -   Denies significant polyuria  -   Denies significant polydipsia  -   denies worsening of neuropathy symptoms  -   Denies worsening of vision    Bipolar Disorder  Borderline Personality  Generalized Anxiety Disorder  - notes more fatigue  - does not note complete improvement on bupropion  - has tried lithium, ability, and prozac previously  - No current suicidal ideation, however depressive symptoms are specifically not well controlled at this time  -   Notes that he meets with a therapist weekly or every other week and meets with a psychiatrist every 3 months    Chronic Left Shoulder Pain  Chronic Low Back Pain  - notes continued pain throughout his left shoulder and lower back  -   Notes a previous injury during which his rotator cuff was damaged and was previously offered surgery by the orthopedic surgery team  -   Notes that he previously declined the surgery and is not interested in additional evaluation by orthopedic surgery or physical therapy  -   Notes that he is in general not functionally limited, although he had been restricted to 5 pounds of lifting with his left upper extremity by orthopedic surgery  -   Denies lower extremity weakness or other  neurologic changes    Testosterone Deficiency  -   Patient notes that he was previously treated with clomiphene by his previous provider for testosterone deficiency  -   Notes associated fatigue, but also has been present with uncontrolled mood symptoms as noted above  -   Denies specific urologic changes at this time  -   Currently being treated for erectile dysfunction          Subjective      Review of Systems:   Review of Systems   Constitutional: Positive for fatigue. Negative for activity change, appetite change and fever.   Eyes: Negative for blurred vision, photophobia and visual disturbance.   Respiratory: Negative for cough, chest tightness and shortness of breath.    Cardiovascular: Negative for chest pain and palpitations.   Gastrointestinal: Negative for abdominal distention, abdominal pain, blood in stool, constipation, diarrhea, nausea and vomiting.   Genitourinary: Negative for dysuria and hematuria.   Musculoskeletal: Positive for arthralgias (Left shoulder pain as noted above). Negative for back pain and joint swelling.   Skin: Negative for rash and wound.   Neurological: Negative for weakness, headache and confusion.   Psychiatric/Behavioral: Positive for depressed mood. Negative for self-injury and suicidal ideas.       Past Medical History:   Past Medical History:   Diagnosis Date   • Anxiety and depression 12/17/2021   • Bipolar disorder (HCC) 12/15/2021   • Diabetes mellitus ([facility])    • Gastroesophageal reflux disease 12/15/2021   • Hyperlipidemia 12/15/2021   • Left arm numbness 01/17/2022   • Left rotator cuff tear 01/17/2022   • Obesity 12/15/2021   • Peptic ulceration        Past Surgical History:   Past Surgical History:   Procedure Laterality Date   • ROTATOR CUFF REPAIR Left     has had it done twice     • TONSILLECTOMY         Family History:   Family History   Problem Relation Age of Onset   • Obesity Mother    • Hypertension Father    • Heart attack Father    • Stroke Maternal  Grandmother    • Heart attack Maternal Grandfather    • Mental illness Paternal Grandmother    • Diabetes Paternal Grandmother        Social History:   Social History     Socioeconomic History   • Marital status:    Tobacco Use   • Smoking status: Never Smoker   • Smokeless tobacco: Never Used   Vaping Use   • Vaping Use: Some days   • Substances: Nicotine, Flavoring   • Devices: Disposable, Refillable tank   Substance and Sexual Activity   • Alcohol use: Yes     Comment: ocassionally    • Drug use: Never   • Sexual activity: Defer       Medications:     Current Outpatient Medications:   •  buPROPion XL (WELLBUTRIN XL) 300 MG 24 hr tablet, Take 1 tablet by mouth Every Morning., Disp: 45 tablet, Rfl: 1  •  diazePAM (VALIUM) 2 MG tablet, Take 2 mg by mouth 3 (Three) Times a Day., Disp: , Rfl:   •  ferrous sulfate 325 (65 FE) MG tablet, Take 1 tablet by mouth Daily With Breakfast., Disp: 90 tablet, Rfl: 0  •  loratadine (Claritin) 10 MG tablet, Take 1 tablet by mouth Daily., Disp: 90 tablet, Rfl: 1  •  metFORMIN (GLUCOPHAGE) 1000 MG tablet, Take 1 tablet by mouth 2 (Two) Times a Day With Meals., Disp: 180 tablet, Rfl: 1  •  omeprazole (priLOSEC) 20 MG capsule, Take 1 capsule by mouth Daily., Disp: 90 capsule, Rfl: 1  •  Semaglutide, 1 MG/DOSE, 4 MG/3ML solution pen-injector, Inject 2 mg under the skin into the appropriate area as directed 1 (One) Time Per Week., Disp: 3 pen, Rfl: 3  •  simvastatin (ZOCOR) 20 MG tablet, Take 1 tablet by mouth Every Night., Disp: 90 tablet, Rfl: 1  •  tadalafil (CIALIS) 5 MG tablet, Take 1 tablet po daily, Disp: 30 tablet, Rfl: 4  •  Vraylar 3 MG capsule capsule, , Disp: , Rfl:     Allergies:   Allergies   Allergen Reactions   • Sulfamethoxazole-Trimethoprim Hives       Immunizations:  Immunization History   Administered Date(s) Administered   • COVID-19 (MODERNA) 1st, 2nd, 3rd Dose Only 04/10/2021, 05/05/2021, 08/19/2021, 09/17/2021   • DTP 03/11/1996   • Hep B, Adolescent or  Pediatric 06/22/2001, 07/23/2001, 08/02/2002   • MMR 03/11/1996   • OPV 03/11/1996   • Pneumococcal Polysaccharide (PPSV23) 08/05/2022   • Tdap 08/05/2022        Colorectal Screening:   Due at age 45   Last Completed Colonoscopy     This patient has no relevant Health Maintenance data.        CT for Smoker (Age 50-80, 20pk yr within last 15 years): Not applicable  Bone Density/DEXA (high risk): Not applicable  Hep C (Age 18-79 once): Ordered on 8/5/2022  HIV (Age 15-65 once) : Ordered on 8/5/2022  PSA (Over age 50, C Level Recommendation): Not applicable  US Aorta (For male smokers, age 65): Not applicable  A1c: Ordered on 8/5/2022  Lipid panel: Ordered on 8/5/2022  The ASCVD Risk score (Christina HUGO Jr., et al., 2013) failed to calculate for the following reasons:    The 2013 ASCVD risk score is only valid for ages 40 to 79    Dermatology: Plan to establish during future visit  Ophthalmologist: Ordered on 8/5/2022  Dentist: note established    Tobacco Use: Low Risk    • Smoking Tobacco Use: Never Smoker   • Smokeless Tobacco Use: Never Used       Social History     Substance and Sexual Activity   Alcohol Use Yes    Comment: ocassionally         Social History     Substance and Sexual Activity   Drug Use Never        Diet/Physical activity: Notes that he does not regularly exercise and diet is not well-balanced typically, not amenable to meeting with a nutritionist at this time    PHQ-2 Depression Screening  Little interest or pleasure in doing things? 3-->nearly every day   Feeling down, depressed, or hopeless? 3-->nearly every day   PHQ-2 Total Score 6        Intimate partner violence: (Screen on initial visit, older adult with injury or evidence of neglect): denies any concerns  • Violence can be a problem in many people's lives, so I now ask every patient about trauma or abuse they may have experienced in a relationship.  • Stress/Safety - Do you feel safe in your relationship?  • Afraid/Abused - Have you ever been  "in a relationship where you were threatened, hurt, or afraid?  • Friend/Family - Are your friends aware you have been hurt?  • Emergency Plan - Do you have a safe place to go and the resources you need in an emergency?    Osteoporosis: N/A  • Men: history of low trauma fracture, androgen deprivation therapy for prostate cancer, hypogonadism, primary hyperparathyroidism, intestinal disorders.     Objective     Physical Exam:  Vital Signs:   Vitals:    08/05/22 0916   BP: 124/94   BP Location: Right arm   Patient Position: Sitting   Cuff Size: Adult   Pulse: 96   Resp: 18   Temp: 97.5 °F (36.4 °C)   TempSrc: Temporal   SpO2: 95%   Weight: (!) 142 kg (314 lb)   Height: 188 cm (74\")   PainSc:   4     Body mass index is 40.32 kg/m².    Physical Exam  Vitals and nursing note reviewed.   Constitutional:       General: He is not in acute distress.     Appearance: Normal appearance. He is normal weight. He is not ill-appearing or toxic-appearing.   HENT:      Nose: No congestion or rhinorrhea.   Eyes:      General:         Right eye: No discharge.         Left eye: No discharge.      Conjunctiva/sclera: Conjunctivae normal.   Cardiovascular:      Rate and Rhythm: Normal rate and regular rhythm.      Heart sounds: Normal heart sounds. No murmur heard.    No friction rub.   Pulmonary:      Effort: Pulmonary effort is normal. No respiratory distress.      Breath sounds: Normal breath sounds. No wheezing or rhonchi.   Abdominal:      General: Abdomen is flat. Bowel sounds are normal. There is no distension.      Palpations: Abdomen is soft. There is no mass.      Tenderness: There is no abdominal tenderness. There is no guarding or rebound.   Musculoskeletal:      Cervical back: Normal range of motion.      Right lower leg: No edema.      Left lower leg: No edema.   Skin:     Findings: No lesion or rash.   Neurological:      General: No focal deficit present.      Mental Status: He is alert. Mental status is at baseline.      " Coordination: Coordination normal.      Gait: Gait normal.   Psychiatric:         Mood and Affect: Mood normal.         Behavior: Behavior normal.         Thought Content: Thought content normal.         Judgment: Judgment normal.         Procedures    Results:     Labs:   Hemoglobin A1C   Date Value Ref Range Status   08/05/2022 5.1 % Final   06/13/2022 5.60 4.80 - 5.60 % Final     TSH   Date Value Ref Range Status   06/13/2022 0.564 0.270 - 4.200 uIU/mL Final        Imaging:   No valid procedures specified.     Assessment / Plan      Assessment/Plan:   Problem List Items Addressed This Visit        Endocrine and Metabolic    Diabetes mellitus ([facility])    Current Assessment & Plan     Diabetes is improving with treatment.   Dietary recommendations for ADA diet.  Regular aerobic exercise.  Discussed foot care.  Reminded to get yearly retinal exam.  Medication changes per orders.  Ophthalmology referral.   -   Increased on 8/5/2022: Ozempic 1 mg every week to 2 mg every week  -   Continue metformin 1000 mg twice daily  Diabetes will be reassessed in 3 months.         Relevant Medications    Semaglutide, 1 MG/DOSE, 4 MG/3ML solution pen-injector    Other Relevant Orders    POC Glycosylated Hemoglobin (Hb A1C) (Completed)    Ambulatory Referral for Diabetic Eye Exam-Ophthalmology    Pneumococcal Polysaccharide Vaccine 23-Valent Greater Than or Equal To 3yo Subcutaneous / IM (Completed)    POC Microalbumin (Completed)    Class 3 severe obesity due to excess calories with serious comorbidity and body mass index (BMI) of 40.0 to 44.9 in adult ([facility])    Current Assessment & Plan     Patient's (Body mass index is 40.32 kg/m².) indicates that they are obese (BMI >30) with health conditions that include diabetes mellitus . Weight is improving with lifestyle modifications. BMI is is above average; BMI management plan is completed. We discussed low calorie, low carb based diet program, portion control, increasing exercise and  joining a fitness center or start home based exercise program.          Testosterone deficiency in male    Current Assessment & Plan     -   Noted to be diagnosed several years prior to clinical exam on 8/5/2022  -   Was treated with clomiphene by his previous provider prior to establishing care, although patient notes that he is not currently taking and was only taking intermittently prior to establishing care  -   Notes that he continues to experience fatigue, although there is overlapping symptoms with his mood disorder currently  -   Erectile dysfunction currently improved with medication regimen  Plan:  -   Discontinue clomiphene  -   Additional testosterone deficiency work-up completed as noted below  -   Consider additional treatment per protocol if needed following work-up completion         Relevant Orders    Lipid Panel    Testosterone    FSH & LH    Comprehensive Metabolic Panel       Hematology and Neoplasia    Iron deficiency anemia secondary to inadequate dietary iron intake    Current Assessment & Plan     -   Was noted to have symptoms years prior to clinical exam on 8/5/2022  -   Has been taking ferrous sulfate for improvement of iron deficiency anemia for an unknown period of time prior to establishing care  -   As noted above, fatigue overlaps with multiple comorbidities including mood disorder and testosterone deficiency  -   No specific hematochezia or hematemesis currently  Plan:  -   Additional work-up completed as noted below  -   Consider discontinuing ferrous sulfate with normal range ferritin and hemoglobin         Relevant Orders    CBC Auto Differential    Ferritin       Mental Health    Bipolar disorder (HCC)    Current Assessment & Plan     Psychological condition is worsening.  Regular aerobic exercise.  Medication changes per orders.   -   Continue to follow with psychology for counseling every week to every 2 weeks for comprehensive psychotherapy  -   Continue to follow with  psychiatry for additional medication changes  -   Increased on 8/5/2022: Bupropion 150 mg extended release daily to 300 mg extended release daily  -   Continue Vraylar as initiated by psychiatrist  -   Patient counseled regarding return precautions and emergency department precautions in the setting of suicidal ideation/homicidal ideation/manic episodes  Psychological condition  will be reassessed at the next regular appointment.         Relevant Medications    diazePAM (VALIUM) 2 MG tablet    buPROPion XL (WELLBUTRIN XL) 300 MG 24 hr tablet      Other Visit Diagnoses     Healthcare maintenance    -  Primary    Relevant Orders    Comprehensive Metabolic Panel    Ambulatory Referral for Diabetic Eye Exam-Ophthalmology    Pneumococcal Polysaccharide Vaccine 23-Valent Greater Than or Equal To 1yo Subcutaneous / IM (Completed)    Tdap Vaccine Greater Than or Equal To 8yo IM (Completed)    Hepatitis C Antibody    HIV-1 / O / 2 Ag / Antibody 4th Generation          Healthcare Maintenance:  Counseling provided based on age appropriate USPSTF guidelines.  Class 3 Severe Obesity (BMI >=40). Obesity-related health conditions include the following: diabetes mellitus. Obesity is improving with lifestyle modifications. BMI is is above average; BMI management plan is completed. We discussed low calorie, low carb based diet program, portion control, increasing exercise and joining a fitness center or start home based exercise program.    Arslan Butler voices understanding and acceptance of this advice and will call back with any further questions or concerns. AVS with preventive healthcare tips printed for patient.     Follow Up:   Return in about 4 weeks (around 9/2/2022) for Chronic care management.      Reji Rader MD  [facility]

## 2022-09-13 ENCOUNTER — OFFICE VISIT (OUTPATIENT)
Dept: INTERNAL MEDICINE | Facility: CLINIC | Age: 32
End: 2022-09-13

## 2022-09-13 VITALS
RESPIRATION RATE: 18 BRPM | SYSTOLIC BLOOD PRESSURE: 132 MMHG | TEMPERATURE: 97.8 F | BODY MASS INDEX: 39.54 KG/M2 | HEART RATE: 98 BPM | WEIGHT: 308 LBS | DIASTOLIC BLOOD PRESSURE: 84 MMHG | OXYGEN SATURATION: 98 %

## 2022-09-13 DIAGNOSIS — M54.50 CHRONIC MIDLINE LOW BACK PAIN WITHOUT SCIATICA: ICD-10-CM

## 2022-09-13 DIAGNOSIS — S46.002D ROTATOR CUFF INJURY, LEFT, SUBSEQUENT ENCOUNTER: Primary | ICD-10-CM

## 2022-09-13 DIAGNOSIS — E66.01 CLASS 3 SEVERE OBESITY DUE TO EXCESS CALORIES WITH SERIOUS COMORBIDITY AND BODY MASS INDEX (BMI) OF 40.0 TO 44.9 IN ADULT: ICD-10-CM

## 2022-09-13 DIAGNOSIS — F31.30 BIPOLAR AFFECTIVE DISORDER, CURRENT EPISODE DEPRESSED, CURRENT EPISODE SEVERITY UNSPECIFIED: ICD-10-CM

## 2022-09-13 DIAGNOSIS — G89.29 CHRONIC MIDLINE LOW BACK PAIN WITHOUT SCIATICA: ICD-10-CM

## 2022-09-13 PROBLEM — D50.8 IRON DEFICIENCY ANEMIA SECONDARY TO INADEQUATE DIETARY IRON INTAKE: Status: RESOLVED | Noted: 2022-08-05 | Resolved: 2022-09-13

## 2022-09-13 PROBLEM — E29.1 TESTOSTERONE DEFICIENCY IN MALE: Status: RESOLVED | Noted: 2022-05-11 | Resolved: 2022-09-13

## 2022-09-13 PROBLEM — E11.9 DIABETES MELLITUS: Status: RESOLVED | Noted: 2021-12-15 | Resolved: 2022-09-13

## 2022-09-13 PROCEDURE — 99214 OFFICE O/P EST MOD 30 MIN: CPT | Performed by: STUDENT IN AN ORGANIZED HEALTH CARE EDUCATION/TRAINING PROGRAM

## 2022-09-13 NOTE — PROGRESS NOTES
Follow Up Office Visit      Date: 2022   Patient Name: Arslan Butler  : 1990   MRN: [id number]     Chief Complaint:    Chief Complaint   Patient presents with   • Back Pain     chronic       History of Present Illness: Arslan Butler is a 31 y.o. male who is here today to follow up with chronic care management.    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  - severe pain noted at work and subsequently afterwards  - pain is limiting with daily functioning  - sometimes radiating numbness or pain  - Notes decreased lifting strength and  strength with his left upper extremity    Chronic Low Back Pain  -   Notes chronic low back pain has been persistent specifically after beginning work  -   Notes that he has attempted therapy in the past without significant relief  -   Notes that this is limiting in addition to his left shoulder pain    Class 3 Obesity   -   Patient notes significant improvement with GLP-1 agonist as previously initiated  -   This has not been covered by his insurance secondary to significantly improved control of diabetes mellitus type 2 such that he no longer meets criteria for diabetes or prediabetes  -   Notes that he is interested in alternate medications if possible  -   States that he is not interested in a nutritionist, prediabetic     Bipolar Disorder  -   Notes minimal improvement with Vraylar as previously initiated  -   States that initial increase in bupropion seem to be beneficial and now he continues to be more fatigued again with perceived symptoms of depression  -   Notes that he continues to work with a psychiatrist for medication titration  -   States that he has previously tried a multitude of other medications and ECT        Subjective      Review of Systems:   Review of Systems   Constitutional: Negative for activity change, appetite change, fatigue and fever.   Eyes: Negative  for blurred vision, photophobia and visual disturbance.   Respiratory: Negative for cough, chest tightness and shortness of breath.    Cardiovascular: Negative for chest pain and palpitations.   Gastrointestinal: Negative for abdominal distention, abdominal pain, blood in stool, constipation, diarrhea, nausea and vomiting.   Genitourinary: Negative for dysuria and hematuria.   Musculoskeletal: Positive for arthralgias and back pain. Negative for joint swelling.   Skin: Negative for rash and wound.   Neurological: Negative for weakness, headache and confusion.   Psychiatric/Behavioral: Positive for depressed mood. Negative for self-injury and suicidal ideas. The patient is not nervous/anxious.        I have reviewed the patients family history, social history, past medical history, past surgical history and have updated it as appropriate.     Medications:     Current Outpatient Medications:   •  buPROPion XL (WELLBUTRIN XL) 300 MG 24 hr tablet, Take 1 tablet by mouth Every Morning., Disp: 45 tablet, Rfl: 1  •  diazePAM (VALIUM) 2 MG tablet, Take 2 mg by mouth 3 (Three) Times a Day., Disp: , Rfl:   •  ferrous sulfate 325 (65 FE) MG tablet, Take 1 tablet by mouth Daily With Breakfast., Disp: 90 tablet, Rfl: 0  •  loratadine (Claritin) 10 MG tablet, Take 1 tablet by mouth Daily., Disp: 90 tablet, Rfl: 1  •  omeprazole (priLOSEC) 20 MG capsule, Take 1 capsule by mouth Daily., Disp: 90 capsule, Rfl: 1  •  simvastatin (ZOCOR) 20 MG tablet, Take 1 tablet by mouth Every Night., Disp: 90 tablet, Rfl: 1  •  tadalafil (CIALIS) 5 MG tablet, Take 1 tablet po daily, Disp: 30 tablet, Rfl: 4  •  Vraylar 3 MG capsule capsule, , Disp: , Rfl:   •  naltrexone-bupropion ER (CONTRAVE) 8-90 MG tablet, Wk 1: 1 tab daily, Wk 2: 1 tab twice a day, Wk 3: 2 tabs in AM, 1 tab in PM, Wk 4: 2 tabs twice a day, Maintenance dose: 2 tabs twice daily., Disp: 120 tablet, Rfl: 0    Allergies:   Allergies   Allergen Reactions   •  Sulfamethoxazole-Trimethoprim Hives       Objective     Physical Exam: Please see above  Vital Signs:   Vitals:    09/13/22 1254   BP: 132/84   BP Location: Right arm   Patient Position: Sitting   Cuff Size: Adult   Pulse: 98   Resp: 18   Temp: 97.8 °F (36.6 °C)   TempSrc: Temporal   SpO2: 98%   Weight: (!) 140 kg (308 lb)   PainSc:   8   PainLoc: Back     Body mass index is 39.54 kg/m².  Class 2 Severe Obesity (BMI >=35 and <=39.9). Obesity-related health conditions include the following: diabetes mellitus and dyslipidemias. Obesity is improving with lifestyle modifications. BMI is is above average; BMI management plan is completed. We discussed low calorie, low carb based diet program, portion control and increasing exercise.       Physical Exam  Vitals and nursing note reviewed.   Constitutional:       General: He is not in acute distress.     Appearance: Normal appearance. He is obese. He is not ill-appearing or toxic-appearing.   HENT:      Nose: No congestion or rhinorrhea.   Eyes:      General:         Right eye: No discharge.         Left eye: No discharge.      Conjunctiva/sclera: Conjunctivae normal.   Pulmonary:      Effort: Pulmonary effort is normal. No respiratory distress.   Abdominal:      General: Abdomen is flat. There is no distension.   Musculoskeletal:      Cervical back: Normal range of motion.   Skin:     Coloration: Skin is not jaundiced.      Findings: No rash.   Neurological:      General: No focal deficit present.      Mental Status: He is alert. Mental status is at baseline.      Coordination: Coordination normal.      Gait: Gait normal.   Psychiatric:         Mood and Affect: Mood normal.         Behavior: Behavior normal.         Thought Content: Thought content normal.         Judgment: Judgment normal.         Procedures    Results:   Labs:   Hemoglobin A1C   Date Value Ref Range Status   08/05/2022 5.1 % Final   06/13/2022 5.60 4.80 - 5.60 % Final     TSH   Date Value Ref Range  Status   06/13/2022 0.564 0.270 - 4.200 uIU/mL Final        Imaging:   No valid procedures specified.     Assessment / Plan      Assessment/Plan:   Problem List Items Addressed This Visit        Endocrine and Metabolic    Class 3 severe obesity due to excess calories with serious comorbidity and body mass index (BMI) of 40.0 to 44.9 in adult ([facility])    Current Assessment & Plan     Patient's (Body mass index is 39.54 kg/m².) indicates that they are obese (BMI >30) with health conditions that include diabetes mellitus . Weight is improving with lifestyle modifications. BMI is is above average; BMI management plan is completed. We discussed low calorie, low carb based diet program, portion control, increasing exercise and joining a fitness center or start home based exercise program.   -   Discontinued GLP-1 secondary to insurance coverage  -   Initiated on 9/13/2022: Bupropion/naltrexone  -   Patient declined meeting with nutritionist or prediabetes   -   Patient declined meeting with exercise therapist         Relevant Medications    naltrexone-bupropion ER (CONTRAVE) 8-90 MG tablet       Mental Health    Bipolar disorder ([facility])    Current Assessment & Plan     Psychological condition is unchanged.  -   Has previously attempted multitude of other depression and mood stabilizer medications  -   Has previously failed ECT, but with inadequate duration of treatment  Regular aerobic exercise.  Medication changes per orders.   -   Continue to follow with psychology for counseling every week to every 2 weeks for comprehensive psychotherapy  -   Continue to follow with psychiatry for additional medication changes  -   Increased on 8/5/2022: Bupropion 150 mg extended release daily to 300 mg extended release daily  -   Continue Vraylar as initiated by psychiatrist  -   Patient counseled regarding return precautions and emergency department precautions in the setting of suicidal ideation/homicidal  ideation/manic episodes  Psychological condition  will be reassessed at the next regular appointment.         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 and this caused an exacerbation of pain  -   Patient without red flag signs including saddle paresthesia or incontinence  Plan:  -   Patient declined physical therapy and exercise therapy on 9/13/2022  -   Patient would like to pursue treatment of left shoulder pain prior to additional treatment for low back pain            Other    Rotator cuff injury, left, subsequent encounter - Primary    Current Assessment & Plan     -   Patient previously experienced injury that caused left-sided rotator cuff tear prior to clinical exam on 9/13/2022  -   Notes that he was previously evaluated by orthopedic surgeon that indicated with MRI that his muscle have largely been replaced by fat  -   Patient with increasing arthropathy in addition to weakness and decreased functionality  Plan:  -   Ambulatory referral placed for orthopedic surgery for additional evaluation and management  -   Patient declined physical therapy on 9/13/2022         Relevant Orders    Ambulatory Referral to Orthopedic Surgery          Patient has been erroneously marked as diabetic. Based on the available clinical information, he does not have diabetes and should therefore be excluded from diabetic health maintenance and quality measures for the remainder of the reporting period.      Follow Up:   Return in about 3 months (around 12/13/2022) for Recheck.       Reji Rader MD  [facility] [place] [facility] [place]

## 2022-09-13 NOTE — ASSESSMENT & PLAN NOTE
-   Patient previously experienced injury that caused left-sided rotator cuff tear prior to clinical exam on 9/13/2022  -   Notes that he was previously evaluated by orthopedic surgeon that indicated with MRI that his muscle have largely been replaced by fat  -   Patient with increasing arthropathy in addition to weakness and decreased functionality  Plan:  -   Ambulatory referral placed for orthopedic surgery for additional evaluation and management  -   Patient declined physical therapy on 9/13/2022

## 2022-09-13 NOTE — ASSESSMENT & PLAN NOTE
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

## 2022-09-13 NOTE — ASSESSMENT & PLAN NOTE
Psychological condition is unchanged.  -   Has previously attempted multitude of other depression and mood stabilizer medications  -   Has previously failed ECT, but with inadequate duration of treatment  Regular aerobic exercise.  Medication changes per orders.   -   Continue to follow with psychology for counseling every week to every 2 weeks for comprehensive psychotherapy  -   Continue to follow with psychiatry for additional medication changes  -   Increased on 8/5/2022: Bupropion 150 mg extended release daily to 300 mg extended release daily  -   Continue Vraylar as initiated by psychiatrist  -   Patient counseled regarding return precautions and emergency department precautions in the setting of suicidal ideation/homicidal ideation/manic episodes  Psychological condition  will be reassessed at the next regular appointment.

## 2022-09-15 ENCOUNTER — TELEPHONE (OUTPATIENT)
Dept: INTERNAL MEDICINE | Facility: CLINIC | Age: 32
End: 2022-09-15

## 2022-09-15 NOTE — TELEPHONE ENCOUNTER
Left voice mail message for Pt to call office for message. Office number given.    Hub please let Pt know he will need to call insurance to confirm if they will cover wegovy even if it is for weight loss exclusively and that will be covered.

## 2022-09-15 NOTE — TELEPHONE ENCOUNTER
I would prefer wegovy since it does not require a prior auth, but it is showing that it is not on formulary for this patient.  Can you confirm that this can be prescribed even if it is for weight loss exclusively and that will be covered?  If not we can proceed with the prior authorization.

## 2022-09-15 NOTE — TELEPHONE ENCOUNTER
Arslan Butler  [place]: [id number]    help_outline NEED HELP? [phone]  error Pharmacy claim for Contrave 8-90MG er tablets has been rejected and requires prior authorization for coverage.  Non-preferred medications may have a higher patient co-pay than the health insurance plan's preferred medications.  If clinically appropriate, you may change the prescription. A therapeutic alternative may be available. Questions on alternatives? Contact [facility] at [phone]. You can also review [employer]'s formulary online or call them directly. Compare the original medication with possible alternatives:  Drug Name  PA Requirement*  Contrave 8-90MG er tablets Required  Benzphetamine HCl Not Required  Diethylpropion HCl Not Required  Imcivree Not Required  Phendimetrazine Tartrate Not Required  Phentermine HCl Not Required  Wegovy Not Required  Lomaira Required  Plenity Required  Qsymia Required  Saxenda Required  Xenical Required    Please advise/ confirm if PA is to be processed or if medication will be changed.

## 2022-09-16 ENCOUNTER — TELEPHONE (OUTPATIENT)
Dept: INTERNAL MEDICINE | Facility: CLINIC | Age: 32
End: 2022-09-16

## 2022-09-16 NOTE — TELEPHONE ENCOUNTER
Arslan Butler (Key: [id number])  Your information has been sent to [employer].  Wait for Determination  Please wait for [place] [employer] 2017 to return a determination.

## 2022-09-16 NOTE — TELEPHONE ENCOUNTER
Cover My Meds called and states they are checking on PA for Ozempic. They said to close out PA in portal The key is [id number]

## 2022-09-16 NOTE — TELEPHONE ENCOUNTER
Reached Arslan, advised of clinical message from Dr. Rader. Pt verbalized good understanding and will contact insurance and let office know of confirmation to cover Wegovy.

## 2022-09-19 ENCOUNTER — TELEPHONE (OUTPATIENT)
Dept: INTERNAL MEDICINE | Facility: CLINIC | Age: 32
End: 2022-09-19

## 2022-09-19 NOTE — TELEPHONE ENCOUNTER
Arslan Butler (Key: [id number])  This request has been approved.    Please note any additional information provided by [employer] at the bottom of your screen.  Message from Plan  The request has been approved. The authorization is effective for a maximum of 12 fills from 09/16/2022 to 09/15/2023, as long as the member is enrolled in their current health plan. The request was approved as submitted. A written notification letter will follow with additional details.

## 2022-10-04 ENCOUNTER — TELEPHONE (OUTPATIENT)
Dept: INTERNAL MEDICINE | Facility: CLINIC | Age: 32
End: 2022-10-04

## 2022-10-04 DIAGNOSIS — E66.01 CLASS 3 SEVERE OBESITY DUE TO EXCESS CALORIES WITH SERIOUS COMORBIDITY AND BODY MASS INDEX (BMI) OF 40.0 TO 44.9 IN ADULT: Primary | ICD-10-CM

## 2022-10-04 RX ORDER — SEMAGLUTIDE 1.34 MG/ML
INJECTION, SOLUTION SUBCUTANEOUS
COMMUNITY
Start: 2022-09-17 | End: 2022-10-04

## 2022-10-04 RX ORDER — SEMAGLUTIDE 2.68 MG/ML
2 INJECTION, SOLUTION SUBCUTANEOUS
Qty: 3 ML | Refills: 1 | Status: SHIPPED | OUTPATIENT
Start: 2022-10-04 | End: 2022-12-22

## 2022-10-04 RX ORDER — BUPROPION HYDROCHLORIDE 150 MG/1
150 TABLET ORAL DAILY
COMMUNITY
Start: 2022-09-22 | End: 2022-10-04

## 2022-10-04 NOTE — TELEPHONE ENCOUNTER
Caller: [id number] - [place], [place] - [address] AT [facility] & [facility] [phone] [facility] [phone] FX    Relationship: Pharmacy    Best call back number: [phone]    Requested Prescriptions:   NEW OZEMPIC PRESCRIPTION WITH 8MG PER 3ML    Pharmacy where request should be sent:    [id number] - [place], [place] - [address] AT [facility] & [facility] [phone]  - [phone] FX    PLEASE SEND A NEW PRESCRIPTION.    Yoel Rojas Rep   10/04/22 15:28 EDT     THANK YOU.

## 2022-10-31 DIAGNOSIS — F31.62 BIPOLAR DISORDER, CURRENT EPISODE MIXED, MODERATE: ICD-10-CM

## 2022-11-01 RX ORDER — BUPROPION HYDROCHLORIDE 300 MG/1
300 TABLET ORAL EVERY MORNING
Qty: 45 TABLET | Refills: 1 | Status: SHIPPED | OUTPATIENT
Start: 2022-11-01 | End: 2022-12-27 | Stop reason: SDUPTHER

## 2022-11-18 ENCOUNTER — OFFICE VISIT (OUTPATIENT)
Dept: INTERNAL MEDICINE | Facility: CLINIC | Age: 32
End: 2022-11-18

## 2022-11-18 VITALS
DIASTOLIC BLOOD PRESSURE: 70 MMHG | OXYGEN SATURATION: 96 % | BODY MASS INDEX: 39.69 KG/M2 | RESPIRATION RATE: 20 BRPM | SYSTOLIC BLOOD PRESSURE: 140 MMHG | HEART RATE: 90 BPM | WEIGHT: 309.13 LBS | TEMPERATURE: 97.7 F

## 2022-11-18 DIAGNOSIS — J06.9 VIRAL URI WITH COUGH: Primary | ICD-10-CM

## 2022-11-18 DIAGNOSIS — J30.9 ALLERGIC RHINITIS, UNSPECIFIED SEASONALITY, UNSPECIFIED TRIGGER: ICD-10-CM

## 2022-11-18 LAB
EXPIRATION DATE: NORMAL
EXPIRATION DATE: NORMAL
FLUAV AG NPH QL: NEGATIVE
FLUBV AG NPH QL: NEGATIVE
INTERNAL CONTROL: NORMAL
INTERNAL CONTROL: NORMAL
Lab: NORMAL
Lab: NORMAL
SARS-COV-2 AG UPPER RESP QL IA.RAPID: NOT DETECTED

## 2022-11-18 PROCEDURE — 87804 INFLUENZA ASSAY W/OPTIC: CPT | Performed by: STUDENT IN AN ORGANIZED HEALTH CARE EDUCATION/TRAINING PROGRAM

## 2022-11-18 PROCEDURE — 99214 OFFICE O/P EST MOD 30 MIN: CPT | Performed by: STUDENT IN AN ORGANIZED HEALTH CARE EDUCATION/TRAINING PROGRAM

## 2022-11-18 PROCEDURE — 87426 SARSCOV CORONAVIRUS AG IA: CPT | Performed by: STUDENT IN AN ORGANIZED HEALTH CARE EDUCATION/TRAINING PROGRAM

## 2022-11-18 RX ORDER — BUPROPION HYDROCHLORIDE 150 MG/1
150 TABLET ORAL DAILY
COMMUNITY
Start: 2022-10-21

## 2022-11-18 RX ORDER — FLUTICASONE PROPIONATE 50 MCG
1 SPRAY, SUSPENSION (ML) NASAL 2 TIMES DAILY
Qty: 16 G | Refills: 1 | Status: SHIPPED | OUTPATIENT
Start: 2022-11-18

## 2022-11-18 NOTE — PROGRESS NOTES
Follow Up Office Visit      Date: 2022   Patient Name: Arslan Butler  : 1990   MRN: [id number]     Chief Complaint:    Chief Complaint   Patient presents with   • Fatigue   • Headache   • Nasal Congestion       History of Present Illness: Arslan Butler is a 32 y.o. male with complex past medical history including morbid obesity, GERD, hyperlipidemia, bipolar disorder who is here today for rhinorrhea, headache.    HPI   Patient reports for the past 4 days he has had increasing rhinorrhea, minimally productive cough, frontal headache, congestion, sneezing and itchy eyes.  He says his symptoms typically feel like this when he has worsening allergies.  He has been taking his Claritin 10 mg daily with minimal improvement.  Denies known sick contacts.  Only other symptom is 2-3 loose bowel movements per day.  Denies melena or hematochezia.  No nausea or vomiting.  No fevers or chills.        Subjective      Review of Systems:   Review of Systems    I have reviewed the patients family history, social history, past medical history, past surgical history and have updated it as appropriate.     Medications:     Current Outpatient Medications:   •  buPROPion XL (WELLBUTRIN XL) 150 MG 24 hr tablet, Take 150 mg by mouth Daily., Disp: , Rfl:   •  buPROPion XL (WELLBUTRIN XL) 300 MG 24 hr tablet, TAKE 1 TABLET BY MOUTH EVERY MORNING, Disp: 45 tablet, Rfl: 1  •  loratadine (Claritin) 10 MG tablet, Take 1 tablet by mouth Daily., Disp: 90 tablet, Rfl: 1  •  omeprazole (priLOSEC) 20 MG capsule, Take 1 capsule by mouth Daily., Disp: 90 capsule, Rfl: 1  •  Semaglutide, 2 MG/DOSE, (Ozempic, 2 MG/DOSE,) 8 MG/3ML solution pen-injector, Inject 2 mg under the skin into the appropriate area as directed Every 7 (Seven) Days., Disp: 3 mL, Rfl: 1  •  fluticasone (Flonase) 50 MCG/ACT nasal spray, 1 spray into the nostril(s) as directed by provider 2 (Two) Times a Day., Disp: 16 g, Rfl: 1  •  tadalafil (CIALIS) 5 MG tablet, Take  1 tablet po daily, Disp: 30 tablet, Rfl: 4  •  Vraylar 3 MG capsule capsule, , Disp: , Rfl:     Allergies:   Allergies   Allergen Reactions   • Sulfamethoxazole-Trimethoprim Hives       Objective     Physical Exam: Please see above  Vital Signs:   Vitals:    11/18/22 1300   BP: 140/70   BP Location: Left arm   Patient Position: Sitting   Cuff Size: Adult   Pulse: 90   Resp: 20   Temp: 97.7 °F (36.5 °C)   TempSrc: Temporal   SpO2: 96%   Weight: (!) 140 kg (309 lb 2 oz)   PainSc:   2     Body mass index is 39.69 kg/m².    Physical Exam  Vitals reviewed.   Constitutional:       General: He is not in acute distress.     Appearance: Normal appearance. He is obese. He is not ill-appearing or toxic-appearing.   HENT:      Head: Normocephalic and atraumatic.      Right Ear: External ear normal.      Left Ear: External ear normal.      Ears:      Comments: Serous effusions bilaterally, mild erythema of TMs     Nose: Congestion and rhinorrhea present.      Mouth/Throat:      Mouth: Mucous membranes are moist.      Pharynx: No oropharyngeal exudate or posterior oropharyngeal erythema.   Eyes:      General: No scleral icterus.        Right eye: No discharge.         Left eye: No discharge.      Extraocular Movements: Extraocular movements intact.      Pupils: Pupils are equal, round, and reactive to light.   Cardiovascular:      Rate and Rhythm: Normal rate and regular rhythm.      Pulses: Normal pulses.      Heart sounds: Normal heart sounds. No murmur heard.    No friction rub. No gallop.   Pulmonary:      Effort: Pulmonary effort is normal. No respiratory distress.      Breath sounds: Normal breath sounds. No stridor. No wheezing, rhonchi or rales.   Abdominal:      General: Abdomen is flat. There is no distension.   Musculoskeletal:         General: No swelling or deformity. Normal range of motion.      Cervical back: Normal range of motion. No rigidity or tenderness.   Lymphadenopathy:      Cervical: No cervical  adenopathy.   Skin:     General: Skin is warm and dry.      Capillary Refill: Capillary refill takes less than 2 seconds.      Coloration: Skin is not jaundiced or pale.   Neurological:      General: No focal deficit present.      Mental Status: He is alert and oriented to person, place, and time. Mental status is at baseline.   Psychiatric:         Mood and Affect: Mood normal.         Behavior: Behavior normal.         Judgment: Judgment normal.         Procedures    Results:   Labs:   Hemoglobin A1C   Date Value Ref Range Status   08/05/2022 5.1 % Final   06/13/2022 5.60 4.80 - 5.60 % Final     TSH   Date Value Ref Range Status   06/13/2022 0.564 0.270 - 4.200 uIU/mL Final        Imaging:   No valid procedures specified.     Assessment / Plan      Assessment/Plan:   Problem List Items Addressed This Visit        Allergies and Adverse Reactions    Allergic rhinitis    Current Assessment & Plan     This is a chronic problem.  Symptoms are worsening.  Recommend patient change from Claritin to alternative second-generation antihistamine.  We will start Flonase at this time.  Information provided in AVS on allergic rhinitis.         Relevant Medications    fluticasone (Flonase) 50 MCG/ACT nasal spray   Other Visit Diagnoses     Viral URI with cough    -  Primary    Relevant Orders    POCT VERITOR SARS-CoV-2 Antigen      Patient is a 32-year-old male presenting for increasing rhinorrhea, congestion, cough and headache.  Differential diagnosis includes acute viral URI versus influenza versus COVID-19 versus worsening seasonal rhinitis.  Point-of-care testing for influenza and COVID sent today.  Patient with significant symptoms concerning for worsening allergic rhinitis, will start Flonase twice daily.  Encourage compliance with over-the-counter antihistamine, recommend switching to Zyrtec or Allegra.  Counseled patient on return precautions and red flag symptoms to seek emergency care, patient voiced  understanding.    Follow Up:   Return in about 25 days (around 12/13/2022), or if symptoms worsen or fail to improve, for Recheck with Prasanth.        Juan Carlos Washington MD  [facility] [place] [facility]

## 2022-11-18 NOTE — ASSESSMENT & PLAN NOTE
This is a chronic problem.  Symptoms are worsening.  Recommend patient change from Claritin to alternative second-generation antihistamine.  We will start Flonase at this time.  Information provided in AVS on allergic rhinitis.

## 2022-11-21 ENCOUNTER — TELEPHONE (OUTPATIENT)
Dept: INTERNAL MEDICINE | Facility: CLINIC | Age: 32
End: 2022-11-21

## 2022-11-21 NOTE — TELEPHONE ENCOUNTER
----- Message from Juan Carlos Washington MD sent at 11/18/2022  2:32 PM EST -----  Please call the patient regarding his normal result. No flu or covid.    DR. Washington

## 2022-11-30 ENCOUNTER — TELEPHONE (OUTPATIENT)
Dept: INTERNAL MEDICINE | Facility: CLINIC | Age: 32
End: 2022-11-30

## 2022-11-30 DIAGNOSIS — F31.30 BIPOLAR AFFECTIVE DISORDER, CURRENT EPISODE DEPRESSED, CURRENT EPISODE SEVERITY UNSPECIFIED: Primary | ICD-10-CM

## 2022-12-01 NOTE — TELEPHONE ENCOUNTER
Tried reaching Arslan on [phone]    Hub please confirm with Pt what labs is to be sent to provider. Thanks.

## 2022-12-05 NOTE — TELEPHONE ENCOUNTER
Reached Pt labs  Were to be faxed today because Pt was put on lithium and blood work is needed.  No fax received will reach lab [phone] 12/6/2022.

## 2022-12-06 NOTE — TELEPHONE ENCOUNTER
Lithium level order has been completed per request.  Please let the patient know that he can complete the lab order whenever he is available.  Thanks and let me know if anything else.

## 2022-12-06 NOTE — TELEPHONE ENCOUNTER
[facility] reached on [phone] fax to be sent to [phone].  Fax received, placed with PCP for review.

## 2022-12-08 NOTE — TELEPHONE ENCOUNTER
2nd attempt to reach Pt   lleft voice mail message for Pt to call back for message. Office number given.    Hub please relay message  Lithium level order has been completed per request.  Please let the patient know that he can complete the lab order whenever he is available.  Thanks and let me know if anything else.

## 2022-12-13 NOTE — TELEPHONE ENCOUNTER
3rd attempt to reach Pt  left voice mail message for Pt to call back for message. Office number given.     Hub please relay message  Lithium level order has been completed per request.  Please let the patient know that he can complete the lab order whenever he is available.  Thanks and let me know if anything else.

## 2022-12-21 DIAGNOSIS — E66.01 CLASS 3 SEVERE OBESITY DUE TO EXCESS CALORIES WITH SERIOUS COMORBIDITY AND BODY MASS INDEX (BMI) OF 40.0 TO 44.9 IN ADULT: ICD-10-CM

## 2022-12-22 RX ORDER — SEMAGLUTIDE 2.68 MG/ML
INJECTION, SOLUTION SUBCUTANEOUS
Qty: 3 ML | Refills: 1 | Status: SHIPPED | OUTPATIENT
Start: 2022-12-22 | End: 2023-03-20 | Stop reason: SDUPTHER

## 2022-12-27 ENCOUNTER — OFFICE VISIT (OUTPATIENT)
Dept: INTERNAL MEDICINE | Facility: CLINIC | Age: 32
End: 2022-12-27

## 2022-12-27 VITALS
WEIGHT: 315 LBS | HEART RATE: 110 BPM | HEIGHT: 74 IN | OXYGEN SATURATION: 94 % | BODY MASS INDEX: 40.43 KG/M2 | RESPIRATION RATE: 18 BRPM | SYSTOLIC BLOOD PRESSURE: 132 MMHG | DIASTOLIC BLOOD PRESSURE: 84 MMHG | TEMPERATURE: 98.6 F

## 2022-12-27 DIAGNOSIS — R52 BODY ACHES: Primary | ICD-10-CM

## 2022-12-27 DIAGNOSIS — R11.2 NAUSEA AND VOMITING, UNSPECIFIED VOMITING TYPE: ICD-10-CM

## 2022-12-27 LAB
EXPIRATION DATE: NORMAL
FLUAV AG UPPER RESP QL IA.RAPID: NOT DETECTED
FLUBV AG UPPER RESP QL IA.RAPID: NOT DETECTED
INTERNAL CONTROL: NORMAL
Lab: NORMAL
SARS-COV-2 RNA RESP QL NAA+PROBE: NOT DETECTED

## 2022-12-27 PROCEDURE — 87428 SARSCOV & INF VIR A&B AG IA: CPT | Performed by: PHYSICIAN ASSISTANT

## 2022-12-27 PROCEDURE — 99213 OFFICE O/P EST LOW 20 MIN: CPT | Performed by: PHYSICIAN ASSISTANT

## 2022-12-27 RX ORDER — DIAZEPAM 5 MG/1
5 TABLET ORAL 3 TIMES DAILY
COMMUNITY
Start: 2022-12-19

## 2022-12-27 RX ORDER — LITHIUM CARBONATE 300 MG
300 TABLET ORAL 2 TIMES DAILY
COMMUNITY
Start: 2022-12-19

## 2022-12-27 RX ORDER — ONDANSETRON 4 MG/1
4 TABLET, ORALLY DISINTEGRATING ORAL EVERY 8 HOURS PRN
Qty: 20 TABLET | Refills: 0 | Status: SHIPPED | OUTPATIENT
Start: 2022-12-27 | End: 2023-02-21

## 2022-12-27 NOTE — PATIENT INSTRUCTIONS
Monitor closely for increased or new symptoms such as fever, worsening abdominal pain, worsening nausea vomiting or diarrhea, coffee-ground appearing vomit or blood in vomit, blood in stool, black tarry stool.  If any of the symptoms arise please report to the emergency department.    Take medications as instructed    Follow-up with primary care provider.

## 2022-12-27 NOTE — PROGRESS NOTES
Office Note     Name: Arslan Butler    : 1990     MRN: [id number]     Chief Complaint  Diarrhea (Along with vomiting, stomach cramps, pt states he feels hot and cold throughout the day. Started yesterday morning )    Subjective     History of Present Illness:  Arslan Butler is a 32 y.o. male who presents today for diarrhea, nausea, vomiting, headache, chills and increased flatulence for 2 days.  He denies any known associated with food or any sick contacts.  He denies any home treatment.  He is able to tolerate some p.o. liquids and solid food but has frequent vomiting.  He denies any associated symptoms or any home treatment at this time.    Review of Systems:   Review of Systems    Past Medical History:   Past Medical History:   Diagnosis Date   • Anxiety and depression 2021   • Bipolar disorder (HCC) 12/15/2021   • Diabetes mellitus (HCC)    • Gastroesophageal reflux disease 12/15/2021   • Hyperlipidemia 12/15/2021   • Iron deficiency anemia secondary to inadequate dietary iron intake 2022   • Left arm numbness 2022   • Left rotator cuff tear 2022   • Obesity 12/15/2021   • Peptic ulceration    • Testosterone deficiency in male 2022       Past Surgical History:   Past Surgical History:   Procedure Laterality Date   • ROTATOR CUFF REPAIR Left     has had it done twice     • TONSILLECTOMY         Immunizations:   Immunization History   Administered Date(s) Administered   • COVID-19 (MODERNA) 1st, 2nd, 3rd Dose Only 04/10/2021, 2021, 2021, 2021   • DTP 1996   • Hep B, Adolescent or Pediatric 2001, 2001, 2002   • MMR 1996   • OPV 1996   • Pneumococcal Polysaccharide (PPSV23) 2022   • Tdap 2022        Medications:     Current Outpatient Medications:   •  buPROPion XL (WELLBUTRIN XL) 150 MG 24 hr tablet, Take 150 mg by mouth Daily., Disp: , Rfl:   •  diazePAM (VALIUM) 5 MG tablet, Take 5 mg by mouth 3 (Three)  "Times a Day., Disp: , Rfl:   •  fluticasone (Flonase) 50 MCG/ACT nasal spray, 1 spray into the nostril(s) as directed by provider 2 (Two) Times a Day., Disp: 16 g, Rfl: 1  •  lithium 300 MG tablet, Take 300 mg by mouth every night at bedtime., Disp: , Rfl:   •  loratadine (Claritin) 10 MG tablet, Take 1 tablet by mouth Daily., Disp: 90 tablet, Rfl: 1  •  Ozempic, 2 MG/DOSE, 8 MG/3ML solution pen-injector, INJECT 2MG UNDER THE SKIN INTO THE APPROPRIATE AREA AS DIRECTED EVERY 7 DAYS, Disp: 3 mL, Rfl: 1  •  ondansetron ODT (ZOFRAN-ODT) 4 MG disintegrating tablet, Place 1 tablet on the tongue Every 8 (Eight) Hours As Needed for Nausea or Vomiting., Disp: 20 tablet, Rfl: 0    Allergies:   Allergies   Allergen Reactions   • Sulfamethoxazole-Trimethoprim Hives       Family History:   Family History   Problem Relation Age of Onset   • Obesity Mother    • Hypertension Father    • Heart attack Father    • Stroke Maternal Grandmother    • Heart attack Maternal Grandfather    • Mental illness Paternal Grandmother    • Diabetes Paternal Grandmother        Social History:   Social History     Socioeconomic History   • Marital status:    Tobacco Use   • Smoking status: Never     Passive exposure: Never   • Smokeless tobacco: Never   Vaping Use   • Vaping Use: Some days   • Substances: Nicotine, Flavoring   • Devices: Disposable, Refillable tank   • Passive vaping exposure: Yes   Substance and Sexual Activity   • Alcohol use: Yes     Comment: ocassionally    • Drug use: Never   • Sexual activity: Defer         Objective     Vital Signs  /84 (BP Location: Right arm, Patient Position: Sitting, Cuff Size: Adult)   Pulse 110   Temp 98.6 °F (37 °C) (Infrared)   Resp 18   Ht 188 cm (74\")   Wt (!) 143 kg (315 lb)   SpO2 94%   BMI 40.44 kg/m²   Estimated body mass index is 40.44 kg/m² as calculated from the following:    Height as of this encounter: 188 cm (74\").    Weight as of this encounter: 143 kg (315 " lb).          Physical Exam  Constitutional:       Appearance: Normal appearance.   HENT:      Mouth/Throat:      Mouth: Mucous membranes are moist.   Eyes:      Extraocular Movements: Extraocular movements intact.      Pupils: Pupils are equal, round, and reactive to light.   Cardiovascular:      Rate and Rhythm: Normal rate and regular rhythm.   Pulmonary:      Effort: Pulmonary effort is normal.      Breath sounds: Normal breath sounds. No wheezing or rhonchi.   Abdominal:      General: Bowel sounds are increased.      Palpations: Abdomen is soft. There is no hepatomegaly or splenomegaly.      Tenderness: There is abdominal tenderness in the right upper quadrant. There is no guarding. Positive signs include Burger's sign. Negative signs include McBurney's sign.   Neurological:      Mental Status: He is alert.          Assessment and Plan     1. Body aches    - Covid-19 + Flu A&B AG, Veritor; Future  - Covid-19 + Flu A&B AG, Veritor    2. Nausea and vomiting, unspecified vomiting type  I did discuss possible gallbladder source of pain due to mild right upper quadrant tenderness with deep palpation.  Patient reports he has never had problems with his gallbladder in the past and does not seem to notice any association with any particular foods we will monitor.  We did discuss that gallbladders can become infected or have a gallstone lodged in if he were to have any worsening symptoms to report to the ER.  - Covid-19 + Flu A&B AG, Veritor; Future  - Covid-19 + Flu A&B AG, Veritor       Follow Up  No follow-ups on file.    [facility]  [address] [address] [phone]

## 2023-01-27 ENCOUNTER — TELEPHONE (OUTPATIENT)
Dept: INTERNAL MEDICINE | Facility: CLINIC | Age: 33
End: 2023-01-27
Payer: COMMERCIAL

## 2023-01-27 DIAGNOSIS — F31.30 BIPOLAR AFFECTIVE DISORDER, CURRENT EPISODE DEPRESSED, CURRENT EPISODE SEVERITY UNSPECIFIED: Primary | ICD-10-CM

## 2023-02-21 ENCOUNTER — LAB (OUTPATIENT)
Dept: LAB | Facility: HOSPITAL | Age: 33
End: 2023-02-21
Payer: COMMERCIAL

## 2023-02-21 ENCOUNTER — OFFICE VISIT (OUTPATIENT)
Dept: INTERNAL MEDICINE | Facility: CLINIC | Age: 33
End: 2023-02-21
Payer: COMMERCIAL

## 2023-02-21 VITALS
TEMPERATURE: 98.4 F | HEART RATE: 107 BPM | RESPIRATION RATE: 18 BRPM | SYSTOLIC BLOOD PRESSURE: 126 MMHG | HEIGHT: 74 IN | WEIGHT: 315 LBS | DIASTOLIC BLOOD PRESSURE: 88 MMHG | OXYGEN SATURATION: 95 % | BODY MASS INDEX: 40.43 KG/M2

## 2023-02-21 DIAGNOSIS — F31.30 BIPOLAR AFFECTIVE DISORDER, CURRENT EPISODE DEPRESSED, CURRENT EPISODE SEVERITY UNSPECIFIED: ICD-10-CM

## 2023-02-21 DIAGNOSIS — M54.50 ACUTE LEFT-SIDED LOW BACK PAIN WITHOUT SCIATICA: Primary | ICD-10-CM

## 2023-02-21 LAB — LITHIUM SERPL-SCNC: 0.2 MMOL/L (ref 0.6–1.2)

## 2023-02-21 PROCEDURE — 80178 ASSAY OF LITHIUM: CPT

## 2023-02-21 PROCEDURE — 99213 OFFICE O/P EST LOW 20 MIN: CPT | Performed by: NURSE PRACTITIONER

## 2023-02-21 PROCEDURE — 36415 COLL VENOUS BLD VENIPUNCTURE: CPT

## 2023-02-21 PROCEDURE — 96372 THER/PROPH/DIAG INJ SC/IM: CPT | Performed by: NURSE PRACTITIONER

## 2023-02-21 RX ORDER — CYCLOBENZAPRINE HCL 10 MG
10 TABLET ORAL 3 TIMES DAILY PRN
Qty: 20 TABLET | Refills: 0 | Status: SHIPPED | OUTPATIENT
Start: 2023-02-21 | End: 2023-03-29

## 2023-02-21 RX ORDER — KETOROLAC TROMETHAMINE 30 MG/ML
60 INJECTION, SOLUTION INTRAMUSCULAR; INTRAVENOUS ONCE
Status: COMPLETED | OUTPATIENT
Start: 2023-02-21 | End: 2023-02-21

## 2023-02-21 RX ORDER — LORATADINE 10 MG/1
TABLET ORAL EVERY 24 HOURS
COMMUNITY
End: 2023-03-20 | Stop reason: SDUPTHER

## 2023-02-21 RX ORDER — KETOROLAC TROMETHAMINE 10 MG/1
10 TABLET, FILM COATED ORAL EVERY 6 HOURS PRN
Qty: 20 TABLET | Refills: 0 | Status: SHIPPED | OUTPATIENT
Start: 2023-02-21 | End: 2023-03-29

## 2023-02-21 RX ADMIN — KETOROLAC TROMETHAMINE 60 MG: 30 INJECTION, SOLUTION INTRAMUSCULAR; INTRAVENOUS at 13:08

## 2023-02-21 NOTE — PROGRESS NOTES
"Patient Name: Arslan Butler  : 1990   MRN: [id number]     Chief Complaint:    Chief Complaint   Patient presents with   • Back Pain     For 3 days        History of Present Illness: Arslan Butler is a 32 y.o. male presents to clinic for an initial visit for back pain x 3 days.    Back pain  The patient states that he was moving furniture on 2023 and \"tweaked\" his back. He reports that it has not gotten any better. He localizes the pain to be on his left side. He does get numbness and tingling in his legs if he lying down or sitting for too long. He complains of tenderness in the middle of his back. He states that when he was lifting the furniture, he got an immediate sharp pain. He notes that he still gets the sharp pains when he moves a certain way. He has tried taking Aleve and Advil to treat the pain. He has had a back injury previously. He does have sciatic nerve pain and other issues. He denies any pain in his legs. He also denies any stomach bleeding. He reports that when he sits down, it is hard for him to get back up due to the back pain. He states that he has had muscle relaxants prescribed previously due to his sciatic nerve flareups but has not taken any recently. He notes that he does currently take lithium and has tolerated ketorolac previously.    Subjective     Review of System: Review of Systems   Constitutional: Negative for activity change, appetite change, chills, fatigue, fever and unexpected weight change.   HENT: Negative for congestion, ear pain, postnasal drip, rhinorrhea, sinus pressure, sinus pain, sneezing and sore throat.    Eyes: Negative for visual disturbance.   Respiratory: Negative for cough, shortness of breath and wheezing.    Cardiovascular: Negative for chest pain and palpitations.   Gastrointestinal: Negative for abdominal pain, blood in stool, constipation, diarrhea, nausea and vomiting.   Genitourinary: Negative for dysuria.   Musculoskeletal: Positive for back " "pain. Negative for arthralgias, joint swelling and myalgias.   Skin: Negative for rash.   Neurological: Negative for dizziness, weakness and headaches.   Hematological: Negative for adenopathy.   Psychiatric/Behavioral: Negative for dysphoric mood. The patient is not nervous/anxious.         Medications:     Current Outpatient Medications:   •  buPROPion XL (WELLBUTRIN XL) 150 MG 24 hr tablet, Take 150 mg by mouth Daily., Disp: , Rfl:   •  diazePAM (VALIUM) 5 MG tablet, Take 5 mg by mouth 3 (Three) Times a Day., Disp: , Rfl:   •  fluticasone (Flonase) 50 MCG/ACT nasal spray, 1 spray into the nostril(s) as directed by provider 2 (Two) Times a Day., Disp: 16 g, Rfl: 1  •  lithium 300 MG tablet, Take 300 mg by mouth every night at bedtime., Disp: , Rfl:   •  loratadine (CLARITIN) 10 MG tablet, Daily., Disp: , Rfl:   •  Ozempic, 2 MG/DOSE, 8 MG/3ML solution pen-injector, INJECT 2MG UNDER THE SKIN INTO THE APPROPRIATE AREA AS DIRECTED EVERY 7 DAYS, Disp: 3 mL, Rfl: 1  •  cyclobenzaprine (FLEXERIL) 10 MG tablet, Take 1 tablet by mouth 3 (Three) Times a Day As Needed for Muscle Spasms., Disp: 20 tablet, Rfl: 0  •  ketorolac (TORADOL) 10 MG tablet, Take 1 tablet by mouth Every 6 (Six) Hours As Needed for Moderate Pain., Disp: 20 tablet, Rfl: 0  No current facility-administered medications for this visit.    Allergies:   Allergies   Allergen Reactions   • Sulfamethoxazole-Trimethoprim Hives       Objective     Physical Exam:   Vital Signs:   Vitals:    02/21/23 1155   BP: 126/88   BP Location: Right arm   Patient Position: Sitting   Cuff Size: Adult   Pulse: 107   Resp: 18   Temp: 98.4 °F (36.9 °C)   TempSrc: Infrared   SpO2: 95%   Weight: (!) 151 kg (333 lb)   Height: 188 cm (74\")   PainSc:   7     Body mass index is 42.75 kg/m². Information provided on My Chart.     Physical Exam  Constitutional:       General: He is not in acute distress.     Appearance: He is not ill-appearing.   HENT:      Head: Normocephalic. "   Cardiovascular:      Rate and Rhythm: Normal rate and regular rhythm.      Heart sounds: Normal heart sounds. No murmur heard.  Pulmonary:      Breath sounds: Normal breath sounds.   Abdominal:      General: Bowel sounds are normal.      Tenderness: There is no abdominal tenderness.   Musculoskeletal:      Comments:  Palpation reveals tenderness and tight paraspinal muscles in    lumbar       spine. The back has decreased flexion, decreased extension, and pain with flexion and extension.  Negative leg raise bilaterally    Neurological:      General: No focal deficit present.      Mental Status: He is oriented to person, place, and time.   Psychiatric:         Mood and Affect: Mood normal.         Assessment / Plan      Assessment/Plan:   Diagnoses and all orders for this visit:    1. Acute left-sided low back pain without sciatica (Primary)  -     ketorolac (TORADOL) injection 60 mg  -     cyclobenzaprine (FLEXERIL) 10 MG tablet; Take 1 tablet by mouth 3 (Three) Times a Day As Needed for Muscle Spasms.  Dispense: 20 tablet; Refill: 0  -     ketorolac (TORADOL) 10 MG tablet; Take 1 tablet by mouth Every 6 (Six) Hours As Needed for Moderate Pain.  Dispense: 20 tablet; Refill: 0      1. Acute left-sided low back pain without sciatica  - The patient has been experiencing left side back pain for 3 days with no improvement. Will administer a Toradol injection in-office on today. Will prescribe Toradol 10 mg, 4 times per day to help treat the pain. He is advised to not drive while taking it. Will prescribe cyclobenzaprine that he can take with Tylenol. Recommend heat, ice, general stretching, and a lidocaine patch. He is warned that Toradol can make the stomach bleed so if he starts to experience abdominal pain, stop taking it. He is advised to contact the office if the pain gets any worse.   Discussed possible red flags and how to follow-up with those.   Patient verbalized an understanding and agreement with plan of  care.        Follow Up:   CORTNEY Joy  [facility] [place] [facility]    Transcribed from ambient dictation for CORTNEY Du by Yojana Giron.  02/21/23   15:47 EST    Patient or patient representative verbalized consent to the visit recording.  I have personally performed the services described in this document as transcribed by the above individual, and it is both accurate and complete.

## 2023-02-22 NOTE — PATIENT INSTRUCTIONS
MyPlate from USDA  MyPlate is an outline of a general healthy diet based on the Dietary Guidelines for Americans, 0198-4474, from the U.S. Department of Agriculture (USDA). It sets guidelines for how much food you should eat from each food group based on your age, sex, and level of physical activity.  What are tips for following MyPlate?  To follow MyPlate recommendations:  Eat a wide variety of fruits and vegetables, grains, and protein foods.  Serve smaller portions and eat less food throughout the day.  Limit portion sizes to avoid overeating.  Enjoy your food.  Get at least 150 minutes of exercise every week. This is about 30 minutes each day, 5 or more days per week.  It can be difficult to have every meal look like MyPlate. Think about MyPlate as eating guidelines for an entire day, rather than each individual meal.  Fruits and vegetables  Make one half of your plate fruits and vegetables.  Eat many different colors of fruits and vegetables each day.  For a 2,000-calorie daily food plan, eat:  2½ cups of vegetables every day.  2 cups of fruit every day.  1 cup is equal to:  1 cup raw or cooked vegetables.  1 cup raw fruit.  1 medium-sized orange, apple, or banana.  1 cup 100% fruit or vegetable juice.  2 cups raw leafy greens, such as lettuce, spinach, or kale.  ½ cup dried fruit.  Grains  One fourth of your plate should be grains.  Make at least half of the grains you eat each day whole grains.  For a 2,000-calorie daily food plan, eat 6 oz of grains every day.  1 oz is equal to:  1 slice bread.  1 cup cereal.  ½ cup cooked rice, cereal, or pasta.  Protein  One fourth of your plate should be protein.  Eat a wide variety of protein foods, including meat, poultry, fish, eggs, beans, nuts, and tofu.  For a 2,000-calorie daily food plan, eat 5½ oz of protein every day.  1 oz is equal to:  1 oz meat, poultry, or fish.  ¼ cup cooked beans.  1 egg.  ½ oz nuts or seeds.  1 Tbsp peanut butter.  Dairy  Drink fat-free  or low-fat (1%) milk.  Eat or drink dairy as a side to meals.  For a 2,000-calorie daily food plan, eat or drink 3 cups of dairy every day.  1 cup is equal to:  1 cup milk, yogurt, cottage cheese, or soy milk (soy beverage).  2 oz processed cheese.  1½ oz natural cheese.  Fats, oils, salt, and sugars  Only small amounts of oils are recommended.  Avoid foods that are high in calories and low in nutritional value (empty calories), like foods high in fat or added sugars.  Choose foods that are low in salt (sodium). Choose foods that have less than 140 milligrams (mg) of sodium per serving.  Drink water instead of sugary drinks. Drink enough fluid to keep your urine pale yellow.  Where to find support  Work with your health care provider or a dietitian to develop a customized eating plan that is right for you.  Download an helen (mobile application) to help you track your daily food intake.  Where to find more information  USDA: ChooseMyPlate.gov  Summary  MyPlate is a general guideline for healthy eating from the USDA. It is based on the Dietary Guidelines for Americans, 4460-3382.  In general, fruits and vegetables should take up one half of your plate, grains should take up one fourth of your plate, and protein should take up one fourth of your plate.  This information is not intended to replace advice given to you by your health care provider. Make sure you discuss any questions you have with your health care provider.  Document Revised: 11/08/2021 Document Reviewed: 11/08/2021  [employer] Patient Education © 2022 [employer] Inc.

## 2023-03-06 ENCOUNTER — TELEPHONE (OUTPATIENT)
Dept: INTERNAL MEDICINE | Facility: CLINIC | Age: 33
End: 2023-03-06

## 2023-03-06 ENCOUNTER — OFFICE VISIT (OUTPATIENT)
Dept: INTERNAL MEDICINE | Facility: CLINIC | Age: 33
End: 2023-03-06
Payer: COMMERCIAL

## 2023-03-06 VITALS
WEIGHT: 315 LBS | HEART RATE: 86 BPM | RESPIRATION RATE: 18 BRPM | SYSTOLIC BLOOD PRESSURE: 116 MMHG | BODY MASS INDEX: 38.36 KG/M2 | HEIGHT: 76 IN | DIASTOLIC BLOOD PRESSURE: 76 MMHG | TEMPERATURE: 98.2 F

## 2023-03-06 DIAGNOSIS — S90.221S SUBUNGUAL HEMATOMA OF RIGHT FOOT, SEQUELA: ICD-10-CM

## 2023-03-06 DIAGNOSIS — M25.572 ACUTE LEFT ANKLE PAIN: Primary | ICD-10-CM

## 2023-03-06 PROCEDURE — 1160F RVW MEDS BY RX/DR IN RCRD: CPT | Performed by: NURSE PRACTITIONER

## 2023-03-06 PROCEDURE — 1159F MED LIST DOCD IN RCRD: CPT | Performed by: NURSE PRACTITIONER

## 2023-03-06 PROCEDURE — 99214 OFFICE O/P EST MOD 30 MIN: CPT | Performed by: NURSE PRACTITIONER

## 2023-03-06 RX ORDER — METHYLPREDNISOLONE 4 MG/1
TABLET ORAL
Qty: 21 TABLET | Refills: 0 | Status: SHIPPED | OUTPATIENT
Start: 2023-03-06 | End: 2023-03-29

## 2023-03-06 NOTE — TELEPHONE ENCOUNTER
Caller: Arslan Butler    Relationship: Self    Best call back number: [phone]    What form or medical record are you requesting: RETURN TO WORK STATEMENT    Who is requesting this form or medical record from you: PATIENT CALL    How would you like to receive the form or medical records (pick-up, mail, fax):     Timeframe paperwork needed: AS SOON AS POSSIBLE    Additional notes: PATIENT WOULD LIKE TO RETURN TO WORK 03./08/23.    PLEASE CALL PATIENT WHEN READY FOR .

## 2023-03-06 NOTE — TELEPHONE ENCOUNTER
Arslan is requesting  a note to return back to work with no restrictions.  He states he is a  and he does not work until 3/8/2023.  He was seen today by Mary Davila

## 2023-03-07 NOTE — TELEPHONE ENCOUNTER
Please provide work excuse for patient.  Please provide excuse to return to work with no restrictions for 3/8/2023.  Please let patient know when the excuse is ready.

## 2023-03-20 DIAGNOSIS — E66.01 CLASS 3 SEVERE OBESITY DUE TO EXCESS CALORIES WITH SERIOUS COMORBIDITY AND BODY MASS INDEX (BMI) OF 40.0 TO 44.9 IN ADULT: ICD-10-CM

## 2023-03-21 RX ORDER — LORATADINE 10 MG/1
10 TABLET ORAL EVERY 24 HOURS
Qty: 90 TABLET | Refills: 3 | Status: SHIPPED | OUTPATIENT
Start: 2023-03-21

## 2023-03-21 RX ORDER — SEMAGLUTIDE 2.68 MG/ML
2 INJECTION, SOLUTION SUBCUTANEOUS
Qty: 3 ML | Refills: 1 | Status: SHIPPED | OUTPATIENT
Start: 2023-03-21

## 2023-03-29 ENCOUNTER — OFFICE VISIT (OUTPATIENT)
Dept: INTERNAL MEDICINE | Facility: CLINIC | Age: 33
End: 2023-03-29
Payer: COMMERCIAL

## 2023-03-29 VITALS
HEART RATE: 72 BPM | SYSTOLIC BLOOD PRESSURE: 128 MMHG | BODY MASS INDEX: 41.51 KG/M2 | DIASTOLIC BLOOD PRESSURE: 70 MMHG | RESPIRATION RATE: 16 BRPM | TEMPERATURE: 96.9 F | WEIGHT: 315 LBS

## 2023-03-29 DIAGNOSIS — M54.50 CHRONIC MIDLINE LOW BACK PAIN WITHOUT SCIATICA: ICD-10-CM

## 2023-03-29 DIAGNOSIS — E66.01 CLASS 3 SEVERE OBESITY DUE TO EXCESS CALORIES WITH SERIOUS COMORBIDITY AND BODY MASS INDEX (BMI) OF 40.0 TO 44.9 IN ADULT: Primary | ICD-10-CM

## 2023-03-29 DIAGNOSIS — S93.402D SPRAIN OF LEFT ANKLE, UNSPECIFIED LIGAMENT, SUBSEQUENT ENCOUNTER: ICD-10-CM

## 2023-03-29 DIAGNOSIS — E29.1 MALE HYPOGONADISM: ICD-10-CM

## 2023-03-29 DIAGNOSIS — Z12.5 SCREENING PSA (PROSTATE SPECIFIC ANTIGEN): ICD-10-CM

## 2023-03-29 DIAGNOSIS — F31.30 BIPOLAR AFFECTIVE DISORDER, CURRENT EPISODE DEPRESSED, CURRENT EPISODE SEVERITY UNSPECIFIED: ICD-10-CM

## 2023-03-29 DIAGNOSIS — G89.29 CHRONIC MIDLINE LOW BACK PAIN WITHOUT SCIATICA: ICD-10-CM

## 2023-03-29 DIAGNOSIS — N52.2 DRUG-INDUCED ERECTILE DYSFUNCTION: ICD-10-CM

## 2023-03-29 PROBLEM — S93.402A SPRAIN OF LEFT ANKLE: Status: ACTIVE | Noted: 2023-03-29

## 2023-03-29 PROBLEM — Z90.89 HX OF TONSILLECTOMY: Status: ACTIVE | Noted: 2023-03-29

## 2023-03-29 PROBLEM — N52.9 ERECTILE DYSFUNCTION: Status: ACTIVE | Noted: 2022-01-13

## 2023-03-29 PROBLEM — E11.9 DIABETES MELLITUS: Status: RESOLVED | Noted: 2021-12-15 | Resolved: 2023-03-29

## 2023-03-29 PROBLEM — Z98.890 H/O SHOULDER SURGERY: Status: ACTIVE | Noted: 2023-03-29

## 2023-03-29 LAB
ALBUMIN SERPL-MCNC: 4 G/DL (ref 3.5–5.2)
ALBUMIN/GLOB SERPL: 1.5 G/DL
ALP SERPL-CCNC: 65 U/L (ref 39–117)
ALT SERPL W P-5'-P-CCNC: 22 U/L (ref 1–41)
ANION GAP SERPL CALCULATED.3IONS-SCNC: 10.3 MMOL/L (ref 5–15)
AST SERPL-CCNC: 20 U/L (ref 1–40)
BASOPHILS # BLD AUTO: 0.02 10*3/MM3 (ref 0–0.2)
BASOPHILS NFR BLD AUTO: 0.4 % (ref 0–1.5)
BILIRUB SERPL-MCNC: 0.5 MG/DL (ref 0–1.2)
BUN SERPL-MCNC: 11 MG/DL (ref 6–20)
BUN/CREAT SERPL: 11.3 (ref 7–25)
CALCIUM SPEC-SCNC: 9.3 MG/DL (ref 8.6–10.5)
CHLORIDE SERPL-SCNC: 111 MMOL/L (ref 98–107)
CHOLEST SERPL-MCNC: 115 MG/DL (ref 0–200)
CO2 SERPL-SCNC: 20.7 MMOL/L (ref 22–29)
CREAT SERPL-MCNC: 0.97 MG/DL (ref 0.76–1.27)
DEPRECATED RDW RBC AUTO: 42.2 FL (ref 37–54)
EGFRCR SERPLBLD CKD-EPI 2021: 107 ML/MIN/1.73
EOSINOPHIL # BLD AUTO: 0.13 10*3/MM3 (ref 0–0.4)
EOSINOPHIL NFR BLD AUTO: 2.5 % (ref 0.3–6.2)
ERYTHROCYTE [DISTWIDTH] IN BLOOD BY AUTOMATED COUNT: 13.4 % (ref 12.3–15.4)
EXPIRATION DATE: NORMAL
FSH SERPL-ACNC: 2.7 MIU/ML
GLOBULIN UR ELPH-MCNC: 2.7 GM/DL
GLUCOSE SERPL-MCNC: 102 MG/DL (ref 65–99)
HBA1C MFR BLD: 5.3 %
HCT VFR BLD AUTO: 41.9 % (ref 37.5–51)
HDLC SERPL-MCNC: 30 MG/DL (ref 40–60)
HGB BLD-MCNC: 14.6 G/DL (ref 13–17.7)
IMM GRANULOCYTES # BLD AUTO: 0.01 10*3/MM3 (ref 0–0.05)
IMM GRANULOCYTES NFR BLD AUTO: 0.2 % (ref 0–0.5)
LDLC SERPL CALC-MCNC: 59 MG/DL (ref 0–100)
LDLC/HDLC SERPL: 1.83 {RATIO}
LH SERPL-ACNC: 4.91 MIU/ML
LITHIUM SERPL-SCNC: 0.2 MMOL/L (ref 0.6–1.2)
LYMPHOCYTES # BLD AUTO: 1.39 10*3/MM3 (ref 0.7–3.1)
LYMPHOCYTES NFR BLD AUTO: 26.7 % (ref 19.6–45.3)
Lab: NORMAL
MCH RBC QN AUTO: 30 PG (ref 26.6–33)
MCHC RBC AUTO-ENTMCNC: 34.8 G/DL (ref 31.5–35.7)
MCV RBC AUTO: 86.2 FL (ref 79–97)
MONOCYTES # BLD AUTO: 0.45 10*3/MM3 (ref 0.1–0.9)
MONOCYTES NFR BLD AUTO: 8.7 % (ref 5–12)
NEUTROPHILS NFR BLD AUTO: 3.2 10*3/MM3 (ref 1.7–7)
NEUTROPHILS NFR BLD AUTO: 61.5 % (ref 42.7–76)
NRBC BLD AUTO-RTO: 0 /100 WBC (ref 0–0.2)
PLATELET # BLD AUTO: 235 10*3/MM3 (ref 140–450)
PMV BLD AUTO: 11 FL (ref 6–12)
POTASSIUM SERPL-SCNC: 4.1 MMOL/L (ref 3.5–5.2)
PROT SERPL-MCNC: 6.7 G/DL (ref 6–8.5)
PSA SERPL-MCNC: 0.76 NG/ML (ref 0–4)
RBC # BLD AUTO: 4.86 10*6/MM3 (ref 4.14–5.8)
SODIUM SERPL-SCNC: 142 MMOL/L (ref 136–145)
TESTOST SERPL-MCNC: 332 NG/DL (ref 249–836)
TRIGL SERPL-MCNC: 151 MG/DL (ref 0–150)
TSH SERPL DL<=0.05 MIU/L-ACNC: 1.26 UIU/ML (ref 0.27–4.2)
VLDLC SERPL-MCNC: 26 MG/DL (ref 5–40)
WBC NRBC COR # BLD: 5.2 10*3/MM3 (ref 3.4–10.8)

## 2023-03-29 PROCEDURE — 84403 ASSAY OF TOTAL TESTOSTERONE: CPT | Performed by: STUDENT IN AN ORGANIZED HEALTH CARE EDUCATION/TRAINING PROGRAM

## 2023-03-29 PROCEDURE — G0103 PSA SCREENING: HCPCS | Performed by: STUDENT IN AN ORGANIZED HEALTH CARE EDUCATION/TRAINING PROGRAM

## 2023-03-29 PROCEDURE — 83001 ASSAY OF GONADOTROPIN (FSH): CPT | Performed by: STUDENT IN AN ORGANIZED HEALTH CARE EDUCATION/TRAINING PROGRAM

## 2023-03-29 PROCEDURE — 83002 ASSAY OF GONADOTROPIN (LH): CPT | Performed by: STUDENT IN AN ORGANIZED HEALTH CARE EDUCATION/TRAINING PROGRAM

## 2023-03-29 PROCEDURE — 80061 LIPID PANEL: CPT | Performed by: STUDENT IN AN ORGANIZED HEALTH CARE EDUCATION/TRAINING PROGRAM

## 2023-03-29 PROCEDURE — 80178 ASSAY OF LITHIUM: CPT | Performed by: STUDENT IN AN ORGANIZED HEALTH CARE EDUCATION/TRAINING PROGRAM

## 2023-03-29 PROCEDURE — 80050 GENERAL HEALTH PANEL: CPT | Performed by: STUDENT IN AN ORGANIZED HEALTH CARE EDUCATION/TRAINING PROGRAM

## 2023-03-29 RX ORDER — SILDENAFIL 50 MG/1
50 TABLET, FILM COATED ORAL DAILY PRN
Qty: 21 TABLET | Refills: 3 | Status: SHIPPED | OUTPATIENT
Start: 2023-03-29

## 2023-03-29 NOTE — PROGRESS NOTES
Follow Up Office Visit      Date: 2023   Patient Name: Arslan Butler  : 1990   MRN: [id number]     Chief Complaint:    Chief Complaint   Patient presents with   • Obesity       History of Present Illness: Arslan Butler is a 32 y.o. male who is here today to follow up with obesity.      Weight loss  The patient inquires about weight loss and side of medications. He states that he is taking Ozempic in which is going good.   He notes that he is walking 11 miles per day. He complains that he is still gaining weight instead of losing it.     Bipolar disorder.  He reports that he started taking Lithium in 2023. He states that he started taking a new medication called Zyprexa. The patient states that while taking Lithium he could only take Tylenol for pain. He confirms his pain comes from his feet due to working 12 hour shift. He inquires if Zyprexa helps with anxiety.    Erectile dysfunction  The patient notes that his wife and him are trying to conceive a child. He reports that he is having issues with erection dysfunction. He confirms that it happens when he is lifting weight in which he quit working out. The patient states that he does have testosterone issues. He confirms that he has taken Clomid previously for fertility. He states that he tried Cialis before in which did not work.     Ankle pain   He reports that he hurt his ankle a couple weeks ago due to injury. He confirms that he was taking diclofenac.    Health maintenance  The patient states that he seen his ophthalmologist in 2023. He denies of wanting the COVID-19 and influenza vaccine today.      Subjective      Review of Systems:   Review of Systems   Constitutional: Positive for unexpected weight gain. Negative for activity change, appetite change, fatigue and fever.   Eyes: Negative for blurred vision, photophobia and visual disturbance.   Respiratory: Negative for cough, chest tightness and shortness of breath.    Cardiovascular:  Negative for chest pain and palpitations.   Gastrointestinal: Negative for abdominal distention, abdominal pain, blood in stool, constipation, diarrhea, nausea and vomiting.   Genitourinary: Positive for erectile dysfunction. Negative for dysuria and hematuria.   Musculoskeletal: Negative for arthralgias, back pain and joint swelling.   Skin: Negative for rash and wound.   Neurological: Negative for weakness, headache and confusion.       I have reviewed the patients family history, social history, past medical history, past surgical history and have updated it as appropriate.     Medications:     Current Outpatient Medications:   •  buPROPion XL (WELLBUTRIN XL) 150 MG 24 hr tablet, Take 1 tablet by mouth Daily., Disp: , Rfl:   •  diazePAM (VALIUM) 5 MG tablet, Take 1 tablet by mouth 3 (Three) Times a Day., Disp: , Rfl:   •  fluticasone (Flonase) 50 MCG/ACT nasal spray, 1 spray into the nostril(s) as directed by provider 2 (Two) Times a Day., Disp: 16 g, Rfl: 1  •  lithium 300 MG tablet, Take 1 tablet by mouth 2 (Two) Times a Day., Disp: , Rfl:   •  loratadine (CLARITIN) 10 MG tablet, Take 1 tablet by mouth Daily., Disp: 90 tablet, Rfl: 3  •  OLANZapine (zyPREXA) 5 MG tablet, Take 1 tablet by mouth every night at bedtime., Disp: , Rfl:   •  Semaglutide, 2 MG/DOSE, (Ozempic, 2 MG/DOSE,) 8 MG/3ML solution pen-injector, Inject 2 mg under the skin into the appropriate area as directed Every 7 (Seven) Days., Disp: 3 mL, Rfl: 1    Allergies:   Allergies   Allergen Reactions   • Sulfamethoxazole-Trimethoprim Hives       Objective     Physical Exam: Please see above  Vital Signs: There were no vitals filed for this visit.  There is no height or weight on file to calculate BMI.  Class 3 Severe Obesity (BMI >=40). Obesity-related health conditions include the following: none. Obesity is worsening. BMI is is above average; BMI management plan is completed. We discussed portion control and increasing exercise.       Physical  Exam  Vitals and nursing note reviewed.   Constitutional:       General: He is not in acute distress.     Appearance: Normal appearance. He is obese. He is not ill-appearing or toxic-appearing.   HENT:      Nose: No congestion or rhinorrhea.   Eyes:      General:         Right eye: No discharge.         Left eye: No discharge.      Conjunctiva/sclera: Conjunctivae normal.   Pulmonary:      Effort: Pulmonary effort is normal. No respiratory distress.   Abdominal:      General: Abdomen is flat. There is no distension.   Musculoskeletal:      Cervical back: Normal range of motion.   Skin:     Coloration: Skin is not jaundiced.      Findings: No rash.   Neurological:      General: No focal deficit present.      Mental Status: He is alert. Mental status is at baseline.      Coordination: Coordination normal.      Gait: Gait normal.   Psychiatric:         Mood and Affect: Mood normal.         Behavior: Behavior normal.         Thought Content: Thought content normal.         Judgment: Judgment normal.         Procedures    Results:   Labs:   Hemoglobin A1C   Date Value Ref Range Status   08/05/2022 5.1 % Final   06/13/2022 5.60 4.80 - 5.60 % Final     TSH   Date Value Ref Range Status   06/13/2022 0.564 0.270 - 4.200 uIU/mL Final        Imaging:   No valid procedures specified.     Assessment / Plan      Assessment/Plan:   Problem List Items Addressed This Visit        Endocrine and Metabolic    Class 3 severe obesity due to excess calories with serious comorbidity and body mass index (BMI) of 40.0 to 44.9 in adult (HCC) - Primary    Male hypogonadism    Relevant Orders    POC Glycosylated Hemoglobin (Hb A1C)    PSA Screen    Testosterone    FSH & LH    Lipid Panel    Comprehensive Metabolic Panel    CBC & Differential    TSH       Genitourinary and Reproductive     Erectile dysfunction    Relevant Medications    sildenafil (Viagra) 50 MG tablet       Mental Health    Bipolar disorder (HCC)       Musculoskeletal and  Injuries    Chronic midline low back pain without sciatica    Sprain of left ankle   Other Visit Diagnoses     Screening PSA (prostate specific antigen)        Relevant Orders    PSA Screen        1.Class 3 severe obesity due to excess calories with serious comorbidity and body mass index (BMI) of 40.0 to 44.9 in adult  - The patient will continue on current medication regimen and diet.  - He was recommended to try water jogging.     2. Male hypogonadism  - Blood work ordered.    3. Erectile dysfunction  - Discussed in great detail about precautions and treatment.  - sildenafil (Viagra) 50 MG tablet sent to pharmacy.     6. Bipolar disorder  - The patient will continue on his current medication regimen.     7. Sprain of left ankle  - The patient was recommended to try topical gels such as Voltaren gel or Biofreeze.    8. Encounter for prostate specific antigen screening      - PSA Screen ordered.     9. Health maintenance  - He denies of getting the COVID-19 and influenza vaccine today.    - The patient will follow up in 3 months.    Follow Up:   Return in about 3 months (around 6/29/2023) for Recheck.          Reji Rader MD  [facility] [place]    Transcribed from ambient dictation for Reji Rader MD by Gilda Garza.  03/29/23   09:48 EDT    Patient or patient representative verbalized consent to the visit recording.  I have personally performed the services described in this document as transcribed by the above individual, and it is both accurate and complete.

## 2023-03-29 NOTE — PATIENT INSTRUCTIONS
"DASH Eating Plan  DASH stands for Dietary Approaches to Stop Hypertension. The DASH eating plan is a healthy eating plan that has been shown to:  Reduce high blood pressure (hypertension).  Reduce your risk for type 2 diabetes, heart disease, and stroke.  Help with weight loss.  What are tips for following this plan?  Reading food labels  Check food labels for the amount of salt (sodium) per serving. Choose foods with less than 5 percent of the Daily Value of sodium. Generally, foods with less than 300 milligrams (mg) of sodium per serving fit into this eating plan.  To find whole grains, look for the word \"whole\" as the first word in the ingredient list.  Shopping  Buy products labeled as \"low-sodium\" or \"no salt added.\"  Buy fresh foods. Avoid canned foods and pre-made or frozen meals.  Cooking  Avoid adding salt when cooking. Use salt-free seasonings or herbs instead of table salt or sea salt. Check with your health care provider or pharmacist before using salt substitutes.  Do not olmedo foods. Cook foods using healthy methods such as baking, boiling, grilling, roasting, and broiling instead.  Cook with heart-healthy oils, such as olive, canola, avocado, soybean, or sunflower oil.  Meal planning  Eat a balanced diet that includes:  4 or more servings of fruits and 4 or more servings of vegetables each day. Try to fill one-half of your plate with fruits and vegetables.  6-8 servings of whole grains each day.  Less than 6 oz (170 g) of lean meat, poultry, or fish each day. A 3-oz (85-g) serving of meat is about the same size as a deck of cards. One egg equals 1 oz (28 g).  2-3 servings of low-fat dairy each day. One serving is 1 cup (237 mL).  1 serving of nuts, seeds, or beans 5 times each week.  2-3 servings of heart-healthy fats. Healthy fats called omega-3 fatty acids are found in foods such as walnuts, flaxseeds, fortified milks, and eggs. These fats are also found in cold-water fish, such as sardines, salmon, " and mackerel.  Limit how much you eat of:  Canned or prepackaged foods.  Food that is high in trans fat, such as some fried foods.  Food that is high in saturated fat, such as fatty meat.  Desserts and other sweets, sugary drinks, and other foods with added sugar.  Full-fat dairy products.  Do not salt foods before eating.  Do not eat more than 4 egg yolks a week.  Try to eat at least 2 vegetarian meals a week.  Eat more home-cooked food and less restaurant, buffet, and fast food.  Lifestyle  When eating at a restaurant, ask that your food be prepared with less salt or no salt, if possible.  If you drink alcohol:  Limit how much you use to:  0-1 drink a day for women who are not pregnant.  0-2 drinks a day for men.  Be aware of how much alcohol is in your drink. In the [place] one drink equals one 12 oz bottle of beer (355 mL), one 5 oz glass of wine (148 mL), or one 1½ oz glass of hard liquor (44 mL).  General information  Avoid eating more than 2,300 mg of salt a day. If you have hypertension, you may need to reduce your sodium intake to 1,500 mg a day.  Work with your health care provider to maintain a healthy body weight or to lose weight. Ask what an ideal weight is for you.  Get at least 30 minutes of exercise that causes your heart to beat faster (aerobic exercise) most days of the week. Activities may include walking, swimming, or biking.  Work with your health care provider or dietitian to adjust your eating plan to your individual calorie needs.  What foods should I eat?  Fruits  All fresh, dried, or frozen fruit. Canned fruit in natural juice (without added sugar).  Vegetables  Fresh or frozen vegetables (raw, steamed, roasted, or grilled). Low-sodium or reduced-sodium tomato and vegetable juice. Low-sodium or reduced-sodium tomato sauce and tomato paste. Low-sodium or reduced-sodium canned vegetables.  Grains  Whole-grain or whole-wheat bread. Whole-grain or whole-wheat pasta. Brown rice. Oatmeal. Quinoa.  Bulgur. Whole-grain and low-sodium cereals. Kristie bread. Low-fat, low-sodium crackers. Whole-wheat flour tortillas.  Meats and other proteins  Skinless chicken or turkey. Ground chicken or turkey. Pork with fat trimmed off. Fish and seafood. Egg whites. Dried beans, peas, or lentils. Unsalted nuts, nut butters, and seeds. Unsalted canned beans. Lean cuts of beef with fat trimmed off. Low-sodium, lean precooked or cured meat, such as sausages or meat loaves.  Dairy  Low-fat (1%) or fat-free (skim) milk. Reduced-fat, low-fat, or fat-free cheeses. Nonfat, low-sodium ricotta or cottage cheese. Low-fat or nonfat yogurt. Low-fat, low-sodium cheese.  Fats and oils  Soft margarine without trans fats. Vegetable oil. Reduced-fat, low-fat, or light mayonnaise and salad dressings (reduced-sodium). Canola, safflower, olive, avocado, soybean, and sunflower oils. Avocado.  Seasonings and condiments  Herbs. Spices. Seasoning mixes without salt.  Other foods  Unsalted popcorn and pretzels. Fat-free sweets.  The items listed above may not be a complete list of foods and beverages you can eat. Contact a dietitian for more information.  What foods should I avoid?  Fruits  Canned fruit in a light or heavy syrup. Fried fruit. Fruit in cream or butter sauce.  Vegetables  Creamed or fried vegetables. Vegetables in a cheese sauce. Regular canned vegetables (not low-sodium or reduced-sodium). Regular canned tomato sauce and paste (not low-sodium or reduced-sodium). Regular tomato and vegetable juice (not low-sodium or reduced-sodium). Pickles. Olives.  Grains  Baked goods made with fat, such as croissants, muffins, or some breads. Dry pasta or rice meal packs.  Meats and other proteins  Fatty cuts of meat. Ribs. Fried meat. Zaragoza. Bologna, salami, and other precooked or cured meats, such as sausages or meat loaves. Fat from the back of a pig (fatback). Bratwurst. Salted nuts and seeds. Canned beans with added salt. Canned or smoked fish.  Whole eggs or egg yolks. Chicken or turkey with skin.  Dairy  Whole or 2% milk, cream, and half-and-half. Whole or full-fat cream cheese. Whole-fat or sweetened yogurt. Full-fat cheese. Nondairy creamers. Whipped toppings. Processed cheese and cheese spreads.  Fats and oils  Butter. Stick margarine. Lard. Shortening. Ghee. Zaragoza fat. Tropical oils, such as coconut, palm kernel, or palm oil.  Seasonings and condiments  Onion salt, garlic salt, seasoned salt, table salt, and sea salt. Worcestershire sauce. Tartar sauce. Barbecue sauce. Teriyaki sauce. Soy sauce, including reduced-sodium. Steak sauce. Canned and packaged gravies. Fish sauce. Oyster sauce. Cocktail sauce. Store-bought horseradish. Ketchup. Mustard. Meat flavorings and tenderizers. Bouillon cubes. Hot sauces. Pre-made or packaged marinades. Pre-made or packaged taco seasonings. Relishes. Regular salad dressings.  Other foods  Salted popcorn and pretzels.  The items listed above may not be a complete list of foods and beverages you should avoid. Contact a dietitian for more information.  Where to find more information  National Heart, Lung, and Blood [place]: www.nhlbi.nih.gov  American Heart Association: www.heart.org  Academy of Nutrition and Dietetics: www.eatright.org  National Kidney Foundation: www.kidney.org  Summary  The DASH eating plan is a healthy eating plan that has been shown to reduce high blood pressure (hypertension). It may also reduce your risk for type 2 diabetes, heart disease, and stroke.  When on the DASH eating plan, aim to eat more fresh fruits and vegetables, whole grains, lean proteins, low-fat dairy, and heart-healthy fats.  With the DASH eating plan, you should limit salt (sodium) intake to 2,300 mg a day. If you have hypertension, you may need to reduce your sodium intake to 1,500 mg a day.  Work with your health care provider or dietitian to adjust your eating plan to your individual calorie needs.  This information is not  intended to replace advice given to you by your health care provider. Make sure you discuss any questions you have with your health care provider.  Document Revised: 11/20/2020 Document Reviewed: 11/20/2020  Elsevier Patient Education © 2022 Elsevier Inc.

## 2023-04-17 ENCOUNTER — OFFICE VISIT (OUTPATIENT)
Dept: INTERNAL MEDICINE | Facility: CLINIC | Age: 33
End: 2023-04-17
Payer: COMMERCIAL

## 2023-04-17 VITALS
DIASTOLIC BLOOD PRESSURE: 84 MMHG | HEART RATE: 88 BPM | TEMPERATURE: 98.7 F | BODY MASS INDEX: 42.6 KG/M2 | SYSTOLIC BLOOD PRESSURE: 124 MMHG | WEIGHT: 315 LBS | RESPIRATION RATE: 18 BRPM

## 2023-04-17 DIAGNOSIS — M79.672 CHRONIC PAIN IN LEFT FOOT: ICD-10-CM

## 2023-04-17 DIAGNOSIS — N52.2 DRUG-INDUCED ERECTILE DYSFUNCTION: ICD-10-CM

## 2023-04-17 DIAGNOSIS — M25.572 CHRONIC PAIN OF LEFT ANKLE: ICD-10-CM

## 2023-04-17 DIAGNOSIS — G89.29 CHRONIC PAIN OF LEFT ANKLE: ICD-10-CM

## 2023-04-17 DIAGNOSIS — F31.30 BIPOLAR AFFECTIVE DISORDER, CURRENT EPISODE DEPRESSED, CURRENT EPISODE SEVERITY UNSPECIFIED: Primary | ICD-10-CM

## 2023-04-17 DIAGNOSIS — G89.29 CHRONIC PAIN IN LEFT FOOT: ICD-10-CM

## 2023-04-17 PROBLEM — Z98.890 H/O SHOULDER SURGERY: Status: RESOLVED | Noted: 2023-03-29 | Resolved: 2023-04-17

## 2023-04-17 PROBLEM — S46.002D ROTATOR CUFF INJURY, LEFT, SUBSEQUENT ENCOUNTER: Status: RESOLVED | Noted: 2022-02-24 | Resolved: 2023-04-17

## 2023-04-17 PROBLEM — E29.1 MALE HYPOGONADISM: Status: RESOLVED | Noted: 2022-05-11 | Resolved: 2023-04-17

## 2023-04-17 PROBLEM — Z90.89 HX OF TONSILLECTOMY: Status: RESOLVED | Noted: 2023-03-29 | Resolved: 2023-04-17

## 2023-04-17 RX ORDER — SILDENAFIL 100 MG/1
100 TABLET, FILM COATED ORAL DAILY PRN
Qty: 30 TABLET | Refills: 1 | Status: SHIPPED | OUTPATIENT
Start: 2023-04-17

## 2023-04-17 NOTE — ASSESSMENT & PLAN NOTE
- An MRI of the left ankle has been ordered for the patient.  - Encouraged patient to apply ice to the affected area, continue diclofenac gel, and continue wearing brace.  - Patient received referrals to orthopedic surgery and physical therapy.

## 2023-04-17 NOTE — PROGRESS NOTES
"    Acute Office Visit      Date: 2023   Patient Name: Arslan Butler  : 1990   MRN: [id number]     Chief Complaint:    Chief Complaint   Patient presents with   • Ankle Pain     Lt       History of Present Illness: Arslan Butler is a 32 y.o. male who presents to the clinic today for evaluation of left ankle pain and swelling.    Chronic pain of left ankle  Mr. Butler has been experiencing some left ankle pain and swelling. He presented to the office a couple of weeks ago and had an x-ray obtained. He was prescribed diclofenac gel to apply to the affected area, and he was encouraged to obtain a brace. His pain did subside briefly, but he now describes his pain as \"unbearable.\" The patient experiences the most pain on the lateral aspect below the bony prominence as well as the medial aspect of his ankle. He reports that his pain has been keeping him awake at night. He has experienced some pain in his calf, but his pain is primarily located in his ankle. He does feel that his pain has worsened since his last visit. He describes his pain as an 8 out of 10, and he reports that his pain was previously at a 5 or 6 out of 10. The patient noticed his pain worsening while he was working on 2023, and he was having difficulty ambulating. He describes his pain as dull and constant if he is at rest, and his pain is \"stabbing\" if he is ambulating while experiencing edema. He denies noticing erythema accompanied by edema. The patient does not recall any specific injury to the area. He reports that when he is ambulating, it feels as if he is walking on his ankle rather than his foot. He is typically on his feet for approximately 12 hours daily while working, and he has noticed that he will typically experience edema after being on his feet for a prolonged period of time. The patient notes that he has to wear steel toe shoes at work. He has tried several different insoles or inserts, but these did not benefit " "him.    Bipolar affective disorder  The patient has been unable to reach his therapist, and he believes that she is no longer with the practice. He has an upcoming appointment scheduled with a new therapist in 05/2023. He has not communicated with his psychiatrist in quite some time due to insurance issues, and he is interested in obtaining a new physician. He continues to have \"bad\" days where he is depressed, but he denies experiencing much jaimee. He has previously discontinued lithium in the past due to side effects.     Drug-induced erectile dysfunction  Mr. Butler does not feel that Viagra 50 mg has worked for his erectile dysfunction.        Subjective      Review of Systems:   Review of Systems   Constitutional: Negative for activity change, appetite change, fatigue and fever.   Eyes: Negative for blurred vision, photophobia and visual disturbance.   Respiratory: Negative for cough, chest tightness and shortness of breath.    Cardiovascular: Negative for chest pain and palpitations.   Gastrointestinal: Negative for abdominal distention, abdominal pain, blood in stool, constipation, diarrhea, nausea and vomiting.   Genitourinary: Positive for erectile dysfunction. Negative for dysuria and hematuria.   Musculoskeletal: Negative for arthralgias, back pain and joint swelling.   Skin: Negative for rash and wound.   Neurological: Negative for weakness, headache and confusion.   Psychiatric/Behavioral: Positive for depressed mood.       I have reviewed the patients family history, social history, past medical history, past surgical history and have updated it as appropriate.     Medications:     Current Outpatient Medications:   •  buPROPion XL (WELLBUTRIN XL) 150 MG 24 hr tablet, Take 1 tablet by mouth Daily., Disp: , Rfl:   •  diazePAM (VALIUM) 5 MG tablet, Take 1 tablet by mouth 3 (Three) Times a Day., Disp: , Rfl:   •  fluticasone (Flonase) 50 MCG/ACT nasal spray, 1 spray into the nostril(s) as directed by " provider 2 (Two) Times a Day., Disp: 16 g, Rfl: 1  •  lithium 300 MG tablet, Take 1 tablet by mouth 2 (Two) Times a Day., Disp: , Rfl:   •  loratadine (CLARITIN) 10 MG tablet, Take 1 tablet by mouth Daily., Disp: 90 tablet, Rfl: 3  •  OLANZapine (zyPREXA) 5 MG tablet, Take 1 tablet by mouth every night at bedtime., Disp: , Rfl:   •  Semaglutide, 2 MG/DOSE, (Ozempic, 2 MG/DOSE,) 8 MG/3ML solution pen-injector, Inject 2 mg under the skin into the appropriate area as directed Every 7 (Seven) Days., Disp: 3 mL, Rfl: 1  •  sildenafil (VIAGRA) 100 MG tablet, Take 1 tablet by mouth Daily As Needed for Erectile Dysfunction., Disp: 30 tablet, Rfl: 1    Allergies:   Allergies   Allergen Reactions   • Sulfamethoxazole-Trimethoprim Hives       Objective     Physical Exam: Please see above  Vital Signs:   Vitals:    04/17/23 1426   BP: 124/84   BP Location: Right arm   Patient Position: Sitting   Cuff Size: Large Adult   Pulse: 88   Resp: 18   Temp: 98.7 °F (37.1 °C)   Weight: (!) 159 kg (350 lb)   PainSc:   6     Body mass index is 42.6 kg/m².    Physical Exam  Vitals and nursing note reviewed.   Constitutional:       General: He is not in acute distress.     Appearance: Normal appearance. He is obese. He is not ill-appearing or toxic-appearing.   HENT:      Nose: No congestion or rhinorrhea.   Eyes:      General:         Right eye: No discharge.         Left eye: No discharge.      Conjunctiva/sclera: Conjunctivae normal.   Pulmonary:      Effort: Pulmonary effort is normal. No respiratory distress.   Abdominal:      General: Abdomen is flat. There is no distension.   Musculoskeletal:      Cervical back: Normal range of motion.   Skin:     Coloration: Skin is not jaundiced.      Findings: No rash.   Neurological:      General: No focal deficit present.      Mental Status: He is alert. Mental status is at baseline.      Coordination: Coordination normal.      Gait: Gait normal.   Psychiatric:         Mood and Affect: Mood  normal.         Behavior: Behavior normal.         Thought Content: Thought content normal.         Judgment: Judgment normal.         Procedures    Results:   Labs:   Hemoglobin A1C   Date Value Ref Range Status   03/29/2023 5.3 % Final   06/13/2022 5.60 4.80 - 5.60 % Final     TSH   Date Value Ref Range Status   03/29/2023 1.260 0.270 - 4.200 uIU/mL Final        Imaging:   No valid procedures specified.     Assessment / Plan      Assessment/Plan:   Problem List Items Addressed This Visit        Genitourinary and Reproductive     Erectile dysfunction    Current Assessment & Plan     - Patient's Viagra dosage has been increased to 100 mg.          Relevant Medications    sildenafil (VIAGRA) 100 MG tablet       Mental Health    Bipolar disorder - Primary    Current Assessment & Plan     - Patient has been referred to behavioral health.         Relevant Orders    Ambulatory Referral to Behavioral Health       Musculoskeletal and Injuries    Chronic pain of left ankle    Current Assessment & Plan     - An MRI of the left ankle has been ordered for the patient.  - Encouraged patient to apply ice to the affected area, continue diclofenac gel, and continue wearing brace.  - Patient received referrals to orthopedic surgery and physical therapy.         Relevant Orders    MRI ankle left w contrast    MRI foot left w contrast    Ambulatory Referral to Orthopedic Surgery (Completed)    Ambulatory Referral to Physical Therapy Evaluate and treat    Chronic pain in left foot    Current Assessment & Plan     - An MRI of the left foot has been ordered.  - Encouraged patient to apply ice to the affected area, continue diclofenac gel, and continue wearing brace.  - Patient received referrals to orthopedic surgery and physical therapy.         Relevant Orders    MRI ankle left w contrast    MRI foot left w contrast    Ambulatory Referral to Orthopedic Surgery (Completed)    Ambulatory Referral to Physical Therapy Evaluate and treat            Follow Up:   Return in about 4 weeks (around 5/15/2023) for Recheck.            Reji Rader MD  [facility]    Transcribed from ambient dictation for Reji Rader MD by Charissa Hitchcock.  04/17/23   16:38 EDT    Patient or patient representative verbalized consent to the visit recording.  I have personally performed the services described in this document as transcribed by the above individual, and it is both accurate and complete.

## 2023-04-17 NOTE — ASSESSMENT & PLAN NOTE
- An MRI of the left foot has been ordered.  - Encouraged patient to apply ice to the affected area, continue diclofenac gel, and continue wearing brace.  - Patient received referrals to orthopedic surgery and physical therapy.

## 2023-04-18 ENCOUNTER — DOCUMENTATION (OUTPATIENT)
Dept: INTERNAL MEDICINE | Facility: CLINIC | Age: 33
End: 2023-04-18
Payer: COMMERCIAL

## 2023-04-18 ENCOUNTER — TELEPHONE (OUTPATIENT)
Dept: INTERNAL MEDICINE | Facility: CLINIC | Age: 33
End: 2023-04-18
Payer: COMMERCIAL

## 2023-04-18 NOTE — TELEPHONE ENCOUNTER
Caller: Arslan Butler    Relationship: Self    Best call back number: [phone]    What form or medical record are you requesting: NOTE WITH PATIENTS WORK RESTRICTIONS AND HOURS ABLE TO WORK    Who is requesting this form or medical record from you: SELF    How would you like to receive the form or medical records (pick-up, mail, fax):   If pick-up, provide patient with address and location details    Timeframe paperwork needed: ASAP    Additional notes:

## 2023-04-19 ENCOUNTER — TELEPHONE (OUTPATIENT)
Dept: INTERNAL MEDICINE | Facility: CLINIC | Age: 33
End: 2023-04-19
Payer: COMMERCIAL

## 2023-04-19 NOTE — TELEPHONE ENCOUNTER
Caller: JOSEPH    Relationship: [employer]    Best call back number: [phone]    What is the best time to reach you: ANY    Who are you requesting to speak with (clinical staff, provider,  specific staff member): DR. LANDON OR CLINICAL TEAM    Do you know the name of the person who called: SELF    What was the call regarding:PATIENT'S HR WANTED TO GET SOME CLARIFICATION ON RESTRICTIONS PATIENT WAS RELEASED WITH ON 4/18/2023.     Do you require a callback: YES

## 2023-04-19 NOTE — LETTER
April 20, 2023       No Recipients    Patient: Arslan Butler   YOB: 1990   Date of Visit: 4/19/2023     To whom it may concern:    Please allow Arslan to only stand for 15 minutes at a time (otherwise should be sitting) while he is receiving diagnostic work-up through our clinic to evaluate his chronic lower extremity pain.  He can work in a seated position for extended periods of time.  Limit lifting that specifically places more weight on his left lower extremity to less than 10 pounds.  Please let me know if additional details are needed regarding his work and I am happy to help.       Sincerely,        Reji Rader MD

## 2023-04-19 NOTE — TELEPHONE ENCOUNTER
Let me know what clarification is required.  I sent the work form and then an additional letter.  Thanks.

## 2023-04-20 NOTE — TELEPHONE ENCOUNTER
Reached Pt. Pt stated HR is requesting exact time he is able to stand verses sitting; exact weight by pound he is able to or not able to lift.

## 2023-04-21 NOTE — TELEPHONE ENCOUNTER
Caller: Arslan Butler    Relationship: Self    Best call back number: [phone]    Additional notes: PATIENT STATES HE RECEIVED A CALL EARLIER TODAY STATING THE RESTRICTIONS LETTER THAT PCP WROTE FOR HIM WAS AVAILABLE IN [employer]. PATIENT STATES HE HAS CHECKED MYCHART AND A LETTER IS NOT AVAILABLE FOR HIM TO REVIEW.

## 2023-04-21 NOTE — TELEPHONE ENCOUNTER
Pt notified of updated letter under letters in chart that he can email to his HR or print out and give to them. Pt verbalized good understanding.

## 2023-04-22 NOTE — TELEPHONE ENCOUNTER
Correct, has been sent to Lucrecia.  I will copy the letter that was sent into a [employer] message.

## 2023-04-24 ENCOUNTER — TELEPHONE (OUTPATIENT)
Dept: INTERNAL MEDICINE | Facility: CLINIC | Age: 33
End: 2023-04-24
Payer: COMMERCIAL

## 2023-04-24 NOTE — TELEPHONE ENCOUNTER
Caller: JOSEPH FROM [facility]    Relationship: Other    Best call back number: [phone]    What is the best time to reach you: ANYTIME     What was the call regarding: JOSEPH FROM [facility] WHICH IS THE PATIENTS EMPLOYER IS CALLING AND WOULD LIKE A CALL BACK FROM DR. LANDON TO CLARIFY A FEW THINGS IN THE LETTER THEY WERE SENT FROM HIM. THEY WOULD LIKE A CALL BACK BY THE END OF THE DAY IF POSSIBLE BECAUSE THE PATIENT CANNOT WORK UNTIL THEY CAN CLARIFY     Do you require a callback: YES

## 2023-04-27 NOTE — TELEPHONE ENCOUNTER
[facility] for [facility] to call back Dr. Rader on [phone] for clarification on letter.

## 2023-05-17 ENCOUNTER — OFFICE VISIT (OUTPATIENT)
Dept: INTERNAL MEDICINE | Facility: CLINIC | Age: 33
End: 2023-05-17
Payer: COMMERCIAL

## 2023-05-17 VITALS
BODY MASS INDEX: 42.12 KG/M2 | TEMPERATURE: 97.8 F | HEART RATE: 88 BPM | RESPIRATION RATE: 18 BRPM | SYSTOLIC BLOOD PRESSURE: 140 MMHG | WEIGHT: 315 LBS | DIASTOLIC BLOOD PRESSURE: 78 MMHG

## 2023-05-17 DIAGNOSIS — G89.29 CHRONIC PAIN OF LEFT ANKLE: ICD-10-CM

## 2023-05-17 DIAGNOSIS — F31.30 BIPOLAR AFFECTIVE DISORDER, CURRENT EPISODE DEPRESSED, CURRENT EPISODE SEVERITY UNSPECIFIED: Primary | ICD-10-CM

## 2023-05-17 DIAGNOSIS — G89.29 CHRONIC PAIN IN LEFT FOOT: ICD-10-CM

## 2023-05-17 DIAGNOSIS — M79.672 CHRONIC PAIN IN LEFT FOOT: ICD-10-CM

## 2023-05-17 DIAGNOSIS — M25.572 CHRONIC PAIN OF LEFT ANKLE: ICD-10-CM

## 2023-05-17 RX ORDER — SERTRALINE HYDROCHLORIDE 25 MG/1
TABLET, FILM COATED ORAL
COMMUNITY
Start: 2023-05-16

## 2023-05-17 NOTE — ASSESSMENT & PLAN NOTE
- Patient provided with behavioral health referral for counseling.  - GeneSight testing will be obtained.  - Patient will continue Zoloft 25 mg.

## 2023-05-17 NOTE — PROGRESS NOTES
Follow Up Office Visit      Date: 2023   Patient Name: Arslan Butler  : 1990   MRN: [id number]     Chief Complaint:    Chief Complaint   Patient presents with   • Manic Behavior     fu       History of Present Illness: Arslan Butler is a 32 y.o. male who presents to the clinic today for a follow-up visit to discuss bipolar disorder, chronic left foot pain, and chronic left ankle pain.    Chronic pain of left ankle, Chronic pain in left foot  Mr. Butler does feel that his left foot pain and left ankle pain have been improving. He has noticed that this is bothersome when it rains, and this causes him to feel a bit stiff. He has an MRI scheduled for 2023. The patient was referred to a podiatrist, but this provider is leaving the practice. He was scheduled with a different provider for 07/15/2023. The patient does wear a compression sleeve. He has inquired about returning to work. He wishes to hold off on physical therapy at this time.    Bipolar affective disorder, current episode depressed  Mr. Butler has been diagnosed with bipolar, anxiety, and depression. The patient began taking Zoloft 25 mg on 2023. He was initially prescribed Zyprexa, but this would have cost approximately $200 monthly, so this was not ideal. He is also prescribed Valium 5 mg, which he takes as needed. He has discontinued Wellbutrin on his own. He is under the care of [facility] in [place], but he has been having some difficulty finding a therapist. He would like to proceed forward with having GeneSight testing performed.           Subjective      Review of Systems:   Review of Systems   Constitutional: Negative for activity change, appetite change, fatigue and fever.   Eyes: Negative for blurred vision, photophobia and visual disturbance.   Respiratory: Negative for cough, chest tightness and shortness of breath.    Cardiovascular: Negative for chest pain and palpitations.   Gastrointestinal:  Negative for abdominal distention, abdominal pain, blood in stool, constipation, diarrhea, nausea and vomiting.   Genitourinary: Negative for dysuria and hematuria.   Musculoskeletal: Positive for arthralgias. Negative for back pain and joint swelling.   Skin: Negative for rash and wound.   Neurological: Negative for weakness, headache and confusion.       I have reviewed the patients family history, social history, past medical history, past surgical history and have updated it as appropriate.     Medications:     Current Outpatient Medications:   •  diazePAM (VALIUM) 5 MG tablet, Take 1 tablet by mouth 3 (Three) Times a Day., Disp: , Rfl:   •  fluticasone (Flonase) 50 MCG/ACT nasal spray, 1 spray into the nostril(s) as directed by provider 2 (Two) Times a Day., Disp: 16 g, Rfl: 1  •  lithium 300 MG tablet, Take 1 tablet by mouth 2 (Two) Times a Day., Disp: , Rfl:   •  loratadine (CLARITIN) 10 MG tablet, Take 1 tablet by mouth Daily., Disp: 90 tablet, Rfl: 3  •  Semaglutide, 2 MG/DOSE, (Ozempic, 2 MG/DOSE,) 8 MG/3ML solution pen-injector, Inject 2 mg under the skin into the appropriate area as directed Every 7 (Seven) Days., Disp: 3 mL, Rfl: 1  •  sertraline (ZOLOFT) 25 MG tablet, , Disp: , Rfl:   •  sildenafil (VIAGRA) 100 MG tablet, Take 1 tablet by mouth Daily As Needed for Erectile Dysfunction., Disp: 30 tablet, Rfl: 1    Allergies:   Allergies   Allergen Reactions   • Sulfamethoxazole-Trimethoprim Hives       Objective     Physical Exam: Please see above  Vital Signs:   Vitals:    05/17/23 1137   BP: 140/78   BP Location: Right arm   Patient Position: Sitting   Cuff Size: Large Adult   Pulse: 88   Resp: 18   Temp: 97.8 °F (36.6 °C)   TempSrc: Temporal   Weight: (!) 157 kg (346 lb)   PainSc: 0-No pain     Body mass index is 42.12 kg/m².       Physical Exam  Vitals and nursing note reviewed.   Constitutional:       General: He is not in acute distress.     Appearance: Normal appearance. He is normal weight. He  is not ill-appearing or toxic-appearing.   HENT:      Nose: No congestion or rhinorrhea.   Eyes:      General:         Right eye: No discharge.         Left eye: No discharge.      Conjunctiva/sclera: Conjunctivae normal.   Pulmonary:      Effort: Pulmonary effort is normal. No respiratory distress.   Abdominal:      General: Abdomen is flat. There is no distension.   Musculoskeletal:      Cervical back: Normal range of motion.   Skin:     Coloration: Skin is not jaundiced.      Findings: No rash.   Neurological:      General: No focal deficit present.      Mental Status: He is alert. Mental status is at baseline.      Coordination: Coordination normal.      Gait: Gait normal.   Psychiatric:         Mood and Affect: Mood normal.         Behavior: Behavior normal.         Thought Content: Thought content normal.         Judgment: Judgment normal.         Procedures    Results:   Labs:   Hemoglobin A1C   Date Value Ref Range Status   03/29/2023 5.3 % Final   06/13/2022 5.60 4.80 - 5.60 % Final     TSH   Date Value Ref Range Status   03/29/2023 1.260 0.270 - 4.200 uIU/mL Final        Imaging:   No valid procedures specified.     Assessment / Plan      Assessment/Plan:   Problem List Items Addressed This Visit        Mental Health    Bipolar disorder - Primary    Overview     Previously failed lithium         Current Assessment & Plan     - Patient provided with behavioral health referral for counseling.  - GeneSight testing will be obtained.  - Patient will continue Zoloft 25 mg.         Relevant Medications    sertraline (ZOLOFT) 25 MG tablet    Other Relevant Orders    GeneSight - Swab,    Ambulatory Referral to Behavioral Health       Musculoskeletal and Injuries    Chronic pain of left ankle    Current Assessment & Plan     - Patient may return to work and was advised to monitor his symptoms.         Chronic pain in left foot    Current Assessment & Plan     - Patient may return to work and was advised to monitor  his symptoms.              Follow Up:   Return in about 7 weeks (around 7/6/2023) for Next scheduled follow up.          Reji Rader MD  [facility] [place]    Transcribed from ambient dictation for Reji Rader MD by Charissa Hitchcock.  05/17/23   13:53 EDT    Patient or patient representative verbalized consent to the visit recording.  I have personally performed the services described in this document as transcribed by the above individual, and it is both accurate and complete.

## 2023-05-22 ENCOUNTER — TELEPHONE (OUTPATIENT)
Dept: INTERNAL MEDICINE | Facility: CLINIC | Age: 33
End: 2023-05-22
Payer: COMMERCIAL

## 2023-05-22 NOTE — TELEPHONE ENCOUNTER
Left voice mail message for Pt to call back for message. Office number given.    Hub please let Pt know we have results from GeneSight testing and he can  copy to take to appointment.     Copy placed up front to be scanned into chart and original placed in Patient  for Pt.

## 2023-07-26 ENCOUNTER — OFFICE VISIT (OUTPATIENT)
Dept: FAMILY MEDICINE CLINIC | Facility: CLINIC | Age: 33
End: 2023-07-26
Payer: COMMERCIAL

## 2023-07-26 VITALS
WEIGHT: 315 LBS | HEART RATE: 113 BPM | DIASTOLIC BLOOD PRESSURE: 84 MMHG | SYSTOLIC BLOOD PRESSURE: 124 MMHG | HEIGHT: 74 IN | OXYGEN SATURATION: 99 % | BODY MASS INDEX: 40.43 KG/M2

## 2023-07-26 DIAGNOSIS — E66.01 CLASS 3 SEVERE OBESITY DUE TO EXCESS CALORIES WITH SERIOUS COMORBIDITY AND BODY MASS INDEX (BMI) OF 40.0 TO 44.9 IN ADULT: ICD-10-CM

## 2023-07-26 DIAGNOSIS — F32.2 SEVERE DEPRESSION: ICD-10-CM

## 2023-07-26 DIAGNOSIS — E11.9 TYPE 2 DIABETES MELLITUS WITHOUT COMPLICATION, WITHOUT LONG-TERM CURRENT USE OF INSULIN: ICD-10-CM

## 2023-07-26 DIAGNOSIS — F40.10 SOCIAL ANXIETY DISORDER: ICD-10-CM

## 2023-07-26 DIAGNOSIS — F31.30 BIPOLAR AFFECTIVE DISORDER, CURRENT EPISODE DEPRESSED, CURRENT EPISODE SEVERITY UNSPECIFIED: Primary | ICD-10-CM

## 2023-07-26 RX ORDER — SEMAGLUTIDE 0.68 MG/ML
INJECTION, SOLUTION SUBCUTANEOUS
Qty: 9 ML | Refills: 0 | Status: SHIPPED | OUTPATIENT
Start: 2023-07-26 | End: 2023-10-18

## 2023-07-26 NOTE — PROGRESS NOTES
Chief Complaint  Anxiety (Having panic attacks ) and Depression    Subjective            Arslan Butler presents to [facility]  History of Present Illness    Arslan presents to the office today to re-establish care. Previously seen here a year ago, but moved to [place] x1 year.     He has been seeing Ekta Benson with [facility] - last seen on 06/16/2023 - has been seeing via telehealth. Recently they started a new medication plan and 'it just didn't work'. He states that at first it was working okay, but then he started to have intrusive thoughts. He stated having the thoughts about two weeks ago - he was having thoughts like he would be better off if he wasn't here. He also had recurrence of panic attacks. Those were seemingly well-controlled and now they are not.     He was most recently prescribed Zoloft and Wellbutrin, and Valium. He has taken Valium for a few years now. The Zoloft he took 1-2 months. Wellbutrin he has taken on and off over the years with different medications. He has taken Prozac, lithium, Abilify, Effexor, Vraylar, Rizulti, to name a few.     He did speak with a crisis counselor yesterday at [facility] - he states that she 'kind of talked me down, but it wasn't a very good experience' - he states that 'she basically told me to suck it up'. He state that his therapist at [facility] recently left - he was with her for two years and she left earlier this year.     He currently denies any thoughts of hurting himself or others. He does not have a plan. He endorses anger and labile mood which is interfering with his personal relationships. Endorses headaches and overeating associated with mood.     PHQ-9 Depression Screening  Little interest or pleasure in doing things? 3-->nearly every day   Feeling down, depressed, or hopeless? 3-->nearly every day   Trouble falling or staying asleep, or sleeping too much? 3-->nearly every day   Feeling tired or having little  energy? 3-->nearly every day   Poor appetite or overeating? 3-->nearly every day   Feeling bad about yourself - or that you are a failure or have let yourself or your family down? 3-->nearly every day   Trouble concentrating on things, such as reading the newspaper or watching television? 3-->nearly every day   Moving or speaking so slowly that other people could have noticed? Or the opposite - being so fidgety or restless that you have been moving around a lot more than usual? 3-->nearly every day   Thoughts that you would be better off dead, or of hurting yourself in some way? 2-->more than half the days   PHQ-9 Total Score 26   If you checked off any problems, how difficult have these problems made it for you to do your work, take care of things at home, or get along with other people? extremely difficult           He would also like to get restarted on Ozempic.  He has been out of this for approximately 2 months.  As a result he has noticed increase in appetite and and weight gain.  He is treated for his diabetes with Ozempic.  He was initially diagnosed 3 to 4 years ago and at the time of diagnosis his A1c was around 11% per his report.  Most recent A1c obtained in March was normal.  No adverse side effects with use of Ozempic.  Denies any personal history or family history of medullary thyroid carcinoma or multiple endocrine neoplasia syndrome type II.    Past Medical History:   Diagnosis Date    Anxiety and depression 12/17/2021    Bipolar disorder 12/15/2021    Diabetes mellitus     Gastroesophageal reflux disease 12/15/2021    H/O shoulder surgery 3/29/2023    Hx of tonsillectomy 3/29/2023    Hyperlipidemia 12/15/2021    Iron deficiency anemia secondary to inadequate dietary iron intake 8/5/2022    Left arm numbness 01/17/2022    Left rotator cuff tear 01/17/2022    Male hypogonadism 5/11/2022    Obesity 12/15/2021    Peptic ulceration     Rotator cuff injury, left, subsequent encounter 2/24/2022     Testosterone deficiency in male 5/11/2022       Allergies   Allergen Reactions    Sulfamethoxazole-Trimethoprim Hives        Past Surgical History:   Procedure Laterality Date    ROTATOR CUFF REPAIR Left     has had it done twice      TONSILLECTOMY          Social History     Tobacco Use    Smoking status: Never     Passive exposure: Never    Smokeless tobacco: Never   Vaping Use    Vaping Use: Former   Substance Use Topics    Alcohol use: Yes     Comment: ocassionally     Drug use: Never       Family History   Problem Relation Age of Onset    Obesity Mother     Hypertension Father     Heart attack Father     Stroke Maternal Grandmother     Heart attack Maternal Grandfather     Mental illness Paternal Grandmother     Diabetes Paternal Grandmother         Health Maintenance Due   Topic Date Due    COVID-19 Vaccine (5 - Moderna series) 11/12/2021    DIABETIC FOOT EXAM  12/17/2022        Current Outpatient Medications on File Prior to Visit   Medication Sig    diazePAM (VALIUM) 5 MG tablet Take 1 tablet by mouth 3 (Three) Times a Day.    [DISCONTINUED] fluticasone (Flonase) 50 MCG/ACT nasal spray 1 spray into the nostril(s) as directed by provider 2 (Two) Times a Day.    [DISCONTINUED] lithium 300 MG tablet Take 1 tablet by mouth 2 (Two) Times a Day.    [DISCONTINUED] loratadine (CLARITIN) 10 MG tablet Take 1 tablet by mouth Daily.    [DISCONTINUED] Semaglutide, 2 MG/DOSE, (Ozempic, 2 MG/DOSE,) 8 MG/3ML solution pen-injector Inject 2 mg under the skin into the appropriate area as directed Every 7 (Seven) Days.    [DISCONTINUED] sertraline (ZOLOFT) 25 MG tablet     [DISCONTINUED] sildenafil (VIAGRA) 100 MG tablet Take 1 tablet by mouth Daily As Needed for Erectile Dysfunction.     No current facility-administered medications on file prior to visit.       Immunization History   Administered Date(s) Administered    COVID-19 (MODERNA) 1st,2nd,3rd Dose Monovalent 04/10/2021, 05/05/2021, 08/19/2021, 09/17/2021    DTP  "03/11/1996    Hep B, Adolescent or Pediatric 06/22/2001, 07/23/2001, 08/02/2002    MMR 03/11/1996    OPV 03/11/1996    Pneumococcal Polysaccharide (PPSV23) 08/05/2022    Tdap 08/05/2022       Review of Systems     Objective     /84   Pulse 113   Ht 188 cm (74\")   Wt (!) 157 kg (347 lb)   SpO2 99%   BMI 44.55 kg/m²       Physical Exam  Vitals reviewed.   Constitutional:       General: He is not in acute distress.     Appearance: He is well-developed. He is obese.   HENT:      Head: Normocephalic and atraumatic.   Eyes:      General: No scleral icterus.     Extraocular Movements: Extraocular movements intact.      Conjunctiva/sclera: Conjunctivae normal.   Cardiovascular:      Rate and Rhythm: Normal rate and regular rhythm.      Pulses: Normal pulses.      Heart sounds: No murmur heard.  Pulmonary:      Effort: Pulmonary effort is normal. No respiratory distress.      Breath sounds: Normal breath sounds. No wheezing, rhonchi or rales.   Musculoskeletal:         General: Normal range of motion.      Cervical back: Normal range of motion.      Right lower leg: No edema.      Left lower leg: No edema.   Skin:     General: Skin is warm and dry.   Neurological:      Mental Status: He is alert and oriented to person, place, and time.   Psychiatric:         Attention and Perception: Attention and perception normal. He does not perceive auditory or visual hallucinations.         Mood and Affect: Mood and affect normal.         Speech: Speech normal.         Behavior: Behavior normal.         Thought Content: Thought content normal. Thought content is not paranoid or delusional. Thought content does not include homicidal or suicidal ideation. Thought content does not include homicidal or suicidal plan.         Cognition and Memory: Cognition and memory normal.         Judgment: Judgment normal.       Result Review :     The following data was reviewed by: CORTNEY Acosta on 07/26/2023:    CMP          " 8/5/2022    11:33 3/29/2023    09:11   CMP   Glucose 93  102    BUN 7  11    Creatinine 1.04  0.97    EGFR 99.1  107.0    Sodium 142  142    Potassium 4.6  4.1    Chloride 107  111    Calcium 9.5  9.3    Total Protein 6.9  6.7    Albumin 4.70  4.0    Globulin 2.2  2.7    Total Bilirubin 0.6  0.5    Alkaline Phosphatase 63  65    AST (SGOT) 54  20    ALT (SGPT) 100  22    Albumin/Globulin Ratio 2.1  1.5    BUN/Creatinine Ratio 6.7  11.3    Anion Gap 14.0  10.3      CBC          8/5/2022    11:33 3/29/2023    09:11   CBC   WBC 4.73  5.20    RBC 5.47  4.86    Hemoglobin 15.9  14.6    Hematocrit 47.1  41.9    MCV 86.1  86.2    MCH 29.1  30.0    MCHC 33.8  34.8    RDW 14.3  13.4    Platelets 160  235      Lipid Panel          8/5/2022    11:33 3/29/2023    09:11   Lipid Panel   Total Cholesterol 131  115    Triglycerides 128  151    HDL Cholesterol 27  30    VLDL Cholesterol 23  26    LDL Cholesterol  81  59    LDL/HDL Ratio 2.90  1.83      TSH          3/29/2023    09:11   TSH   TSH 1.260      A1C Last 3 Results          8/5/2022    13:38 3/29/2023    09:50   HGBA1C Last 3 Results   Hemoglobin A1C 5.1  5.3      SCANNED - LABS (05/19/2023)                 Assessment and Plan      Diagnoses and all orders for this visit:    1. Bipolar affective disorder, current episode depressed, current episode severity unspecified (Primary)  -     Ambulatory Referral to Psychiatry    2. Severe depression  -     Ambulatory Referral to Psychiatry    3. Social anxiety disorder  -     Ambulatory Referral to Psychiatry    4. Type 2 diabetes mellitus without complication, without long-term current use of insulin  -     Semaglutide,0.25 or 0.5MG/DOS, (Ozempic, 0.25 or 0.5 MG/DOSE,) 2 MG/3ML solution pen-injector; Inject 0.25 mg under the skin into the appropriate area as directed 1 (One) Time Per Week for 28 days, THEN 0.5 mg 1 (One) Time Per Week for 56 days.  Dispense: 9 mL; Refill: 0    5. Class 3 severe obesity due to excess calories with  serious comorbidity and body mass index (BMI) of 40.0 to 44.9 in adult  -     Semaglutide,0.25 or 0.5MG/DOS, (Ozempic, 0.25 or 0.5 MG/DOSE,) 2 MG/3ML solution pen-injector; Inject 0.25 mg under the skin into the appropriate area as directed 1 (One) Time Per Week for 28 days, THEN 0.5 mg 1 (One) Time Per Week for 56 days.  Dispense: 9 mL; Refill: 0            Follow Up     Return in about 12 days (around 8/7/2023) for Annual physical, Next scheduled follow up.    Stat referral placed to psychiatry.  We were actually able to get him in with the nurse practitioner next week on Monday.  He will continue to monitor his symptoms and if he should have any thoughts of self-harm, or thoughts of hurting others, he will reach back out to the crisis center, or call 911 for assistance.    I am going to restart his Ozempic, but advised that we are going to start back at the lowest dose and titrate upward due to him being out of the medication for the past 2 months.  He will be due for annual physical exam in approximately 2 weeks so he will follow-up with me at that time and we will get fasting labs as well.    Patient was given instructions and counseling regarding his condition or for health maintenance advice. Please see specific information pulled into the AVS if appropriate.

## 2023-07-31 ENCOUNTER — OFFICE VISIT (OUTPATIENT)
Dept: BEHAVIORAL HEALTH | Facility: CLINIC | Age: 33
End: 2023-07-31
Payer: COMMERCIAL

## 2023-07-31 VITALS
HEIGHT: 76 IN | SYSTOLIC BLOOD PRESSURE: 126 MMHG | WEIGHT: 262 LBS | DIASTOLIC BLOOD PRESSURE: 81 MMHG | BODY MASS INDEX: 31.9 KG/M2

## 2023-07-31 DIAGNOSIS — F41.1 GENERALIZED ANXIETY DISORDER: ICD-10-CM

## 2023-07-31 DIAGNOSIS — F41.0 PANIC ATTACKS: ICD-10-CM

## 2023-07-31 DIAGNOSIS — F31.30 BIPOLAR AFFECTIVE DISORDER, CURRENT EPISODE DEPRESSED, CURRENT EPISODE SEVERITY UNSPECIFIED: Primary | ICD-10-CM

## 2023-07-31 RX ORDER — LURASIDONE HYDROCHLORIDE 20 MG/1
20 TABLET, FILM COATED ORAL DAILY
Qty: 30 TABLET | Refills: 1 | Status: SHIPPED | OUTPATIENT
Start: 2023-07-31 | End: 2023-08-14 | Stop reason: DRUGHIGH

## 2023-07-31 NOTE — PATIENT INSTRUCTIONS
1.  Please return to clinic at your next scheduled visit.  Please contact the clinic ([phone]) at least 24 hours prior in the event you need to cancel.  2.  Do no harm to yourself or others.    3.  Avoid alcohol and drugs.    4.  Take all medications as prescribed.  Please contact the clinic with any concerns. If you are in need of medication refills, please call the clinic at [phone].    5. Should you want to get in touch with your provider, CORTNEY Gray, please contact the office ([phone]), and staff will be able to page Sherrie directly.  6. In the event you have personal crisis, contact the following crisis numbers: Suicide Prevention Hotline 1-251.740.9826; [facility] Helpline 6-266-334-KWEF; [facility] Emergency Room [phone]; text HELLO to 823318; or 524.     SPECIFIC RECOMMENDATIONS:     1.      Medications discussed at this encounter: LATUDA (LURASIDONE) Take with food. Risks, benefits, and alternatives discussed with patient including akathisia, sedation, dizziness/falls risk, nausea, low risk of weight gain & metabolic risks for diabetes and dyslipidemia, and rare tardive dyskinesia.  After discussion of these risks and benefits, the patient voiced understanding and agreed to proceed. Instructed to take medication with meal of minimum of 350 calories to improve consistent efficacy and absorption.                       2.      Psychotherapy recommendations: We will continue therapy at future visits.     3.     Return to clinic:  3 Weeks

## 2023-07-31 NOTE — PROGRESS NOTES
"[facility]iatry  Initial Psychiatric Evaluation    Referring Provider:   Thank you   Chad, Mandy RICE, APRN  [address] [place] [address]  Your referral is greatly appreciated.    Vital Signs:   /81   Ht 193 cm (76\")   Wt 119 kg (262 lb)   BMI 31.89 kg/mý      Chief Complaint:     History of Present Illness:   Arslan Butler is a 32 y.o. male who presents today for initial psychiatric evaluation for:     ICD-10-CM ICD-9-CM   1. Bipolar affective disorder, current episode depressed, current episode severity unspecified  F31.30 296.50   2. Generalized anxiety disorder  F41.1 300.02   3. Panic attacks  F41.0 300.01       07/31/2023 Depression and anxiety for several years.   Bipolar  Depression  Visit Type: initial  Onset of symptoms: more than 1 year ago  Progression since onset: gradually worsening  Patient presents with the following symptoms: anhedonia, decreased concentration, depressed mood, excessive worry, fatigue, feelings of hopelessness, irritability, muscle tension, nervousness/anxiety, obsessions, panic, psychomotor agitation and restlessness.  Patient is not experiencing: suicidal ideas, suicidal planning and thoughts of death.  Frequency of symptoms: most days   Severity: interfering with daily activities   Aggravated by: family issues  Sleep quality: fair  Not taking valium three times daily, mostly once daily, valium doesn't really help with panic attacks.   Paranoia (I.e.accusing his wife of talking to her ex-, afraid of passing his mental health issue to his children). Catastrophism of things. Has tried  TMS in [place] 2 years ago without success, was unable to complete full course of treatment. Panic attacks three times daily.   Patient has a compelling childhood assessment for potentially undiagnosed ADHD.  We discussed neuropsychological testing to confirm diagnosis.  Denies suicidal ideation.  Denies AVH.  PHQ-9 is 27 and PETR-7 is " "21.  ADHD:  Symptoms are persistent and significantly interfere with major life activities and/or result in significant suffering  With focus on K5 through 6th grade only   Grades: \"pretty bad\"  Behavioral issues: Trouble for getting out of his seat, not listening, talking   Special Classes:Yes, speech, hearing  Inattention:Yes  Referral for ADHD testing:Father did not believe in mental health problems     Often fails to give close attention to details or makes careless mistakes in schoolwork, at work, or during other activities:Yes  Often has difficulty sustaining attention in tasks:Yes  Often does not seem to listen when spoken to directly:Yes  Often does not follow through on instructions and fails to finish duties in the workplace:Yes  Often has difficulty organizing tasks and activities:Yes  Often avoids, dislikes or is reluctant to engage in tasks that require sustained mental effort:Yes  Often loses things necessary for tasks or activities:Yes  Is often easily distracted by extraneous stimuli: Yes  Is often forgetful in daily activities: Yes  Hyperactivity and Impulsivity: Yes  Often fidgets with or taps hands or feet: Yes  Often leaves seat in situations when remaining seated is expected: Yes  Often feels restless: Yes  Is often "on the go", acting as if "driven by a motor": Yes  Often talks excessively: Yes  Often blurts out an answer before a question has been completed: Yes  Often has difficulty waiting their turn: Yes  Often interrupts or intrudes on others: Yes      Record Review for 07/31/2023 : I have thoroughly reviewed the patient's electronic medical record to include previous encounters, care everywhere, notes, medications, labs, SHAUNA and UDS (if applicable), imaging, and EKG's.  Pertinent information is included in this note.  3/29/2023 TSH 1.260, glucose 102, CBC and CMP are otherwise reassuring, lipid panel is reassuring.  Lithium level 0.2.  EKG Results:  SCANNED EKG (12/29/2019)   Head " Imaging:  None in record    Per Referring Provider 7/26/2023 Arslan presents to the office today to re-establish care. Previously seen here a year ago, but moved to [place] x1 year.      He has been seeing Ekta Benson with [facility] - last seen on 06/16/2023 - has been seeing via telehealth. Recently they started a new medication plan and 'it just didn't work'. He states that at first it was working okay, but then he started to have intrusive thoughts. He stated having the thoughts about two weeks ago - he was having thoughts like he would be better off if he wasn't here. He also had recurrence of panic attacks. Those were seemingly well-controlled and now they are not.      He was most recently prescribed Zoloft and Wellbutrin, and Valium. He has taken Valium for a few years now. The Zoloft he took 1-2 months. Wellbutrin he has taken on and off over the years with different medications. He has taken Prozac, lithium, Abilify, Effexor, Vraylar, Rizulti, to name a few.      He did speak with a crisis counselor yesterday at [facility] - he states that she 'kind of talked me down, but it wasn't a very good experience' - he states that 'she basically told me to suck it up'. He state that his therapist at [facility] recently left - he was with her for two years and she left earlier this year.      He currently denies any thoughts of hurting himself or others. He does not have a plan. He endorses anger and labile mood which is interfering with his personal relationships. Endorses headaches and overeating associated with mood.      Past Psychiatric History:  Began Treatment: 2014   Diagnoses: Bipolar depression, anxiety, panic attacks  Psychiatrist: Yes  Therapist: Yes  Admission History: 3 admissions  Medication Trials: Ativan, klonopin, xanax, valium, gabapentin, vistaril, buspar, prozac, lithium, abilify, effexor, vraylar, rexulti, wellbutrin, zoloft, lexapro, celexa, seroquel, clonidine  Suicide Attempts: Several  attempts, tried to OD on pills, cut wrists  Self Harm: Hx of cutting   Substance use/abuse:Denies  Withdrawal Symptoms: Not applicable  Longest Period Sober: Not applicable  AA: Not applicable  Trauma/Childhood/ACE:  parents,       PHQ-9 Depression Screening  PHQ-9 Total Score: 27    Little interest or pleasure in doing things? 3-->nearly every day   Feeling down, depressed, or hopeless? 3-->nearly every day   Trouble falling or staying asleep, or sleeping too much? 3-->nearly every day   Feeling tired or having little energy? 3-->nearly every day   Poor appetite or overeating? 3-->nearly every day   Feeling bad about yourself - or that you are a failure or have let yourself or your family down? 3-->nearly every day   Trouble concentrating on things, such as reading the newspaper or watching television? 3-->nearly every day   Moving or speaking so slowly that other people could have noticed? Or the opposite - being so fidgety or restless that you have been moving around a lot more than usual? 3-->nearly every day   Thoughts that you would be better off dead, or of hurting yourself in some way? 3-->nearly every day   PHQ-9 Total Score 27     PETR-7  Feeling nervous, anxious or on edge: Nearly every day  Not being able to stop or control worrying: Nearly every day  Worrying too much about different things: Nearly every day  Trouble Relaxing: Nearly every day  Being so restless that it is hard to sit still: Nearly every day  Feeling afraid as if something awful might happen: Nearly every day  Becoming easily annoyed or irritable: Nearly every day  PETR 7 Total Score: 21  If you checked any problems, how difficult have these problems made it for you to do your work, take care of things at home, or get along with other people: Extremely difficult    Labs:  WBC   Date Value Ref Range Status   03/29/2023 5.20 3.40 - 10.80 10*3/mm3 Final     Platelets   Date Value Ref Range Status   03/29/2023 235 140 - 450 10*3/mm3  Final     Hemoglobin   Date Value Ref Range Status   03/29/2023 14.6 13.0 - 17.7 g/dL Final     Hematocrit   Date Value Ref Range Status   03/29/2023 41.9 37.5 - 51.0 % Final     Glucose   Date Value Ref Range Status   03/29/2023 102 (H) 65 - 99 mg/dL Final     Creatinine   Date Value Ref Range Status   03/29/2023 0.97 0.76 - 1.27 mg/dL Final     ALT (SGPT)   Date Value Ref Range Status   03/29/2023 22 1 - 41 U/L Final     AST (SGOT)   Date Value Ref Range Status   03/29/2023 20 1 - 40 U/L Final     BUN   Date Value Ref Range Status   03/29/2023 11 6 - 20 mg/dL Final     eGFR   Date Value Ref Range Status   03/29/2023 107.0 >60.0 mL/min/1.73 Final     Total Cholesterol   Date Value Ref Range Status   03/29/2023 115 0 - 200 mg/dL Final     Triglycerides   Date Value Ref Range Status   03/29/2023 151 (H) 0 - 150 mg/dL Final     HDL Cholesterol   Date Value Ref Range Status   03/29/2023 30 (L) 40 - 60 mg/dL Final     LDL Cholesterol    Date Value Ref Range Status   03/29/2023 59 0 - 100 mg/dL Final     VLDL Cholesterol   Date Value Ref Range Status   03/29/2023 26 5 - 40 mg/dL Final     LDL/HDL Ratio   Date Value Ref Range Status   03/29/2023 1.83  Final     Hemoglobin A1C   Date Value Ref Range Status   03/29/2023 5.3 % Final     TSH   Date Value Ref Range Status   03/29/2023 1.260 0.270 - 4.200 uIU/mL Final     Free T4   Date Value Ref Range Status   06/13/2022 1.45 0.93 - 1.70 ng/dL Final     Comment:     T4 results may be falsely increased if patient taking Biotin.     Last Urine Toxicity  More data exists         8/5/2022 6/13/2022   LAST URINE TOXICITY RESULTS   Creatinine, Urine 300  270.8         Imaging Results:  No Images in the past 120 days found..  Allergy:   Allergies   Allergen Reactions    Sulfamethoxazole-Trimethoprim Hives      Problem List:  Patient Active Problem List   Diagnosis    Generalized anxiety disorder    Bipolar disorder    Gastroesophageal reflux disease    Hyperlipidemia    Class 3  severe obesity due to excess calories with serious comorbidity and body mass index (BMI) of 40.0 to 44.9 in adult    Erectile dysfunction    Chronic midline low back pain without sciatica    Allergic rhinitis    Chronic pain of left ankle    Chronic pain in left foot    Social anxiety disorder     Current Medications:   Current Outpatient Medications   Medication Sig Dispense Refill    diazePAM (VALIUM) 5 MG tablet Take 1 tablet by mouth 3 (Three) Times a Day.      Lurasidone HCl (LATUDA) 20 MG tablet tablet Take 1 tablet by mouth Daily. 30 tablet 1    Semaglutide,0.25 or 0.5MG/DOS, (Ozempic, 0.25 or 0.5 MG/DOSE,) 2 MG/3ML solution pen-injector Inject 0.25 mg under the skin into the appropriate area as directed 1 (One) Time Per Week for 28 days, THEN 0.5 mg 1 (One) Time Per Week for 56 days. (Patient not taking: Reported on 7/31/2023) 9 mL 0     No current facility-administered medications for this visit.     Discontinued Medications:  There are no discontinued medications.  Past Surgical History:  Past Surgical History:   Procedure Laterality Date    ROTATOR CUFF REPAIR Left     has had it done twice      TONSILLECTOMY       Past Medical History:  Past Medical History:   Diagnosis Date    Anxiety and depression 12/17/2021    Bipolar disorder 12/15/2021    Chronic pain disorder     Diabetes mellitus     Gastroesophageal reflux disease 12/15/2021    H/O shoulder surgery 03/29/2023    Hx of tonsillectomy 03/29/2023    Hyperlipidemia 12/15/2021    Iron deficiency anemia secondary to inadequate dietary iron intake 08/05/2022    Left arm numbness 01/17/2022    Left rotator cuff tear 01/17/2022    Male hypogonadism 05/11/2022    Obesity 12/15/2021    Panic disorder     Peptic ulceration     Rotator cuff injury, left, subsequent encounter 02/24/2022    Self-injurious behavior     Suicide attempt     Testosterone deficiency in male 05/11/2022     Social History     Socioeconomic History    Marital status:    Tobacco  Use    Smoking status: Never     Passive exposure: Never    Smokeless tobacco: Never   Vaping Use    Vaping Use: Former   Substance and Sexual Activity    Alcohol use: Not Currently     Comment: ocassionally     Drug use: Never    Sexual activity: Defer     Family History   Problem Relation Age of Onset    Suicide Attempts Mother     Self-Injurious Behavior  Mother     Seizures Mother     Schizophrenia Mother     Paranoid behavior Mother     Depression Mother     Bipolar disorder Mother     Alcohol abuse Mother     Obesity Mother     Alcohol abuse Father     Hypertension Father     Heart attack Father     Heart attack Maternal Grandfather     Stroke Maternal Grandmother     Mental illness Paternal Grandmother     Diabetes Paternal Grandmother      Social History:  Martial Status: , 7 years, supportive  Employed: Difficult to hold employment  Kids: 2 daughters  House: Lives with wife and children  Legal:Denies   History: Denies  Family History:   Suicide Attempts: Mom tried to OD, Cut her wrists, several attempts  Suicide Completions: Denies  Developmental History:   Born: [place]  Siblings: 3 brothers, 2 sisters  High School: Graduate  College: Some    Access to Firearms: Denies    Mental Status Exam:   Appearance: good eye contact, normal street clothes, groomed, sitting in chair   Behavior: pleasant and cooperative  Motor: no abnormal  Speech: normal rhythm, rate, volume, tone, not hyperverbal, not pressured, normal prosidy  Mood: Anxious  Affect: Appropriate  Thought Content: negative suicidal ideations, negative homicidal ideations, negative obsessions  Perceptions: negative auditory hallucinations, negative visual hallucinations, negative delusions, negative paranoia  Thought Process: goal directed, linear  Insight/Judgement: fair/fair  Cognition: grossly intact  Attention: intact  Orientation: person, place, time and situation  Memory: intact    Review of Systems:   Constitutional: Denies fatigue,  night sweats  Eyes: Denies double vision, blurred vision  HENT: Denies vertigo, recent head injury  Cardiovascular: Denies chest pain, irregular heartbeats  Respiratory: Denies productive cough, shortness of breath  Gastrointestinal: Denies nausea, vomiting  Genitourinary: Denies dysuria, urinary retention  Integument: Denies hair growth change, new skin lesions  Neurologic: Denies altered mental status, seizures  Musculoskeletal: Denies joint swelling, limitation of motion  Endocrine: Denies cold intolerance, heat intolerance  Psychiatric: See mental status exam above  Allergic-immunologic: Denies frequent illnesses    PLAN:   Presentation seems most consistent with DSM-V criteria for:  Diagnoses and all orders for this visit:    1. Bipolar affective disorder, current episode depressed, current episode severity unspecified (Primary)  -     Lurasidone HCl (LATUDA) 20 MG tablet tablet; Take 1 tablet by mouth Daily.  Dispense: 30 tablet; Refill: 1    2. Generalized anxiety disorder    3. Panic attacks           Latuda 20 mg daily  Follow-up 3 weeks  Discussed medication options and treatment plan of prescribed medication as well as the risks, benefits, and side effects.  Patient verbalized understanding and is agreeable to this plan.   Patient is agreeable to call the office with any worsening of symptoms or onset of side effects.   Patient is agreeable to call 911 or go to the nearest ER should he/she begin having SI/HI.   Addressed all questions and concerns.    Continue psychotherapeutic modalities as indicated.    TREATMENT PLAN/GOALS:   Treatment plan: Continue supportive psychotherapy efforts and medications as indicated. Will continue to challenge patterns of living conducive to pathology, strengthen defenses, promote problems solving, restore adaptive functioning and provide symptom relief. Treatment and medication options discussed during today's visit. Patient acknowledged and verbally consented to continue  with current treatment plan and was educated on the importance of compliance with treatment and follow-up appointments.  Functional status:Good  Prognosis: Good  Progress: Will address progress and continued improvement at follow-up visits.    Safety: No acute safety concerns.   Therapy: Will continue therapy at future visits.  Risk Assessment: Risk of self-harm acutely and chronically is moderate.  Risk factors include anxiety disorder, mood disorder, and recent psychosocial stressors. Protective factors include no family history, denies access to guns/weapons, no present SI, minimal AODA, healthcare seeking, future orientation, willingness to engage in care.  Risk assessment could be further elevated in the event of treatment noncompliance and/or AODA.  Labs/studies: No labs/studies ordered at this time  Medications:   New Medications Ordered This Visit   Medications    Lurasidone HCl (LATUDA) 20 MG tablet tablet     Sig: Take 1 tablet by mouth Daily.     Dispense:  30 tablet     Refill:  1        Medication Education:   LATUDA (LURASIDONE) Take with food. Risks, benefits, and alternatives discussed with patient including akathisia, sedation, dizziness/falls risk, nausea, low risk of weight gain & metabolic risks for diabetes and dyslipidemia, and rare tardive dyskinesia.  After discussion of these risks and benefits, the patient voiced understanding and agreed to proceed. Instructed to take medication with meal of minimum of 350 calories to improve consistent efficacy and absorption.       Follow-up: Return in about 3 weeks (around 8/21/2023) for Recheck, Next scheduled follow up.       This document has been electronically signed by CORTNEY Gray  August 9, 2023 14:26 EDT    Please note that portions of this note were completed with a voice recognition program.  Copied text in this note has been reviewed and is accurate as of 08/09/23

## 2023-08-09 ENCOUNTER — TELEPHONE (OUTPATIENT)
Dept: PSYCHIATRY | Facility: CLINIC | Age: 33
End: 2023-08-09
Payer: COMMERCIAL

## 2023-08-09 DIAGNOSIS — F41.1 GENERALIZED ANXIETY DISORDER: Primary | ICD-10-CM

## 2023-08-09 DIAGNOSIS — F31.31 BIPOLAR AFFECTIVE DISORDER, CURRENTLY DEPRESSED, MILD: ICD-10-CM

## 2023-08-09 NOTE — TELEPHONE ENCOUNTER
PT LEFT VOICEMAIL REQUESTING CALL BACK.    I CALLED PT BACK AND CALL WAS FORWARDED TO PTS VOICEMAIL BOX.     I LEFT MESSAGE FOR PT TO CALL OUR OFFICE BACK.

## 2023-08-09 NOTE — TELEPHONE ENCOUNTER
PT CALLED AND STATED TAKING VALIUM CAUSED HIM TO TEST POSITIVE FOR A NEW JOB DUE TO A SAFETY REGULATION; AND IT BEING AN OSHA VIOLATION DUE TO WORKING WITH [employer].    HE IS ASKING IF THERE IS ANOTHER NON BENZO MEDICATION THAT HE CAN TRY.    PROVIDER PLEASE ADVISE.

## 2023-08-11 LAB
PH UR: 5 [PH]
PROTEIN: NEGATIVE
SPECIFIC GRAVITY: 1.02

## 2023-08-14 RX ORDER — LURASIDONE HYDROCHLORIDE 40 MG/1
40 TABLET, FILM COATED ORAL DAILY
Qty: 30 TABLET | Refills: 1 | Status: SHIPPED | OUTPATIENT
Start: 2023-08-14

## 2023-08-14 RX ORDER — HYDROXYZINE HYDROCHLORIDE 25 MG/1
25 TABLET, FILM COATED ORAL 3 TIMES DAILY PRN
Qty: 90 TABLET | Refills: 1 | Status: SHIPPED | OUTPATIENT
Start: 2023-08-14

## 2023-08-14 NOTE — TELEPHONE ENCOUNTER
Patient was only taking valium once daily, but since he has been rejected from a new job position he has stopped taking it. Still having some difficulty with irritability, mood reactivity. Is asking if there is anything else (non-controlled) he can take to target anxiety. We discussed trying hydroxyzine again, he does not recall if this medication was helpful.    Discontinue valium.   Increase Latuda to 40mg daily  Start hydroxyzine 25 mg 3 times daily as needed for panic attacks/anxiety    Continue with follow-up appointment  Appointment with Sherrie Oneill APRN (08/22/2023)     CORTNEY Gray, PMHNP-BC  [facility]  Office: [phone]

## 2023-08-15 ENCOUNTER — PATIENT ROUNDING (BHMG ONLY) (OUTPATIENT)
Dept: FAMILY MEDICINE CLINIC | Facility: CLINIC | Age: 33
End: 2023-08-15
Payer: COMMERCIAL

## 2023-08-15 NOTE — PROGRESS NOTES
My name is Vani Ann      I am  with [facility]  [facility]  [address] [place] [address] [address]GAR [id number] [phone].    I am sending this message to officially welcome you to our practice and ask about your recent visit.    Tell me about your visit with us. What things went well?       We're always looking for ways to make our patients' experiences even better. Do you have recommendations on ways we may improve?      Overall were you satisfied with your first visit to our practice?        I appreciate you taking the time to respond with me today. Is there anything else I can do for you?     Thank you, and have a great day.

## 2023-08-17 ENCOUNTER — TELEPHONE (OUTPATIENT)
Dept: FAMILY MEDICINE CLINIC | Facility: CLINIC | Age: 33
End: 2023-08-17

## 2023-08-17 ENCOUNTER — OFFICE VISIT (OUTPATIENT)
Dept: FAMILY MEDICINE CLINIC | Facility: CLINIC | Age: 33
End: 2023-08-17
Payer: COMMERCIAL

## 2023-08-17 VITALS
BODY MASS INDEX: 39.17 KG/M2 | OXYGEN SATURATION: 97 % | HEIGHT: 75 IN | DIASTOLIC BLOOD PRESSURE: 90 MMHG | HEART RATE: 90 BPM | SYSTOLIC BLOOD PRESSURE: 140 MMHG | WEIGHT: 315 LBS

## 2023-08-17 DIAGNOSIS — E78.5 DYSLIPIDEMIA: ICD-10-CM

## 2023-08-17 DIAGNOSIS — F40.10 SOCIAL ANXIETY DISORDER: ICD-10-CM

## 2023-08-17 DIAGNOSIS — E55.9 VITAMIN D INSUFFICIENCY: ICD-10-CM

## 2023-08-17 DIAGNOSIS — E11.9 TYPE 2 DIABETES MELLITUS WITHOUT COMPLICATION, WITHOUT LONG-TERM CURRENT USE OF INSULIN: ICD-10-CM

## 2023-08-17 DIAGNOSIS — F32.2 SEVERE DEPRESSION: ICD-10-CM

## 2023-08-17 DIAGNOSIS — Z00.00 ENCOUNTER FOR ANNUAL PHYSICAL EXAM: Primary | ICD-10-CM

## 2023-08-17 DIAGNOSIS — F31.30 BIPOLAR AFFECTIVE DISORDER, CURRENT EPISODE DEPRESSED, CURRENT EPISODE SEVERITY UNSPECIFIED: ICD-10-CM

## 2023-08-17 DIAGNOSIS — I10 ESSENTIAL HYPERTENSION: ICD-10-CM

## 2023-08-17 DIAGNOSIS — E66.01 CLASS 3 SEVERE OBESITY WITH SERIOUS COMORBIDITY AND BODY MASS INDEX (BMI) OF 40.0 TO 44.9 IN ADULT, UNSPECIFIED OBESITY TYPE: ICD-10-CM

## 2023-08-17 DIAGNOSIS — E11.9 ENCOUNTER FOR DIABETIC FOOT EXAM: ICD-10-CM

## 2023-08-17 LAB
25(OH)D3 SERPL-MCNC: 22 NG/ML (ref 30–100)
ALBUMIN SERPL-MCNC: 4.7 G/DL (ref 3.5–5.2)
ALBUMIN UR-MCNC: <1.2 MG/DL
ALBUMIN/GLOB SERPL: 2 G/DL
ALP SERPL-CCNC: 69 U/L (ref 39–117)
ALT SERPL W P-5'-P-CCNC: 52 U/L (ref 1–41)
ANION GAP SERPL CALCULATED.3IONS-SCNC: 9 MMOL/L (ref 5–15)
AST SERPL-CCNC: 42 U/L (ref 1–40)
BASOPHILS # BLD AUTO: 0.01 10*3/MM3 (ref 0–0.2)
BASOPHILS NFR BLD AUTO: 0.2 % (ref 0–1.5)
BILIRUB SERPL-MCNC: 0.6 MG/DL (ref 0–1.2)
BUN SERPL-MCNC: 12 MG/DL (ref 6–20)
BUN/CREAT SERPL: 12.5 (ref 7–25)
CALCIUM SPEC-SCNC: 9.8 MG/DL (ref 8.6–10.5)
CHLORIDE SERPL-SCNC: 105 MMOL/L (ref 98–107)
CHOLEST SERPL-MCNC: 124 MG/DL (ref 0–200)
CO2 SERPL-SCNC: 26 MMOL/L (ref 22–29)
CREAT SERPL-MCNC: 0.96 MG/DL (ref 0.76–1.27)
CREAT UR-MCNC: 172.2 MG/DL
DEPRECATED RDW RBC AUTO: 45.7 FL (ref 37–54)
EGFRCR SERPLBLD CKD-EPI 2021: 108.4 ML/MIN/1.73
EOSINOPHIL # BLD AUTO: 0.11 10*3/MM3 (ref 0–0.4)
EOSINOPHIL NFR BLD AUTO: 2.5 % (ref 0.3–6.2)
ERYTHROCYTE [DISTWIDTH] IN BLOOD BY AUTOMATED COUNT: 14.4 % (ref 12.3–15.4)
GLOBULIN UR ELPH-MCNC: 2.4 GM/DL
GLUCOSE SERPL-MCNC: 136 MG/DL (ref 65–99)
HBA1C MFR BLD: 6.1 % (ref 4.8–5.6)
HCT VFR BLD AUTO: 42.3 % (ref 37.5–51)
HDLC SERPL-MCNC: 35 MG/DL (ref 40–60)
HGB BLD-MCNC: 14.4 G/DL (ref 13–17.7)
IMM GRANULOCYTES # BLD AUTO: 0.01 10*3/MM3 (ref 0–0.05)
IMM GRANULOCYTES NFR BLD AUTO: 0.2 % (ref 0–0.5)
LDLC SERPL CALC-MCNC: 63 MG/DL (ref 0–100)
LDLC/HDLC SERPL: 1.7 {RATIO}
LYMPHOCYTES # BLD AUTO: 1.55 10*3/MM3 (ref 0.7–3.1)
LYMPHOCYTES NFR BLD AUTO: 34.7 % (ref 19.6–45.3)
MCH RBC QN AUTO: 29.4 PG (ref 26.6–33)
MCHC RBC AUTO-ENTMCNC: 34 G/DL (ref 31.5–35.7)
MCV RBC AUTO: 86.5 FL (ref 79–97)
MICROALBUMIN/CREAT UR: NORMAL MG/G{CREAT}
MONOCYTES # BLD AUTO: 0.4 10*3/MM3 (ref 0.1–0.9)
MONOCYTES NFR BLD AUTO: 8.9 % (ref 5–12)
NEUTROPHILS NFR BLD AUTO: 2.39 10*3/MM3 (ref 1.7–7)
NEUTROPHILS NFR BLD AUTO: 53.5 % (ref 42.7–76)
NRBC BLD AUTO-RTO: 0 /100 WBC (ref 0–0.2)
PLATELET # BLD AUTO: 236 10*3/MM3 (ref 140–450)
PMV BLD AUTO: 11 FL (ref 6–12)
POTASSIUM SERPL-SCNC: 4.5 MMOL/L (ref 3.5–5.2)
PROT SERPL-MCNC: 7.1 G/DL (ref 6–8.5)
RBC # BLD AUTO: 4.89 10*6/MM3 (ref 4.14–5.8)
SODIUM SERPL-SCNC: 140 MMOL/L (ref 136–145)
T4 FREE SERPL-MCNC: 1.26 NG/DL (ref 0.93–1.7)
TRIGL SERPL-MCNC: 148 MG/DL (ref 0–150)
TSH SERPL DL<=0.05 MIU/L-ACNC: 0.69 UIU/ML (ref 0.27–4.2)
VLDLC SERPL-MCNC: 26 MG/DL (ref 5–40)
WBC NRBC COR # BLD: 4.47 10*3/MM3 (ref 3.4–10.8)

## 2023-08-17 PROCEDURE — 83036 HEMOGLOBIN GLYCOSYLATED A1C: CPT | Performed by: NURSE PRACTITIONER

## 2023-08-17 PROCEDURE — 80061 LIPID PANEL: CPT | Performed by: NURSE PRACTITIONER

## 2023-08-17 PROCEDURE — 82570 ASSAY OF URINE CREATININE: CPT | Performed by: NURSE PRACTITIONER

## 2023-08-17 PROCEDURE — 82043 UR ALBUMIN QUANTITATIVE: CPT | Performed by: NURSE PRACTITIONER

## 2023-08-17 PROCEDURE — 80050 GENERAL HEALTH PANEL: CPT | Performed by: NURSE PRACTITIONER

## 2023-08-17 PROCEDURE — 82306 VITAMIN D 25 HYDROXY: CPT | Performed by: NURSE PRACTITIONER

## 2023-08-17 PROCEDURE — 84439 ASSAY OF FREE THYROXINE: CPT | Performed by: NURSE PRACTITIONER

## 2023-08-17 RX ORDER — LISINOPRIL 10 MG/1
10 TABLET ORAL DAILY
Qty: 30 TABLET | Refills: 0 | Status: SHIPPED | OUTPATIENT
Start: 2023-08-17

## 2023-08-17 NOTE — PROGRESS NOTES
Chief Complaint  Annual Exam    Subjective            Arslan Butler presents to [facility]  History of Present Illness    Arslan presents to the office today for annual physical exam    Past medical history includes type 2 diabetes, hypertension, and dyslipidemia.  In the past he has been treated for his diabetes with Ozempic.  Recently he tried to fill a prescription for Ozempic but it was denied by insurance and prior authorization was required.  He is not currently monitoring his blood sugar.  Previously, when initially diagnosed with diabetes his A1c was greater than 10%.  In more recent months his A1c has been normal, but that was with Ozempic.  Denies polyuria, polydipsia, or polyphagia.    Blood pressure is elevated on exam, 140/90.  He has a remote history of hypertension and was treated previously with lisinopril, but as he lost weight his blood pressure improved and he was able to come off of the medication.  He has recently gained a lot of weight back.  No complaints of chest pain, palpitations, headaches, dizziness, or shortness of breath.    His only complaint today is that of somnolence.  He is having some daytime fatigue that was not present previously and he is uncertain if that is due to coming off of his Valium, or his new medication.  He recently establish care with psychiatry.  He is seeing CORTNEY Gray - she has taken him off the Valium and put him on Latuda - he has not taken Valium for just over a week now - and doing okay. He has noticed weight gain with Latuda.     He feels like his mood is in a much better place now than it was when he initially saw me.  He states that he does not feel as depressed as he did and he is not having intrusive thoughts.  He missed out on a good opportunity with [employer] secondary to being prescribed Valium, but he is getting ready to interview and test for a job at the [employer].  He is nervous that his Valium still may show up  in the urine despite quitting at almost 10 days ago.    PHQ-9 Depression Screening  Little interest or pleasure in doing things? 2-->more than half the days   Feeling down, depressed, or hopeless? 2-->more than half the days   Trouble falling or staying asleep, or sleeping too much? 2-->more than half the days   Feeling tired or having little energy? 0-->not at all   Poor appetite or overeating? 2-->more than half the days   Feeling bad about yourself - or that you are a failure or have let yourself or your family down? 2-->more than half the days   Trouble concentrating on things, such as reading the newspaper or watching television? 2-->more than half the days   Moving or speaking so slowly that other people could have noticed? Or the opposite - being so fidgety or restless that you have been moving around a lot more than usual? 2-->more than half the days   Thoughts that you would be better off dead, or of hurting yourself in some way? 0-->not at all   PHQ-9 Total Score 14   If you checked off any problems, how difficult have these problems made it for you to do your work, take care of things at home, or get along with other people? somewhat difficult           Past Medical History:   Diagnosis Date    Anxiety and depression 12/17/2021    Bipolar disorder 12/15/2021    Chronic pain disorder     Diabetes mellitus     Gastroesophageal reflux disease 12/15/2021    H/O shoulder surgery 03/29/2023    Hx of tonsillectomy 03/29/2023    Hyperlipidemia 12/15/2021    Iron deficiency anemia secondary to inadequate dietary iron intake 08/05/2022    Left arm numbness 01/17/2022    Left rotator cuff tear 01/17/2022    Male hypogonadism 05/11/2022    Obesity 12/15/2021    Panic disorder     Peptic ulceration     Rotator cuff injury, left, subsequent encounter 02/24/2022    Self-injurious behavior     Suicide attempt     Testosterone deficiency in male 05/11/2022       Allergies   Allergen Reactions     Sulfamethoxazole-Trimethoprim Hives    Metformin GI Intolerance        Past Surgical History:   Procedure Laterality Date    ROTATOR CUFF REPAIR Left     has had it done twice      TONSILLECTOMY          Social History     Tobacco Use    Smoking status: Never     Passive exposure: Never    Smokeless tobacco: Never   Vaping Use    Vaping Use: Former   Substance Use Topics    Alcohol use: Not Currently     Comment: ocassionally     Drug use: Never       Family History   Problem Relation Age of Onset    Suicide Attempts Mother     Self-Injurious Behavior  Mother     Seizures Mother     Schizophrenia Mother     Paranoid behavior Mother     Depression Mother     Bipolar disorder Mother     Alcohol abuse Mother     Obesity Mother     Alcohol abuse Father     Hypertension Father     Heart attack Father     Heart attack Maternal Grandfather     Stroke Maternal Grandmother     Mental illness Paternal Grandmother     Diabetes Paternal Grandmother         Health Maintenance Due   Topic Date Due    COVID-19 Vaccine (5 - Moderna series) 11/12/2021    URINE MICROALBUMIN  08/05/2023        Current Outpatient Medications on File Prior to Visit   Medication Sig    hydrOXYzine (ATARAX) 25 MG tablet Take 1 tablet by mouth 3 (Three) Times a Day As Needed for Anxiety (Panic Attacks).    lurasidone (Latuda) 40 MG tablet tablet Take 1 tablet by mouth Daily.    [DISCONTINUED] diazePAM (VALIUM) 5 MG tablet Take 1 tablet by mouth 3 (Three) Times a Day.    [DISCONTINUED] Semaglutide,0.25 or 0.5MG/DOS, (Ozempic, 0.25 or 0.5 MG/DOSE,) 2 MG/3ML solution pen-injector Inject 0.25 mg under the skin into the appropriate area as directed 1 (One) Time Per Week for 28 days, THEN 0.5 mg 1 (One) Time Per Week for 56 days.     No current facility-administered medications on file prior to visit.       Immunization History   Administered Date(s) Administered    COVID-19 (MODERNA) 1st,2nd,3rd Dose Monovalent 04/10/2021, 05/05/2021, 08/19/2021, 09/17/2021     "DTP 03/11/1996    Hep B, Adolescent or Pediatric 06/22/2001, 07/23/2001, 08/02/2002    MMR 03/11/1996    OPV 03/11/1996    Pneumococcal Polysaccharide (PPSV23) 08/05/2022    Tdap 08/05/2022       Review of Systems     Objective     /90   Pulse 90   Ht 190.5 cm (75\")   Wt (!) 161 kg (354 lb)   SpO2 97%   BMI 44.25 kg/mý       Physical Exam  Vitals reviewed.   Constitutional:       General: He is not in acute distress.     Appearance: He is well-developed. He is obese.   HENT:      Head: Normocephalic and atraumatic.      Right Ear: Tympanic membrane, ear canal and external ear normal. There is no impacted cerumen.      Left Ear: Tympanic membrane, ear canal and external ear normal. There is no impacted cerumen.      Nose: Nose normal.      Mouth/Throat:      Mouth: Mucous membranes are moist.      Pharynx: Oropharynx is clear. No oropharyngeal exudate or posterior oropharyngeal erythema.   Eyes:      General: No scleral icterus.        Right eye: No discharge.         Left eye: No discharge.      Extraocular Movements: Extraocular movements intact.      Conjunctiva/sclera: Conjunctivae normal.      Pupils: Pupils are equal, round, and reactive to light.   Neck:      Thyroid: No thyroid mass, thyromegaly or thyroid tenderness.      Vascular: No carotid bruit.      Trachea: Trachea normal.   Cardiovascular:      Rate and Rhythm: Normal rate and regular rhythm.      Pulses: Normal pulses.           Dorsalis pedis pulses are 2+ on the right side and 2+ on the left side.        Posterior tibial pulses are 2+ on the right side and 2+ on the left side.      Heart sounds: No murmur heard.  Pulmonary:      Effort: Pulmonary effort is normal. No respiratory distress.      Breath sounds: Normal breath sounds. No wheezing, rhonchi or rales.   Abdominal:      General: Bowel sounds are normal. There is no distension.      Palpations: Abdomen is soft. There is no mass.      Tenderness: There is no abdominal tenderness. " There is no guarding or rebound.   Musculoskeletal:         General: Normal range of motion.      Cervical back: Normal range of motion and neck supple. No tenderness.      Right lower leg: No edema.      Left lower leg: No edema.      Right foot: Normal range of motion. No deformity or bunion.      Left foot: Normal range of motion. No deformity or bunion.   Feet:      Right foot:      Protective Sensation: 9 sites tested.  9 sites sensed.      Skin integrity: Skin integrity normal. Dry skin present. No ulcer, blister or callus.      Left foot:      Protective Sensation: 9 sites tested.  9 sites sensed.      Skin integrity: Skin integrity normal. Dry skin present. No ulcer, blister or callus.      Comments: Diabetic Foot Exam Performed and Monofilament Test Performed     There is a linear deformity in each toenail of the great toes.  Patient reports recent traumatic loss of toenail secondary to steel toe boots.  Lymphadenopathy:      Cervical: No cervical adenopathy.   Skin:     General: Skin is warm and dry.   Neurological:      Mental Status: He is alert and oriented to person, place, and time.   Psychiatric:         Mood and Affect: Mood and affect normal.         Behavior: Behavior normal.         Thought Content: Thought content normal.         Judgment: Judgment normal.       Result Review :     The following data was reviewed by: CORTNEY Acosta on 08/17/2023:    CMP          3/29/2023    09:11   CMP   Glucose 102    BUN 11    Creatinine 0.97    EGFR 107.0    Sodium 142    Potassium 4.1    Chloride 111    Calcium 9.3    Total Protein 6.7    Albumin 4.0    Globulin 2.7    Total Bilirubin 0.5    Alkaline Phosphatase 65    AST (SGOT) 20    ALT (SGPT) 22    Albumin/Globulin Ratio 1.5    BUN/Creatinine Ratio 11.3    Anion Gap 10.3      CBC          3/29/2023    09:11   CBC   WBC 5.20    RBC 4.86    Hemoglobin 14.6    Hematocrit 41.9    MCV 86.2    MCH 30.0    MCHC 34.8    RDW 13.4    Platelets 235       Lipid Panel          3/29/2023    09:11   Lipid Panel   Total Cholesterol 115    Triglycerides 151    HDL Cholesterol 30    VLDL Cholesterol 26    LDL Cholesterol  59    LDL/HDL Ratio 1.83      TSH          3/29/2023    09:11   TSH   TSH 1.260      A1C Last 3 Results          3/29/2023    09:50   HGBA1C Last 3 Results   Hemoglobin A1C 5.3          Data reviewed : Consultant notes :    Office Visit with Sherrie Oneill APRN (07/31/2023)            Assessment and Plan      Diagnoses and all orders for this visit:    1. Encounter for annual physical exam (Primary)  -     CBC Auto Differential  -     Comprehensive Metabolic Panel  -     Hemoglobin A1c  -     Lipid Panel  -     TSH+Free T4  -     Microalbumin / Creatinine Urine Ratio - Urine, Clean Catch    2. Class 3 severe obesity with serious comorbidity and body mass index (BMI) of 40.0 to 44.9 in adult, unspecified obesity type  Comments:  Recommended healthy diet and exercise -PA was obtained for Ozempic, so hopefully this will also help with weight loss    3. Type 2 diabetes mellitus without complication, without long-term current use of insulin  Comments:  Patient advised that prior authorization was approved for Ozempic.  He should be able to go to the pharmacy and get it.    4. Encounter for diabetic foot exam    5. Essential hypertension  -     lisinopril (PRINIVIL,ZESTRIL) 10 MG tablet; Take 1 tablet by mouth Daily.  Dispense: 30 tablet; Refill: 0    6. Dyslipidemia    7. Bipolar affective disorder, current episode depressed, current episode severity unspecified    8. Severe depression    9. Social anxiety disorder    10. Vitamin D insufficiency  -     Vitamin D,25-Hydroxy            Follow Up     Return in about 3 weeks (around 9/7/2023) for Next scheduled follow up.    Fasting labs today.  Initiate lisinopril for hypertension.  Recommended going to the pharmacy to get Ozempic.  He will call if he is unable to pick this up.  Prior authorization was  obtained.  Follow-up in 3 weeks.    Annual physical exam encouraged.  He is currently up-to-date on age-appropriate screening and vaccination with the exception of pneumococcal vaccine which he declined today.    Patient was given instructions and counseling regarding his condition or for health maintenance advice. Please see specific information pulled into the AVS if appropriate.

## 2023-08-17 NOTE — PROGRESS NOTES
Venipuncture Blood Specimen Collection  Venipuncture performed in left arm by Marcela Rivera with good hemostasis. Patient tolerated the procedure well without complications.   08/17/23   Cassia Sunshine

## 2023-08-17 NOTE — TELEPHONE ENCOUNTER
Caller: Arslan Butler    Relationship to patient: Self    Best call back number: [phone]    Patient is needing: PATIENT CALLED TO LET CLINICAL STAFF WORKING ON HIS PA FOR MEDICATION KNOW THAT HE CALLED AND SPOKE WITH [employer] AND THEY CONFIRMED THAT HIS INSURANCE HAD BEEN CUT OFF AND HE NO LONGER HAS IT. PATIENT STATES HE ONLY HAS THE [employer] NOW AND THE PA WOULD NEED TO GO THROUGH THEM.

## 2023-08-18 ENCOUNTER — TELEPHONE (OUTPATIENT)
Dept: PSYCHIATRY | Facility: CLINIC | Age: 33
End: 2023-08-18
Payer: COMMERCIAL

## 2023-08-18 DIAGNOSIS — E55.9 VITAMIN D INSUFFICIENCY: Primary | ICD-10-CM

## 2023-08-18 RX ORDER — CHOLECALCIFEROL (VITAMIN D3) 125 MCG
2000 CAPSULE ORAL DAILY
Qty: 90 TABLET | Refills: 0 | Status: SHIPPED | OUTPATIENT
Start: 2023-08-18

## 2023-08-18 NOTE — TELEPHONE ENCOUNTER
Called pt back to inquire about voicemail.    Pt asked if provider could write a letter stating that he stopped the Valium and is taking Hydroxyzine instead, and that he is cleared to work on this medication.     hydrOXYzine (ATARAX) 25 MG tablet (08/14/2023)     Pt reported he's looking for a job and his agent is requiring this letter to show pt is cleared to work with the current medications he's taking. Pt was advised to call our office with an update by next Wednesday if he doesn't hear from our office beforehand, pt expressed understanding. Please review and route back once letter is written so pt can be notified.

## 2023-08-18 NOTE — TELEPHONE ENCOUNTER
Pt lvm requesting call back to ask questions about his medication. Please call pt back to inquire.

## 2023-08-22 ENCOUNTER — TELEPHONE (OUTPATIENT)
Dept: BEHAVIORAL HEALTH | Facility: CLINIC | Age: 33
End: 2023-08-22

## 2023-08-22 ENCOUNTER — TELEMEDICINE (OUTPATIENT)
Dept: BEHAVIORAL HEALTH | Facility: CLINIC | Age: 33
End: 2023-08-22
Payer: COMMERCIAL

## 2023-08-22 DIAGNOSIS — F41.0 PANIC ATTACKS: ICD-10-CM

## 2023-08-22 DIAGNOSIS — F41.1 GENERALIZED ANXIETY DISORDER: ICD-10-CM

## 2023-08-22 DIAGNOSIS — F31.30 BIPOLAR AFFECTIVE DISORDER, CURRENT EPISODE DEPRESSED, CURRENT EPISODE SEVERITY UNSPECIFIED: Primary | ICD-10-CM

## 2023-08-22 RX ORDER — DIVALPROEX SODIUM 250 MG/1
250 TABLET, EXTENDED RELEASE ORAL DAILY
Qty: 30 TABLET | Refills: 1 | Status: SHIPPED | OUTPATIENT
Start: 2023-08-22 | End: 2024-08-21

## 2023-08-22 NOTE — PROGRESS NOTES
[facility]  Medication Management Follow-up  Virtual MyChart Visit  This provider is located at [address] [place] [address], [address], [place] [id number]. The Patient is located at home. Patient is being seen remotely using MyChart. Patient is being seen via telehealth and confirm that they are in a secure environment for this session. The patient's condition being diagnosed/treated is appropriate for telemedicine. The provider identified herself as well as her credentials. They may refuse to be seen remotely at any time. The electronic data is encrypted and password protected, and the patient has been advised of the potential risks to privacy not withstanding such measures. Virtual visit via Zoom audio and video due to the COVID-19 pandemic.  Patient is accepting of and agreeable to visit.  The visit consisted of the patient and I. PT Identifiers used: Name and .    Referring Provider:  No referring provider defined for this encounter.    Vital Signs:   There were no vitals taken for this visit.    Chief Complaint:     History of Present Illness:   Arslan Butler is a 32 y.o. male who presents today for follow-up and medication management for:    ICD-10-CM ICD-9-CM   1. Bipolar affective disorder, current episode depressed, current episode severity unspecified  F31.30 296.50   2. Generalized anxiety disorder  F41.1 300.02   3. Panic attacks  F41.0 300.01         2023 Patient is taking medications as prescribed and is tolerating them well.   Still having mood swings, anger outbursts, latuda is helping with depression.   Having some altercations with wife about finances.   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  Depression  Visit Type: follow-up (Bipolar, PETR, Panic Attacks)  Patient presents with the following symptoms: depressed mood, excessive worry, irritability, muscle  tension, nervousness/anxiety, psychomotor agitation and restlessness.  Patient is not experiencing: anhedonia, compulsions, suicidal ideas, suicidal planning and thoughts of death.  Frequency of symptoms: most days   Severity: causing significant distress   Sleep quality: fair  Denies suicidal ideation.  Denies AVH.  We will continue to monitor for mood, behavior, and safety.    Record Review is below for 08/22/2023 : I have thoroughly reviewed the patient's electronic medical record to include previous encounters, care everywhere, notes, medications, labs, SHAUNA and UDS (if applicable), imaging, and EKG's.  Pertinent information is included in this note.  8/17/2023 TSH 0.695, free T41.26, lipid panel is reassuring, hemoglobin A1c 6.10, glucose 136, ALT 52, AST 42, CBC and CMP are otherwise reassuring.  EKG Results:  SCANNED EKG (12/29/2019)   Head Imaging:  None in record      07/31/2023 Depression and anxiety for several years.   Bipolar  Depression  Visit Type: initial  Onset of symptoms: more than 1 year ago  Progression since onset: gradually worsening  Patient presents with the following symptoms: anhedonia, decreased concentration, depressed mood, excessive worry, fatigue, feelings of hopelessness, irritability, muscle tension, nervousness/anxiety, obsessions, panic, psychomotor agitation and restlessness.  Patient is not experiencing: suicidal ideas, suicidal planning and thoughts of death.  Frequency of symptoms: most days   Severity: interfering with daily activities   Aggravated by: family issues  Sleep quality: fair  Not taking valium three times daily, mostly once daily, valium doesn't really help with panic attacks.   Paranoia (I.e.accusing his wife of talking to her ex-, afraid of passing his mental health issue to his children). Catastrophism of things. Has tried  TMS in [place] 2 years ago without success, was unable to complete full course of treatment. Panic attacks three times daily.  "  Patient has a compelling childhood assessment for potentially undiagnosed ADHD.  We discussed neuropsychological testing to confirm diagnosis.  Denies suicidal ideation.  Denies AVH.  PHQ-9 is 27 and PETR-7 is 21.  Latuda 20 mg daily  Follow-up 3 weeks    ADHD:  Symptoms are persistent and significantly interfere with major life activities and/or result in significant suffering  With focus on K5 through 6th grade only   Grades: \"pretty bad\"  Behavioral issues: Trouble for getting out of his seat, not listening, talking   Special Classes:Yes, speech, hearing  Inattention:Yes  Referral for ADHD testing:Father did not believe in mental health problems     Often fails to give close attention to details or makes careless mistakes in schoolwork, at work, or during other activities:Yes  Often has difficulty sustaining attention in tasks:Yes  Often does not seem to listen when spoken to directly:Yes  Often does not follow through on instructions and fails to finish duties in the workplace:Yes  Often has difficulty organizing tasks and activities:Yes  Often avoids, dislikes or is reluctant to engage in tasks that require sustained mental effort:Yes  Often loses things necessary for tasks or activities:Yes  Is often easily distracted by extraneous stimuli: Yes  Is often forgetful in daily activities: Yes  Hyperactivity and Impulsivity: Yes  Often fidgets with or taps hands or feet: Yes  Often leaves seat in situations when remaining seated is expected: Yes  Often feels restless: Yes  Is often "on the go", acting as if "driven by a motor": Yes  Often talks excessively: Yes  Often blurts out an answer before a question has been completed: Yes  Often has difficulty waiting their turn: Yes  Often interrupts or intrudes on others: Yes      Record Review for 07/31/2023 : I have thoroughly reviewed the patient's electronic medical record to include previous encounters, care everywhere, notes, medications, labs, SHAUNA and UDS (if " applicable), imaging, and EKG's.  Pertinent information is included in this note.  3/29/2023 TSH 1.260, glucose 102, CBC and CMP are otherwise reassuring, lipid panel is reassuring.  Lithium level 0.2.  EKG Results:  SCANNED EKG (12/29/2019)   Head Imaging:  None in record    Per Referring Provider 7/26/2023 Arslan presents to the office today to re-establish care. Previously seen here a year ago, but moved to [place] x1 year.      He has been seeing Ekta Benson with [facility] - last seen on 06/16/2023 - has been seeing via telehealth. Recently they started a new medication plan and 'it just didn't work'. He states that at first it was working okay, but then he started to have intrusive thoughts. He stated having the thoughts about two weeks ago - he was having thoughts like he would be better off if he wasn't here. He also had recurrence of panic attacks. Those were seemingly well-controlled and now they are not.      He was most recently prescribed Zoloft and Wellbutrin, and Valium. He has taken Valium for a few years now. The Zoloft he took 1-2 months. Wellbutrin he has taken on and off over the years with different medications. He has taken Prozac, lithium, Abilify, Effexor, Vraylar, Rizulti, to name a few.      He did speak with a crisis counselor yesterday at [facility] - he states that she 'kind of talked me down, but it wasn't a very good experience' - he states that 'she basically told me to suck it up'. He state that his therapist at [facility] recently left - he was with her for two years and she left earlier this year.      He currently denies any thoughts of hurting himself or others. He does not have a plan. He endorses anger and labile mood which is interfering with his personal relationships. Endorses headaches and overeating associated with mood.      Past Psychiatric History:  Began Treatment: 2014   Diagnoses: Bipolar depression, anxiety, panic attacks  Psychiatrist: Yes  Therapist:  Yes  Admission History: 3 admissions  Medication Trials: Ativan, klonopin, xanax, valium, gabapentin, vistaril, buspar, prozac, lithium, abilify, effexor, vraylar, rexulti, wellbutrin, zoloft, lexapro, celexa, seroquel, clonidine, lamictal  Suicide Attempts: Several attempts, tried to OD on pills, cut wrists  Self Harm: Hx of cutting   Substance use/abuse:Denies  Withdrawal Symptoms: Not applicable  Longest Period Sober: Not applicable  AA: Not applicable  Trauma/Childhood/ACE:  parents,        Labs:  WBC   Date Value Ref Range Status   08/17/2023 4.47 3.40 - 10.80 10*3/mm3 Final     Platelets   Date Value Ref Range Status   08/17/2023 236 140 - 450 10*3/mm3 Final     Hemoglobin   Date Value Ref Range Status   08/17/2023 14.4 13.0 - 17.7 g/dL Final     Hematocrit   Date Value Ref Range Status   08/17/2023 42.3 37.5 - 51.0 % Final     Glucose   Date Value Ref Range Status   08/17/2023 136 (H) 65 - 99 mg/dL Final     Creatinine   Date Value Ref Range Status   08/17/2023 0.96 0.76 - 1.27 mg/dL Final     ALT (SGPT)   Date Value Ref Range Status   08/17/2023 52 (H) 1 - 41 U/L Final     AST (SGOT)   Date Value Ref Range Status   08/17/2023 42 (H) 1 - 40 U/L Final     BUN   Date Value Ref Range Status   08/17/2023 12 6 - 20 mg/dL Final     eGFR   Date Value Ref Range Status   08/17/2023 108.4 >60.0 mL/min/1.73 Final     Total Cholesterol   Date Value Ref Range Status   08/17/2023 124 0 - 200 mg/dL Final     Triglycerides   Date Value Ref Range Status   08/17/2023 148 0 - 150 mg/dL Final     HDL Cholesterol   Date Value Ref Range Status   08/17/2023 35 (L) 40 - 60 mg/dL Final     LDL Cholesterol    Date Value Ref Range Status   08/17/2023 63 0 - 100 mg/dL Final     VLDL Cholesterol   Date Value Ref Range Status   08/17/2023 26 5 - 40 mg/dL Final     LDL/HDL Ratio   Date Value Ref Range Status   08/17/2023 1.70  Final     Hemoglobin A1C   Date Value Ref Range Status   08/17/2023 6.10 (H) 4.80 - 5.60 % Final     TSH    Date Value Ref Range Status   08/17/2023 0.695 0.270 - 4.200 uIU/mL Final     Free T4   Date Value Ref Range Status   08/17/2023 1.26 0.93 - 1.70 ng/dL Final     Comment:     T4 results may be falsely increased if patient taking Biotin.      Pain Management Panel  More data exists         Latest Ref Rng & Units 8/17/2023 8/5/2022   Pain Management Panel   Creatinine, Urine mg/dL 172.2  300         Imaging Results:  No Images in the past 120 days found..    Current Medications:   Current Outpatient Medications   Medication Sig Dispense Refill    Cholecalciferol (Vitamin D3) 50 MCG (2000 UT) tablet Take 1 tablet by mouth Daily. 90 tablet 0    divalproex (Depakote ER) 250 MG 24 hr tablet Take 1 tablet by mouth Daily. 30 tablet 1    hydrOXYzine (ATARAX) 25 MG tablet Take 1 tablet by mouth 3 (Three) Times a Day As Needed for Anxiety (Panic Attacks). 90 tablet 1    lisinopril (PRINIVIL,ZESTRIL) 10 MG tablet Take 1 tablet by mouth Daily. 30 tablet 0    lurasidone (Latuda) 40 MG tablet tablet Take 1 tablet by mouth Daily. 30 tablet 1     No current facility-administered medications for this visit.       Problem List:  Patient Active Problem List   Diagnosis    Generalized anxiety disorder    Bipolar disorder    Gastroesophageal reflux disease    Hyperlipidemia    Class 3 severe obesity due to excess calories with serious comorbidity and body mass index (BMI) of 40.0 to 44.9 in adult    Erectile dysfunction    Chronic midline low back pain without sciatica    Allergic rhinitis    Chronic pain of left ankle    Chronic pain in left foot    Social anxiety disorder       Allergy:   Allergies   Allergen Reactions    Sulfamethoxazole-Trimethoprim Hives    Metformin GI Intolerance        Discontinued Medications:  There are no discontinued medications.    Mental Status Exam:   Appearance: good eye contact, normal street clothes, groomed, sitting in chair   Behavior: pleasant and cooperative  Motor: no abnormal  Speech: normal  rhythm, rate, volume, tone, not hyperverbal, not pressured, normal prosidy  Mood: Anxious  Affect: Appropriate  Thought Content: negative suicidal ideations, negative homicidal ideations, negative obsessions  Perceptions: negative auditory hallucinations, negative visual hallucinations, negative delusions, negative paranoia  Thought Process: goal directed, linear  Insight/Judgement: fair/fair  Cognition: grossly intact  Attention: intact  Orientation: person, place, time and situation  Memory: intact    Review of Systems:   Constitutional: Denies fatigue, night sweats  Eyes: Denies double vision, blurred vision  HENT: Denies vertigo, recent head injury  Cardiovascular: Denies chest pain, irregular heartbeats  Respiratory: Denies productive cough, shortness of breath  Gastrointestinal: Denies nausea, vomiting  Genitourinary: Denies dysuria, urinary retention  Integument: Denies hair growth change, new skin lesions  Neurologic: Denies altered mental status, seizures  Musculoskeletal: Denies joint swelling, limitation of motion  Endocrine: Denies cold intolerance, heat intolerance  Psychiatric: See mental status exam  Allergic-immunologic: Denies frequent illnesses    PLAN:   Presentation seems most consistent with DSM-V criteria for:  Diagnoses and all orders for this visit:    1. Bipolar affective disorder, current episode depressed, current episode severity unspecified (Primary)  -     divalproex (Depakote ER) 250 MG 24 hr tablet; Take 1 tablet by mouth Daily.  Dispense: 30 tablet; Refill: 1    2. Generalized anxiety disorder  -     divalproex (Depakote ER) 250 MG 24 hr tablet; Take 1 tablet by mouth Daily.  Dispense: 30 tablet; Refill: 1    3. Panic attacks       Continue Latuda 40mg daily  Start Depakote ER 250mg at bedtime  Follow-up 1 month  Discussed medication options and treatment plan of prescribed medication as well as the risks, benefits, and side effects.  Patient verbalized understanding and is agreeable  to this plan.   Patient is agreeable to call the office with any worsening of symptoms or onset of side effects.   Patient is agreeable to call 911 or go to the nearest ER should he/she begin having SI/HI.   Addressed all questions and concerns.    Continue psychotherapeutic modalities as indicated.    TREATMENT PLAN/GOALS:  Treatment plan: Continue supportive psychotherapy efforts and medications as indicated. Continue to challenge patterns of living conducive to pathology, strengthen defenses, promote problems solving, restore adaptive functioning and provide symptom relief. Treatment and medication options discussed during today's visit. Patient acknowledged and verbally consented to continue with current treatment plan and was educated on the importance of compliance with treatment and follow-up appointments.  Functional status: Fair  Prognosis: Good  Progress: Minimal improvement    Safety: No acute safety concerns.   Psychotherapy:      17 minutes of supportive psychotherapy with goal to strengthen defenses, promote problems solving, restore adaptive functioning and provide symptom relief. The therapeutic alliance was strengthened to encourage the patient to express their thoughts and feelings. Esteem building was enhanced through praise, reassurance, normalizing and encouragement. Coping skills were enhanced to build distress tolerance skills and emotional regulation. Allowed patient to freely discuss issues without interruption or judgement with unconditional positive regard, active listening skills, and empathy. Provided a safe, confidential environment to facilitate the development of a positive therapeutic relationship and encourage open, honest communication. Assisted patient in identifying risk factors which would indicate the need for higher level of care including thoughts to harm self or others and/or self-harming behavior and encouraged patient to contact this office, call 911, or present to the  nearest emergency room should any of these events occur. Assisted patient in processing session content; acknowledged and normalized patient's thoughts, feelings, and concerns by utilizing a person-centered approach in efforts to build appropriate rapport and a positive therapeutic relationship with open and honest communication. Patient given education on medication side effects, diagnosis/illness and relapse symptoms. Plan to continue supportive psychotherapy in next appointment to provide symptom relief.      Risk Assessment: Risk of self-harm acutely and chronically is moderate to severe.    Risk factors include anxiety disorder, mood disorder, and recent psychosocial stressors.   Protective factors include no family history, denies access to guns/weapons, no present SI, no history of suicide attempts or self-harm in the past, minimal AODA, healthcare seeking, future orientation, willingness to engage in care.    Risk assessment could be further elevated in the event of treatment noncompliance and/or AODA.  Labs/studies: No labs/studies ordered at this time  Medications:   New Medications Ordered This Visit   Medications    divalproex (Depakote ER) 250 MG 24 hr tablet     Sig: Take 1 tablet by mouth Daily.     Dispense:  30 tablet     Refill:  1      Medication Education:   LATUDA (LURASIDONE) Take with food. Risks, benefits, and alternatives discussed with patient including akathisia, sedation, dizziness/falls risk, nausea, low risk of weight gain & metabolic risks for diabetes and dyslipidemia, and rare tardive dyskinesia.  After discussion of these risks and benefits, the patient voiced understanding and agreed to proceed. Instructed to take medication with meal of minimum of 350 calories to improve consistent efficacy and absorption.   DEPAKOTE ER(VALPROATE) Risks, benefits, alternatives discussed with patient including nausea and vomiting, GI upset, sedation, dizziness/falls risk, increased appetite. After  discussion of these risks and benefits, the patient voiced understanding and agreed to proceed. Patient also informed of the need to take as prescribed, and for periodic labwork.        Follow-up: Return in about 1 month (around 9/22/2023) for Recheck, Next scheduled follow up.         This document has been electronically signed by CORTNEY Gray  August 22, 2023 15:14 EDT    Please note that portions of this note were completed with a voice recognition program.  Copied text in this note has been reviewed and is accurate as of 08/22/23

## 2023-08-22 NOTE — LETTER
2023       No Recipients    Patient: Arslan Butler   YOB: 1990   Date of Visit: 2023     Dear   No Recipients:       Thank you for referring Arslan Butler to me for evaluation. Below are the relevant portions of my assessment and plan of care.    If you have questions, please do not hesitate to call me. I look forward to following Arslan along with you.         Sincerely,        CORTNEY Gray        CC:   No Recipients    Sherrie Oneill APRN  [phone]  Signed  [facility]  Medication Management Follow-up  Virtual MyChart Visit  This provider is located at [address] [place] [address]. The Patient is located at home. Patient is being seen remotely using MyChart. Patient is being seen via telehealth and confirm that they are in a secure environment for this session. The patient's condition being diagnosed/treated is appropriate for telemedicine. The provider identified herself as well as her credentials. They may refuse to be seen remotely at any time. The electronic data is encrypted and password protected, and the patient has been advised of the potential risks to privacy not withstanding such measures. Virtual visit via Zoom audio and video due to the COVID-19 pandemic.  Patient is accepting of and agreeable to visit.  The visit consisted of the patient and I. PT Identifiers used: Name and .    Referring Provider:  No referring provider defined for this encounter.    Vital Signs:   There were no vitals taken for this visit.    Chief Complaint:     History of Present Illness:   Arslan Butler is a 32 y.o. male who presents today for follow-up and medication management for:    ICD-10-CM ICD-9-CM   1. Bipolar affective disorder, current episode depressed, current episode severity unspecified  F31.30 296.50   2. Generalized anxiety disorder  F41.1 300.02   3. Panic attacks  F41.0 300.01         2023 Patient is taking medications as  prescribed and is tolerating them well.   Still having mood swings, anger outbursts, leyda is helping with depression.   Having some altercations with wife about finances.   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  Depression  Visit Type: follow-up (Bipolar, PETR, Panic Attacks)  Patient presents with the following symptoms: depressed mood, excessive worry, irritability, muscle tension, nervousness/anxiety, psychomotor agitation and restlessness.  Patient is not experiencing: anhedonia, compulsions, suicidal ideas, suicidal planning and thoughts of death.  Frequency of symptoms: most days   Severity: causing significant distress   Sleep quality: fair  Denies suicidal ideation.  Denies AVH.  We will continue to monitor for mood, behavior, and safety.    Record Review is below for 08/22/2023 : I have thoroughly reviewed the patient's electronic medical record to include previous encounters, care everywhere, notes, medications, labs, SHAUNA and UDS (if applicable), imaging, and EKG's.  Pertinent information is included in this note.  8/17/2023 TSH 0.695, free T41.26, lipid panel is reassuring, hemoglobin A1c 6.10, glucose 136, ALT 52, AST 42, CBC and CMP are otherwise reassuring.  EKG Results:  SCANNED EKG (12/29/2019)   Head Imaging:  None in record      07/31/2023 Depression and anxiety for several years.   Bipolar  Depression  Visit Type: initial  Onset of symptoms: more than 1 year ago  Progression since onset: gradually worsening  Patient presents with the following symptoms: anhedonia, decreased concentration, depressed mood, excessive worry, fatigue, feelings of hopelessness, irritability, muscle tension, nervousness/anxiety, obsessions, panic, psychomotor agitation and restlessness.  Patient is not experiencing: suicidal ideas, suicidal planning and thoughts of death.  Frequency of  "symptoms: most days   Severity: interfering with daily activities   Aggravated by: family issues  Sleep quality: fair  Not taking valium three times daily, mostly once daily, valium doesn't really help with panic attacks.   Paranoia (I.e.accusing his wife of talking to her ex-, afraid of passing his mental health issue to his children). Catastrophism of things. Has tried  TMS in [place] 2 years ago without success, was unable to complete full course of treatment. Panic attacks three times daily.   Patient has a compelling childhood assessment for potentially undiagnosed ADHD.  We discussed neuropsychological testing to confirm diagnosis.  Denies suicidal ideation.  Denies AVH.  PHQ-9 is 27 and PETR-7 is 21.  Latuda 20 mg daily  Follow-up 3 weeks    ADHD:  Symptoms are persistent and significantly interfere with major life activities and/or result in significant suffering  With focus on K5 through 6th grade only   Grades: \"pretty bad\"  Behavioral issues: Trouble for getting out of his seat, not listening, talking   Special Classes:Yes, speech, hearing  Inattention:Yes  Referral for ADHD testing:Father did not believe in mental health problems     Often fails to give close attention to details or makes careless mistakes in schoolwork, at work, or during other activities:Yes  Often has difficulty sustaining attention in tasks:Yes  Often does not seem to listen when spoken to directly:Yes  Often does not follow through on instructions and fails to finish duties in the workplace:Yes  Often has difficulty organizing tasks and activities:Yes  Often avoids, dislikes or is reluctant to engage in tasks that require sustained mental effort:Yes  Often loses things necessary for tasks or activities:Yes  Is often easily distracted by extraneous stimuli: Yes  Is often forgetful in daily activities: Yes  Hyperactivity and Impulsivity: Yes  Often fidgets with or taps hands or feet: Yes  Often leaves seat in situations when " "remaining seated is expected: Yes  Often feels restless: Yes  Is often "on the go", acting as if "driven by a motor": Yes  Often talks excessively: Yes  Often blurts out an answer before a question has been completed: Yes  Often has difficulty waiting their turn: Yes  Often interrupts or intrudes on others: Yes      Record Review for 07/31/2023 : I have thoroughly reviewed the patient's electronic medical record to include previous encounters, care everywhere, notes, medications, labs, SHAUNA and UDS (if applicable), imaging, and EKG's.  Pertinent information is included in this note.  3/29/2023 TSH 1.260, glucose 102, CBC and CMP are otherwise reassuring, lipid panel is reassuring.  Lithium level 0.2.  EKG Results:  SCANNED EKG (12/29/2019)   Head Imaging:  None in record    Per Referring Provider 7/26/2023 Arslan presents to the office today to re-establish care. Previously seen here a year ago, but moved to [place] x1 year.      He has been seeing Ekta Benson with [facility] - last seen on 06/16/2023 - has been seeing via telehealth. Recently they started a new medication plan and 'it just didn't work'. He states that at first it was working okay, but then he started to have intrusive thoughts. He stated having the thoughts about two weeks ago - he was having thoughts like he would be better off if he wasn't here. He also had recurrence of panic attacks. Those were seemingly well-controlled and now they are not.      He was most recently prescribed Zoloft and Wellbutrin, and Valium. He has taken Valium for a few years now. The Zoloft he took 1-2 months. Wellbutrin he has taken on and off over the years with different medications. He has taken Prozac, lithium, Abilify, Effexor, Vraylar, Rizulti, to name a few.      He did speak with a crisis counselor yesterday at [facility] - he states that she 'kind of talked me down, but it wasn't a very good experience' - he states that 'she basically told me to " suck it up'. He state that his therapist at [facility] recently left - he was with her for two years and she left earlier this year.      He currently denies any thoughts of hurting himself or others. He does not have a plan. He endorses anger and labile mood which is interfering with his personal relationships. Endorses headaches and overeating associated with mood.      Past Psychiatric History:  Began Treatment: 2014   Diagnoses: Bipolar depression, anxiety, panic attacks  Psychiatrist: Yes  Therapist: Yes  Admission History: 3 admissions  Medication Trials: Ativan, klonopin, xanax, valium, gabapentin, vistaril, buspar, prozac, lithium, abilify, effexor, vraylar, rexulti, wellbutrin, zoloft, lexapro, celexa, seroquel, clonidine, lamictal  Suicide Attempts: Several attempts, tried to OD on pills, cut wrists  Self Harm: Hx of cutting   Substance use/abuse:Denies  Withdrawal Symptoms: Not applicable  Longest Period Sober: Not applicable  AA: Not applicable  Trauma/Childhood/ACE:  parents,        Labs:  WBC   Date Value Ref Range Status   08/17/2023 4.47 3.40 - 10.80 10*3/mm3 Final     Platelets   Date Value Ref Range Status   08/17/2023 236 140 - 450 10*3/mm3 Final     Hemoglobin   Date Value Ref Range Status   08/17/2023 14.4 13.0 - 17.7 g/dL Final     Hematocrit   Date Value Ref Range Status   08/17/2023 42.3 37.5 - 51.0 % Final     Glucose   Date Value Ref Range Status   08/17/2023 136 (H) 65 - 99 mg/dL Final     Creatinine   Date Value Ref Range Status   08/17/2023 0.96 0.76 - 1.27 mg/dL Final     ALT (SGPT)   Date Value Ref Range Status   08/17/2023 52 (H) 1 - 41 U/L Final     AST (SGOT)   Date Value Ref Range Status   08/17/2023 42 (H) 1 - 40 U/L Final     BUN   Date Value Ref Range Status   08/17/2023 12 6 - 20 mg/dL Final     eGFR   Date Value Ref Range Status   08/17/2023 108.4 >60.0 mL/min/1.73 Final     Total Cholesterol   Date Value Ref Range Status   08/17/2023 124 0 - 200 mg/dL Final      Triglycerides   Date Value Ref Range Status   08/17/2023 148 0 - 150 mg/dL Final     HDL Cholesterol   Date Value Ref Range Status   08/17/2023 35 (L) 40 - 60 mg/dL Final     LDL Cholesterol    Date Value Ref Range Status   08/17/2023 63 0 - 100 mg/dL Final     VLDL Cholesterol   Date Value Ref Range Status   08/17/2023 26 5 - 40 mg/dL Final     LDL/HDL Ratio   Date Value Ref Range Status   08/17/2023 1.70  Final     Hemoglobin A1C   Date Value Ref Range Status   08/17/2023 6.10 (H) 4.80 - 5.60 % Final     TSH   Date Value Ref Range Status   08/17/2023 0.695 0.270 - 4.200 uIU/mL Final     Free T4   Date Value Ref Range Status   08/17/2023 1.26 0.93 - 1.70 ng/dL Final     Comment:     T4 results may be falsely increased if patient taking Biotin.      Pain Management Panel  More data exists         Latest Ref Rng & Units 8/17/2023 8/5/2022   Pain Management Panel   Creatinine, Urine mg/dL 172.2  300         Imaging Results:  No Images in the past 120 days found..    Current Medications:   Current Outpatient Medications   Medication Sig Dispense Refill    Cholecalciferol (Vitamin D3) 50 MCG (2000 UT) tablet Take 1 tablet by mouth Daily. 90 tablet 0    divalproex (Depakote ER) 250 MG 24 hr tablet Take 1 tablet by mouth Daily. 30 tablet 1    hydrOXYzine (ATARAX) 25 MG tablet Take 1 tablet by mouth 3 (Three) Times a Day As Needed for Anxiety (Panic Attacks). 90 tablet 1    lisinopril (PRINIVIL,ZESTRIL) 10 MG tablet Take 1 tablet by mouth Daily. 30 tablet 0    lurasidone (Latuda) 40 MG tablet tablet Take 1 tablet by mouth Daily. 30 tablet 1     No current facility-administered medications for this visit.       Problem List:  Patient Active Problem List   Diagnosis    Generalized anxiety disorder    Bipolar disorder    Gastroesophageal reflux disease    Hyperlipidemia    Class 3 severe obesity due to excess calories with serious comorbidity and body mass index (BMI) of 40.0 to 44.9 in adult    Erectile dysfunction     Chronic midline low back pain without sciatica    Allergic rhinitis    Chronic pain of left ankle    Chronic pain in left foot    Social anxiety disorder       Allergy:   Allergies   Allergen Reactions    Sulfamethoxazole-Trimethoprim Hives    Metformin GI Intolerance        Discontinued Medications:  There are no discontinued medications.    Mental Status Exam:   Appearance: good eye contact, normal street clothes, groomed, sitting in chair   Behavior: pleasant and cooperative  Motor: no abnormal  Speech: normal rhythm, rate, volume, tone, not hyperverbal, not pressured, normal prosidy  Mood: Anxious  Affect: Appropriate  Thought Content: negative suicidal ideations, negative homicidal ideations, negative obsessions  Perceptions: negative auditory hallucinations, negative visual hallucinations, negative delusions, negative paranoia  Thought Process: goal directed, linear  Insight/Judgement: fair/fair  Cognition: grossly intact  Attention: intact  Orientation: person, place, time and situation  Memory: intact    Review of Systems:   Constitutional: Denies fatigue, night sweats  Eyes: Denies double vision, blurred vision  HENT: Denies vertigo, recent head injury  Cardiovascular: Denies chest pain, irregular heartbeats  Respiratory: Denies productive cough, shortness of breath  Gastrointestinal: Denies nausea, vomiting  Genitourinary: Denies dysuria, urinary retention  Integument: Denies hair growth change, new skin lesions  Neurologic: Denies altered mental status, seizures  Musculoskeletal: Denies joint swelling, limitation of motion  Endocrine: Denies cold intolerance, heat intolerance  Psychiatric: See mental status exam  Allergic-immunologic: Denies frequent illnesses    PLAN:   Presentation seems most consistent with DSM-V criteria for:  Diagnoses and all orders for this visit:    1. Bipolar affective disorder, current episode depressed, current episode severity unspecified (Primary)  -     divalproex (Depakote  ER) 250 MG 24 hr tablet; Take 1 tablet by mouth Daily.  Dispense: 30 tablet; Refill: 1    2. Generalized anxiety disorder  -     divalproex (Depakote ER) 250 MG 24 hr tablet; Take 1 tablet by mouth Daily.  Dispense: 30 tablet; Refill: 1    3. Panic attacks       Continue Latuda 40mg daily  Start Depakote ER 250mg at bedtime  Follow-up 1 month  Discussed medication options and treatment plan of prescribed medication as well as the risks, benefits, and side effects.  Patient verbalized understanding and is agreeable to this plan.   Patient is agreeable to call the office with any worsening of symptoms or onset of side effects.   Patient is agreeable to call 911 or go to the nearest ER should he/she begin having SI/HI.   Addressed all questions and concerns.    Continue psychotherapeutic modalities as indicated.    TREATMENT PLAN/GOALS:  Treatment plan: Continue supportive psychotherapy efforts and medications as indicated. Continue to challenge patterns of living conducive to pathology, strengthen defenses, promote problems solving, restore adaptive functioning and provide symptom relief. Treatment and medication options discussed during today's visit. Patient acknowledged and verbally consented to continue with current treatment plan and was educated on the importance of compliance with treatment and follow-up appointments.  Functional status: Fair  Prognosis: Good  Progress: Minimal improvement    Safety: No acute safety concerns.   Psychotherapy:      17 minutes of supportive psychotherapy with goal to strengthen defenses, promote problems solving, restore adaptive functioning and provide symptom relief. The therapeutic alliance was strengthened to encourage the patient to express their thoughts and feelings. Esteem building was enhanced through praise, reassurance, normalizing and encouragement. Coping skills were enhanced to build distress tolerance skills and emotional regulation. Allowed patient to freely  discuss issues without interruption or judgement with unconditional positive regard, active listening skills, and empathy. Provided a safe, confidential environment to facilitate the development of a positive therapeutic relationship and encourage open, honest communication. Assisted patient in identifying risk factors which would indicate the need for higher level of care including thoughts to harm self or others and/or self-harming behavior and encouraged patient to contact this office, call 911, or present to the nearest emergency room should any of these events occur. Assisted patient in processing session content; acknowledged and normalized patient's thoughts, feelings, and concerns by utilizing a person-centered approach in efforts to build appropriate rapport and a positive therapeutic relationship with open and honest communication. Patient given education on medication side effects, diagnosis/illness and relapse symptoms. Plan to continue supportive psychotherapy in next appointment to provide symptom relief.      Risk Assessment: Risk of self-harm acutely and chronically is moderate to severe.    Risk factors include anxiety disorder, mood disorder, and recent psychosocial stressors.   Protective factors include no family history, denies access to guns/weapons, no present SI, no history of suicide attempts or self-harm in the past, minimal AODA, healthcare seeking, future orientation, willingness to engage in care.    Risk assessment could be further elevated in the event of treatment noncompliance and/or AODA.  Labs/studies: No labs/studies ordered at this time  Medications:   New Medications Ordered This Visit   Medications    divalproex (Depakote ER) 250 MG 24 hr tablet     Sig: Take 1 tablet by mouth Daily.     Dispense:  30 tablet     Refill:  1      Medication Education:   LATUDA (LURASIDONE) Take with food. Risks, benefits, and alternatives discussed with patient including akathisia, sedation,  dizziness/falls risk, nausea, low risk of weight gain & metabolic risks for diabetes and dyslipidemia, and rare tardive dyskinesia.  After discussion of these risks and benefits, the patient voiced understanding and agreed to proceed. Instructed to take medication with meal of minimum of 350 calories to improve consistent efficacy and absorption.   DEPAKOTE ER(VALPROATE) Risks, benefits, alternatives discussed with patient including nausea and vomiting, GI upset, sedation, dizziness/falls risk, increased appetite. After discussion of these risks and benefits, the patient voiced understanding and agreed to proceed. Patient also informed of the need to take as prescribed, and for periodic labwork.        Follow-up: Return in about 1 month (around 9/22/2023) for Recheck, Next scheduled follow up.         This document has been electronically signed by CORTNEY Gray  August 22, 2023 15:14 EDT    Please note that portions of this note were completed with a voice recognition program.  Copied text in this note has been reviewed and is accurate as of 08/22/23

## 2023-08-22 NOTE — PATIENT INSTRUCTIONS
1.  Please return to clinic at your next scheduled visit.  Please contact the clinic ([phone]) at least 24 hours prior in the event you need to cancel.  2.  Do no harm to yourself or others.    3.  Avoid alcohol and drugs.    4.  Take all medications as prescribed.  Please contact the clinic with any concerns. If you are in need of medication refills, please call the clinic at [phone].    5. Should you want to get in touch with your provider, CORTNEY Gray, please contact the office ([phone]), and staff will be able to page Sherrie directly.  6. In the event you have personal crisis, contact the following crisis numbers: Suicide Prevention Hotline 1-345.264.4430; [facility] Helpline 3-904-162-QICM; [facility] Emergency Room [phone]; text HELLO to 724528; or 643.     SPECIFIC RECOMMENDATIONS:     1.      Medications discussed at this encounter:     New Medications Ordered This Visit   Medications    divalproex (Depakote ER) 250 MG 24 hr tablet     Sig: Take 1 tablet by mouth Daily.     Dispense:  30 tablet     Refill:  1                       2.      Psychotherapy recommendations: We will continue therapy at future visits.     3.     Return to clinic: Return in about 1 month (around 9/22/2023) for Recheck, Next scheduled follow up.

## 2023-08-22 NOTE — TELEPHONE ENCOUNTER
Provider request records request from [facility].     Called pt to notify that he will need to complete AMADO for our office to request records, or he can stop by [facility] office and complete records release for there to send records to our office. The AMADO from our office can be completed at the [facility] location as his provider travels to various offices during the week, or he can fill out a request at the [facility] office. Pt did not answer, lvm with the following information and to call our office if needed.

## 2023-08-23 NOTE — TELEPHONE ENCOUNTER
Called pt to request he stop by [facility] office or [facility] to complete medical records release form to obtain records from [facility]. Requested he stop by [facility] location instead of [place] so pt is able to complete records release for behavioral health as provider travels between two locations during the week. Pt also notified letter is written by provider and available to view in [place], or he can pickup physical copy at [place] office.     Letter from Sherrie Oneill APRN (08/23/2023)     Pt did not answer, lvm with the following information and to call our office if needed.

## 2023-09-21 ENCOUNTER — TELEMEDICINE (OUTPATIENT)
Dept: BEHAVIORAL HEALTH | Facility: CLINIC | Age: 33
End: 2023-09-21
Payer: COMMERCIAL

## 2023-09-21 DIAGNOSIS — F41.0 PANIC ATTACKS: ICD-10-CM

## 2023-09-21 DIAGNOSIS — F31.30 BIPOLAR AFFECTIVE DISORDER, CURRENT EPISODE DEPRESSED, CURRENT EPISODE SEVERITY UNSPECIFIED: Primary | ICD-10-CM

## 2023-09-21 DIAGNOSIS — F41.1 GENERALIZED ANXIETY DISORDER: ICD-10-CM

## 2023-09-21 RX ORDER — LURASIDONE HYDROCHLORIDE 60 MG/1
60 TABLET, FILM COATED ORAL DAILY
Qty: 30 TABLET | Refills: 1 | Status: SHIPPED | OUTPATIENT
Start: 2023-09-21

## 2023-09-21 NOTE — PROGRESS NOTES
"[facility]  Medication Management Follow-up    Referring Provider:  No referring provider defined for this encounter.    Vital Signs:   There were no vitals taken for this visit.    Chief Complaint: Bipolar    History of Present Illness:   Arslan Butler is a 32 y.o. male who presents today for follow-up and medication management for:    ICD-10-CM ICD-9-CM   1. Bipolar affective disorder, current episode depressed, current episode severity unspecified  F31.30 296.50   2. Generalized anxiety disorder  F41.1 300.02   3. Panic attacks  F41.0 300.01       09/21/2023 Patient is taking medications as prescribed and is tolerating them well.   Has been through 4 jobs since we last talked. He says that he would start the job and then would talk himself for attendance. He is regretting quitting the most recent job because it was a fairly simple job.   His wife, Belia, is noticing that he is having more \"ups\" in a good way.   We are discussing the possibility of treating patient for a compelling childhood assessment for ADHD since we have been well with some mood stabilization.  He is still struggling with anxiety.  Panic attacks a few times a week. Denies suicidal ideation.  Denies AVH.  We will continue to monitor for mood, behavior, and safety.    Record Review is below for 09/21/2023 : I have thoroughly reviewed the patient's electronic medical record to include previous encounters, care everywhere, notes, medications, labs, SHAUNA and UDS (if applicable), imaging, and EKG's.  Pertinent information is included in this note.  8/17/2023 TSH 0.695, free T41.26, lipid panel is reassuring, hemoglobin A1c 6.10, glucose 136, ALT 52, AST 42, CBC and CMP are otherwise reassuring.  EKG Results:  SCANNED EKG (12/29/2019)   Head Imaging:  None in record    08/22/2023 Patient is taking medications as prescribed and is tolerating them well.   Still having mood swings, anger outbursts, latuda is helping with " depression.   Having some altercations with wife about finances.   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  Depression  Visit Type: follow-up (Bipolar, PETR, Panic Attacks)  Patient presents with the following symptoms: depressed mood, excessive worry, irritability, muscle tension, nervousness/anxiety, psychomotor agitation and restlessness.  Patient is not experiencing: anhedonia, compulsions, suicidal ideas, suicidal planning and thoughts of death.  Frequency of symptoms: most days   Severity: causing significant distress   Sleep quality: fair  Denies suicidal ideation.  Denies AVH.  We will continue to monitor for mood, behavior, and safety.  Continue Latuda 40mg daily  Start Depakote ER 250mg at bedtime  Follow-up 1 month    Record Review is below for 08/22/2023 : I have thoroughly reviewed the patient's electronic medical record to include previous encounters, care everywhere, notes, medications, labs, SHAUNA and UDS (if applicable), imaging, and EKG's.  Pertinent information is included in this note.  8/17/2023 TSH 0.695, free T41.26, lipid panel is reassuring, hemoglobin A1c 6.10, glucose 136, ALT 52, AST 42, CBC and CMP are otherwise reassuring.  EKG Results:  SCANNED EKG (12/29/2019)   Head Imaging:  None in record      07/31/2023 Depression and anxiety for several years.   Bipolar  Depression  Visit Type: initial  Onset of symptoms: more than 1 year ago  Progression since onset: gradually worsening  Patient presents with the following symptoms: anhedonia, decreased concentration, depressed mood, excessive worry, fatigue, feelings of hopelessness, irritability, muscle tension, nervousness/anxiety, obsessions, panic, psychomotor agitation and restlessness.  Patient is not experiencing: suicidal ideas, suicidal planning and thoughts of death.  Frequency of symptoms: most days   Severity:  "interfering with daily activities   Aggravated by: family issues  Sleep quality: fair  Not taking valium three times daily, mostly once daily, valium doesn't really help with panic attacks.   Paranoia (I.e.accusing his wife of talking to her ex-, afraid of passing his mental health issue to his children). Catastrophism of things. Has tried  TMS in [place] 2 years ago without success, was unable to complete full course of treatment. Panic attacks three times daily.   Patient has a compelling childhood assessment for potentially undiagnosed ADHD.  We discussed neuropsychological testing to confirm diagnosis.  Denies suicidal ideation.  Denies AVH.  PHQ-9 is 27 and PETR-7 is 21.  Latuda 20 mg daily  Follow-up 3 weeks    ADHD:  Symptoms are persistent and significantly interfere with major life activities and/or result in significant suffering  With focus on K5 through 6th grade only   Grades: \"pretty bad\"  Behavioral issues: Trouble for getting out of his seat, not listening, talking   Special Classes:Yes, speech, hearing  Inattention:Yes  Referral for ADHD testing:Father did not believe in mental health problems     Often fails to give close attention to details or makes careless mistakes in schoolwork, at work, or during other activities:Yes  Often has difficulty sustaining attention in tasks:Yes  Often does not seem to listen when spoken to directly:Yes  Often does not follow through on instructions and fails to finish duties in the workplace:Yes  Often has difficulty organizing tasks and activities:Yes  Often avoids, dislikes or is reluctant to engage in tasks that require sustained mental effort:Yes  Often loses things necessary for tasks or activities:Yes  Is often easily distracted by extraneous stimuli: Yes  Is often forgetful in daily activities: Yes  Hyperactivity and Impulsivity: Yes  Often fidgets with or taps hands or feet: Yes  Often leaves seat in situations when remaining seated is expected: " Yes  Often feels restless: Yes  Is often “on the go”, acting as if “driven by a motor”: Yes  Often talks excessively: Yes  Often blurts out an answer before a question has been completed: Yes  Often has difficulty waiting their turn: Yes  Often interrupts or intrudes on others: Yes      Record Review for 07/31/2023 : I have thoroughly reviewed the patient's electronic medical record to include previous encounters, care everywhere, notes, medications, labs, SHAUNA and UDS (if applicable), imaging, and EKG's.  Pertinent information is included in this note.  3/29/2023 TSH 1.260, glucose 102, CBC and CMP are otherwise reassuring, lipid panel is reassuring.  Lithium level 0.2.  EKG Results:  SCANNED EKG (12/29/2019)   Head Imaging:  None in record    Per Referring Provider 7/26/2023 Arslan presents to the office today to re-establish care. Previously seen here a year ago, but moved to [place] x1 year.      He has been seeing Ekta Benson with [facility] - last seen on 06/16/2023 - has been seeing via telehealth. Recently they started a new medication plan and 'it just didn't work'. He states that at first it was working okay, but then he started to have intrusive thoughts. He stated having the thoughts about two weeks ago - he was having thoughts like he would be better off if he wasn't here. He also had recurrence of panic attacks. Those were seemingly well-controlled and now they are not.      He was most recently prescribed Zoloft and Wellbutrin, and Valium. He has taken Valium for a few years now. The Zoloft he took 1-2 months. Wellbutrin he has taken on and off over the years with different medications. He has taken Prozac, lithium, Abilify, Effexor, Vraylar, Rizulti, to name a few.      He did speak with a crisis counselor yesterday at [facility] - he states that she 'kind of talked me down, but it wasn't a very good experience' - he states that 'she basically told me to suck it up'. He state that his  therapist at [facility] recently left - he was with her for two years and she left earlier this year.      He currently denies any thoughts of hurting himself or others. He does not have a plan. He endorses anger and labile mood which is interfering with his personal relationships. Endorses headaches and overeating associated with mood.      Past Psychiatric History:  Began Treatment: 2014   Diagnoses: Bipolar depression, anxiety, panic attacks  Psychiatrist: Yes  Therapist: Yes  Admission History: 3 admissions  Medication Trials: Ativan, klonopin, xanax, valium, gabapentin, vistaril, buspar, prozac, lithium, abilify, effexor, vraylar, rexulti, wellbutrin, zoloft, lexapro, celexa, seroquel, clonidine, lamictal  Suicide Attempts: Several attempts, tried to OD on pills, cut wrists  Self Harm: Hx of cutting   Substance use/abuse:Denies  Withdrawal Symptoms: Not applicable  Longest Period Sober: Not applicable  AA: Not applicable  Trauma/Childhood/ACE:  parents,     MENTAL STATUS EXAM   General Appearance:  Cleanly groomed and dressed and well developed  Eye Contact:  Good eye contact  Attitude:  Cooperative and polite  Motor Activity:  Normal gait, posture  Speech:  Normal rate, tone, volume  Mood and affect:  Normal, pleasant and euthymic  Hopelessness:  Denies  Thought Process:  Logical and goal-directed  Associations/ Thought Content:  No delusions  Hallucinations:  None  Suicidal Ideations:  Not present  Homicidal Ideation:  Not present  Sensorium:  Alert  Orientation:  Person, place, time and situation  Immediate Recall, Recent, and Remote Memory:  Intact  Attention Span/ Concentration:  Good  Fund of Knowledge:  Appropriate for age and educational level  Intellectual Functioning:  Average range  Insight:  Good  Judgement:  Good  Reliability:  Good  Impulse Control:  Good        Review of systems is negative except as noted in HPI.  Labs:  WBC   Date Value Ref Range Status   08/17/2023 4.47 3.40 - 10.80  10*3/mm3 Final     Platelets   Date Value Ref Range Status   08/17/2023 236 140 - 450 10*3/mm3 Final     Hemoglobin   Date Value Ref Range Status   08/17/2023 14.4 13.0 - 17.7 g/dL Final     Hematocrit   Date Value Ref Range Status   08/17/2023 42.3 37.5 - 51.0 % Final     Glucose   Date Value Ref Range Status   08/17/2023 136 (H) 65 - 99 mg/dL Final     Creatinine   Date Value Ref Range Status   08/17/2023 0.96 0.76 - 1.27 mg/dL Final     ALT (SGPT)   Date Value Ref Range Status   08/17/2023 52 (H) 1 - 41 U/L Final     AST (SGOT)   Date Value Ref Range Status   08/17/2023 42 (H) 1 - 40 U/L Final     BUN   Date Value Ref Range Status   08/17/2023 12 6 - 20 mg/dL Final     eGFR   Date Value Ref Range Status   08/17/2023 108.4 >60.0 mL/min/1.73 Final     Total Cholesterol   Date Value Ref Range Status   08/17/2023 124 0 - 200 mg/dL Final     Triglycerides   Date Value Ref Range Status   08/17/2023 148 0 - 150 mg/dL Final     HDL Cholesterol   Date Value Ref Range Status   08/17/2023 35 (L) 40 - 60 mg/dL Final     LDL Cholesterol    Date Value Ref Range Status   08/17/2023 63 0 - 100 mg/dL Final     VLDL Cholesterol   Date Value Ref Range Status   08/17/2023 26 5 - 40 mg/dL Final     LDL/HDL Ratio   Date Value Ref Range Status   08/17/2023 1.70  Final     Hemoglobin A1C   Date Value Ref Range Status   08/17/2023 6.10 (H) 4.80 - 5.60 % Final     TSH   Date Value Ref Range Status   08/17/2023 0.695 0.270 - 4.200 uIU/mL Final     Free T4   Date Value Ref Range Status   08/17/2023 1.26 0.93 - 1.70 ng/dL Final     Comment:     T4 results may be falsely increased if patient taking Biotin.      Pain Management Panel  More data exists         Latest Ref Rng & Units 8/17/2023 8/5/2022   Pain Management Panel   Creatinine, Urine mg/dL 172.2  300         Imaging Results:  No Images in the past 120 days found..    Current Medications:   Current Outpatient Medications   Medication Sig Dispense Refill    Cholecalciferol (Vitamin D3)  50 MCG (2000 UT) tablet Take 1 tablet by mouth Daily. 90 tablet 0    divalproex (Depakote ER) 250 MG 24 hr tablet Take 1 tablet by mouth Daily. 30 tablet 1    hydrOXYzine (ATARAX) 25 MG tablet Take 1 tablet by mouth 3 (Three) Times a Day As Needed for Anxiety (Panic Attacks). 90 tablet 1    lisinopril (PRINIVIL,ZESTRIL) 10 MG tablet TAKE 1 TABLET BY MOUTH DAILY 30 tablet 0    lurasidone HCl (LATUDA) 60 MG tablet tablet Take 1 tablet by mouth Daily. 30 tablet 1     No current facility-administered medications for this visit.       Problem List:  Patient Active Problem List   Diagnosis    Generalized anxiety disorder    Bipolar disorder    Gastroesophageal reflux disease    Hyperlipidemia    Class 3 severe obesity due to excess calories with serious comorbidity and body mass index (BMI) of 40.0 to 44.9 in adult    Erectile dysfunction    Chronic midline low back pain without sciatica    Allergic rhinitis    Chronic pain of left ankle    Chronic pain in left foot    Social anxiety disorder       Allergy:   Allergies   Allergen Reactions    Sulfamethoxazole-Trimethoprim Hives    Metformin GI Intolerance        Discontinued Medications:  Medications Discontinued During This Encounter   Medication Reason    lurasidone (Latuda) 40 MG tablet tablet Dose adjustment         PLAN:   Presentation seems most consistent with DSM-V criteria for:  Diagnoses and all orders for this visit:    1. Bipolar affective disorder, current episode depressed, current episode severity unspecified (Primary)  -     lurasidone HCl (LATUDA) 60 MG tablet tablet; Take 1 tablet by mouth Daily.  Dispense: 30 tablet; Refill: 1    2. Generalized anxiety disorder  -     lurasidone HCl (LATUDA) 60 MG tablet tablet; Take 1 tablet by mouth Daily.  Dispense: 30 tablet; Refill: 1    3. Panic attacks       Increase Latuda to 60 mg daily  Continue Depakote ER 250mg at bedtime  Follow-up 1 month  Discussed medication options and treatment plan of prescribed  medication as well as the risks, benefits, and side effects.  Patient verbalized understanding and is agreeable to this plan.   Patient is agreeable to call the office with any worsening of symptoms or onset of side effects.   Patient is agreeable to call 911 or go to the nearest ER should he/she begin having SI/HI.   Addressed all questions and concerns.    Continue psychotherapeutic modalities as indicated.    TREATMENT PLAN/GOALS:  Treatment plan: Continue supportive psychotherapy efforts and medications as indicated. Continue to challenge patterns of living conducive to pathology, strengthen defenses, promote problems solving, restore adaptive functioning and provide symptom relief. Treatment and medication options discussed during today's visit. Patient acknowledged and verbally consented to continue with current treatment plan and was educated on the importance of compliance with treatment and follow-up appointments.  Functional status:Good  Prognosis: Good  Progress: Continued improvement    Safety: No acute safety concerns.   Psychotherapy:      17 minutes of supportive psychotherapy with goal to strengthen defenses, promote problems solving, restore adaptive functioning and provide symptom relief. The therapeutic alliance was strengthened to encourage the patient to express their thoughts and feelings. Esteem building was enhanced through praise, reassurance, normalizing and encouragement. Coping skills were enhanced to build distress tolerance skills and emotional regulation. Allowed patient to freely discuss issues without interruption or judgement with unconditional positive regard, active listening skills, and empathy. Provided a safe, confidential environment to facilitate the development of a positive therapeutic relationship and encourage open, honest communication. Assisted patient in identifying risk factors which would indicate the need for higher level of care including thoughts to harm self or  others and/or self-harming behavior and encouraged patient to contact this office, call 911, or present to the nearest emergency room should any of these events occur. Assisted patient in processing session content; acknowledged and normalized patient’s thoughts, feelings, and concerns by utilizing a person-centered approach in efforts to build appropriate rapport and a positive therapeutic relationship with open and honest communication. Patient given education on medication side effects, diagnosis/illness and relapse symptoms. Plan to continue supportive psychotherapy in next appointment to provide symptom relief.      Risk Assessment: Risk of self-harm acutely and chronically is moderate.    Risk factors include anxiety disorder, mood disorder, and recent psychosocial stressors.   Protective factors include no family history, denies access to guns/weapons, no present SI,minimal AODA, healthcare seeking, future orientation, willingness to engage in care.    Risk assessment could be further elevated in the event of treatment noncompliance and/or AODA.  Labs/studies: No labs/studies ordered at this time  Medications:   New Medications Ordered This Visit   Medications    lurasidone HCl (LATUDA) 60 MG tablet tablet     Sig: Take 1 tablet by mouth Daily.     Dispense:  30 tablet     Refill:  1      Medication Education:   LATUDA (LURASIDONE) Take with food. Risks, benefits, and alternatives discussed with patient including akathisia, sedation, dizziness/falls risk, nausea, low risk of weight gain & metabolic risks for diabetes and dyslipidemia, and rare tardive dyskinesia.  After discussion of these risks and benefits, the patient voiced understanding and agreed to proceed. Instructed to take medication with meal of minimum of 350 calories to improve consistent efficacy and absorption.   DEPAKOTE ER(VALPROATE) Risks, benefits, alternatives discussed with patient including nausea and vomiting, GI upset, sedation,  dizziness/falls risk, increased appetite. After discussion of these risks and benefits, the patient voiced understanding and agreed to proceed. Patient also informed of the need to take as prescribed, and for periodic labwork.      Follow-up: Return in about 1 month (around 10/21/2023) for Next scheduled follow up.         This document has been electronically signed by CORTNEY Gray  September 23, 2023 15:47 EDT    Please note that portions of this note were completed with a voice recognition program.  Copied text in this note has been reviewed and is accurate as of 09/23/23

## 2023-09-22 DIAGNOSIS — I10 ESSENTIAL HYPERTENSION: ICD-10-CM

## 2023-09-22 RX ORDER — LISINOPRIL 10 MG/1
10 TABLET ORAL DAILY
Qty: 30 TABLET | Refills: 0 | Status: SHIPPED | OUTPATIENT
Start: 2023-09-22

## 2023-09-23 NOTE — PATIENT INSTRUCTIONS
1.  Please return to clinic at your next scheduled visit.  Please contact the clinic ([phone]) at least 24 hours prior in the event you need to cancel.  2.  Do no harm to yourself or others.    3.  Avoid alcohol and drugs.    4.  Take all medications as prescribed.  Please contact the clinic with any concerns. If you are in need of medication refills, please call the clinic at [phone].    5. Should you want to get in touch with your provider, CORTNEY Gray, please contact the office ([phone]), and staff will be able to page Sherrie directly.  6. In the event you have personal crisis, contact the following crisis numbers: Suicide Prevention Hotline 1-681.321.4794; [facility] Helpline 6-772-432-IRUZ; [facility] Emergency Room [phone]; text HELLO to 324574; or 194.     SPECIFIC RECOMMENDATIONS:     1.      Medications discussed at this encounter:     New Medications Ordered This Visit   Medications    lurasidone HCl (LATUDA) 60 MG tablet tablet     Sig: Take 1 tablet by mouth Daily.     Dispense:  30 tablet     Refill:  1                       2.      Psychotherapy recommendations: We will continue therapy at future visits.     3.     Return to clinic: Return in about 1 month (around 10/21/2023) for Next scheduled follow up.

## 2023-09-27 ENCOUNTER — TELEPHONE (OUTPATIENT)
Dept: FAMILY MEDICINE CLINIC | Facility: CLINIC | Age: 33
End: 2023-09-27

## 2023-09-27 NOTE — TELEPHONE ENCOUNTER
Caller: Arslan Butler    Relationship: Self    Best call back number: [phone]     Requested Prescriptions:   [facility]        Pharmacy where request should be sent: [id number]  [place] - [address] AT [facility] - [phone]  - [phone] FX     Last office visit with prescribing clinician: 8/17/2023   Last telemedicine visit with prescribing clinician: Visit date not found   Next office visit with prescribing clinician: Visit date not found     Additional details provided by patient: PATIENT STATED THAT HE IS OUT OF THE MEDICATION AND THAT SAMANTHA Hitsbook HAS BEEN PROVIDING THIS MEDICATION. HE ALSO STATED THAT HE IS TO HAVE THIS INCREASED FROM 0.5 MG TO THE NEXT HIGHER DOSE.     Does the patient have less than a 3 day supply:  [x] Yes  [] No    Would you like a call back once the refill request has been completed: [] Yes [] No    If the office needs to give you a call back, can they leave a voicemail: [] Yes [] No    Yoel Gallagher Rep   09/27/23 12:11 EDT

## 2023-09-28 DIAGNOSIS — E11.9 TYPE 2 DIABETES MELLITUS WITHOUT COMPLICATION, WITHOUT LONG-TERM CURRENT USE OF INSULIN: Primary | ICD-10-CM

## 2023-09-28 RX ORDER — SEMAGLUTIDE 0.68 MG/ML
0.25 INJECTION, SOLUTION SUBCUTANEOUS WEEKLY
Qty: 2 ML | Refills: 0 | Status: SHIPPED | OUTPATIENT
Start: 2023-09-28

## 2023-09-28 NOTE — TELEPHONE ENCOUNTER
PATIENT SCHEDULED FIRST AVAILABLE WITH [facility] BUT REQUESTED A COURTESY REFILL OF THE CURRENT DOSAGE OF THIS MEDICATION BE SENT IN IF POSSIBLE. WILL DISCUSS INCREASING DOSAGE AT UPCOMING VISIT. THANKS

## 2023-10-09 ENCOUNTER — OFFICE VISIT (OUTPATIENT)
Dept: FAMILY MEDICINE CLINIC | Facility: CLINIC | Age: 33
End: 2023-10-09
Payer: COMMERCIAL

## 2023-10-09 VITALS
TEMPERATURE: 96.6 F | SYSTOLIC BLOOD PRESSURE: 124 MMHG | DIASTOLIC BLOOD PRESSURE: 84 MMHG | HEART RATE: 101 BPM | WEIGHT: 315 LBS | BODY MASS INDEX: 46.25 KG/M2 | OXYGEN SATURATION: 98 %

## 2023-10-09 DIAGNOSIS — E11.9 TYPE 2 DIABETES MELLITUS WITHOUT COMPLICATION, WITHOUT LONG-TERM CURRENT USE OF INSULIN: Primary | ICD-10-CM

## 2023-10-09 DIAGNOSIS — E66.01 CLASS 3 SEVERE OBESITY DUE TO EXCESS CALORIES WITH SERIOUS COMORBIDITY AND BODY MASS INDEX (BMI) OF 45.0 TO 49.9 IN ADULT: ICD-10-CM

## 2023-10-09 DIAGNOSIS — I10 ESSENTIAL HYPERTENSION: ICD-10-CM

## 2023-10-09 DIAGNOSIS — E78.5 DYSLIPIDEMIA: ICD-10-CM

## 2023-10-09 RX ORDER — LISINOPRIL 10 MG/1
10 TABLET ORAL DAILY
Qty: 90 TABLET | Refills: 0 | Status: SHIPPED | OUTPATIENT
Start: 2023-10-09

## 2023-10-09 RX ORDER — LISINOPRIL 10 MG/1
10 TABLET ORAL DAILY
Qty: 90 TABLET | OUTPATIENT
Start: 2023-10-09

## 2023-10-09 RX ORDER — SEMAGLUTIDE 1.34 MG/ML
1 INJECTION, SOLUTION SUBCUTANEOUS WEEKLY
Qty: 9 ML | Refills: 0 | Status: SHIPPED | OUTPATIENT
Start: 2023-10-09

## 2023-10-09 NOTE — PROGRESS NOTES
Chief Complaint  Diabetes (Discuss ozempic )    Subjective      History of Present Illness  Arslan Butler is a 33 y.o. male who presents to [facility] with a past medical history of  Past Medical History:   Diagnosis Date    Anxiety and depression 12/17/2021    Bipolar disorder 12/15/2021    Chronic pain disorder     Diabetes mellitus     Gastroesophageal reflux disease 12/15/2021    H/O shoulder surgery 03/29/2023    Hx of tonsillectomy 03/29/2023    Hyperlipidemia 12/15/2021    Iron deficiency anemia secondary to inadequate dietary iron intake 08/05/2022    Left arm numbness 01/17/2022    Left rotator cuff tear 01/17/2022    Male hypogonadism 05/11/2022    Obesity 12/15/2021    Panic disorder     Peptic ulceration     Rotator cuff injury, left, subsequent encounter 02/24/2022    Self-injurious behavior     Suicide attempt     Testosterone deficiency in male 05/11/2022     Arslan presents to the office today to follow-up following recent initiation of Ozempic secondary to type 2 diabetes, as well as follow-up on hypertension.    He is currently injecting 0.5 mg of Ozempic weekly.  Glucose 130 in the evenings; less than 90 each AM fasting.  He is currently without any adverse side effects including dysphagia, nausea, vomiting, abdominal pain, or constipation.  He has baseline diarrhea and that is unchanged as well.  He states in the past he was on as much as 2 mg of Ozempic weekly.  He would like to go ahead and increase to 1 mg.  He denies any polyuria, polydipsia, or polyphagia.    Of note, he has gained 16 pounds since August - increased appetite since starting Latuda for mood - he does not feel that the benefits of the Latuda are worth the weight gain at this point.  He continues to endorse mood changes.  He has a follow up at the end of the month with psychiatry and they are going to further discuss mood management - they have briefly discussed ADHD medication in addition to  "current therapy.  Currently without any homicidal ideations or suicidal ideations.  He did inquire with bariatric surgery regarding weight loss surgery; however, he had checked \"yes\" to \"BPD\" so they denied him. He was advised to follow up with PCP regarding other options for medical weight loss.     His blood pressure is improved with initiation of lisinopril 10 mg daily -no complaints of dry cough with use.  No complaints of chest pain, palpitations, headaches, dizziness, shortness of breath.    Objective   Vital Signs:   Vitals:    10/09/23 1003   BP: 124/84   Pulse: 101   Temp: 96.6 øF (35.9 øC)   SpO2: 98%   Weight: (!) 168 kg (370 lb)       Wt Readings from Last 3 Encounters:   10/09/23 (!) 168 kg (370 lb)   08/17/23 (!) 161 kg (354 lb)   07/31/23 119 kg (262 lb)     BP Readings from Last 3 Encounters:   10/09/23 124/84   08/17/23 140/90   07/31/23 126/81       Physical Exam  Vitals reviewed.   Constitutional:       General: He is not in acute distress.     Appearance: He is well-developed. He is obese.   HENT:      Head: Normocephalic and atraumatic.   Eyes:      General: No scleral icterus.     Extraocular Movements: Extraocular movements intact.      Conjunctiva/sclera: Conjunctivae normal.   Neck:      Trachea: Trachea normal.   Cardiovascular:      Rate and Rhythm: Normal rate and regular rhythm.      Pulses: Normal pulses.      Heart sounds: No murmur heard.  Pulmonary:      Effort: Pulmonary effort is normal. No respiratory distress.      Breath sounds: Normal breath sounds. No wheezing, rhonchi or rales.   Musculoskeletal:         General: Normal range of motion.      Cervical back: Normal range of motion.      Right lower leg: No edema.      Left lower leg: No edema.   Skin:     General: Skin is warm and dry.   Neurological:      Mental Status: He is alert and oriented to person, place, and time.   Psychiatric:         Attention and Perception: Attention normal.         Mood and Affect: Mood and " affect normal. Mood is not anxious or depressed. Affect is flat.         Behavior: Behavior normal. Behavior is cooperative.         Thought Content: Thought content normal. Thought content does not include suicidal ideation. Thought content does not include homicidal or suicidal plan.         Cognition and Memory: Cognition and memory normal.         Judgment: Judgment normal.         Result Review :  The following data was reviewed by: CORTNEY Acosta on 10/09/2023:    No visits with results within 1 Month(s) from this visit.   Latest known visit with results is:   Office Visit on 08/17/2023   Component Date Value    WBC 08/17/2023 4.47     RBC 08/17/2023 4.89     Hemoglobin 08/17/2023 14.4     Hematocrit 08/17/2023 42.3     MCV 08/17/2023 86.5     MCH 08/17/2023 29.4     MCHC 08/17/2023 34.0     RDW 08/17/2023 14.4     RDW-SD 08/17/2023 45.7     MPV 08/17/2023 11.0     Platelets 08/17/2023 236     Neutrophil % 08/17/2023 53.5     Lymphocyte % 08/17/2023 34.7     Monocyte % 08/17/2023 8.9     Eosinophil % 08/17/2023 2.5     Basophil % 08/17/2023 0.2     Immature Grans % 08/17/2023 0.2     Neutrophils, Absolute 08/17/2023 2.39     Lymphocytes, Absolute 08/17/2023 1.55     Monocytes, Absolute 08/17/2023 0.40     Eosinophils, Absolute 08/17/2023 0.11     Basophils, Absolute 08/17/2023 0.01     Immature Grans, Absolute 08/17/2023 0.01     nRBC 08/17/2023 0.0     Glucose 08/17/2023 136 (H)     BUN 08/17/2023 12     Creatinine 08/17/2023 0.96     Sodium 08/17/2023 140     Potassium 08/17/2023 4.5     Chloride 08/17/2023 105     CO2 08/17/2023 26.0     Calcium 08/17/2023 9.8     Total Protein 08/17/2023 7.1     Albumin 08/17/2023 4.7     ALT (SGPT) 08/17/2023 52 (H)     AST (SGOT) 08/17/2023 42 (H)     Alkaline Phosphatase 08/17/2023 69     Total Bilirubin 08/17/2023 0.6     Globulin 08/17/2023 2.4     A/G Ratio 08/17/2023 2.0     BUN/Creatinine Ratio 08/17/2023 12.5     Anion Gap 08/17/2023 9.0     eGFR  08/17/2023 108.4     Hemoglobin A1C 08/17/2023 6.10 (H)     Total Cholesterol 08/17/2023 124     Triglycerides 08/17/2023 148     HDL Cholesterol 08/17/2023 35 (L)     LDL Cholesterol  08/17/2023 63     VLDL Cholesterol 08/17/2023 26     LDL/HDL Ratio 08/17/2023 1.70     TSH 08/17/2023 0.695     Free T4 08/17/2023 1.26     Microalbumin/Creatinine * 08/17/2023      Creatinine, Urine 08/17/2023 172.2     Microalbumin, Urine 08/17/2023 <1.2     25 Hydroxy, Vitamin D 08/17/2023 22.0 (L)      No Images in the past 120 days found..    Procedures        Assessment and Plan   Diagnoses and all orders for this visit:    1. Type 2 diabetes mellitus without complication, without long-term current use of insulin (Primary)  -     Semaglutide, 1 MG/DOSE, (Ozempic, 1 MG/DOSE,) 4 MG/3ML solution pen-injector; Inject 1 mg under the skin into the appropriate area as directed 1 (One) Time Per Week.  Dispense: 9 mL; Refill: 0    2. Class 3 severe obesity due to excess calories with serious comorbidity and body mass index (BMI) of 45.0 to 49.9 in adult  -     Semaglutide, 1 MG/DOSE, (Ozempic, 1 MG/DOSE,) 4 MG/3ML solution pen-injector; Inject 1 mg under the skin into the appropriate area as directed 1 (One) Time Per Week.  Dispense: 9 mL; Refill: 0    3. Essential hypertension  -     lisinopril (PRINIVIL,ZESTRIL) 10 MG tablet; Take 1 tablet by mouth Daily.  Dispense: 90 tablet; Refill: 0    4. Dyslipidemia          FOLLOW UP  Return in about 3 months (around 1/9/2024) for Next scheduled follow up, medication refills and fasting labs.    Increase Ozempic to 1 mg daily.  Previously injecting is much as 2 mg daily and tolerating well.  We will plan to repeat his A1c at his next follow-up appointment.  Continue lisinopril 10 mg daily for control of blood pressure.  We will need to continue to monitor his A1c and blood pressure closely due to weight gain as a side effect of recent medication (Latuda).  He will let me know if there are any  medication changes once he has seen psychiatry later this month.    Patient was given instructions and counseling regarding his condition or for health maintenance advice. Please see specific information pulled into the AVS if appropriate.       Mandy Bonilla, APRN  10/09/23  10:28 EDT    CURRENT & DISCONTINUED MEDICATIONS  Current Outpatient Medications   Medication Instructions    divalproex (DEPAKOTE ER) 250 mg, Oral, Daily    hydrOXYzine (ATARAX) 25 mg, Oral, 3 Times Daily PRN    lisinopril (PRINIVIL,ZESTRIL) 10 mg, Oral, Daily    lurasidone HCl (LATUDA) 60 mg, Oral, Daily    Ozempic (1 MG/DOSE) 1 mg, Subcutaneous, Weekly    Vitamin D3 50 mcg, Oral, Daily       Medications Discontinued During This Encounter   Medication Reason    Semaglutide,0.25 or 0.5MG/DOS, (Ozempic, 0.25 or 0.5 MG/DOSE,) 2 MG/3ML solution pen-injector Dose adjustment    lisinopril (PRINIVIL,ZESTRIL) 10 MG tablet Reorder

## 2023-10-24 ENCOUNTER — TELEMEDICINE (OUTPATIENT)
Dept: BEHAVIORAL HEALTH | Facility: CLINIC | Age: 33
End: 2023-10-24
Payer: COMMERCIAL

## 2023-10-24 DIAGNOSIS — Z79.899 MEDICATION MANAGEMENT: ICD-10-CM

## 2023-10-24 DIAGNOSIS — F31.30 BIPOLAR AFFECTIVE DISORDER, CURRENT EPISODE DEPRESSED, CURRENT EPISODE SEVERITY UNSPECIFIED: Primary | ICD-10-CM

## 2023-10-24 DIAGNOSIS — F41.1 GENERALIZED ANXIETY DISORDER: ICD-10-CM

## 2023-10-24 DIAGNOSIS — F90.1 ATTENTION DEFICIT HYPERACTIVITY DISORDER (ADHD), PREDOMINANTLY HYPERACTIVE TYPE: ICD-10-CM

## 2023-10-24 DIAGNOSIS — F41.0 PANIC ATTACKS: ICD-10-CM

## 2023-10-24 PROCEDURE — 1160F RVW MEDS BY RX/DR IN RCRD: CPT

## 2023-10-24 PROCEDURE — 99214 OFFICE O/P EST MOD 30 MIN: CPT

## 2023-10-24 PROCEDURE — 1159F MED LIST DOCD IN RCRD: CPT

## 2023-10-24 RX ORDER — LURASIDONE HYDROCHLORIDE 60 MG/1
60 TABLET, FILM COATED ORAL DAILY
Qty: 30 TABLET | Refills: 1 | Status: SHIPPED | OUTPATIENT
Start: 2023-10-24

## 2023-10-24 RX ORDER — DIVALPROEX SODIUM 250 MG/1
250 TABLET, EXTENDED RELEASE ORAL DAILY
Qty: 30 TABLET | Refills: 1 | Status: SHIPPED | OUTPATIENT
Start: 2023-10-24 | End: 2024-10-23

## 2023-10-24 NOTE — PATIENT INSTRUCTIONS
1.  Please return to clinic at your next scheduled visit.  Please contact the clinic ([phone]) at least 24 hours prior in the event you need to cancel.  2.  Do no harm to yourself or others.    3.  Avoid alcohol and drugs.    4.  Take all medications as prescribed.  Please contact the clinic with any concerns. If you are in need of medication refills, please call the clinic at [phone].    5. Should you want to get in touch with your provider, CORTNEY Gray, please contact the office ([phone]), and staff will be able to page Sherrie directly.  6. In the event you have personal crisis, contact the following crisis numbers: Suicide Prevention Hotline 1-988.179.8326; [facility] Helpline 3-555-215-EREB; [facility] Emergency Room [phone]; text HELLO to 060915; or 835.     SPECIFIC RECOMMENDATIONS:     1.      Medications discussed at this encounter:     New Medications Ordered This Visit   Medications    divalproex (Depakote ER) 250 MG 24 hr tablet     Sig: Take 1 tablet by mouth Daily.     Dispense:  30 tablet     Refill:  1    lurasidone HCl (LATUDA) 60 MG tablet tablet     Sig: Take 1 tablet by mouth Daily.     Dispense:  30 tablet     Refill:  1                       2.      Psychotherapy recommendations: We will continue therapy at future visits.     3.     Return to clinic: Return in about 1 week (around 10/31/2023) for Next scheduled follow up.

## 2023-10-24 NOTE — PROGRESS NOTES
[facility]  Medication Management Follow-up  Virtual MyChart Visit  This provider is located at [address] [place] [address], [address], [place] [id number]. The Patient is located at home. Patient is being seen remotely using MyChart. Patient is being seen via telehealth and confirm that they are in a secure environment for this session. The patient's condition being diagnosed/treated is appropriate for telemedicine. The provider identified herself as well as her credentials. They may refuse to be seen remotely at any time. The electronic data is encrypted and password protected, and the patient has been advised of the potential risks to privacy not withstanding such measures. Virtual visit via Zoom audio and video due to the COVID-19 pandemic.  Patient is accepting of and agreeable to visit.  The visit consisted of the patient and I. PT Identifiers used: Name and .    Referring Provider:  No referring provider defined for this encounter.    Vital Signs:   There were no vitals taken for this visit.    Chief Complaint: ADHD. Bipolar. Anxiety.     History of Present Illness:   Arslan Butler is a 33 y.o. male who presents today for follow-up and medication management for:    ICD-10-CM ICD-9-CM   1. Bipolar affective disorder, current episode depressed, current episode severity unspecified  F31.30 296.50   2. Attention deficit hyperactivity disorder (ADHD), predominantly hyperactive type  F90.1 314.01   3. Generalized anxiety disorder  F41.1 300.02   4. Panic attacks  F41.0 300.01   5. Medication management  Z79.899 V58.69       10/24/2023 Patient is taking medications as prescribed and is tolerating them well.   Recently had to move to his mom's house because after he lost his jobs they lost their house and car.   He has still been self-sabotaging with his thoughts and has a lot of self-doubt. We continue to discuss the possibility of treating ADHD now that we have targeted mood stabilization with Latuda and  "Depakote ER. Patient has failed several classifications of psychotropic medications and has not ever been treated for ADHD. He has a compelling childhood assessment. Denies suicidal ideation.  Denies AVH.  We will continue to monitor for mood, behavior, and safety.    Record Review is below for 10/24/2023 : I have thoroughly reviewed the patient's electronic medical record to include previous encounters, care everywhere, notes, medications, labs, SHAUNA and UDS (if applicable), imaging, and EKG's.  Pertinent information is included in this note.  8/17/2023 TSH 0.695, free T41.26, lipid panel is reassuring, hemoglobin A1c 6.10, glucose 136, ALT 52, AST 42, CBC and CMP are otherwise reassuring.  EKG Results:  SCANNED EKG (12/29/2019)   Head Imaging:  None in record      09/21/2023 Patient is taking medications as prescribed and is tolerating them well.   Has been through 4 jobs since we last talked. He says that he would start the job and then would talk himself for attendance. He is regretting quitting the most recent job because it was a fairly simple job.   His wife, Belia, is noticing that he is having more \"ups\" in a good way.   We are discussing the possibility of treating patient for a compelling childhood assessment for ADHD since we have been well with some mood stabilization.  He is still struggling with anxiety.  Panic attacks a few times a week. Denies suicidal ideation.  Denies AVH.  We will continue to monitor for mood, behavior, and safety.  Increase Latuda to 60 mg daily  Continue Depakote ER 250mg at bedtime  Follow-up 1 month    Record Review is below for 09/21/2023 : I have thoroughly reviewed the patient's electronic medical record to include previous encounters, care everywhere, notes, medications, labs, SHAUNA and UDS (if applicable), imaging, and EKG's.  Pertinent information is included in this note.  8/17/2023 TSH 0.695, free T41.26, lipid panel is reassuring, hemoglobin A1c 6.10, glucose " 136, ALT 52, AST 42, CBC and CMP are otherwise reassuring.  EKG Results:  SCANNED EKG (12/29/2019)   Head Imaging:  None in record    08/22/2023 Patient is taking medications as prescribed and is tolerating them well.   Still having mood swings, anger outbursts, latuda is helping with depression.   Having some altercations with wife about finances.   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  Depression  Visit Type: follow-up (Bipolar, PETR, Panic Attacks)  Patient presents with the following symptoms: depressed mood, excessive worry, irritability, muscle tension, nervousness/anxiety, psychomotor agitation and restlessness.  Patient is not experiencing: anhedonia, compulsions, suicidal ideas, suicidal planning and thoughts of death.  Frequency of symptoms: most days   Severity: causing significant distress   Sleep quality: fair  Denies suicidal ideation.  Denies AVH.  We will continue to monitor for mood, behavior, and safety.  Continue Latuda 40mg daily  Start Depakote ER 250mg at bedtime  Follow-up 1 month    Record Review is below for 08/22/2023 : I have thoroughly reviewed the patient's electronic medical record to include previous encounters, care everywhere, notes, medications, labs, SHAUNA and UDS (if applicable), imaging, and EKG's.  Pertinent information is included in this note.  8/17/2023 TSH 0.695, free T41.26, lipid panel is reassuring, hemoglobin A1c 6.10, glucose 136, ALT 52, AST 42, CBC and CMP are otherwise reassuring.  EKG Results:  SCANNED EKG (12/29/2019)   Head Imaging:  None in record      07/31/2023 Depression and anxiety for several years.   Bipolar  Depression  Visit Type: initial  Onset of symptoms: more than 1 year ago  Progression since onset: gradually worsening  Patient presents with the following symptoms: anhedonia, decreased concentration, depressed mood,  "excessive worry, fatigue, feelings of hopelessness, irritability, muscle tension, nervousness/anxiety, obsessions, panic, psychomotor agitation and restlessness.  Patient is not experiencing: suicidal ideas, suicidal planning and thoughts of death.  Frequency of symptoms: most days   Severity: interfering with daily activities   Aggravated by: family issues  Sleep quality: fair  Not taking valium three times daily, mostly once daily, valium doesn't really help with panic attacks.   Paranoia (I.e.accusing his wife of talking to her ex-, afraid of passing his mental health issue to his children). Catastrophism of things. Has tried  TMS in [place] 2 years ago without success, was unable to complete full course of treatment. Panic attacks three times daily.   Patient has a compelling childhood assessment for potentially undiagnosed ADHD.  We discussed neuropsychological testing to confirm diagnosis.  Denies suicidal ideation.  Denies AVH.  PHQ-9 is 27 and PETR-7 is 21.  Latuda 20 mg daily  Follow-up 3 weeks    ADHD:  Symptoms are persistent and significantly interfere with major life activities and/or result in significant suffering  With focus on K5 through 6th grade only   Grades: \"pretty bad\"  Behavioral issues: Trouble for getting out of his seat, not listening, talking   Special Classes:Yes, speech, hearing  Inattention:Yes  Referral for ADHD testing:Father did not believe in mental health problems     Often fails to give close attention to details or makes careless mistakes in schoolwork, at work, or during other activities:Yes  Often has difficulty sustaining attention in tasks:Yes  Often does not seem to listen when spoken to directly:Yes  Often does not follow through on instructions and fails to finish duties in the workplace:Yes  Often has difficulty organizing tasks and activities:Yes  Often avoids, dislikes or is reluctant to engage in tasks that require sustained mental effort:Yes  Often loses " things necessary for tasks or activities:Yes  Is often easily distracted by extraneous stimuli: Yes  Is often forgetful in daily activities: Yes  Hyperactivity and Impulsivity: Yes  Often fidgets with or taps hands or feet: Yes  Often leaves seat in situations when remaining seated is expected: Yes  Often feels restless: Yes  Is often “on the go”, acting as if “driven by a motor”: Yes  Often talks excessively: Yes  Often blurts out an answer before a question has been completed: Yes  Often has difficulty waiting their turn: Yes  Often interrupts or intrudes on others: Yes      Record Review for 07/31/2023 : I have thoroughly reviewed the patient's electronic medical record to include previous encounters, care everywhere, notes, medications, labs, SHAUNA and UDS (if applicable), imaging, and EKG's.  Pertinent information is included in this note.  3/29/2023 TSH 1.260, glucose 102, CBC and CMP are otherwise reassuring, lipid panel is reassuring.  Lithium level 0.2.  EKG Results:  SCANNED EKG (12/29/2019)   Head Imaging:  None in record    Per Referring Provider 7/26/2023 Arslan presents to the office today to re-establish care. Previously seen here a year ago, but moved to [place] x1 year.      He has been seeing Ekta Benson with [facility] - last seen on 06/16/2023 - has been seeing via telehealth. Recently they started a new medication plan and 'it just didn't work'. He states that at first it was working okay, but then he started to have intrusive thoughts. He stated having the thoughts about two weeks ago - he was having thoughts like he would be better off if he wasn't here. He also had recurrence of panic attacks. Those were seemingly well-controlled and now they are not.      He was most recently prescribed Zoloft and Wellbutrin, and Valium. He has taken Valium for a few years now. The Zoloft he took 1-2 months. Wellbutrin he has taken on and off over the years with different medications. He has  taken Prozac, lithium, Abilify, Effexor, Vraylar, Rizulti, to name a few.      He did speak with a crisis counselor yesterday at [facility] - he states that she 'kind of talked me down, but it wasn't a very good experience' - he states that 'she basically told me to suck it up'. He state that his therapist at [facility] recently left - he was with her for two years and she left earlier this year.      He currently denies any thoughts of hurting himself or others. He does not have a plan. He endorses anger and labile mood which is interfering with his personal relationships. Endorses headaches and overeating associated with mood.      Past Psychiatric History:  Began Treatment: 2014   Diagnoses: Bipolar depression, anxiety, panic attacks  Psychiatrist: Yes  Therapist: Yes  Admission History: 3 admissions  Medication Trials: Ativan, klonopin, xanax, valium, gabapentin, vistaril, buspar, prozac, lithium, abilify, effexor, vraylar, rexulti, wellbutrin, zoloft, lexapro, celexa, seroquel, clonidine, lamictal  Suicide Attempts: Several attempts, tried to OD on pills, cut wrists  Self Harm: Hx of cutting   Substance use/abuse:Denies  Withdrawal Symptoms: Not applicable  Longest Period Sober: Not applicable  AA: Not applicable  Trauma/Childhood/ACE:  parents     MENTAL STATUS EXAM   General Appearance:  Cleanly groomed and dressed and well developed  Eye Contact:  Good eye contact  Attitude:  Cooperative and polite  Motor Activity:  Normal gait, posture  Speech:  Normal rate, tone, volume  Mood and affect:  Normal, pleasant and euthymic  Hopelessness:  Denies  Thought Process:  Logical and goal-directed  Associations/ Thought Content:  No delusions  Hallucinations:  None  Suicidal Ideations:  Not present  Homicidal Ideation:  Not present  Sensorium:  Alert  Orientation:  Person, place, time and situation  Immediate Recall, Recent, and Remote Memory:  Intact  Attention Span/ Concentration:  Good  Fund of Knowledge:  Appropriate  for age and educational level  Intellectual Functioning:  Average range  Insight:  Good  Judgement:  Good  Reliability:  Good  Impulse Control:  Good       Review of systems is negative except as noted in HPI.  Labs:  WBC   Date Value Ref Range Status   08/17/2023 4.47 3.40 - 10.80 10*3/mm3 Final     Platelets   Date Value Ref Range Status   08/17/2023 236 140 - 450 10*3/mm3 Final     Hemoglobin   Date Value Ref Range Status   08/17/2023 14.4 13.0 - 17.7 g/dL Final     Hematocrit   Date Value Ref Range Status   08/17/2023 42.3 37.5 - 51.0 % Final     Glucose   Date Value Ref Range Status   08/17/2023 136 (H) 65 - 99 mg/dL Final     Creatinine   Date Value Ref Range Status   08/17/2023 0.96 0.76 - 1.27 mg/dL Final     ALT (SGPT)   Date Value Ref Range Status   08/17/2023 52 (H) 1 - 41 U/L Final     AST (SGOT)   Date Value Ref Range Status   08/17/2023 42 (H) 1 - 40 U/L Final     BUN   Date Value Ref Range Status   08/17/2023 12 6 - 20 mg/dL Final     eGFR   Date Value Ref Range Status   08/17/2023 108.4 >60.0 mL/min/1.73 Final     Total Cholesterol   Date Value Ref Range Status   08/17/2023 124 0 - 200 mg/dL Final     Triglycerides   Date Value Ref Range Status   08/17/2023 148 0 - 150 mg/dL Final     HDL Cholesterol   Date Value Ref Range Status   08/17/2023 35 (L) 40 - 60 mg/dL Final     LDL Cholesterol    Date Value Ref Range Status   08/17/2023 63 0 - 100 mg/dL Final     VLDL Cholesterol   Date Value Ref Range Status   08/17/2023 26 5 - 40 mg/dL Final     LDL/HDL Ratio   Date Value Ref Range Status   08/17/2023 1.70  Final     Hemoglobin A1C   Date Value Ref Range Status   08/17/2023 6.10 (H) 4.80 - 5.60 % Final     TSH   Date Value Ref Range Status   08/17/2023 0.695 0.270 - 4.200 uIU/mL Final     Free T4   Date Value Ref Range Status   08/17/2023 1.26 0.93 - 1.70 ng/dL Final     Comment:     T4 results may be falsely increased if patient taking Biotin.      Pain Management Panel  More data exists         Latest  Ref Rng & Units 8/17/2023 8/5/2022   Pain Management Panel   Creatinine, Urine mg/dL 172.2  300         Imaging Results:  No Images in the past 120 days found..    Current Medications:   Current Outpatient Medications   Medication Sig Dispense Refill    divalproex (Depakote ER) 250 MG 24 hr tablet Take 1 tablet by mouth Daily. 30 tablet 1    lurasidone HCl (LATUDA) 60 MG tablet tablet Take 1 tablet by mouth Daily. 30 tablet 1    Cholecalciferol (Vitamin D3) 50 MCG (2000 UT) tablet Take 1 tablet by mouth Daily. 90 tablet 0    hydrOXYzine (ATARAX) 25 MG tablet Take 1 tablet by mouth 3 (Three) Times a Day As Needed for Anxiety (Panic Attacks). 90 tablet 1    lisinopril (PRINIVIL,ZESTRIL) 10 MG tablet Take 1 tablet by mouth Daily. 90 tablet 0    Semaglutide, 1 MG/DOSE, (Ozempic, 1 MG/DOSE,) 4 MG/3ML solution pen-injector Inject 1 mg under the skin into the appropriate area as directed 1 (One) Time Per Week. 9 mL 0     No current facility-administered medications for this visit.       Problem List:  Patient Active Problem List   Diagnosis    Generalized anxiety disorder    Bipolar disorder    Gastroesophageal reflux disease    Hyperlipidemia    Class 3 severe obesity due to excess calories with serious comorbidity and body mass index (BMI) of 40.0 to 44.9 in adult    Erectile dysfunction    Chronic midline low back pain without sciatica    Allergic rhinitis    Chronic pain of left ankle    Chronic pain in left foot    Social anxiety disorder       Allergy:   Allergies   Allergen Reactions    Sulfamethoxazole-Trimethoprim Hives    Metformin GI Intolerance        Discontinued Medications:  Medications Discontinued During This Encounter   Medication Reason    divalproex (Depakote ER) 250 MG 24 hr tablet Reorder    lurasidone HCl (LATUDA) 60 MG tablet tablet Reorder         PLAN:   Presentation seems most consistent with DSM-V criteria for:  Diagnoses and all orders for this visit:    1. Bipolar affective disorder, current  episode depressed, current episode severity unspecified (Primary)  -     divalproex (Depakote ER) 250 MG 24 hr tablet; Take 1 tablet by mouth Daily.  Dispense: 30 tablet; Refill: 1  -     lurasidone HCl (LATUDA) 60 MG tablet tablet; Take 1 tablet by mouth Daily.  Dispense: 30 tablet; Refill: 1    2. Attention deficit hyperactivity disorder (ADHD), predominantly hyperactive type    3. Generalized anxiety disorder  -     divalproex (Depakote ER) 250 MG 24 hr tablet; Take 1 tablet by mouth Daily.  Dispense: 30 tablet; Refill: 1  -     lurasidone HCl (LATUDA) 60 MG tablet tablet; Take 1 tablet by mouth Daily.  Dispense: 30 tablet; Refill: 1    4. Panic attacks    5. Medication management  -     Urine Drug Screen - Urine, Clean Catch; Future       Continue Latuda to 60 mg daily  Continue Depakote ER 250mg at bedtime  Start Adderall XR 5 mg daily  UDS  CSA  Follow-up 1 week  Discussed medication options and treatment plan of prescribed medication as well as the risks, benefits, and side effects.  Patient verbalized understanding and is agreeable to this plan.   Patient is agreeable to call the office with any worsening of symptoms or onset of side effects.   Patient is agreeable to call 911 or go to the nearest ER should he/she begin having SI/HI.   Addressed all questions and concerns.    Continue psychotherapeutic modalities as indicated.    TREATMENT PLAN/GOALS:  Treatment plan: Continue supportive psychotherapy efforts and medications as indicated. Continue to challenge patterns of living conducive to pathology, strengthen defenses, promote problems solving, restore adaptive functioning and provide symptom relief. Treatment and medication options discussed during today's visit. Patient acknowledged and verbally consented to continue with current treatment plan and was educated on the importance of compliance with treatment and follow-up appointments.  Functional status: Fair  Prognosis: Fair  Progress: Minimal  improvement    Safety: No acute safety concerns.   Psychotherapy:    Provided minimal supportive therapy.   Risk Assessment: Risk of self-harm acutely and chronically is moderate.    Risk factors include anxiety disorder, mood disorder, and recent psychosocial stressors.   Protective factors include no family history, denies access to guns/weapons, no present SI, no history of suicide attempts or self-harm in the past, minimal AODA, healthcare seeking, future orientation, willingness to engage in care.    Risk assessment could be further elevated in the event of treatment noncompliance and/or AODA.  Labs/studies: No labs/studies ordered at this time  Medications:   New Medications Ordered This Visit   Medications    divalproex (Depakote ER) 250 MG 24 hr tablet     Sig: Take 1 tablet by mouth Daily.     Dispense:  30 tablet     Refill:  1    lurasidone HCl (LATUDA) 60 MG tablet tablet     Sig: Take 1 tablet by mouth Daily.     Dispense:  30 tablet     Refill:  1      Medication Education:   LATUDA (LURASIDONE) Take with food. Risks, benefits, and alternatives discussed with patient including akathisia, sedation, dizziness/falls risk, nausea, low risk of weight gain & metabolic risks for diabetes and dyslipidemia, and rare tardive dyskinesia.  After discussion of these risks and benefits, the patient voiced understanding and agreed to proceed. Instructed to take medication with meal of minimum of 350 calories to improve consistent efficacy and absorption.   DEPAKOTE (VALPROATE) Risks, benefits, alternatives discussed with patient including nausea and vomiting, GI upset, sedation, dizziness/falls risk, increased appetite. After discussion of these risks and benefits, the patient voiced understanding and agreed to proceed. Patient also informed of the need to take as prescribed, and for periodic labwork.  ADDERALL (AMPHETAMINE) Risks, benefits, side effects discussed with patient including elevated heart rate, elevated  blood pressure, irritability, insomnia, sexual dysfunction, appetite suppressing properties, psychosis.  After discussion of these risks and benefits, the patient voiced understanding and agreed to proceed. Shauna reviewed, UDS ordered, and controlled substance agreement signed & witnessed.    SHAUNA reviewed as expected.  Follow-up: No follow-ups on file.         This document has been electronically signed by CORTNEY Gray  October 24, 2023 17:27 EDT    Please note that portions of this note were completed with a voice recognition program.  Copied text in this note has been reviewed and is accurate as of 10/24/23

## 2023-10-25 ENCOUNTER — LAB (OUTPATIENT)
Dept: LAB | Facility: HOSPITAL | Age: 33
End: 2023-10-25
Payer: COMMERCIAL

## 2023-10-25 DIAGNOSIS — Z79.899 MEDICATION MANAGEMENT: ICD-10-CM

## 2023-10-25 LAB
AMPHET+METHAMPHET UR QL: NEGATIVE
AMPHETAMINES UR QL: NEGATIVE
BARBITURATES UR QL SCN: NEGATIVE
BENZODIAZ UR QL SCN: POSITIVE
BUPRENORPHINE SERPL-MCNC: NEGATIVE NG/ML
CANNABINOIDS SERPL QL: NEGATIVE
COCAINE UR QL: NEGATIVE
FENTANYL UR-MCNC: NEGATIVE NG/ML
METHADONE UR QL SCN: NEGATIVE
OPIATES UR QL: NEGATIVE
OXYCODONE UR QL SCN: NEGATIVE
PCP UR QL SCN: NEGATIVE
PROPOXYPH UR QL: NEGATIVE
TRICYCLICS UR QL SCN: NEGATIVE

## 2023-10-25 PROCEDURE — 80307 DRUG TEST PRSMV CHEM ANLYZR: CPT

## 2023-10-26 ENCOUNTER — TELEPHONE (OUTPATIENT)
Dept: PSYCHIATRY | Facility: CLINIC | Age: 33
End: 2023-10-26
Payer: COMMERCIAL

## 2023-10-26 DIAGNOSIS — F90.1 ATTENTION DEFICIT HYPERACTIVITY DISORDER (ADHD), PREDOMINANTLY HYPERACTIVE TYPE: Primary | ICD-10-CM

## 2023-10-26 NOTE — TELEPHONE ENCOUNTER
PATIENT CALLED AND WANTS TO SPEAK WITH PROVIDER ABOUT THE RESULTS OF HIS DRUG SCREEN      Fentanyl, Urine - Urine, Clean Catch (10/25/2023 09:45)       Urine Drug Screen - Urine, Clean Catch (10/25/2023 09:45)

## 2023-10-30 RX ORDER — DEXTROAMPHETAMINE SACCHARATE, AMPHETAMINE ASPARTATE MONOHYDRATE, DEXTROAMPHETAMINE SULFATE AND AMPHETAMINE SULFATE 1.25; 1.25; 1.25; 1.25 MG/1; MG/1; MG/1; MG/1
5 CAPSULE, EXTENDED RELEASE ORAL DAILY
Qty: 30 CAPSULE | Refills: 0 | Status: SHIPPED | OUTPATIENT
Start: 2023-10-30 | End: 2024-10-29

## 2023-10-30 NOTE — TELEPHONE ENCOUNTER
SHAUNA - SCAN - MANUAL [facility], 10/30/2023 (10/30/2023)     Please review the following SHAUNA report.

## 2023-10-30 NOTE — TELEPHONE ENCOUNTER
PT(PATIENT) VERIFIED     PT(PATIENT) CALLED AND REQUESTED A CALL BACK FROM PROVIDER TO DISCUSS TEST RESULTS     Fentanyl, Urine - Urine, Clean Catch (10/25/2023 09:45)  Urine Drug Screen - Urine, Clean Catch (10/25/2023 09:45)    PROVIDER PLEASE ADVISE

## 2023-10-31 ENCOUNTER — OFFICE VISIT (OUTPATIENT)
Dept: BARIATRICS/WEIGHT MGMT | Facility: CLINIC | Age: 33
End: 2023-10-31
Payer: COMMERCIAL

## 2023-10-31 ENCOUNTER — DOCUMENTATION (OUTPATIENT)
Dept: BARIATRICS/WEIGHT MGMT | Facility: CLINIC | Age: 33
End: 2023-10-31
Payer: COMMERCIAL

## 2023-10-31 ENCOUNTER — OFFICE VISIT (OUTPATIENT)
Dept: BEHAVIORAL HEALTH | Facility: CLINIC | Age: 33
End: 2023-10-31
Payer: COMMERCIAL

## 2023-10-31 VITALS
HEART RATE: 94 BPM | OXYGEN SATURATION: 96 % | SYSTOLIC BLOOD PRESSURE: 126 MMHG | BODY MASS INDEX: 39.17 KG/M2 | DIASTOLIC BLOOD PRESSURE: 78 MMHG | HEIGHT: 75 IN | WEIGHT: 315 LBS

## 2023-10-31 DIAGNOSIS — E78.5 HYPERLIPIDEMIA, UNSPECIFIED HYPERLIPIDEMIA TYPE: ICD-10-CM

## 2023-10-31 DIAGNOSIS — F31.30 BIPOLAR AFFECTIVE DISORDER, CURRENT EPISODE DEPRESSED, CURRENT EPISODE SEVERITY UNSPECIFIED: ICD-10-CM

## 2023-10-31 DIAGNOSIS — E66.01 MORBID OBESITY WITH BMI OF 45.0-49.9, ADULT: Primary | ICD-10-CM

## 2023-10-31 DIAGNOSIS — E66.01 OBESITY, CLASS III, BMI 40-49.9 (MORBID OBESITY): Primary | ICD-10-CM

## 2023-10-31 DIAGNOSIS — Z71.89 ENCOUNTER FOR PSYCHOLOGICAL ASSESSMENT PRIOR TO BARIATRIC SURGERY: ICD-10-CM

## 2023-10-31 DIAGNOSIS — G47.30 SLEEP APNEA, UNSPECIFIED TYPE: ICD-10-CM

## 2023-10-31 DIAGNOSIS — F41.9 ANXIETY AND DEPRESSION: ICD-10-CM

## 2023-10-31 DIAGNOSIS — E11.69 DIABETES MELLITUS TYPE 2 IN OBESE: ICD-10-CM

## 2023-10-31 DIAGNOSIS — F31.60 BIPOLAR AFFECTIVE DISORDER, MIXED: ICD-10-CM

## 2023-10-31 DIAGNOSIS — Z86.59 HISTORY OF SUICIDAL IDEATION: ICD-10-CM

## 2023-10-31 DIAGNOSIS — E66.9 DIABETES MELLITUS TYPE 2 IN OBESE: ICD-10-CM

## 2023-10-31 DIAGNOSIS — F98.8 ADD (ATTENTION DEFICIT DISORDER) WITHOUT HYPERACTIVITY: ICD-10-CM

## 2023-10-31 DIAGNOSIS — I10 HYPERTENSION, UNSPECIFIED TYPE: ICD-10-CM

## 2023-10-31 DIAGNOSIS — K21.9 GASTROESOPHAGEAL REFLUX DISEASE, UNSPECIFIED WHETHER ESOPHAGITIS PRESENT: ICD-10-CM

## 2023-10-31 DIAGNOSIS — F32.A ANXIETY AND DEPRESSION: ICD-10-CM

## 2023-10-31 DIAGNOSIS — R53.83 FATIGUE, UNSPECIFIED TYPE: ICD-10-CM

## 2023-10-31 PROCEDURE — 90791 PSYCH DIAGNOSTIC EVALUATION: CPT | Performed by: PSYCHOLOGIST

## 2023-10-31 PROCEDURE — 1159F MED LIST DOCD IN RCRD: CPT | Performed by: PSYCHOLOGIST

## 2023-10-31 PROCEDURE — 1160F RVW MEDS BY RX/DR IN RCRD: CPT | Performed by: PSYCHOLOGIST

## 2023-10-31 PROCEDURE — 99214 OFFICE O/P EST MOD 30 MIN: CPT | Performed by: PHYSICIAN ASSISTANT

## 2023-10-31 NOTE — PROGRESS NOTES
[facility] BARIATRIC SURGERY  [address]  [address]  [facility] [id number] [phone]      Patient  Name:  Arslan Butler  :  1990        Chief Complaint:  weight gain; unable to maintain weight loss    History of Present Illness:  Arslan Butler is a 33 y.o. male who presents today for evaluation, education and consultation regarding bariatric and metabolic surgery. The patient is interested in sleeve gastrectomy with Dr. Nichole.     Arslan has been overweight for at least 30 years, has been 35 pounds or more overweight for at least 30 years, has been 100 pounds or more overweight for 13 or more years and started dieting at age 23.      Previous diet attempts include: High Protein, Low Carbohydrate, Calorie Counting, Armen's Diet, Fasting, and Slim Fast; Weight Watchers; Prozac.  The most weight Arslan lost was 50 pounds on calorie deficit but was unable to maintain weightloss.  His maximum lifetime weight is 410 pounds.    As above, patient has been overweight for many years, with numerous failed dietary/weight loss attempts.  He now has obesity related comorbidities and as such has decided to pursue weight loss surgery.    History of hypertension, sleep apnea treated with CPAP.  Diabetes diagnosed 2018 on the Ozempic with last A1c 7.2 reportedly.  Occasional sciatic nerve pain.  Status post rotator cuff repair x2 with left arm numbness subsequently.  Bipolar, anxiety, depression, ADHD working with psychiatrist on management currently.  Past MRSA skin infection of shoulder after tattoo.    GI: Acid reflux treated with Pepto or Tums as needed.  History of EGD with visualized peptic ulcers 2019.  Denies past H. pylori.  Ultrasound  with diffuse fatty infiltration of the liver with liver coarsening.    All other past medical, surgical, social and family history have been obtained and discussed as pertinent to bariatric surgery as below.    Has had covid twice, no hospitalized,  is  vaccinated.     Past Medical History:   Diagnosis Date    Anxiety and depression 12/17/2021    Bipolar disorder 12/15/2021    Chronic pain disorder     Diabetes mellitus 2018    on Ozempic, last A1C 7.2    Fatty liver     on US    Gastroesophageal reflux disease 12/15/2021    pepto or tums prn    H/O shoulder surgery 03/29/2023    Hx of tonsillectomy 03/29/2023    Hyperlipidemia 12/15/2021    Iron deficiency anemia secondary to inadequate dietary iron intake 08/05/2022    Left arm numbness 01/17/2022    suspected related to shoulder surgery    Left rotator cuff tear 01/17/2022    Male hypogonadism 05/11/2022    MRSA infection     after tattoo    Obesity 12/15/2021    Panic disorder     Peptic ulceration 2020    noted on EGD in [place]    Rotator cuff injury, left, subsequent encounter 02/24/2022    Sciatic nerve pain     Self-injurious behavior     Sleep apnea     Suicide attempt     Testosterone deficiency in male 05/11/2022     Past Surgical History:   Procedure Laterality Date    ENDOSCOPY AND COLONOSCOPY  2019    peptic ulcer    ROTATOR CUFF REPAIR Left 2014    ROTATOR CUFF REPAIR  2016    TONSILLECTOMY  1997       Allergies   Allergen Reactions    Sulfamethoxazole-Trimethoprim Hives    Metformin GI Intolerance       Current Outpatient Medications:     amphetamine-dextroamphetamine XR (Adderall XR) 5 MG 24 hr capsule, Take 1 capsule by mouth Daily, Disp: 30 capsule, Rfl: 0    Cholecalciferol (Vitamin D3) 50 MCG (2000 UT) tablet, Take 1 tablet by mouth Daily., Disp: 90 tablet, Rfl: 0    divalproex (Depakote ER) 250 MG 24 hr tablet, Take 1 tablet by mouth Daily., Disp: 30 tablet, Rfl: 1    hydrOXYzine (ATARAX) 25 MG tablet, Take 1 tablet by mouth 3 (Three) Times a Day As Needed for Anxiety (Panic Attacks)., Disp: 90 tablet, Rfl: 1    lisinopril (PRINIVIL,ZESTRIL) 10 MG tablet, Take 1 tablet by mouth Daily., Disp: 90 tablet, Rfl: 0    lurasidone HCl (LATUDA) 60 MG tablet tablet, Take 1 tablet by mouth Daily.,  Disp: 30 tablet, Rfl: 1    Semaglutide, 1 MG/DOSE, (Ozempic, 1 MG/DOSE,) 4 MG/3ML solution pen-injector, Inject 1 mg under the skin into the appropriate area as directed 1 (One) Time Per Week., Disp: 9 mL, Rfl: 0    Social History     Socioeconomic History    Marital status:    Tobacco Use    Smoking status: Never     Passive exposure: Never    Smokeless tobacco: Never   Vaping Use    Vaping Use: Never used   Substance and Sexual Activity    Alcohol use: Yes     Comment: ocassionally     Drug use: Never    Sexual activity: Defer     Family History   Problem Relation Age of Onset    Suicide Attempts Mother     Self-Injurious Behavior  Mother     Seizures Mother     Schizophrenia Mother     Paranoid behavior Mother     Depression Mother     Bipolar disorder Mother     Alcohol abuse Mother     Obesity Mother     Alcohol abuse Father     Hypertension Father     Heart attack Father     Obesity Sister     Obesity Brother     Stroke Maternal Grandmother     Heart attack Maternal Grandfather     Diabetes Maternal Grandfather     Hypertension Maternal Grandfather     Mental illness Paternal Grandmother     Diabetes Paternal Grandmother     Heart attack Paternal Grandmother     No Known Problems Paternal Grandfather        Review of Systems:  Constitutional:  Reports fatigue, weight gain and denies fevers, chills.  HEENT:  denies headache, ear pain or loss of hearing, blurred or double vision, nasal discharge or sore throat.  Cardiovascular:  Reports HTN and denies HLD, CAD, Atrial Fib, hx heart disease, heart murmur, hx MI, chest pain, palpitations, edema, hx DVT.  Respiratory:  Reports sleep apnea and denies dyspnea on exertion, shortness of breath , cough , wheezing, asthma, COPD, hx PE.  Gastrointestinal:  Reports heartburn, liver disease and denies dysphagia, nausea, vomiting, abdominal pain, IBS, diarrhea, constipation, melena, blood in stool, hx pancreatitis.  Genitourinary:  Reports none and denies history of   frequent UTI, incontinence, hematuria, dysuria, polyuria, polydipsia, renal insufficiency, renal failure.    Musculoskeletal:  Reports none and denies joint pain, fibromyalgia, arthritis, and autoimmune disease.  Neurological:  Reports numbness /tingling and denies headaches, migraines, dizziness, confusion, seizure, stroke.  Psychiatric:  Reports hx depression, hx anxiety, bipolar disorder, ADHD and denies hx substance abuse, eating disorder.  Endocrine:  Reports diabetes and denies thyroid disease, gout.  Hematologic:  Reports none and denies bruising, bleeding disorder, hx anemia, hx blood transfusion.  Skin:  Reports MRSA and denies rashes.      Physical Exam:  Vital Signs:  Weight: (!) 170 kg (374 lb 8 oz)   Body mass index is 46.81 kg/m².      Heart Rate: 94   BP: 126/78     Physical Exam  Vitals reviewed.   Constitutional:       Appearance: He is well-developed. He is obese.   HENT:      Head: Normocephalic.   Neck:      Thyroid: No thyromegaly.   Cardiovascular:      Rate and Rhythm: Normal rate and regular rhythm.      Heart sounds: Normal heart sounds.   Pulmonary:      Effort: Pulmonary effort is normal. No respiratory distress.      Breath sounds: Normal breath sounds. No wheezing.   Abdominal:      General: Bowel sounds are normal. There is no distension.      Palpations: Abdomen is soft.      Tenderness: There is no abdominal tenderness.   Musculoskeletal:         General: Normal range of motion.      Cervical back: Normal range of motion and neck supple.   Skin:     General: Skin is warm and dry.   Neurological:      Mental Status: He is alert and oriented to person, place, and time.   Psychiatric:         Mood and Affect: Mood normal.         Behavior: Behavior normal.         Thought Content: Thought content normal.         Judgment: Judgment normal.         Patient Active Problem List   Diagnosis    Generalized anxiety disorder    Bipolar disorder    Gastroesophageal reflux disease     Hyperlipidemia    Class 3 severe obesity due to excess calories with serious comorbidity and body mass index (BMI) of 40.0 to 44.9 in adult    Erectile dysfunction    Chronic midline low back pain without sciatica    Allergic rhinitis    Chronic pain of left ankle    Chronic pain in left foot    Social anxiety disorder    Anxiety and depression       Assessment:    Arslan Butler is a 33 y.o. year old male with medically complicated obesity pursuing sleeve gastrectomy.    Weight loss surgery is deemed medically necessary given the following obesity related comorbidities including hypertension, GERD, depression, and mental health disease with current Weight: (!) 170 kg (374 lb 8 oz) and Body mass index is 46.81 kg/m²..    Plan:  The consultation plan and program requirements were reviewed with the patient.  The patient has been advised that a letter of medical support must be obtained from his primary care physician or referring provider. A psychological evaluation will be arranged.  A nutritional evaluation will be performed.  The patient was advised to start a high protein and low carbohydrate diet.  Necessary lifestyle modifications were discussed.  Instructions on how to access MARY CARMEN was given to the patient.  MARY CARMEN is an internet based educational video that explains the surgical procedure chosen and answers basic questions regarding that procedure.              Preoperative testing will include: CBC, CMP, Lipids, TSH, HgA1C, H.Pylori UBT, EKG, CXR, Gastric Emptying Study, and EGD  (will also request last EGD from 2019)    The risks and benefits of the upper endoscopy were discussed with the patient in detail and all questions were answered.  Possibility of perforation, bleeding, aspiration, and anesthesia reaction were reviewed.  Patient agrees to proceed.      Additional preop clearances required prior to surgery: Cardiology.      The patient has been educated on expected postoperative lifestyle changes,  including commitment to high protein diet, vitamin regimen, and exercise program.  They are aware that support groups are encouraged for optimal weight loss results. Patient understands that bariatric surgery is not cosmetic surgery but rather a tool to help make a lifelong commitment to lifestyle changes including diet, exercise, behavior modifications, and healthy habits. The procedure was discussed with the patient and all questions were answered. The importance of avoiding ASA/ NSAIDS/ steroids/ tobacco/ hormones/ immunomodulators perioperatively was discussed.         Charissa Villarreal PA-C

## 2023-10-31 NOTE — PROGRESS NOTES
Bariatric Nutrition Consult     Name: Arslan Butler   : 1990   AGE: 33 y.o.   MRN: [id number]      Consult Date: 10/31/2023     Surgery desired: sleeve    Height: 190.5cm                  Current weight: 370lbs                    BMI: 46.25    Highest weight: 410lbs                           Lowest weight: 215lbs in high school    Goals: 220-230lbs, to improve mental health, increase activity        Past Medical History:   Diagnosis Date    Anxiety and depression 2021    Bipolar disorder 12/15/2021    Chronic pain disorder     Diabetes mellitus     Gastroesophageal reflux disease 12/15/2021    H/O shoulder surgery 2023    Hx of tonsillectomy 2023    Hyperlipidemia 12/15/2021    Iron deficiency anemia secondary to inadequate dietary iron intake 2022    Left arm numbness 2022    Left rotator cuff tear 2022    Male hypogonadism 2022    Obesity 12/15/2021    Panic disorder     Peptic ulceration     Rotator cuff injury, left, subsequent encounter 2022    Self-injurious behavior     Suicide attempt     Testosterone deficiency in male 2022                                 Diet history reveals eats 2 meals and numerous snacks daily, large portions, high in fat and carbs. Large intake of high caloric caffeinated carbonated beverages    Breakfast: poptarts/bagel and cream cheese/2 eggs 2 sl porras 2 biscuits and gravy/cereal (cinnamon toast crunch or raisin bran)    Lunch: skips or black pepper turkey cheese and wagner sandwich on white bread    Dinner:chicken/beef/fish with veg potatoes/pasta/bread (fried, air fried, baked)    Snacks: reports eats an individual bag of candy daily(loves sweets), 2 bags chips, popcorn    Protein sources: meat, fish, chicken, cheese, eggs, fairlife protein drinks    Drinks: 36oz soda, 8-16oz sweet tea, 32oz water, 8oz orange juice daily. Occasional alcohol    Food allergies/intolerances: none reported    Night eating: no    Patient  has/has not been diagnosed with an eating disorder: no    Exercise/activity: occasionally    Main bariatric nutrition principles discussed and explained. Patient needs to focus on 100g protein daily, 100-140g carbohydrates daily, healthy fat intake, 64 oz fluid daily, no carbonation, and try protein drinks/protein powders. Avoid high fructose corn syrup. Patient verbalized understanding and queries were answered.  Additional nutritional counseling will be available      Nora Abraham RD,LD

## 2023-11-01 LAB — UREA BREATH TEST QL: NEGATIVE

## 2023-11-03 ENCOUNTER — TELEMEDICINE (OUTPATIENT)
Dept: BEHAVIORAL HEALTH | Facility: CLINIC | Age: 33
End: 2023-11-03
Payer: COMMERCIAL

## 2023-11-03 DIAGNOSIS — F41.0 PANIC ATTACKS: ICD-10-CM

## 2023-11-03 DIAGNOSIS — F31.60 BIPOLAR AFFECTIVE DISORDER, MIXED: Primary | ICD-10-CM

## 2023-11-03 DIAGNOSIS — Z86.59 HISTORY OF SUICIDAL IDEATION: ICD-10-CM

## 2023-11-03 DIAGNOSIS — F41.1 GENERALIZED ANXIETY DISORDER: ICD-10-CM

## 2023-11-03 DIAGNOSIS — F90.1 ATTENTION DEFICIT HYPERACTIVITY DISORDER (ADHD), PREDOMINANTLY HYPERACTIVE TYPE: ICD-10-CM

## 2023-11-03 NOTE — PROGRESS NOTES
[facility]  Medication Management Follow-up    Referring Provider:  No referring provider defined for this encounter.    Vital Signs:   There were no vitals taken for this visit.    Chief Complaint: Bipolar    History of Present Illness:   Arslan Butler is a 33 y.o. male who presents today for follow-up and medication management for:    ICD-10-CM ICD-9-CM   1. Bipolar affective disorder, mixed  F31.60 296.60   2. History of suicidal ideation  Z86.59 V11.8   3. Attention deficit hyperactivity disorder (ADHD), predominantly hyperactive type  F90.1 314.01   4. Generalized anxiety disorder  F41.1 300.02   5. Panic attacks  F41.0 300.01       11/03/2023 Patient is taking medications as prescribed and is tolerating them well.   He has had a positive strep and flu test and has been sick all week. He hasn't experienced any major mood fluctuations, jaimee, or hypomania. It has only been one week but we wanted to closely monitor mood.  He hasn't noticed a great difference but his wife is saying he seems less flustered.   We are still going to target ADHD, anxiety symptoms with Adderall.   He says he has felt a slight increase in anxiety, but denies panic attacks.   He has found a new job with [employer] and will start in a couple weeks.   Denies suicidal ideation.  Denies AVH.  We will continue to monitor for mood, behavior, and safety.    Record Review is below for 11/03/2023 : I have thoroughly reviewed the patient's electronic medical record to include previous encounters, care everywhere, notes, medications, labs, SHAUNA and UDS (if applicable), imaging, and EKG's.  Pertinent information is included in this note.  8/17/2023 TSH 0.695, free T41.26, lipid panel is reassuring, hemoglobin A1c 6.10, glucose 136, ALT 52, AST 42, CBC and CMP are otherwise reassuring.  EKG Results:  SCANNED EKG (12/29/2019)   Head Imaging:  None in record      10/24/2023 Patient is taking medications as prescribed and is  "tolerating them well.   Recently had to move to his mom's house because after he lost his jobs they lost their house and car.   He has still been self-sabotaging with his thoughts and has a lot of self-doubt. We continue to discuss the possibility of treating ADHD now that we have targeted mood stabilization with Latuda and Depakote ER. Patient has failed several classifications of psychotropic medications and has not ever been treated for ADHD. He has a compelling childhood assessment. Denies suicidal ideation.  Denies AVH.  We will continue to monitor for mood, behavior, and safety.  Continue Latuda to 60 mg daily  Continue Depakote ER 250mg at bedtime  Start Adderall XR 5 mg daily  UDS  CSA  Follow-up 1 week    Record Review is below for 10/24/2023 : I have thoroughly reviewed the patient's electronic medical record to include previous encounters, care everywhere, notes, medications, labs, SHAUNA and UDS (if applicable), imaging, and EKG's.  Pertinent information is included in this note.  8/17/2023 TSH 0.695, free T41.26, lipid panel is reassuring, hemoglobin A1c 6.10, glucose 136, ALT 52, AST 42, CBC and CMP are otherwise reassuring.  EKG Results:  SCANNED EKG (12/29/2019)   Head Imaging:  None in record      09/21/2023 Patient is taking medications as prescribed and is tolerating them well.   Has been through 4 jobs since we last talked. He says that he would start the job and then would talk himself for attendance. He is regretting quitting the most recent job because it was a fairly simple job.   His wife, Belia, is noticing that he is having more \"ups\" in a good way.   We are discussing the possibility of treating patient for a compelling childhood assessment for ADHD since we have been well with some mood stabilization.  He is still struggling with anxiety.  Panic attacks a few times a week. Denies suicidal ideation.  Denies AVH.  We will continue to monitor for mood, behavior, and safety.  Increase " Latuda to 60 mg daily  Continue Depakote ER 250mg at bedtime  Follow-up 1 month    Record Review is below for 09/21/2023 : I have thoroughly reviewed the patient's electronic medical record to include previous encounters, care everywhere, notes, medications, labs, SHAUNA and UDS (if applicable), imaging, and EKG's.  Pertinent information is included in this note.  8/17/2023 TSH 0.695, free T41.26, lipid panel is reassuring, hemoglobin A1c 6.10, glucose 136, ALT 52, AST 42, CBC and CMP are otherwise reassuring.  EKG Results:  SCANNED EKG (12/29/2019)   Head Imaging:  None in record    08/22/2023 Patient is taking medications as prescribed and is tolerating them well.   Still having mood swings, anger outbursts, latuda is helping with depression.   Having some altercations with wife about finances.   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  Depression  Visit Type: follow-up (Bipolar, PETR, Panic Attacks)  Patient presents with the following symptoms: depressed mood, excessive worry, irritability, muscle tension, nervousness/anxiety, psychomotor agitation and restlessness.  Patient is not experiencing: anhedonia, compulsions, suicidal ideas, suicidal planning and thoughts of death.  Frequency of symptoms: most days   Severity: causing significant distress   Sleep quality: fair  Denies suicidal ideation.  Denies AVH.  We will continue to monitor for mood, behavior, and safety.  Continue Latuda 40mg daily  Start Depakote ER 250mg at bedtime  Follow-up 1 month    Record Review is below for 08/22/2023 : I have thoroughly reviewed the patient's electronic medical record to include previous encounters, care everywhere, notes, medications, labs, SHAUNA and UDS (if applicable), imaging, and EKG's.  Pertinent information is included in this note.  8/17/2023 TSH 0.695, free T41.26, lipid panel is  "reassuring, hemoglobin A1c 6.10, glucose 136, ALT 52, AST 42, CBC and CMP are otherwise reassuring.  EKG Results:  SCANNED EKG (12/29/2019)   Head Imaging:  None in record      07/31/2023 Depression and anxiety for several years.   Bipolar  Depression  Visit Type: initial  Onset of symptoms: more than 1 year ago  Progression since onset: gradually worsening  Patient presents with the following symptoms: anhedonia, decreased concentration, depressed mood, excessive worry, fatigue, feelings of hopelessness, irritability, muscle tension, nervousness/anxiety, obsessions, panic, psychomotor agitation and restlessness.  Patient is not experiencing: suicidal ideas, suicidal planning and thoughts of death.  Frequency of symptoms: most days   Severity: interfering with daily activities   Aggravated by: family issues  Sleep quality: fair  Not taking valium three times daily, mostly once daily, valium doesn't really help with panic attacks.   Paranoia (I.e.accusing his wife of talking to her ex-, afraid of passing his mental health issue to his children). Catastrophism of things. Has tried  TMS in [place] 2 years ago without success, was unable to complete full course of treatment. Panic attacks three times daily.   Patient has a compelling childhood assessment for potentially undiagnosed ADHD.  We discussed neuropsychological testing to confirm diagnosis.  Denies suicidal ideation.  Denies AVH.  PHQ-9 is 27 and PETR-7 is 21.  Latuda 20 mg daily  Follow-up 3 weeks    ADHD:  Symptoms are persistent and significantly interfere with major life activities and/or result in significant suffering  With focus on K5 through 6th grade only   Grades: \"pretty bad\"  Behavioral issues: Trouble for getting out of his seat, not listening, talking   Special Classes:Yes, speech, hearing  Inattention:Yes  Referral for ADHD testing:Father did not believe in mental health problems     Often fails to give close attention to details or makes " careless mistakes in schoolwork, at work, or during other activities:Yes  Often has difficulty sustaining attention in tasks:Yes  Often does not seem to listen when spoken to directly:Yes  Often does not follow through on instructions and fails to finish duties in the workplace:Yes  Often has difficulty organizing tasks and activities:Yes  Often avoids, dislikes or is reluctant to engage in tasks that require sustained mental effort:Yes  Often loses things necessary for tasks or activities:Yes  Is often easily distracted by extraneous stimuli: Yes  Is often forgetful in daily activities: Yes  Hyperactivity and Impulsivity: Yes  Often fidgets with or taps hands or feet: Yes  Often leaves seat in situations when remaining seated is expected: Yes  Often feels restless: Yes  Is often “on the go”, acting as if “driven by a motor”: Yes  Often talks excessively: Yes  Often blurts out an answer before a question has been completed: Yes  Often has difficulty waiting their turn: Yes  Often interrupts or intrudes on others: Yes      Record Review for 07/31/2023 : I have thoroughly reviewed the patient's electronic medical record to include previous encounters, care everywhere, notes, medications, labs, SHAUNA and UDS (if applicable), imaging, and EKG's.  Pertinent information is included in this note.  3/29/2023 TSH 1.260, glucose 102, CBC and CMP are otherwise reassuring, lipid panel is reassuring.  Lithium level 0.2.  EKG Results:  SCANNED EKG (12/29/2019)   Head Imaging:  None in record    Per Referring Provider 7/26/2023 Arslan presents to the office today to re-establish care. Previously seen here a year ago, but moved to [place] x1 year.      He has been seeing Ekta Benson with [facility] - last seen on 06/16/2023 - has been seeing via telehealth. Recently they started a new medication plan and 'it just didn't work'. He states that at first it was working okay, but then he started to have intrusive  thoughts. He stated having the thoughts about two weeks ago - he was having thoughts like he would be better off if he wasn't here. He also had recurrence of panic attacks. Those were seemingly well-controlled and now they are not.      He was most recently prescribed Zoloft and Wellbutrin, and Valium. He has taken Valium for a few years now. The Zoloft he took 1-2 months. Wellbutrin he has taken on and off over the years with different medications. He has taken Prozac, lithium, Abilify, Effexor, Vraylar, Rizulti, to name a few.      He did speak with a crisis counselor yesterday at [facility] - he states that she 'kind of talked me down, but it wasn't a very good experience' - he states that 'she basically told me to suck it up'. He state that his therapist at [facility] recently left - he was with her for two years and she left earlier this year.      He currently denies any thoughts of hurting himself or others. He does not have a plan. He endorses anger and labile mood which is interfering with his personal relationships. Endorses headaches and overeating associated with mood.      Past Psychiatric History:  Began Treatment: 2014   Diagnoses: Bipolar depression, anxiety, panic attacks  Psychiatrist: Yes  Therapist: Yes  Admission History: 3 admissions  Medication Trials: Ativan, klonopin, xanax, valium, gabapentin, vistaril, buspar, prozac, lithium, abilify, effexor, vraylar, rexulti, wellbutrin, zoloft, lexapro, celexa, seroquel, clonidine, lamictal  Suicide Attempts: Several attempts, tried to OD on pills, cut wrists  Self Harm: Hx of cutting   Substance use/abuse:Denies  Withdrawal Symptoms: Not applicable  Longest Period Sober: Not applicable  AA: Not applicable  Trauma/Childhood/ACE:  parents     MENTAL STATUS EXAM   General Appearance:  Cleanly groomed and dressed and well developed  Eye Contact:  Good eye contact  Attitude:  Cooperative and polite  Motor Activity:  Normal gait, posture  Speech:  Normal  rate, tone, volume  Mood and affect:  Normal, pleasant and euthymic  Hopelessness:  Denies  Thought Process:  Logical and goal-directed  Associations/ Thought Content:  No delusions  Hallucinations:  None  Suicidal Ideations:  Not present  Homicidal Ideation:  Not present  Sensorium:  Alert  Orientation:  Person, place, time and situation  Immediate Recall, Recent, and Remote Memory:  Intact  Attention Span/ Concentration:  Good  Fund of Knowledge:  Appropriate for age and educational level  Intellectual Functioning:  Average range  Insight:  Good  Judgement:  Good  Reliability:  Good  Impulse Control:  Good          Review of systems is negative except as noted in HPI.  Labs:  WBC   Date Value Ref Range Status   08/17/2023 4.47 3.40 - 10.80 10*3/mm3 Final     Platelets   Date Value Ref Range Status   08/17/2023 236 140 - 450 10*3/mm3 Final     Hemoglobin   Date Value Ref Range Status   08/17/2023 14.4 13.0 - 17.7 g/dL Final     Hematocrit   Date Value Ref Range Status   08/17/2023 42.3 37.5 - 51.0 % Final     Glucose   Date Value Ref Range Status   08/17/2023 136 (H) 65 - 99 mg/dL Final     Creatinine   Date Value Ref Range Status   08/17/2023 0.96 0.76 - 1.27 mg/dL Final     ALT (SGPT)   Date Value Ref Range Status   08/17/2023 52 (H) 1 - 41 U/L Final     AST (SGOT)   Date Value Ref Range Status   08/17/2023 42 (H) 1 - 40 U/L Final     BUN   Date Value Ref Range Status   08/17/2023 12 6 - 20 mg/dL Final     eGFR   Date Value Ref Range Status   08/17/2023 108.4 >60.0 mL/min/1.73 Final     Total Cholesterol   Date Value Ref Range Status   08/17/2023 124 0 - 200 mg/dL Final     Triglycerides   Date Value Ref Range Status   08/17/2023 148 0 - 150 mg/dL Final     HDL Cholesterol   Date Value Ref Range Status   08/17/2023 35 (L) 40 - 60 mg/dL Final     LDL Cholesterol    Date Value Ref Range Status   08/17/2023 63 0 - 100 mg/dL Final     VLDL Cholesterol   Date Value Ref Range Status   08/17/2023 26 5 - 40 mg/dL Final      LDL/HDL Ratio   Date Value Ref Range Status   08/17/2023 1.70  Final     Hemoglobin A1C   Date Value Ref Range Status   08/17/2023 6.10 (H) 4.80 - 5.60 % Final     TSH   Date Value Ref Range Status   08/17/2023 0.695 0.270 - 4.200 uIU/mL Final     Free T4   Date Value Ref Range Status   08/17/2023 1.26 0.93 - 1.70 ng/dL Final     Comment:     T4 results may be falsely increased if patient taking Biotin.      Pain Management Panel  More data exists         Latest Ref Rng & Units 10/25/2023 8/17/2023   Pain Management Panel   Creatinine, Urine mg/dL - 172.2    Amphetamine, Urine Qual Negative Negative  -   Barbiturates Screen, Urine Negative Negative  -   Benzodiazepine Screen, Urine Negative Positive  -   Buprenorphine, Screen, Urine Negative Negative  -   Cocaine Screen, Urine Negative Negative  -   Fentanyl, Urine Negative Negative  -   Methadone Screen , Urine Negative Negative  -   Methamphetamine, Ur Negative Negative  -        Imaging Results:  No Images in the past 120 days found..    Current Medications:   Current Outpatient Medications   Medication Sig Dispense Refill    amphetamine-dextroamphetamine XR (Adderall XR) 5 MG 24 hr capsule Take 1 capsule by mouth Daily 30 capsule 0    azithromycin (ZITHROMAX) 500 MG tablet Take 1 tablet by mouth Daily for 5 days. 5 tablet 0    Cholecalciferol (Vitamin D3) 50 MCG (2000 UT) tablet Take 1 tablet by mouth Daily. 90 tablet 0    divalproex (Depakote ER) 250 MG 24 hr tablet Take 1 tablet by mouth Daily. 30 tablet 1    hydrOXYzine (ATARAX) 25 MG tablet Take 1 tablet by mouth 3 (Three) Times a Day As Needed for Anxiety (Panic Attacks). 90 tablet 1    ibuprofen (ADVIL,MOTRIN) 800 MG tablet Take 1 tablet by mouth Every 8 (Eight) Hours As Needed for Mild Pain. 90 tablet 0    lisinopril (PRINIVIL,ZESTRIL) 10 MG tablet Take 1 tablet by mouth Daily. 90 tablet 0    lurasidone HCl (LATUDA) 60 MG tablet tablet Take 1 tablet by mouth Daily. 30 tablet 1    oseltamivir (TAMIFLU)  75 MG capsule Take 1 capsule by mouth 2 (Two) Times a Day for 5 days. 10 capsule 0    Semaglutide, 1 MG/DOSE, (Ozempic, 1 MG/DOSE,) 4 MG/3ML solution pen-injector Inject 1 mg under the skin into the appropriate area as directed 1 (One) Time Per Week. 9 mL 0     No current facility-administered medications for this visit.       Problem List:  Patient Active Problem List   Diagnosis    Generalized anxiety disorder    Bipolar disorder    Gastroesophageal reflux disease    Hyperlipidemia    Class 3 severe obesity due to excess calories with serious comorbidity and body mass index (BMI) of 40.0 to 44.9 in adult    Erectile dysfunction    Chronic midline low back pain without sciatica    Allergic rhinitis    Chronic pain of left ankle    Chronic pain in left foot    Social anxiety disorder    Anxiety and depression       Allergy:   Allergies   Allergen Reactions    Sulfamethoxazole-Trimethoprim Hives    Metformin GI Intolerance        Discontinued Medications:  There are no discontinued medications.      PLAN:   Presentation seems most consistent with DSM-V criteria for:  Diagnoses and all orders for this visit:    1. Bipolar affective disorder, mixed (Primary)    2. History of suicidal ideation    3. Attention deficit hyperactivity disorder (ADHD), predominantly hyperactive type    4. Generalized anxiety disorder    5. Panic attacks       Continue Latuda to 60 mg daily  Continue Depakote ER 250mg at bedtime  Continue Adderall XR 5 mg daily  Follow-up 1 month  Discussed medication options and treatment plan of prescribed medication as well as the risks, benefits, and side effects.  Patient verbalized understanding and is agreeable to this plan.   Patient is agreeable to call the office with any worsening of symptoms or onset of side effects.   Patient is agreeable to call 911 or go to the nearest ER should he/she begin having SI/HI.   Addressed all questions and concerns.    Continue psychotherapeutic modalities as  indicated.    TREATMENT PLAN/GOALS:  Treatment plan: Continue supportive psychotherapy efforts and medications as indicated. Continue to challenge patterns of living conducive to pathology, strengthen defenses, promote problems solving, restore adaptive functioning and provide symptom relief. Treatment and medication options discussed during today's visit. Patient acknowledged and verbally consented to continue with current treatment plan and was educated on the importance of compliance with treatment and follow-up appointments.  Functional status:Good  Prognosis: Good  Progress: Continued improvement    Safety: No acute safety concerns.   Psychotherapy:    Provided minimal supportive therapy.  Risk Assessment: Risk of self-harm acutely and chronically is moderate.    Risk factors include anxiety disorder, mood disorder, and recent psychosocial stressors.   Protective factors include no family history, denies access to guns/weapons, no present SI, no history of suicide attempts or self-harm in the past, minimal AODA, healthcare seeking, future orientation, willingness to engage in care.    Risk assessment could be further elevated in the event of treatment noncompliance and/or AODA.  Labs/studies: No labs/studies ordered at this time  Medications: No orders of the defined types were placed in this encounter.     Medication Education:   LATUDA (LURASIDONE) Take with food. Risks, benefits, and alternatives discussed with patient including akathisia, sedation, dizziness/falls risk, nausea, low risk of weight gain & metabolic risks for diabetes and dyslipidemia, and rare tardive dyskinesia.  After discussion of these risks and benefits, the patient voiced understanding and agreed to proceed. Instructed to take medication with meal of minimum of 350 calories to improve consistent efficacy and absorption.   DEPAKOTE (VALPROATE) Risks, benefits, alternatives discussed with patient including nausea and vomiting, GI upset,  sedation, dizziness/falls risk, increased appetite. After discussion of these risks and benefits, the patient voiced understanding and agreed to proceed. Patient also informed of the need to take as prescribed, and for periodic labwork.  ADDERALL (AMPHETAMINE) Risks, benefits, side effects discussed with patient including elevated heart rate, elevated blood pressure, irritability, insomnia, sexual dysfunction, appetite suppressing properties, psychosis.  After discussion of these risks and benefits, the patient voiced understanding and agreed to proceed. Shauna reviewed, UDS ordered, and controlled substance agreement signed & witnessed.    SHAUNA reviewed as expected.  Follow-up: Return in about 1 month (around 12/3/2023) for Next scheduled follow up.         This document has been electronically signed by CORTNEY Gray  November 3, 2023 15:14 EDT    Please note that portions of this note were completed with a voice recognition program.  Copied text in this note has been reviewed and is accurate as of 11/03/23

## 2023-11-03 NOTE — PATIENT INSTRUCTIONS
1.  Please return to clinic at your next scheduled visit.  Please contact the clinic ([phone]) at least 24 hours prior in the event you need to cancel.  2.  Do no harm to yourself or others.    3.  Avoid alcohol and drugs.    4.  Take all medications as prescribed.  Please contact the clinic with any concerns. If you are in need of medication refills, please call the clinic at [phone].    5. Should you want to get in touch with your provider, CORTNEY Gray, please contact the office ([phone]), and staff will be able to page Sherrie directly.  6. In the event you have personal crisis, contact the following crisis numbers: Suicide Prevention Hotline 1-566.421.2328; [facility] Helpline 3-197-503-LZVK; [facility] Emergency Room [phone]; text HELLO to 835362; or 382.     SPECIFIC RECOMMENDATIONS:     1.      Medications discussed at this encounter:     No orders of the defined types were placed in this encounter.                      2.      Psychotherapy recommendations: We will continue therapy at future visits.     3.     Return to clinic: Return in about 1 month (around 12/3/2023) for Next scheduled follow up.

## 2023-11-22 DIAGNOSIS — F41.1 GENERALIZED ANXIETY DISORDER: ICD-10-CM

## 2023-11-22 DIAGNOSIS — F31.30 BIPOLAR AFFECTIVE DISORDER, CURRENT EPISODE DEPRESSED, CURRENT EPISODE SEVERITY UNSPECIFIED: ICD-10-CM

## 2023-11-22 DIAGNOSIS — F90.1 ATTENTION DEFICIT HYPERACTIVITY DISORDER (ADHD), PREDOMINANTLY HYPERACTIVE TYPE: ICD-10-CM

## 2023-11-22 RX ORDER — DEXTROAMPHETAMINE SACCHARATE, AMPHETAMINE ASPARTATE MONOHYDRATE, DEXTROAMPHETAMINE SULFATE AND AMPHETAMINE SULFATE 1.25; 1.25; 1.25; 1.25 MG/1; MG/1; MG/1; MG/1
5 CAPSULE, EXTENDED RELEASE ORAL DAILY
Qty: 30 CAPSULE | Refills: 0 | Status: SHIPPED | OUTPATIENT
Start: 2023-11-29 | End: 2024-11-28

## 2023-11-22 RX ORDER — DIVALPROEX SODIUM 250 MG/1
250 TABLET, EXTENDED RELEASE ORAL DAILY
Qty: 90 TABLET | Refills: 1 | Status: SHIPPED | OUTPATIENT
Start: 2023-11-22 | End: 2024-11-21

## 2023-11-22 NOTE — TELEPHONE ENCOUNTER
Patient has been having transportation issues, we are working on controlled substance agreement, we did discuss verbally during telehealth encounter.

## 2023-11-22 NOTE — TELEPHONE ENCOUNTER
CONTROLLED MEDICATION REFILL REQUEST    STATE REGULATION APPT EVERY 3 MONTHS     UDS(URINE DRUG SCREEN) EVERY 6 MONTHS     NEW NARC CONSENT EVERY YEAR      MEDICATION: amphetamine-dextroamphetamine XR (Adderall XR) 5 MG 24 hr capsule (10/30/2023)     PT(PATIENT) CONFIRMED PHARMACY: [id number] [place], [place]      NEXT OFFICE VISIT: Appointment with Sherrie Oneill APRN (12/01/2023)     LAST OFFICE VISIT: Telemedicine with Sherrie Oneill APRN (10/24/2023)     NARC CONSENT: NONE IN EPIC    URINE DRUG SCREEN: Urine Drug Screen - Urine, Clean Catch (10/25/2023 09:45)     PROVIDER PLEASE ADVISE

## 2023-12-01 ENCOUNTER — TELEMEDICINE (OUTPATIENT)
Dept: BEHAVIORAL HEALTH | Facility: CLINIC | Age: 33
End: 2023-12-01
Payer: COMMERCIAL

## 2023-12-01 DIAGNOSIS — Z86.59 HISTORY OF SUICIDAL IDEATION: ICD-10-CM

## 2023-12-01 DIAGNOSIS — F41.1 GENERALIZED ANXIETY DISORDER: ICD-10-CM

## 2023-12-01 DIAGNOSIS — F31.30 BIPOLAR AFFECTIVE DISORDER, CURRENT EPISODE DEPRESSED, CURRENT EPISODE SEVERITY UNSPECIFIED: Primary | ICD-10-CM

## 2023-12-01 DIAGNOSIS — F41.0 PANIC ATTACKS: ICD-10-CM

## 2023-12-01 DIAGNOSIS — F90.1 ATTENTION DEFICIT HYPERACTIVITY DISORDER (ADHD), PREDOMINANTLY HYPERACTIVE TYPE: ICD-10-CM

## 2023-12-01 RX ORDER — DIAZEPAM 5 MG/1
5 TABLET ORAL DAILY PRN
Qty: 15 TABLET | Refills: 0 | Status: SHIPPED | OUTPATIENT
Start: 2023-12-01 | End: 2024-11-30

## 2023-12-01 NOTE — PROGRESS NOTES
[facility] [place] [facility]  Medication Management Follow-up  Virtual MyChart Visit  This provider is located at [address], [address], [place] [address]. The Patient is located at home.  Patient is being seen remotely using MyChart. Patient is being seen via telehealth and confirm that they are in a secure environment for this session. The patient's condition being diagnosed/treated is appropriate for telemedicine. The provider identified herself as well as her credentials. They may refuse to be seen remotely at any time. The electronic data is encrypted and password protected, and the patient has been advised of the potential risks to privacy not withstanding such measures. Virtual visit via Zoom audio and video due to the COVID-19 pandemic.  Patient is accepting of and agreeable to visit.  The visit consisted of the patient and I. PT Identifiers used: Name and .   Referring Provider:  No referring provider defined for this encounter.    Vital Signs:   There were no vitals taken for this visit.    Chief Complaint: ADHD. Bipolar.     History of Present Illness:   Arslan Butler is a 33 y.o. male who presents today for follow-up and medication management for:    ICD-10-CM ICD-9-CM   1. Bipolar affective disorder, current episode depressed, current episode severity unspecified  F31.30 296.50   2. Generalized anxiety disorder  F41.1 300.02   3. History of suicidal ideation  Z86.59 V11.8   4. Attention deficit hyperactivity disorder (ADHD), predominantly hyperactive type  F90.1 314.01   5. Panic attacks  F41.0 300.01       2023 Patient is taking medications as prescribed and is tolerating them well.   He hasn't experienced any major mood fluctuations, jaimee, or hypomania. It has only been one week but we wanted to closely monitor mood.  He hasn't noticed a great difference but his wife is saying he seems less flustered.   We are still going to target ADHD, anxiety symptoms with Adderall.   He  says he has felt a slight increase in anxiety, but denies panic attacks.   He has found a new job with [employer] and will start in a couple weeks.   Denies suicidal ideation.  Denies AVH.  We will continue to monitor for mood, behavior, and safety.    Record Review is below for 12/01/2023 : I have thoroughly reviewed the patient's electronic medical record to include previous encounters, care everywhere, notes, medications, labs, SHAUNA and UDS (if applicable), imaging, and EKG's.  Pertinent information is included in this note.  8/17/2023 TSH 0.695, free T41.26, lipid panel is reassuring, hemoglobin A1c 6.10, glucose 136, ALT 52, AST 42, CBC and CMP are otherwise reassuring.  EKG Results:  SCANNED EKG (12/29/2019)   Head Imaging:  None in record      11/03/2023 Patient is taking medications as prescribed and is tolerating them well.   He has had a positive strep and flu test and has been sick all week. He hasn't experienced any major mood fluctuations, jaimee, or hypomania. It has only been one week but we wanted to closely monitor mood.  He hasn't noticed a great difference but his wife is saying he seems less flustered.   We are still going to target ADHD, anxiety symptoms with Adderall.   He says he has felt a slight increase in anxiety, but denies panic attacks.   He has found a new job with [employer] and will start in a couple weeks.   Denies suicidal ideation.  Denies AVH.  We will continue to monitor for mood, behavior, and safety.  Continue Latuda to 60 mg daily  Continue Depakote ER 250mg at bedtime  Continue Adderall XR 5 mg daily  Follow-up 1 month    Record Review is below for 11/03/2023 : I have thoroughly reviewed the patient's electronic medical record to include previous encounters, care everywhere, notes, medications, labs, SHAUNA and UDS (if applicable), imaging, and EKG's.  Pertinent information is included in this note.  8/17/2023 TSH 0.695, free T41.26, lipid panel is reassuring, hemoglobin A1c 6.10,  "glucose 136, ALT 52, AST 42, CBC and CMP are otherwise reassuring.  EKG Results:  SCANNED EKG (12/29/2019)   Head Imaging:  None in record      10/24/2023 Patient is taking medications as prescribed and is tolerating them well.   Recently had to move to his mom's house because after he lost his jobs they lost their house and car.   He has still been self-sabotaging with his thoughts and has a lot of self-doubt. We continue to discuss the possibility of treating ADHD now that we have targeted mood stabilization with Latuda and Depakote ER. Patient has failed several classifications of psychotropic medications and has not ever been treated for ADHD. He has a compelling childhood assessment. Denies suicidal ideation.  Denies AVH.  We will continue to monitor for mood, behavior, and safety.  Continue Latuda to 60 mg daily  Continue Depakote ER 250mg at bedtime  Start Adderall XR 5 mg daily  UDS  CSA  Follow-up 1 week    Record Review is below for 10/24/2023 : I have thoroughly reviewed the patient's electronic medical record to include previous encounters, care everywhere, notes, medications, labs, SHAUNA and UDS (if applicable), imaging, and EKG's.  Pertinent information is included in this note.  8/17/2023 TSH 0.695, free T41.26, lipid panel is reassuring, hemoglobin A1c 6.10, glucose 136, ALT 52, AST 42, CBC and CMP are otherwise reassuring.  EKG Results:  SCANNED EKG (12/29/2019)   Head Imaging:  None in record      09/21/2023 Patient is taking medications as prescribed and is tolerating them well.   Has been through 4 jobs since we last talked. He says that he would start the job and then would talk himself for attendance. He is regretting quitting the most recent job because it was a fairly simple job.   His wife, Belia, is noticing that he is having more \"ups\" in a good way.   We are discussing the possibility of treating patient for a compelling childhood assessment for ADHD since we have been well with some " mood stabilization.  He is still struggling with anxiety.  Panic attacks a few times a week. Denies suicidal ideation.  Denies AVH.  We will continue to monitor for mood, behavior, and safety.  Increase Latuda to 60 mg daily  Continue Depakote ER 250mg at bedtime  Follow-up 1 month    Record Review is below for 09/21/2023 : I have thoroughly reviewed the patient's electronic medical record to include previous encounters, care everywhere, notes, medications, labs, SHAUNA and UDS (if applicable), imaging, and EKG's.  Pertinent information is included in this note.  8/17/2023 TSH 0.695, free T41.26, lipid panel is reassuring, hemoglobin A1c 6.10, glucose 136, ALT 52, AST 42, CBC and CMP are otherwise reassuring.  EKG Results:  SCANNED EKG (12/29/2019)   Head Imaging:  None in record    08/22/2023 Patient is taking medications as prescribed and is tolerating them well.   Still having mood swings, anger outbursts, latuda is helping with depression.   Having some altercations with wife about finances.   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  Depression  Visit Type: follow-up (Bipolar, PETR, Panic Attacks)  Patient presents with the following symptoms: depressed mood, excessive worry, irritability, muscle tension, nervousness/anxiety, psychomotor agitation and restlessness.  Patient is not experiencing: anhedonia, compulsions, suicidal ideas, suicidal planning and thoughts of death.  Frequency of symptoms: most days   Severity: causing significant distress   Sleep quality: fair  Denies suicidal ideation.  Denies AVH.  We will continue to monitor for mood, behavior, and safety.  Continue Latuda 40mg daily  Start Depakote ER 250mg at bedtime  Follow-up 1 month    Record Review is below for 08/22/2023 : I have thoroughly reviewed the patient's electronic medical record to include previous encounters,  "care everywhere, notes, medications, labs, SHAUNA and UDS (if applicable), imaging, and EKG's.  Pertinent information is included in this note.  8/17/2023 TSH 0.695, free T41.26, lipid panel is reassuring, hemoglobin A1c 6.10, glucose 136, ALT 52, AST 42, CBC and CMP are otherwise reassuring.  EKG Results:  SCANNED EKG (12/29/2019)   Head Imaging:  None in record      07/31/2023 Depression and anxiety for several years.   Bipolar  Depression  Visit Type: initial  Onset of symptoms: more than 1 year ago  Progression since onset: gradually worsening  Patient presents with the following symptoms: anhedonia, decreased concentration, depressed mood, excessive worry, fatigue, feelings of hopelessness, irritability, muscle tension, nervousness/anxiety, obsessions, panic, psychomotor agitation and restlessness.  Patient is not experiencing: suicidal ideas, suicidal planning and thoughts of death.  Frequency of symptoms: most days   Severity: interfering with daily activities   Aggravated by: family issues  Sleep quality: fair  Not taking valium three times daily, mostly once daily, valium doesn't really help with panic attacks.   Paranoia (I.e.accusing his wife of talking to her ex-, afraid of passing his mental health issue to his children). Catastrophism of things. Has tried  TMS in [place] 2 years ago without success, was unable to complete full course of treatment. Panic attacks three times daily.   Patient has a compelling childhood assessment for potentially undiagnosed ADHD.  We discussed neuropsychological testing to confirm diagnosis.  Denies suicidal ideation.  Denies AVH.  PHQ-9 is 27 and PETR-7 is 21.  Latuda 20 mg daily  Follow-up 3 weeks    ADHD:  Symptoms are persistent and significantly interfere with major life activities and/or result in significant suffering  With focus on K5 through 6th grade only   Grades: \"pretty bad\"  Behavioral issues: Trouble for getting out of his seat, not listening, " talking   Special Classes:Yes, speech, hearing  Inattention:Yes  Referral for ADHD testing:Father did not believe in mental health problems     Often fails to give close attention to details or makes careless mistakes in schoolwork, at work, or during other activities:Yes  Often has difficulty sustaining attention in tasks:Yes  Often does not seem to listen when spoken to directly:Yes  Often does not follow through on instructions and fails to finish duties in the workplace:Yes  Often has difficulty organizing tasks and activities:Yes  Often avoids, dislikes or is reluctant to engage in tasks that require sustained mental effort:Yes  Often loses things necessary for tasks or activities:Yes  Is often easily distracted by extraneous stimuli: Yes  Is often forgetful in daily activities: Yes  Hyperactivity and Impulsivity: Yes  Often fidgets with or taps hands or feet: Yes  Often leaves seat in situations when remaining seated is expected: Yes  Often feels restless: Yes  Is often “on the go”, acting as if “driven by a motor”: Yes  Often talks excessively: Yes  Often blurts out an answer before a question has been completed: Yes  Often has difficulty waiting their turn: Yes  Often interrupts or intrudes on others: Yes      Record Review for 07/31/2023 : I have thoroughly reviewed the patient's electronic medical record to include previous encounters, care everywhere, notes, medications, labs, SHAUNA and UDS (if applicable), imaging, and EKG's.  Pertinent information is included in this note.  3/29/2023 TSH 1.260, glucose 102, CBC and CMP are otherwise reassuring, lipid panel is reassuring.  Lithium level 0.2.  EKG Results:  SCANNED EKG (12/29/2019)   Head Imaging:  None in record    Per Referring Provider 7/26/2023 Arslan presents to the office today to re-establish care. Previously seen here a year ago, but moved to [place] x1 year.      He has been seeing Ekta Benson with [facility] - last seen on  06/16/2023 - has been seeing via telehealth. Recently they started a new medication plan and 'it just didn't work'. He states that at first it was working okay, but then he started to have intrusive thoughts. He stated having the thoughts about two weeks ago - he was having thoughts like he would be better off if he wasn't here. He also had recurrence of panic attacks. Those were seemingly well-controlled and now they are not.      He was most recently prescribed Zoloft and Wellbutrin, and Valium. He has taken Valium for a few years now. The Zoloft he took 1-2 months. Wellbutrin he has taken on and off over the years with different medications. He has taken Prozac, lithium, Abilify, Effexor, Vraylar, Rizulti, to name a few.      He did speak with a crisis counselor yesterday at [facility] - he states that she 'kind of talked me down, but it wasn't a very good experience' - he states that 'she basically told me to suck it up'. He state that his therapist at [facility] recently left - he was with her for two years and she left earlier this year.      He currently denies any thoughts of hurting himself or others. He does not have a plan. He endorses anger and labile mood which is interfering with his personal relationships. Endorses headaches and overeating associated with mood.      Past Psychiatric History:  Began Treatment: 2014   Diagnoses: Bipolar depression, anxiety, panic attacks  Psychiatrist: Yes  Therapist: Yes  Admission History: 3 admissions  Medication Trials: Ativan, klonopin, xanax, valium, gabapentin, vistaril, buspar, prozac, lithium, abilify, effexor, vraylar, rexulti, wellbutrin, zoloft, lexapro, celexa, seroquel, clonidine, lamictal  Suicide Attempts: Several attempts, tried to OD on pills, cut wrists  Self Harm: Hx of cutting   Substance use/abuse:Denies  Withdrawal Symptoms: Not applicable  Longest Period Sober: Not applicable  AA: Not applicable  Trauma/Childhood/ACE:  parents     MENTAL STATUS  EXAM   General Appearance:  Cleanly groomed and dressed and well developed  Eye Contact:  Good eye contact  Attitude:  Cooperative and polite  Motor Activity:  Normal gait, posture  Speech:  Normal rate, tone, volume  Mood and affect:  Normal, pleasant and euthymic  Hopelessness:  Denies  Thought Process:  Logical and goal-directed  Associations/ Thought Content:  No delusions  Hallucinations:  None  Suicidal Ideations:  Not present  Homicidal Ideation:  Not present  Sensorium:  Alert  Orientation:  Person, place, time and situation  Immediate Recall, Recent, and Remote Memory:  Intact  Attention Span/ Concentration:  Good  Fund of Knowledge:  Appropriate for age and educational level  Intellectual Functioning:  Average range  Insight:  Good  Judgement:  Good  Reliability:  Good  Impulse Control:  Good          Review of systems is negative except as noted in HPI.  Labs:  WBC   Date Value Ref Range Status   08/17/2023 4.47 3.40 - 10.80 10*3/mm3 Final     Platelets   Date Value Ref Range Status   08/17/2023 236 140 - 450 10*3/mm3 Final     Hemoglobin   Date Value Ref Range Status   08/17/2023 14.4 13.0 - 17.7 g/dL Final     Hematocrit   Date Value Ref Range Status   08/17/2023 42.3 37.5 - 51.0 % Final     Glucose   Date Value Ref Range Status   08/17/2023 136 (H) 65 - 99 mg/dL Final     Creatinine   Date Value Ref Range Status   08/17/2023 0.96 0.76 - 1.27 mg/dL Final     ALT (SGPT)   Date Value Ref Range Status   08/17/2023 52 (H) 1 - 41 U/L Final     AST (SGOT)   Date Value Ref Range Status   08/17/2023 42 (H) 1 - 40 U/L Final     BUN   Date Value Ref Range Status   08/17/2023 12 6 - 20 mg/dL Final     eGFR   Date Value Ref Range Status   08/17/2023 108.4 >60.0 mL/min/1.73 Final     Total Cholesterol   Date Value Ref Range Status   08/17/2023 124 0 - 200 mg/dL Final     Triglycerides   Date Value Ref Range Status   08/17/2023 148 0 - 150 mg/dL Final     HDL Cholesterol   Date Value Ref Range Status   08/17/2023 35  (L) 40 - 60 mg/dL Final     LDL Cholesterol    Date Value Ref Range Status   08/17/2023 63 0 - 100 mg/dL Final     VLDL Cholesterol   Date Value Ref Range Status   08/17/2023 26 5 - 40 mg/dL Final     LDL/HDL Ratio   Date Value Ref Range Status   08/17/2023 1.70  Final     Hemoglobin A1C   Date Value Ref Range Status   08/17/2023 6.10 (H) 4.80 - 5.60 % Final     TSH   Date Value Ref Range Status   08/17/2023 0.695 0.270 - 4.200 uIU/mL Final     Free T4   Date Value Ref Range Status   08/17/2023 1.26 0.93 - 1.70 ng/dL Final     Comment:     T4 results may be falsely increased if patient taking Biotin.      Pain Management Panel  More data exists         Latest Ref Rng & Units 10/25/2023 8/17/2023   Pain Management Panel   Creatinine, Urine mg/dL - 172.2    Amphetamine, Urine Qual Negative Negative  -   Barbiturates Screen, Urine Negative Negative  -   Benzodiazepine Screen, Urine Negative Positive  -   Buprenorphine, Screen, Urine Negative Negative  -   Cocaine Screen, Urine Negative Negative  -   Fentanyl, Urine Negative Negative  -   Methadone Screen , Urine Negative Negative  -   Methamphetamine, Ur Negative Negative  -        Imaging Results:  No Images in the past 120 days found..    Current Medications:   Current Outpatient Medications   Medication Sig Dispense Refill    divalproex (DEPAKOTE ER) 250 MG 24 hr tablet TAKE 1 TABLET BY MOUTH DAILY 90 tablet 1    amphetamine-dextroamphetamine XR (Adderall XR) 10 MG 24 hr capsule Take 1 capsule by mouth Daily 30 capsule 0    Cholecalciferol (Vitamin D3) 50 MCG (2000 UT) tablet Take 1 tablet by mouth Daily. 90 tablet 0    diazePAM (Valium) 5 MG tablet Take 1 tablet by mouth Daily As Needed for Anxiety (Panic Attacks). 15 tablet 0    hydrOXYzine (ATARAX) 25 MG tablet Take 1 tablet by mouth 3 (Three) Times a Day As Needed for Anxiety (Panic Attacks). 90 tablet 1    ibuprofen (ADVIL,MOTRIN) 800 MG tablet Take 1 tablet by mouth Every 8 (Eight) Hours As Needed for Mild  Pain. 90 tablet 0    lisinopril (PRINIVIL,ZESTRIL) 10 MG tablet Take 1 tablet by mouth Daily. 90 tablet 0    lurasidone HCl (LATUDA) 60 MG tablet tablet Take 1 tablet by mouth Daily. 30 tablet 1    Semaglutide, 1 MG/DOSE, (Ozempic, 1 MG/DOSE,) 4 MG/3ML solution pen-injector Inject 1 mg under the skin into the appropriate area as directed 1 (One) Time Per Week. 9 mL 0     No current facility-administered medications for this visit.       Problem List:  Patient Active Problem List   Diagnosis    Generalized anxiety disorder    Bipolar disorder    Gastroesophageal reflux disease    Hyperlipidemia    Class 3 severe obesity due to excess calories with serious comorbidity and body mass index (BMI) of 40.0 to 44.9 in adult    Erectile dysfunction    Chronic midline low back pain without sciatica    Allergic rhinitis    Chronic pain of left ankle    Chronic pain in left foot    Social anxiety disorder    Anxiety and depression       Allergy:   Allergies   Allergen Reactions    Sulfamethoxazole-Trimethoprim Hives    Metformin GI Intolerance        Discontinued Medications:  There are no discontinued medications.      PLAN:   Presentation seems most consistent with DSM-V criteria for:  Diagnoses and all orders for this visit:    1. Bipolar affective disorder, current episode depressed, current episode severity unspecified (Primary)    2. Generalized anxiety disorder  -     diazePAM (Valium) 5 MG tablet; Take 1 tablet by mouth Daily As Needed for Anxiety (Panic Attacks).  Dispense: 15 tablet; Refill: 0    3. History of suicidal ideation    4. Attention deficit hyperactivity disorder (ADHD), predominantly hyperactive type    5. Panic attacks  -     diazePAM (Valium) 5 MG tablet; Take 1 tablet by mouth Daily As Needed for Anxiety (Panic Attacks).  Dispense: 15 tablet; Refill: 0       Continue Latuda to 60 mg daily  Continue Depakote ER 250mg at bedtime  Continue Adderall XR to 10 mg daily  Start Valium 5 mg qd as needed for  anxiety and panic attacks  Follow-up 1 month  Discussed medication options and treatment plan of prescribed medication as well as the risks, benefits, and side effects.  Patient verbalized understanding and is agreeable to this plan.   Patient is agreeable to call the office with any worsening of symptoms or onset of side effects.   Patient is agreeable to call 911 or go to the nearest ER should he/she begin having SI/HI.   Addressed all questions and concerns.    Continue psychotherapeutic modalities as indicated.    TREATMENT PLAN/GOALS:  Treatment plan: Continue supportive psychotherapy efforts and medications as indicated. Continue to challenge patterns of living conducive to pathology, strengthen defenses, promote problems solving, restore adaptive functioning and provide symptom relief. Treatment and medication options discussed during today's visit. Patient acknowledged and verbally consented to continue with current treatment plan and was educated on the importance of compliance with treatment and follow-up appointments.  Functional status:Good  Prognosis: Good  Progress: Continued improvement    Safety: No acute safety concerns.   Psychotherapy:      17 minutes of supportive psychotherapy with goal to strengthen defenses, promote problems solving, restore adaptive functioning and provide symptom relief. The therapeutic alliance was strengthened to encourage the patient to express their thoughts and feelings. Esteem building was enhanced through praise, reassurance, normalizing and encouragement. Coping skills were enhanced to build distress tolerance skills and emotional regulation. Allowed patient to freely discuss issues without interruption or judgement with unconditional positive regard, active listening skills, and empathy. Provided a safe, confidential environment to facilitate the development of a positive therapeutic relationship and encourage open, honest communication. Assisted patient in  identifying risk factors which would indicate the need for higher level of care including thoughts to harm self or others and/or self-harming behavior and encouraged patient to contact this office, call 911, or present to the nearest emergency room should any of these events occur. Assisted patient in processing session content; acknowledged and normalized patient’s thoughts, feelings, and concerns by utilizing a person-centered approach in efforts to build appropriate rapport and a positive therapeutic relationship with open and honest communication. Patient given education on medication side effects, diagnosis/illness and relapse symptoms. Plan to continue supportive psychotherapy in next appointment to provide symptom relief.      Risk Assessment: Risk of self-harm acutely and chronically is moderate.    Risk factors include anxiety disorder, mood disorder, and recent psychosocial stressors.   Protective factors include no family history, denies access to guns/weapons, no present SI, no history of suicide attempts or self-harm in the past, minimal AODA, healthcare seeking, future orientation, willingness to engage in care.    Risk assessment could be further elevated in the event of treatment noncompliance and/or AODA.  Labs/studies: No labs/studies ordered at this time  Medications:   New Medications Ordered This Visit   Medications    diazePAM (Valium) 5 MG tablet     Sig: Take 1 tablet by mouth Daily As Needed for Anxiety (Panic Attacks).     Dispense:  15 tablet     Refill:  0      Medication Education:   LATUDA (LURASIDONE) Take with food. Risks, benefits, and alternatives discussed with patient including akathisia, sedation, dizziness/falls risk, nausea, low risk of weight gain & metabolic risks for diabetes and dyslipidemia, and rare tardive dyskinesia.  After discussion of these risks and benefits, the patient voiced understanding and agreed to proceed. Instructed to take medication with meal of minimum  of 350 calories to improve consistent efficacy and absorption.   DEPAKOTE (VALPROATE) Risks, benefits, alternatives discussed with patient including nausea and vomiting, GI upset, sedation, dizziness/falls risk, increased appetite. After discussion of these risks and benefits, the patient voiced understanding and agreed to proceed. Patient also informed of the need to take as prescribed, and for periodic labwork.  ADDERALL (AMPHETAMINE) Risks, benefits, side effects discussed with patient including elevated heart rate, elevated blood pressure, irritability, insomnia, sexual dysfunction, appetite suppressing properties, psychosis.  After discussion of these risks and benefits, the patient voiced understanding and agreed to proceed. Shauna reviewed, UDS ordered, and controlled substance agreement signed & witnessed.  VALIUM (DIAZEPAM)  Risks, benefits, and alternatives discussed with patient including sedation/falls risk, dizziness, disinhibition, headache, fatigue, dry mouth, blurred vision, constipation, nausea, diarrhea, heartburn, unusual taste in mouth, urinary retention, increased appetite, restlessness, sexual dysfunction, sweating, weight gain, cardiac arrhythmias, seizures, and fall risk.  After discussion of these risks and benefits, patient voiced understanding and agreed to proceed.  Shauna reviewed, UDS ordered, and controlled substance agreement signed & witnessed.      SHAUNA reviewed as expected.  Follow-up: Return in about 1 month (around 1/1/2024) for Next scheduled follow up.         This document has been electronically signed by CORTNEY Gray  December 18, 2023 13:05 EST    Please note that portions of this note were completed with a voice recognition program.  Copied text in this note has been reviewed and is accurate as of 12/18/23

## 2023-12-12 ENCOUNTER — TELEPHONE (OUTPATIENT)
Dept: PSYCHIATRY | Facility: CLINIC | Age: 33
End: 2023-12-12
Payer: COMMERCIAL

## 2023-12-12 DIAGNOSIS — F90.1 ATTENTION DEFICIT HYPERACTIVITY DISORDER (ADHD), PREDOMINANTLY HYPERACTIVE TYPE: Primary | ICD-10-CM

## 2023-12-12 RX ORDER — DEXTROAMPHETAMINE SACCHARATE, AMPHETAMINE ASPARTATE MONOHYDRATE, DEXTROAMPHETAMINE SULFATE AND AMPHETAMINE SULFATE 2.5; 2.5; 2.5; 2.5 MG/1; MG/1; MG/1; MG/1
10 CAPSULE, EXTENDED RELEASE ORAL DAILY
Qty: 30 CAPSULE | Refills: 0 | Status: SHIPPED | OUTPATIENT
Start: 2023-12-12 | End: 2024-12-11

## 2023-12-12 NOTE — TELEPHONE ENCOUNTER
Patient needs a refill.  Patient stated that he needed ten mg.  I only see 5mg in his chart.  Please advise

## 2023-12-15 ENCOUNTER — TELEPHONE (OUTPATIENT)
Dept: PSYCHIATRY | Facility: CLINIC | Age: 33
End: 2023-12-15
Payer: COMMERCIAL

## 2023-12-15 NOTE — TELEPHONE ENCOUNTER
I called the pharmacy who advised that the Insurance will cover the medication, but not until the end of the month because the patient picked up the 5mg dosage on the 30th and his insurance only covers 30 extended release tablets every 30 days.

## 2023-12-15 NOTE — TELEPHONE ENCOUNTER
PT(PATIENT) VERIFIED     amphetamine-dextroamphetamine XR (Adderall XR) 10 MG 24 hr capsule (2023)     [id number] [place], [place]     PT(PATIENT) STATES HE WAS ADVISED BY PHARMACY THAT MEDICATION REQUIRES A PA     PT(PATIENT) ADVISED REQUEST WILL BE FORWARDED TO WORK QUE FOR REVIEW AND THEN SENT TO HIS INSURANCE     PT(PATIENT) ADVISED HE SHOULD RECEIVE A LETTER WITH THE DETERMINATION AFTER INSURANCE REVIEWS THE REQUEST     PT(PATIENT) VERBALIZED UNDERSTANDING AND HAD NO FURTHER QUESTIONS AT THIS TIME

## 2023-12-18 NOTE — TELEPHONE ENCOUNTER
PA APPROVAL RECEIVED FOR PTS ADDERALL XR 10 MG CAPSULES.    APPROVAL DATES ARE: 12/15/23-01/14/2024.  BEHAVIORAL HEALTH - SCAN - [place] APPROVAL ADDERALL; MEDIMPACT; 12/15/23 (12/18/2023)     I CALLED PT TO INFORM OF APPROVAL.  IT WENT STRAIGHT TO VOICEMAIL.    I LEFT A MESSAGE INDICATING OF APPROVAL USING BASIC INFORMATION AND TO CALL OUR OFFICE WITH ANY FURTHER QUESTIONS.

## 2023-12-18 NOTE — PATIENT INSTRUCTIONS
1.  Please return to clinic at your next scheduled visit.  Please contact the clinic ([phone]) at least 24 hours prior in the event you need to cancel.  2.  Do no harm to yourself or others.    3.  Avoid alcohol and drugs.    4.  Take all medications as prescribed.  Please contact the clinic with any concerns. If you are in need of medication refills, please call the clinic at [phone].    5. Should you want to get in touch with your provider, CORTNEY Gray, please contact the office ([phone]), and staff will be able to page Shrerie directly.  6. In the event you have personal crisis, contact the following crisis numbers: Suicide Prevention Hotline 1-833.223.9114; [facility] Helpline 0-611-865-XTIT; [facility] Emergency Room [phone]; text HELLO to 548852; or 807.     SPECIFIC RECOMMENDATIONS:     1.      Medications discussed at this encounter:     New Medications Ordered This Visit   Medications    diazePAM (Valium) 5 MG tablet     Sig: Take 1 tablet by mouth Daily As Needed for Anxiety (Panic Attacks).     Dispense:  15 tablet     Refill:  0                       2.      Psychotherapy recommendations: We will continue therapy at future visits.     3.     Return to clinic: Return in about 1 month (around 1/1/2024) for Next scheduled follow up.

## 2023-12-19 ENCOUNTER — OFFICE VISIT (OUTPATIENT)
Dept: INTERNAL MEDICINE | Facility: CLINIC | Age: 33
End: 2023-12-19
Payer: COMMERCIAL

## 2023-12-19 VITALS
BODY MASS INDEX: 39.17 KG/M2 | HEIGHT: 75 IN | RESPIRATION RATE: 16 BRPM | DIASTOLIC BLOOD PRESSURE: 106 MMHG | HEART RATE: 88 BPM | TEMPERATURE: 97.1 F | WEIGHT: 315 LBS | SYSTOLIC BLOOD PRESSURE: 134 MMHG

## 2023-12-19 DIAGNOSIS — K21.9 GASTROESOPHAGEAL REFLUX DISEASE, UNSPECIFIED WHETHER ESOPHAGITIS PRESENT: ICD-10-CM

## 2023-12-19 DIAGNOSIS — F31.30 BIPOLAR AFFECTIVE DISORDER, CURRENT EPISODE DEPRESSED, CURRENT EPISODE SEVERITY UNSPECIFIED: ICD-10-CM

## 2023-12-19 DIAGNOSIS — E66.01 CLASS 3 SEVERE OBESITY DUE TO EXCESS CALORIES WITH SERIOUS COMORBIDITY AND BODY MASS INDEX (BMI) OF 40.0 TO 44.9 IN ADULT: ICD-10-CM

## 2023-12-19 DIAGNOSIS — G89.29 CHRONIC MIDLINE LOW BACK PAIN WITHOUT SCIATICA: ICD-10-CM

## 2023-12-19 DIAGNOSIS — M54.50 CHRONIC MIDLINE LOW BACK PAIN WITHOUT SCIATICA: ICD-10-CM

## 2023-12-19 DIAGNOSIS — I10 ESSENTIAL HYPERTENSION: ICD-10-CM

## 2023-12-19 DIAGNOSIS — E78.5 HYPERLIPIDEMIA, UNSPECIFIED HYPERLIPIDEMIA TYPE: ICD-10-CM

## 2023-12-19 DIAGNOSIS — E11.65 TYPE 2 DIABETES MELLITUS WITH HYPERGLYCEMIA, WITHOUT LONG-TERM CURRENT USE OF INSULIN: ICD-10-CM

## 2023-12-19 DIAGNOSIS — E55.9 VITAMIN D INSUFFICIENCY: ICD-10-CM

## 2023-12-19 DIAGNOSIS — F41.9 ANXIETY AND DEPRESSION: ICD-10-CM

## 2023-12-19 DIAGNOSIS — F32.A ANXIETY AND DEPRESSION: ICD-10-CM

## 2023-12-19 DIAGNOSIS — F90.0 ATTENTION DEFICIT HYPERACTIVITY DISORDER (ADHD), PREDOMINANTLY INATTENTIVE TYPE: Primary | ICD-10-CM

## 2023-12-19 DIAGNOSIS — E66.01 CLASS 3 SEVERE OBESITY DUE TO EXCESS CALORIES WITH SERIOUS COMORBIDITY AND BODY MASS INDEX (BMI) OF 45.0 TO 49.9 IN ADULT: ICD-10-CM

## 2023-12-19 DIAGNOSIS — F40.10 SOCIAL ANXIETY DISORDER: ICD-10-CM

## 2023-12-19 DIAGNOSIS — F41.1 GENERALIZED ANXIETY DISORDER: ICD-10-CM

## 2023-12-19 LAB
25(OH)D3 SERPL-MCNC: 23.7 NG/ML (ref 30–100)
ALBUMIN SERPL-MCNC: 4.5 G/DL (ref 3.5–5.2)
ALBUMIN/GLOB SERPL: 1.7 G/DL
ALP SERPL-CCNC: 73 U/L (ref 39–117)
ALT SERPL W P-5'-P-CCNC: 48 U/L (ref 1–41)
ANION GAP SERPL CALCULATED.3IONS-SCNC: 11.6 MMOL/L (ref 5–15)
AST SERPL-CCNC: 28 U/L (ref 1–40)
BASOPHILS # BLD AUTO: 0.02 10*3/MM3 (ref 0–0.2)
BASOPHILS NFR BLD AUTO: 0.4 % (ref 0–1.5)
BILIRUB SERPL-MCNC: 0.6 MG/DL (ref 0–1.2)
BUN SERPL-MCNC: 10 MG/DL (ref 6–20)
BUN/CREAT SERPL: 9.3 (ref 7–25)
CALCIUM SPEC-SCNC: 9.3 MG/DL (ref 8.6–10.5)
CHLORIDE SERPL-SCNC: 104 MMOL/L (ref 98–107)
CHOLEST SERPL-MCNC: 107 MG/DL (ref 0–200)
CO2 SERPL-SCNC: 24.4 MMOL/L (ref 22–29)
CREAT SERPL-MCNC: 1.07 MG/DL (ref 0.76–1.27)
DEPRECATED RDW RBC AUTO: 42.9 FL (ref 37–54)
EGFRCR SERPLBLD CKD-EPI 2021: 94.6 ML/MIN/1.73
EOSINOPHIL # BLD AUTO: 0.09 10*3/MM3 (ref 0–0.4)
EOSINOPHIL NFR BLD AUTO: 1.6 % (ref 0.3–6.2)
ERYTHROCYTE [DISTWIDTH] IN BLOOD BY AUTOMATED COUNT: 14.1 % (ref 12.3–15.4)
GLOBULIN UR ELPH-MCNC: 2.7 GM/DL
GLUCOSE SERPL-MCNC: 111 MG/DL (ref 65–99)
HBA1C MFR BLD: 6 % (ref 4.8–5.6)
HCT VFR BLD AUTO: 42.5 % (ref 37.5–51)
HDLC SERPL-MCNC: 27 MG/DL (ref 40–60)
HGB BLD-MCNC: 14.7 G/DL (ref 13–17.7)
IMM GRANULOCYTES # BLD AUTO: 0.02 10*3/MM3 (ref 0–0.05)
IMM GRANULOCYTES NFR BLD AUTO: 0.4 % (ref 0–0.5)
LDLC SERPL CALC-MCNC: 58 MG/DL (ref 0–100)
LDLC/HDLC SERPL: 2.07 {RATIO}
LYMPHOCYTES # BLD AUTO: 1.69 10*3/MM3 (ref 0.7–3.1)
LYMPHOCYTES NFR BLD AUTO: 30.6 % (ref 19.6–45.3)
MCH RBC QN AUTO: 29.4 PG (ref 26.6–33)
MCHC RBC AUTO-ENTMCNC: 34.6 G/DL (ref 31.5–35.7)
MCV RBC AUTO: 85 FL (ref 79–97)
MONOCYTES # BLD AUTO: 0.4 10*3/MM3 (ref 0.1–0.9)
MONOCYTES NFR BLD AUTO: 7.2 % (ref 5–12)
NEUTROPHILS NFR BLD AUTO: 3.31 10*3/MM3 (ref 1.7–7)
NEUTROPHILS NFR BLD AUTO: 59.8 % (ref 42.7–76)
NRBC BLD AUTO-RTO: 0 /100 WBC (ref 0–0.2)
PLATELET # BLD AUTO: 244 10*3/MM3 (ref 140–450)
PMV BLD AUTO: 11.1 FL (ref 6–12)
POTASSIUM SERPL-SCNC: 4.5 MMOL/L (ref 3.5–5.2)
PROT SERPL-MCNC: 7.2 G/DL (ref 6–8.5)
RBC # BLD AUTO: 5 10*6/MM3 (ref 4.14–5.8)
SODIUM SERPL-SCNC: 140 MMOL/L (ref 136–145)
T4 FREE SERPL-MCNC: 1.44 NG/DL (ref 0.93–1.7)
TRIGL SERPL-MCNC: 121 MG/DL (ref 0–150)
TSH SERPL DL<=0.05 MIU/L-ACNC: 0.81 UIU/ML (ref 0.27–4.2)
VLDLC SERPL-MCNC: 22 MG/DL (ref 5–40)
WBC NRBC COR # BLD AUTO: 5.53 10*3/MM3 (ref 3.4–10.8)

## 2023-12-19 PROCEDURE — 80050 GENERAL HEALTH PANEL: CPT | Performed by: PHYSICIAN ASSISTANT

## 2023-12-19 PROCEDURE — 82306 VITAMIN D 25 HYDROXY: CPT | Performed by: PHYSICIAN ASSISTANT

## 2023-12-19 PROCEDURE — 84439 ASSAY OF FREE THYROXINE: CPT | Performed by: PHYSICIAN ASSISTANT

## 2023-12-19 PROCEDURE — 80061 LIPID PANEL: CPT | Performed by: PHYSICIAN ASSISTANT

## 2023-12-19 PROCEDURE — 83036 HEMOGLOBIN GLYCOSYLATED A1C: CPT | Performed by: PHYSICIAN ASSISTANT

## 2023-12-19 RX ORDER — LISINOPRIL 10 MG/1
10 TABLET ORAL DAILY
Qty: 90 TABLET | Refills: 0 | Status: SHIPPED | OUTPATIENT
Start: 2023-12-19

## 2023-12-19 RX ORDER — CHOLECALCIFEROL (VITAMIN D3) 125 MCG
2000 CAPSULE ORAL DAILY
Qty: 90 TABLET | Refills: 0 | Status: SHIPPED | OUTPATIENT
Start: 2023-12-19

## 2023-12-19 NOTE — PROGRESS NOTES
"    Office Note     Name: Arslan Butler    : 1990     MRN: [id number]     Chief Complaint  Diabetes and New Patient    Subjective     History of Present Illness:  Arslan Butler is a 33 y.o. male who presents today to establish care.    He is requesting his vitamin D refilled. He has a history vitamin D deficiency.    His blood pressure is slightly elevated today. He was on lisinopril, but he did not take it because he did not think he needed it.    His Latuda, Valium, and Adderall are prescribed by Dr. Tara Oneill at [facility]. He is not sure if he is inattentive or hyperactive for his ADHD. He had difficulty focusing. He had a bad panic attack the other night and needed to leave work. He takes Valium as needed.     He needs a refill on Ozempic. He was recently restated on Ozempic 1 mg. He has a history of diabetes. His last A1c was 7.4 percent.     He has a torn rotator cuff in his left shoulder. The ibuprofen 800 mg is not beneficial. He saw a specialist last year who told him that he needed a shoulder replacement.They said they could do a surgery to \"toughen up\" his ligaments, but he decided against the surgery. He has tried ibuprofen 100 mg, but it is not beneficial.      Review of Systems:   Review of Systems    Past Medical History:   Past Medical History:   Diagnosis Date    Anxiety and depression 2021    Bipolar disorder 12/15/2021    Chronic pain disorder     Diabetes mellitus 2018    on Ozempic, last A1C 7.2    Fatty liver     on US    Gastroesophageal reflux disease 12/15/2021    pepto or tums prn    H/O shoulder surgery 2023    Hx of tonsillectomy 2023    Hyperlipidemia 12/15/2021    Iron deficiency anemia secondary to inadequate dietary iron intake 2022    Left arm numbness 2022    suspected related to shoulder surgery    Left rotator cuff tear 2022    Male hypogonadism 2022    MRSA infection     after tattoo    Obesity 12/15/2021    Panic disorder     " Peptic ulceration 2020    noted on EGD in [place]    Rotator cuff injury, left, subsequent encounter 02/24/2022    Sciatic nerve pain     Self-injurious behavior     Sleep apnea     Suicide attempt     Testosterone deficiency in male 05/11/2022       Past Surgical History:   Past Surgical History:   Procedure Laterality Date    ENDOSCOPY AND COLONOSCOPY  2019    peptic ulcer    ROTATOR CUFF REPAIR Left 2014    ROTATOR CUFF REPAIR  2016    TONSILLECTOMY  1997       Immunizations:   Immunization History   Administered Date(s) Administered    COVID-19 (MODERNA) 1st,2nd,3rd Dose Monovalent 04/10/2021, 05/05/2021, 08/19/2021, 09/17/2021    DTP 03/11/1996    Hep B, Adolescent or Pediatric 06/22/2001, 07/23/2001, 08/02/2002    MMR 03/11/1996    OPV 03/11/1996    Pneumococcal Polysaccharide (PPSV23) 08/05/2022    Tdap 08/05/2022        Medications:     Current Outpatient Medications:     amphetamine-dextroamphetamine XR (Adderall XR) 10 MG 24 hr capsule, Take 1 capsule by mouth Daily, Disp: 30 capsule, Rfl: 0    Cholecalciferol (Vitamin D3) 50 MCG (2000 UT) tablet, Take 1 tablet by mouth Daily., Disp: 90 tablet, Rfl: 0    diazePAM (Valium) 5 MG tablet, Take 1 tablet by mouth Daily As Needed for Anxiety (Panic Attacks)., Disp: 15 tablet, Rfl: 0    divalproex (DEPAKOTE ER) 250 MG 24 hr tablet, TAKE 1 TABLET BY MOUTH DAILY, Disp: 90 tablet, Rfl: 1    ibuprofen (ADVIL,MOTRIN) 800 MG tablet, Take 1 tablet by mouth Every 8 (Eight) Hours As Needed for Mild Pain., Disp: 90 tablet, Rfl: 0    lisinopril (PRINIVIL,ZESTRIL) 10 MG tablet, Take 1 tablet by mouth Daily., Disp: 90 tablet, Rfl: 0    lurasidone HCl (LATUDA) 60 MG tablet tablet, Take 1 tablet by mouth Daily., Disp: 30 tablet, Rfl: 1    Semaglutide, 2 MG/DOSE, (OZEMPIC) 8 MG/3ML solution pen-injector, Inject 2 mg under the skin into the appropriate area as directed 1 (One) Time Per Week., Disp: 3 mL, Rfl: 3    Allergies:   Allergies   Allergen Reactions     "Sulfamethoxazole-Trimethoprim Hives    Metformin GI Intolerance       Family History:   Family History   Problem Relation Age of Onset    Suicide Attempts Mother     Self-Injurious Behavior  Mother     Seizures Mother     Schizophrenia Mother     Paranoid behavior Mother     Depression Mother     Bipolar disorder Mother     Alcohol abuse Mother     Obesity Mother     Alcohol abuse Father     Hypertension Father     Heart attack Father     Obesity Sister     Obesity Brother     Stroke Maternal Grandmother     Heart attack Maternal Grandfather     Diabetes Maternal Grandfather     Hypertension Maternal Grandfather     Mental illness Paternal Grandmother     Diabetes Paternal Grandmother     Heart attack Paternal Grandmother     No Known Problems Paternal Grandfather        Social History:   Social History     Socioeconomic History    Marital status:    Tobacco Use    Smoking status: Never     Passive exposure: Never    Smokeless tobacco: Never   Vaping Use    Vaping Use: Never used   Substance and Sexual Activity    Alcohol use: Not Currently     Comment: ocassionally     Drug use: Never    Sexual activity: Defer         Objective     Vital Signs  BP (!) 134/106 (BP Location: Left arm, Patient Position: Sitting, Cuff Size: Adult)   Pulse 88   Temp 97.1 °F (36.2 °C) (Infrared)   Resp 16   Ht 190.5 cm (75\")   Wt (!) 164 kg (361 lb)   BMI 45.12 kg/m²   Estimated body mass index is 45.12 kg/m² as calculated from the following:    Height as of this encounter: 190.5 cm (75\").    Weight as of this encounter: 164 kg (361 lb).            Physical Exam  Vitals and nursing note reviewed.   Constitutional:       Appearance: Normal appearance.   Cardiovascular:      Rate and Rhythm: Normal rate and regular rhythm.      Pulses: Normal pulses.      Heart sounds: Normal heart sounds.   Pulmonary:      Effort: Pulmonary effort is normal.      Breath sounds: Normal breath sounds.   Skin:     General: Skin is warm and dry. "   Neurological:      General: No focal deficit present.      Mental Status: He is alert.   Psychiatric:         Mood and Affect: Mood normal.         Behavior: Behavior normal.          Assessment and Plan     1. Vitamin D insufficiency    - Cholecalciferol (Vitamin D3) 50 MCG (2000 UT) tablet; Take 1 tablet by mouth Daily.  Dispense: 90 tablet; Refill: 0  - Comprehensive Metabolic Panel; Future  - CBC & Differential; Future  - Lipid Panel; Future  - TSH; Future  - T4, Free; Future  - Vitamin D,25-Hydroxy; Future  - Comprehensive Metabolic Panel  - CBC & Differential  - Lipid Panel  - TSH  - T4, Free  - Vitamin D,25-Hydroxy    2. Essential hypertension    - lisinopril (PRINIVIL,ZESTRIL) 10 MG tablet; Take 1 tablet by mouth Daily.  Dispense: 90 tablet; Refill: 0  - Comprehensive Metabolic Panel; Future  - CBC & Differential; Future  - Lipid Panel; Future  - TSH; Future  - T4, Free; Future  - Semaglutide, 2 MG/DOSE, (OZEMPIC) 8 MG/3ML solution pen-injector; Inject 2 mg under the skin into the appropriate area as directed 1 (One) Time Per Week.  Dispense: 3 mL; Refill: 3  - Comprehensive Metabolic Panel  - CBC & Differential  - Lipid Panel  - TSH  - T4, Free  His blood pressure is slightly elevated today. He was encouraged to take lisinopril. He was encouraged to monitor his blood pressure at home. He will let us know if the low dose of lisinopril is not beneficial.    3. Attention deficit hyperactivity disorder (ADHD), predominantly inattentive type    - Comprehensive Metabolic Panel; Future  - CBC & Differential; Future  - Lipid Panel; Future  - TSH; Future  - T4, Free; Future  - Comprehensive Metabolic Panel  - CBC & Differential  - Lipid Panel  - TSH  - T4, Free    4. Anxiety and depression    - Comprehensive Metabolic Panel; Future  - CBC & Differential; Future  - Lipid Panel; Future  - TSH; Future  - T4, Free; Future  - Comprehensive Metabolic Panel  - CBC & Differential  - Lipid Panel  - TSH  - T4, Free    5.  Social anxiety disorder    - Comprehensive Metabolic Panel; Future  - CBC & Differential; Future  - Lipid Panel; Future  - TSH; Future  - T4, Free; Future  - Comprehensive Metabolic Panel  - CBC & Differential  - Lipid Panel  - TSH  - T4, Free    6. Bipolar affective disorder, current episode depressed, current episode severity unspecified    - Comprehensive Metabolic Panel; Future  - CBC & Differential; Future  - Lipid Panel; Future  - TSH; Future  - T4, Free; Future  - Comprehensive Metabolic Panel  - CBC & Differential  - Lipid Panel  - TSH  - T4, Free    7. Generalized anxiety disorder    - Comprehensive Metabolic Panel; Future  - CBC & Differential; Future  - Lipid Panel; Future  - TSH; Future  - T4, Free; Future  - Comprehensive Metabolic Panel  - CBC & Differential  - Lipid Panel  - TSH  - T4, Free    8. Gastroesophageal reflux disease, unspecified whether esophagitis present    - Comprehensive Metabolic Panel; Future  - CBC & Differential; Future  - Lipid Panel; Future  - TSH; Future  - T4, Free; Future  - Comprehensive Metabolic Panel  - CBC & Differential  - Lipid Panel  - TSH  - T4, Free    9. Hyperlipidemia, unspecified hyperlipidemia type    - Comprehensive Metabolic Panel; Future  - CBC & Differential; Future  - Lipid Panel; Future  - TSH; Future  - T4, Free; Future  - Comprehensive Metabolic Panel  - CBC & Differential  - Lipid Panel  - TSH  - T4, Free    10. Class 3 severe obesity due to excess calories with serious comorbidity and body mass index (BMI) of 40.0 to 44.9 in adult    - Comprehensive Metabolic Panel; Future  - CBC & Differential; Future  - Lipid Panel; Future  - TSH; Future  - T4, Free; Future  - Comprehensive Metabolic Panel  - CBC & Differential  - Lipid Panel  - TSH  - T4, Free    11. Chronic midline low back pain without sciatica    - Comprehensive Metabolic Panel; Future  - CBC & Differential; Future  - Lipid Panel; Future  - TSH; Future  - T4, Free; Future  - Comprehensive  Metabolic Panel  - CBC & Differential  - Lipid Panel  - TSH  - T4, Free    12. Type 2 diabetes mellitus with hyperglycemia, without long-term current use of insulin    - Hemoglobin A1c; Future  - Semaglutide, 2 MG/DOSE, (OZEMPIC) 8 MG/3ML solution pen-injector; Inject 2 mg under the skin into the appropriate area as directed 1 (One) Time Per Week.  Dispense: 3 mL; Refill: 3  - Hemoglobin A1c    13. Class 3 severe obesity due to excess calories with serious comorbidity and body mass index (BMI) of 45.0 to 49.9 in adult    - Semaglutide, 2 MG/DOSE, (OZEMPIC) 8 MG/3ML solution pen-injector; Inject 2 mg under the skin into the appropriate area as directed 1 (One) Time Per Week.  Dispense: 3 mL; Refill: 3       Reviewed medications, potential side effects and signs and symptoms to report. Discussed risk versus benefits of treatment plan with patient and/or family-including medications, labs and radiology that may be ordered. Addressed questions and concerns during visit. Patient and/or family verbalized understanding and agree with plan. Instructed to call the office with any questions and report to ER with any life-threatening symptoms.       Follow Up  No follow-ups on file.    Esperanza Benson PA-C  [facility]  [address] [address] [phone]  Transcribed from ambient dictation for Esperanza Benson PA-C by Hayley Roberto.  12/19/23   11:32 EST    Patient or patient representative verbalized consent to the visit recording.  I have personally performed the services described in this document as transcribed by the above individual, and it is both accurate and complete.

## 2023-12-20 DIAGNOSIS — E55.9 VITAMIN D INSUFFICIENCY: Primary | ICD-10-CM

## 2023-12-20 RX ORDER — ERGOCALCIFEROL 1.25 MG/1
50000 CAPSULE ORAL WEEKLY
Qty: 8 CAPSULE | Refills: 0 | Status: SHIPPED | OUTPATIENT
Start: 2023-12-20

## 2023-12-22 ENCOUNTER — PATIENT ROUNDING (BHMG ONLY) (OUTPATIENT)
Dept: BARIATRICS/WEIGHT MGMT | Facility: CLINIC | Age: 33
End: 2023-12-22
Payer: COMMERCIAL

## 2023-12-22 NOTE — PROGRESS NOTES
A [employer] message has been sent to the patient for PATIENT ROUNDING for [facility] [place] - Bariatric Surgery/[facility] [place] [facility] Weight Mgmt.

## 2023-12-28 NOTE — PROGRESS NOTES
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 call 911, or present to the nearest emergency room should any of these events occur. Assisted patient in processing session content; acknowledged and normalized patient’s thoughts, feelings, and concerns by utilizing a person-centered approach in efforts to build appropriate rapport and a positive therapeutic relationship with open and honest communication. Patient given education on medication side effects, diagnosis/illness and relapse symptoms. Plan to continue supportive psychotherapy in next appointment to provide symptom relief.      Functional status: Moderate impairment  Prognosis: Fair  Progress: Mild improvement    Safety/Risk Assessment: Risk of self-harm acutely and chronically is moderate.    Risk factors include anxiety disorder, mood disorder, and recent psychosocial stressors.   Protective factors include no family history, denies access to guns/weapons, no present SI, no history of suicide attempts or self-harm in the past, minimal AODA, healthcare seeking, future orientation, willingness to engage in care.    Risk assessment could be further elevated in the event of treatment noncompliance and/or AODA.    Follow-up: Return in about 1 month (around 1/29/2024) for Next scheduled follow up.         This document has been electronically signed by CORTNEY Gray  December 29, 2023  14:54 EST    Please note that portions of this note were completed with a voice recognition program.  Copied text in this note has been reviewed and is accurate as of 12/29/23

## 2023-12-29 ENCOUNTER — TELEMEDICINE (OUTPATIENT)
Dept: BEHAVIORAL HEALTH | Facility: CLINIC | Age: 33
End: 2023-12-29
Payer: COMMERCIAL

## 2023-12-29 DIAGNOSIS — Z86.59 HISTORY OF SUICIDAL IDEATION: ICD-10-CM

## 2023-12-29 DIAGNOSIS — F41.0 PANIC ATTACKS: ICD-10-CM

## 2023-12-29 DIAGNOSIS — F31.30 BIPOLAR AFFECTIVE DISORDER, CURRENT EPISODE DEPRESSED, CURRENT EPISODE SEVERITY UNSPECIFIED: Primary | ICD-10-CM

## 2023-12-29 DIAGNOSIS — F41.1 GENERALIZED ANXIETY DISORDER: ICD-10-CM

## 2023-12-29 DIAGNOSIS — F51.01 PRIMARY INSOMNIA: ICD-10-CM

## 2023-12-29 DIAGNOSIS — F90.1 ATTENTION DEFICIT HYPERACTIVITY DISORDER (ADHD), PREDOMINANTLY HYPERACTIVE TYPE: ICD-10-CM

## 2023-12-29 RX ORDER — MIRTAZAPINE 7.5 MG/1
7.5 TABLET, FILM COATED ORAL NIGHTLY
Qty: 30 TABLET | Refills: 1 | Status: SHIPPED | OUTPATIENT
Start: 2023-12-29

## 2023-12-29 NOTE — PATIENT INSTRUCTIONS
1.  Please return to clinic at your next scheduled visit.  Please contact the clinic ([phone]) at least 24 hours prior in the event you need to cancel.  2.  Do no harm to yourself or others.    3.  Avoid alcohol and drugs.    4.  Take all medications as prescribed.  Please contact the clinic with any concerns. If you are in need of medication refills, please call the clinic at [phone].    5. Should you want to get in touch with your provider, CORTNEY Gray, please contact the office ([phone]), and staff will be able to page Sherrie directly.  6. In the event you have personal crisis, contact the following crisis numbers: Suicide Prevention Hotline 1-954.544.2700; [facility] Helpline 2-118-898-NAXM; [facility] Emergency Room [phone]; text HELLO to 021052; or 417.     SPECIFIC RECOMMENDATIONS:     1.      Medications discussed at this encounter:     New Medications Ordered This Visit   Medications    mirtazapine (REMERON) 7.5 MG tablet     Sig: Take 1 tablet by mouth Every Night.     Dispense:  30 tablet     Refill:  1                       2.      Psychotherapy recommendations: We will continue therapy at future visits.     3.     Return to clinic: Return in about 1 month (around 1/29/2024) for Next scheduled follow up.

## 2024-01-10 DIAGNOSIS — F41.1 GENERALIZED ANXIETY DISORDER: ICD-10-CM

## 2024-01-10 DIAGNOSIS — F41.0 PANIC ATTACKS: ICD-10-CM

## 2024-01-10 DIAGNOSIS — F90.1 ATTENTION DEFICIT HYPERACTIVITY DISORDER (ADHD), PREDOMINANTLY HYPERACTIVE TYPE: ICD-10-CM

## 2024-01-10 RX ORDER — DEXTROAMPHETAMINE SACCHARATE, AMPHETAMINE ASPARTATE MONOHYDRATE, DEXTROAMPHETAMINE SULFATE AND AMPHETAMINE SULFATE 2.5; 2.5; 2.5; 2.5 MG/1; MG/1; MG/1; MG/1
10 CAPSULE, EXTENDED RELEASE ORAL DAILY
Qty: 30 CAPSULE | Refills: 0 | Status: SHIPPED | OUTPATIENT
Start: 2024-01-12 | End: 2024-01-15 | Stop reason: DRUGHIGH

## 2024-01-10 RX ORDER — DIAZEPAM 5 MG/1
5 TABLET ORAL DAILY PRN
Qty: 15 TABLET | Refills: 0 | Status: SHIPPED | OUTPATIENT
Start: 2024-01-10 | End: 2025-01-09

## 2024-01-10 NOTE — TELEPHONE ENCOUNTER
Patient requesting refill for     diazePAM (Valium) 5 MG tablet     mphetamine-dextroamphetamine XR (Adderall XR) 10 MG     Follow up 01/26/2024

## 2024-01-15 RX ORDER — DEXTROAMPHETAMINE SACCHARATE, AMPHETAMINE ASPARTATE MONOHYDRATE, DEXTROAMPHETAMINE SULFATE AND AMPHETAMINE SULFATE 3.75; 3.75; 3.75; 3.75 MG/1; MG/1; MG/1; MG/1
15 CAPSULE, EXTENDED RELEASE ORAL DAILY
Qty: 30 CAPSULE | Refills: 0 | Status: SHIPPED | OUTPATIENT
Start: 2024-01-15 | End: 2025-01-14

## 2024-01-15 NOTE — TELEPHONE ENCOUNTER
ATTEMPTED TO CONTACT PT(PATIENT) PER PROVIDER'S INSTRUCTIONS      NO ANSWER      LEFT VOICEMAIL WITH INSTRUCTIONS TO RETURN CALL TO OFFICE AT [phone]

## 2024-01-15 NOTE — TELEPHONE ENCOUNTER
PT(PATIENT) VERIFIED     amphetamine-dextroamphetamine XR (Adderall XR) 10 MG 24 hr capsule (2024)     PT(PATIENT) STATES HIS DOSAGE IS WRONG     PT(PATIENT) STATES HE WAS SUPPOSED TO HAVE AN INCREASE TO 15MG FOR HIS NEXT REFILL     PT(PATIENT) STATES HE DID NOT  SCRIPT, BUT WANTED TO REACH OUT TO PROVIDER FIRST AND MAKE SURE THAT HE RECEIVED THE CORRECT DOSAGE     Telemedicine with Sherrie Oneill APRN (2023)   Plan to increase Adderall XR to 15 mg daily at next refill     PT(PATIENT) CONFIRMED PHARMACY   [facility] #[id number] [place], [place]     PROVIDER PLEASE ADVISE

## 2024-02-01 DIAGNOSIS — F31.30 BIPOLAR AFFECTIVE DISORDER, CURRENT EPISODE DEPRESSED, CURRENT EPISODE SEVERITY UNSPECIFIED: ICD-10-CM

## 2024-02-01 DIAGNOSIS — F41.1 GENERALIZED ANXIETY DISORDER: ICD-10-CM

## 2024-02-01 RX ORDER — LURASIDONE HYDROCHLORIDE 60 MG/1
60 TABLET, FILM COATED ORAL DAILY
Qty: 30 TABLET | Refills: 1 | Status: SHIPPED | OUTPATIENT
Start: 2024-02-01

## 2024-02-01 NOTE — TELEPHONE ENCOUNTER
PT(PATIENT) VERIFIED      NEXT VISIT WITH PROVIDER   Appointment with Sherrie Oneill APRN (2024)     LAST SEEN BY PROVIDER   Telemedicine with Sherrie Oneill APRN (2023)     LAST MED REFILL  lurasidone HCl (LATUDA) 60 MG tablet tablet (10/24/2023)     PT(PATIENT) CONFIRMED PHARMACY   [facility] #[id number] [place], [place]     PROVIDER PLEASE ADVISE

## 2024-02-05 ENCOUNTER — TELEMEDICINE (OUTPATIENT)
Dept: BEHAVIORAL HEALTH | Facility: CLINIC | Age: 34
End: 2024-02-05
Payer: COMMERCIAL

## 2024-02-05 DIAGNOSIS — F41.0 PANIC ATTACKS: ICD-10-CM

## 2024-02-05 DIAGNOSIS — F51.01 PRIMARY INSOMNIA: ICD-10-CM

## 2024-02-05 DIAGNOSIS — Z86.59 HISTORY OF SUICIDAL IDEATION: ICD-10-CM

## 2024-02-05 DIAGNOSIS — F90.1 ATTENTION DEFICIT HYPERACTIVITY DISORDER (ADHD), PREDOMINANTLY HYPERACTIVE TYPE: ICD-10-CM

## 2024-02-05 DIAGNOSIS — F31.30 BIPOLAR AFFECTIVE DISORDER, CURRENT EPISODE DEPRESSED, CURRENT EPISODE SEVERITY UNSPECIFIED: Primary | ICD-10-CM

## 2024-02-12 ENCOUNTER — TELEMEDICINE (OUTPATIENT)
Dept: BEHAVIORAL HEALTH | Facility: CLINIC | Age: 34
End: 2024-02-12
Payer: COMMERCIAL

## 2024-02-12 DIAGNOSIS — F32.A ANXIETY AND DEPRESSION: Primary | ICD-10-CM

## 2024-02-12 DIAGNOSIS — F41.9 ANXIETY AND DEPRESSION: Primary | ICD-10-CM

## 2024-02-12 DIAGNOSIS — F40.10 SOCIAL ANXIETY DISORDER: ICD-10-CM

## 2024-02-12 PROCEDURE — 90837 PSYTX W PT 60 MINUTES: CPT | Performed by: SOCIAL WORKER

## 2024-02-13 ENCOUNTER — TELEPHONE (OUTPATIENT)
Dept: PSYCHIATRY | Facility: CLINIC | Age: 34
End: 2024-02-13
Payer: COMMERCIAL

## 2024-02-13 ENCOUNTER — OFFICE VISIT (OUTPATIENT)
Dept: INTERNAL MEDICINE | Facility: CLINIC | Age: 34
End: 2024-02-13
Payer: COMMERCIAL

## 2024-02-13 VITALS
WEIGHT: 315 LBS | BODY MASS INDEX: 44.75 KG/M2 | TEMPERATURE: 97.3 F | DIASTOLIC BLOOD PRESSURE: 86 MMHG | SYSTOLIC BLOOD PRESSURE: 128 MMHG | RESPIRATION RATE: 18 BRPM | HEART RATE: 78 BPM

## 2024-02-13 DIAGNOSIS — F31.30 BIPOLAR AFFECTIVE DISORDER, CURRENT EPISODE DEPRESSED, CURRENT EPISODE SEVERITY UNSPECIFIED: ICD-10-CM

## 2024-02-13 DIAGNOSIS — E66.01 CLASS 3 SEVERE OBESITY DUE TO EXCESS CALORIES WITH SERIOUS COMORBIDITY AND BODY MASS INDEX (BMI) OF 40.0 TO 44.9 IN ADULT: Primary | ICD-10-CM

## 2024-02-13 DIAGNOSIS — I10 ESSENTIAL HYPERTENSION: ICD-10-CM

## 2024-02-13 DIAGNOSIS — K52.9 INFLAMMATORY BOWEL DISEASE: ICD-10-CM

## 2024-02-13 DIAGNOSIS — R73.03 PREDIABETES: ICD-10-CM

## 2024-02-13 DIAGNOSIS — K21.9 GASTROESOPHAGEAL REFLUX DISEASE, UNSPECIFIED WHETHER ESOPHAGITIS PRESENT: ICD-10-CM

## 2024-02-13 DIAGNOSIS — H91.91 HEARING LOSS OF RIGHT EAR, UNSPECIFIED HEARING LOSS TYPE: ICD-10-CM

## 2024-02-13 DIAGNOSIS — F90.0 ATTENTION DEFICIT HYPERACTIVITY DISORDER (ADHD), PREDOMINANTLY INATTENTIVE TYPE: ICD-10-CM

## 2024-02-13 DIAGNOSIS — G47.33 OSA (OBSTRUCTIVE SLEEP APNEA): ICD-10-CM

## 2024-02-13 LAB
ALBUMIN SERPL-MCNC: 4.7 G/DL (ref 3.5–5.2)
ALBUMIN UR-MCNC: <1.2 MG/DL
ALBUMIN/GLOB SERPL: 1.7 G/DL
ALP SERPL-CCNC: 75 U/L (ref 39–117)
ALT SERPL W P-5'-P-CCNC: 43 U/L (ref 1–41)
ANION GAP SERPL CALCULATED.3IONS-SCNC: 11 MMOL/L (ref 5–15)
AST SERPL-CCNC: 30 U/L (ref 1–40)
BILIRUB SERPL-MCNC: 0.3 MG/DL (ref 0–1.2)
BUN SERPL-MCNC: 9 MG/DL (ref 6–20)
BUN/CREAT SERPL: 8.3 (ref 7–25)
CALCIUM SPEC-SCNC: 9.9 MG/DL (ref 8.6–10.5)
CHLORIDE SERPL-SCNC: 105 MMOL/L (ref 98–107)
CO2 SERPL-SCNC: 23 MMOL/L (ref 22–29)
CREAT SERPL-MCNC: 1.08 MG/DL (ref 0.76–1.27)
CREAT UR-MCNC: 143.5 MG/DL
CRP SERPL-MCNC: 1.12 MG/DL (ref 0–0.5)
EGFRCR SERPLBLD CKD-EPI 2021: 93.5 ML/MIN/1.73
EXPIRATION DATE: NORMAL
GLOBULIN UR ELPH-MCNC: 2.7 GM/DL
GLUCOSE SERPL-MCNC: 90 MG/DL (ref 65–99)
HBA1C MFR BLD: 5.7 % (ref 4.5–5.7)
LIPASE SERPL-CCNC: 38 U/L (ref 13–60)
Lab: NORMAL
MICROALBUMIN/CREAT UR: NORMAL MG/G{CREAT}
POTASSIUM SERPL-SCNC: 4.6 MMOL/L (ref 3.5–5.2)
PROT SERPL-MCNC: 7.4 G/DL (ref 6–8.5)
SODIUM SERPL-SCNC: 139 MMOL/L (ref 136–145)
TSH SERPL DL<=0.05 MIU/L-ACNC: 1.17 UIU/ML (ref 0.27–4.2)

## 2024-02-13 PROCEDURE — 86364 TISS TRNSGLTMNASE EA IG CLAS: CPT | Performed by: STUDENT IN AN ORGANIZED HEALTH CARE EDUCATION/TRAINING PROGRAM

## 2024-02-13 PROCEDURE — 83690 ASSAY OF LIPASE: CPT | Performed by: STUDENT IN AN ORGANIZED HEALTH CARE EDUCATION/TRAINING PROGRAM

## 2024-02-13 PROCEDURE — 84443 ASSAY THYROID STIM HORMONE: CPT | Performed by: STUDENT IN AN ORGANIZED HEALTH CARE EDUCATION/TRAINING PROGRAM

## 2024-02-13 PROCEDURE — 82570 ASSAY OF URINE CREATININE: CPT | Performed by: STUDENT IN AN ORGANIZED HEALTH CARE EDUCATION/TRAINING PROGRAM

## 2024-02-13 PROCEDURE — 86231 EMA EACH IG CLASS: CPT | Performed by: STUDENT IN AN ORGANIZED HEALTH CARE EDUCATION/TRAINING PROGRAM

## 2024-02-13 PROCEDURE — 80053 COMPREHEN METABOLIC PANEL: CPT | Performed by: STUDENT IN AN ORGANIZED HEALTH CARE EDUCATION/TRAINING PROGRAM

## 2024-02-13 PROCEDURE — 82043 UR ALBUMIN QUANTITATIVE: CPT | Performed by: STUDENT IN AN ORGANIZED HEALTH CARE EDUCATION/TRAINING PROGRAM

## 2024-02-13 PROCEDURE — 86140 C-REACTIVE PROTEIN: CPT | Performed by: STUDENT IN AN ORGANIZED HEALTH CARE EDUCATION/TRAINING PROGRAM

## 2024-02-13 PROCEDURE — 82784 ASSAY IGA/IGD/IGG/IGM EACH: CPT | Performed by: STUDENT IN AN ORGANIZED HEALTH CARE EDUCATION/TRAINING PROGRAM

## 2024-02-13 RX ORDER — OMEPRAZOLE 40 MG/1
40 CAPSULE, DELAYED RELEASE ORAL 2 TIMES DAILY
Qty: 60 CAPSULE | Refills: 1 | Status: SHIPPED | OUTPATIENT
Start: 2024-02-13

## 2024-02-14 ENCOUNTER — LAB (OUTPATIENT)
Dept: INTERNAL MEDICINE | Facility: CLINIC | Age: 34
End: 2024-02-14
Payer: COMMERCIAL

## 2024-02-14 DIAGNOSIS — K52.9 INFLAMMATORY BOWEL DISEASE: ICD-10-CM

## 2024-02-14 LAB
ADV 40+41 DNA STL QL NAA+NON-PROBE: NOT DETECTED
ASTRO TYP 1-8 RNA STL QL NAA+NON-PROBE: NOT DETECTED
C CAYETANENSIS DNA STL QL NAA+NON-PROBE: NOT DETECTED
C COLI+JEJ+UPSA DNA STL QL NAA+NON-PROBE: NOT DETECTED
C DIFF TOX GENS STL QL NAA+PROBE: NOT DETECTED
CRYPTOSP DNA STL QL NAA+NON-PROBE: NOT DETECTED
E HISTOLYT DNA STL QL NAA+NON-PROBE: NOT DETECTED
EAEC PAA PLAS AGGR+AATA ST NAA+NON-PRB: NOT DETECTED
EC STX1+STX2 GENES STL QL NAA+NON-PROBE: NOT DETECTED
ENDOMYSIUM IGA SER QL: NEGATIVE
EPEC EAE GENE STL QL NAA+NON-PROBE: NOT DETECTED
ETEC LTA+ST1A+ST1B TOX ST NAA+NON-PROBE: NOT DETECTED
EXPIRATION DATE 2: NORMAL
EXPIRATION DATE 3: NORMAL
EXPIRATION DATE: NORMAL
G LAMBLIA DNA STL QL NAA+NON-PROBE: NOT DETECTED
GASTROCULT GAST QL: NEGATIVE
HEMOCCULT SP2 STL QL: NEGATIVE
HEMOCCULT SP3 STL QL: NEGATIVE
IGA SERPL-MCNC: 191 MG/DL (ref 90–386)
Lab: NORMAL
NOROVIRUS GI+II RNA STL QL NAA+NON-PROBE: NOT DETECTED
P SHIGELLOIDES DNA STL QL NAA+NON-PROBE: NOT DETECTED
RVA RNA STL QL NAA+NON-PROBE: NOT DETECTED
S ENT+BONG DNA STL QL NAA+NON-PROBE: NOT DETECTED
SAPO I+II+IV+V RNA STL QL NAA+NON-PROBE: NOT DETECTED
SHIGELLA SP+EIEC IPAH ST NAA+NON-PROBE: NOT DETECTED
TTG IGA SER-ACNC: <2 U/ML (ref 0–3)
V CHOL+PARA+VUL DNA STL QL NAA+NON-PROBE: NOT DETECTED
V CHOLERAE DNA STL QL NAA+NON-PROBE: NOT DETECTED
Y ENTEROCOL DNA STL QL NAA+NON-PROBE: NOT DETECTED

## 2024-02-14 PROCEDURE — 87338 HPYLORI STOOL AG IA: CPT | Performed by: STUDENT IN AN ORGANIZED HEALTH CARE EDUCATION/TRAINING PROGRAM

## 2024-02-14 PROCEDURE — 82653 EL-1 FECAL QUANTITATIVE: CPT | Performed by: STUDENT IN AN ORGANIZED HEALTH CARE EDUCATION/TRAINING PROGRAM

## 2024-02-14 PROCEDURE — 82274 ASSAY TEST FOR BLOOD FECAL: CPT | Performed by: STUDENT IN AN ORGANIZED HEALTH CARE EDUCATION/TRAINING PROGRAM

## 2024-02-14 PROCEDURE — 87507 IADNA-DNA/RNA PROBE TQ 12-25: CPT | Performed by: STUDENT IN AN ORGANIZED HEALTH CARE EDUCATION/TRAINING PROGRAM

## 2024-02-14 PROCEDURE — 87493 C DIFF AMPLIFIED PROBE: CPT | Performed by: STUDENT IN AN ORGANIZED HEALTH CARE EDUCATION/TRAINING PROGRAM

## 2024-02-14 PROCEDURE — 83993 ASSAY FOR CALPROTECTIN FECAL: CPT | Performed by: STUDENT IN AN ORGANIZED HEALTH CARE EDUCATION/TRAINING PROGRAM

## 2024-02-15 DIAGNOSIS — F90.1 ATTENTION DEFICIT HYPERACTIVITY DISORDER (ADHD), PREDOMINANTLY HYPERACTIVE TYPE: Primary | ICD-10-CM

## 2024-02-15 DIAGNOSIS — F41.1 GENERALIZED ANXIETY DISORDER: ICD-10-CM

## 2024-02-15 DIAGNOSIS — F31.30 BIPOLAR AFFECTIVE DISORDER, CURRENT EPISODE DEPRESSED, CURRENT EPISODE SEVERITY UNSPECIFIED: ICD-10-CM

## 2024-02-15 RX ORDER — DEXTROAMPHETAMINE SACCHARATE, AMPHETAMINE ASPARTATE MONOHYDRATE, DEXTROAMPHETAMINE SULFATE AND AMPHETAMINE SULFATE 5; 5; 5; 5 MG/1; MG/1; MG/1; MG/1
20 CAPSULE, EXTENDED RELEASE ORAL EVERY MORNING
Qty: 30 CAPSULE | Refills: 0 | Status: SHIPPED | OUTPATIENT
Start: 2024-02-15

## 2024-02-15 RX ORDER — DIVALPROEX SODIUM 250 MG/1
250 TABLET, EXTENDED RELEASE ORAL DAILY
Qty: 90 TABLET | Refills: 1 | Status: SHIPPED | OUTPATIENT
Start: 2024-02-15 | End: 2025-02-14

## 2024-02-16 LAB — H PYLORI AG STL QL IA: NEGATIVE

## 2024-02-20 ENCOUNTER — TELEMEDICINE (OUTPATIENT)
Dept: BEHAVIORAL HEALTH | Facility: CLINIC | Age: 34
End: 2024-02-20
Payer: COMMERCIAL

## 2024-02-20 DIAGNOSIS — F41.9 ANXIETY AND DEPRESSION: ICD-10-CM

## 2024-02-20 DIAGNOSIS — F40.10 SOCIAL ANXIETY DISORDER: Primary | ICD-10-CM

## 2024-02-20 DIAGNOSIS — F32.A ANXIETY AND DEPRESSION: ICD-10-CM

## 2024-02-20 DIAGNOSIS — F41.0 PANIC ATTACKS: ICD-10-CM

## 2024-02-20 NOTE — PROGRESS NOTES
"Progress Note    Date: 02/20/2024  Time In: 8:01  Time Out: 8:56    Patient Legal Name: Arslan Butler  Patient Age: 33 y.o.    Mode of visit: Video  Location of provider: [address], [place] [address]  Location of patient: in car with spouse    Patient is seen remotely using [employer]. Patient is being seen via telehealth and patient confirms that they are in a secure environment for this session. The patient's condition being diagnosed/treated is appropriate for telemedicine. The provider identified herself as well as her credentials. The patient gave consent to be seen remotely, and when consent is given they understand that the consent allows for patient identifiable information to be sent to a third party as needed. They may refuse to be seen remotely at any time. The electronic data is encrypted and password protected, and the patient has been advised of the potential risks to privacy not withstanding such measures. Patient consented to the use of video for the purpose of a telehealth session today. The visit included audio and video interaction. No technical issues occurred during this visit.    CHIEF COMPLAINT: anxiety, social anxiety    Subjective   History of Present Illness   Arslan is a 33 y.o. male who presents today as a follow-up for continued psychotherapy. Patient's wife was also present in the car during session. Patient reported he hasn't been able to find employment.  Patient stated he has not had any panic attacks since last session.  Patient shared he is really stressed about not being able to find a job. Patient advised going out in public triggers him to have panic attacks.  Patient reported he feels like \"anything could happen\" when in a crowd of people.  Patient shared he has not experienced anything bad happening in public. Patient described being triggered by outbursts from his 8 y/o daughter who is on the spectrum when in public as well. Patient is voluntarily requesting " to participate in outpatient therapy at [facility].  weekly    History obtained from referring provider's note on 12/1/23:  Past Psychiatric History:  Began Treatment: 2014            Diagnoses: Bipolar depression, anxiety, panic attacks  Psychiatrist: Yes  Therapist: Yes  Admission History: 3 admissions  Medication Trials: Ativan, klonopin, xanax, valium, gabapentin, vistaril, buspar, prozac, lithium, abilify, effexor, vraylar, rexulti, wellbutrin, zoloft, lexapro, celexa, seroquel, clonidine, lamictal  Suicide Attempts: Several attempts, tried to OD on pills, cut wrists  Self Harm: Hx of cutting   Substance use/abuse:Denies  Withdrawal Symptoms: Not applicable  Longest Period Sober: Not applicable  AA: Not applicable  Trauma/Childhood/ACE:  parents     Assessment    Mental Status Exam     Appearance: appeared to have good hygiene  Behavior: calm  Cooperation:  engaged, cooperative, attentive, and friendly  Eye Contact:  good  Affect:  congruent  Mood: anxious  Speech: responsive and talkative  Thought Process:  organized  Thought Content: appropriate  Suicidal: denies  Homicidal:  denies  Hallucinations:  denies  Memory:  intact  Orientation:  person, place, time, and situation  Reliability:  reliable  Insight:  good  Judgment:  good     Clinical Intervention       ICD-10-CM ICD-9-CM   1. Social anxiety disorder  F40.10 300.23   2. Panic attacks  F41.0 300.01   3. Anxiety and depression  F41.9 300.00    F32.A 311        Individual psychotherapy was provided utilizing CBT and person-centered techniques to restore adaptive functioning, provide symptom relief, build rapport, encourage expression of thoughts and feelings, support self-esteem, challenge avoidance, identify triggers, confront irrational ideas, build confidence, assess symptoms, gather history, provide psychoeducation, and practice exposures. Therapist reviewed deep breathing techniques, including the body's stress responses  and the need for physiological intervention at times when heart rate increases and breathing becomes more difficult. Esteem building was enhanced through praise, reassurance, normalizing/challenging, and encouragement as appropriate. Coping skills were enhanced to build distress tolerance skills and emotional regulation. Therapist utilized open-ended questions to encourage the development of a positive therapeutic relationship and open communication.  Therapist normalized/validated patient’s thoughts and feelings as appropriate. Patient was not able to find grounding and breathing resources in his notes for last visit and they were added again. Provided brief psychoeducation on autism to normalize his daughter's bx.   Plan   Plan & Goals     Continue building rapport and follow up on exposures and use of anxiety coping strategies.    Patient acknowledged and verbally consented to continue working toward resolving current treatment plan goals and was educated on the importance of participation in the therapeutic process.  Patient will remain compliant with medication regimen as prescribed. Discuss any medication side effects, questions or concerns with prescribing provider.  Call 911 or present to the nearest emergency room in an emergency situation.   National Suicide Prevention Lifeline: Call 988. The Lifeline provides 24/7, free and confidential support for people in distress, prevention and crisis resources.  Crisis Text Line  Text HOME To 678250    Return in about 1 week (around 2/27/2024).    ____________________  This document has been electronically signed by Christy Mccarthy LCSW  February 20, 2024 09:12 EST    Part of this note may be an electronic transcription/translation of spoken language to printed text using the Dragon Dictation System.

## 2024-02-21 LAB — CALPROTECTIN STL-MCNT: 12 UG/G (ref 0–120)

## 2024-02-22 LAB — ELASTASE PANC STL-MCNT: 284 UG ELAST./G

## 2024-02-23 ENCOUNTER — TELEPHONE (OUTPATIENT)
Dept: GASTROENTEROLOGY | Facility: CLINIC | Age: 34
End: 2024-02-23

## 2024-02-23 ENCOUNTER — OFFICE VISIT (OUTPATIENT)
Dept: GASTROENTEROLOGY | Facility: CLINIC | Age: 34
End: 2024-02-23
Payer: COMMERCIAL

## 2024-02-23 VITALS
DIASTOLIC BLOOD PRESSURE: 76 MMHG | HEIGHT: 75 IN | TEMPERATURE: 97.5 F | WEIGHT: 315 LBS | BODY MASS INDEX: 39.17 KG/M2 | HEART RATE: 101 BPM | SYSTOLIC BLOOD PRESSURE: 109 MMHG

## 2024-02-23 DIAGNOSIS — R19.7 DIARRHEA, UNSPECIFIED TYPE: ICD-10-CM

## 2024-02-23 DIAGNOSIS — R10.13 DYSPEPSIA: Primary | ICD-10-CM

## 2024-02-23 NOTE — TELEPHONE ENCOUNTER
I called [facility] at [phone] and requested the colonoscopy and pathology report completed in 2019. Medical records personnel stated they would fax it to the fax number here.

## 2024-02-23 NOTE — PROGRESS NOTES
"GASTROENTEROLOGY OFFICE NOTE  Arslan Butler  [id number]  1990    CARE TEAM  Patient Care Team:  Reji Rader MD as PCP - General (Family Medicine)    Referring Provider: Reji Rader MD    Chief Complaint   Patient presents with    Diarrhea    \"sour stomach\"        HISTORY OF PRESENT ILLNESS:  Mr. Butler is a very pleasant 33-year-old male who presents with a 3 to 4-week history of having a \"sour stomach\" postprandially.  He has been on Ozempic for about 3 years now and has tolerated this very well, he has not had nausea, vomiting or complaints such as he presents with today with taking Ozempic.  He has been having some abdominal cramping when he eats and has chronic diarrhea, going to the bathroom a looser stool 2-3 times daily.  He denies any blood in his stool.    He reports that he was diagnosed with ulcerative colitis in 2019.  He was having diarrhea and some blood in his stool.  He [place] in [place] and recalls that he was told he had ulcerative colitis or something like it and was started on an oral medication, he does not remember the name of it but he recalls that it was fairly big pill and he took 3 or 4 of them daily.  Once starting this medication his diarrhea resolved as did the blood in his stool.  He moved from the area and has not been seen in follow-up nor has he been on any medication for the past 2 years.    PAST MEDICAL HISTORY  Past Medical History:   Diagnosis Date    Anxiety and depression 12/17/2021    Bipolar disorder 12/15/2021    Chronic pain disorder     Diabetes mellitus 2018    on Ozempic, last A1C 7.2    Fatty liver     on     Gastroesophageal reflux disease 12/15/2021    pepto or tums prn    H/O shoulder surgery 03/29/2023    Hx of tonsillectomy 03/29/2023    Hyperlipidemia 12/15/2021    Iron deficiency anemia secondary to inadequate dietary iron intake 08/05/2022    Left arm numbness 01/17/2022    suspected related to shoulder surgery    Left rotator " cuff tear 01/17/2022    Male hypogonadism 05/11/2022    MRSA infection     after tattoo    Obesity 12/15/2021    Panic disorder     Peptic ulceration 2020    noted on EGD in [place]    Rotator cuff injury, left, subsequent encounter 02/24/2022    Sciatic nerve pain     Self-injurious behavior     Sleep apnea     Suicide attempt     Testosterone deficiency in male 05/11/2022        PAST SURGICAL HISTORY  Past Surgical History:   Procedure Laterality Date    ENDOSCOPY AND COLONOSCOPY  2019    peptic ulcer    ROTATOR CUFF REPAIR Left 2014    ROTATOR CUFF REPAIR  2016    TONSILLECTOMY  1997        MEDICATIONS:    Current Outpatient Medications:     amphetamine-dextroamphetamine XR (Adderall XR) 20 MG 24 hr capsule, Take 1 capsule by mouth Every Morning, Disp: 30 capsule, Rfl: 0    Cholecalciferol (Vitamin D3) 50 MCG (2000 UT) capsule, Take 1 capsule by mouth Daily., Disp: , Rfl:     diazePAM (Valium) 5 MG tablet, Take 1 tablet by mouth Daily As Needed for Anxiety (Panic Attacks)., Disp: 15 tablet, Rfl: 0    divalproex (DEPAKOTE ER) 250 MG 24 hr tablet, Take 1 tablet by mouth Daily., Disp: 90 tablet, Rfl: 1    lisinopril (PRINIVIL,ZESTRIL) 10 MG tablet, Take 1 tablet by mouth Daily., Disp: 90 tablet, Rfl: 0    lurasidone HCl (LATUDA) 60 MG tablet tablet, Take 1 tablet by mouth Daily., Disp: 30 tablet, Rfl: 1    mirtazapine (REMERON) 7.5 MG tablet, Take 1 tablet by mouth Every Night., Disp: 30 tablet, Rfl: 1    Semaglutide, 2 MG/DOSE, (OZEMPIC) 8 MG/3ML solution pen-injector, Inject 2 mg under the skin into the appropriate area as directed 1 (One) Time Per Week., Disp: 3 mL, Rfl: 3    vitamin D (ERGOCALCIFEROL) 1.25 MG (90077 UT) capsule capsule, Take 1 capsule by mouth 1 (One) Time Per Week., Disp: 8 capsule, Rfl: 0    ibuprofen (ADVIL,MOTRIN) 800 MG tablet, Take 1 tablet by mouth Every 8 (Eight) Hours As Needed for Mild Pain. (Patient not taking: Reported on 2/23/2024), Disp: 90 tablet, Rfl: 0    omeprazole (priLOSEC) 40  "MG capsule, Take 1 capsule by mouth 2 (Two) Times a Day. (Patient not taking: Reported on 2/23/2024), Disp: 60 capsule, Rfl: 1    ALLERGIES  Allergies   Allergen Reactions    Sulfamethoxazole-Trimethoprim Hives    Metformin GI Intolerance       FAMILY HISTORY:  Family History   Problem Relation Age of Onset    Suicide Attempts Mother     Self-Injurious Behavior  Mother     Seizures Mother     Schizophrenia Mother     Paranoid behavior Mother     Depression Mother     Bipolar disorder Mother     Alcohol abuse Mother     Obesity Mother     Alcohol abuse Father     Hypertension Father     Heart attack Father     Obesity Sister     Obesity Brother     Stroke Maternal Grandmother     Heart attack Maternal Grandfather     Diabetes Maternal Grandfather     Hypertension Maternal Grandfather     Mental illness Paternal Grandmother     Diabetes Paternal Grandmother     Heart attack Paternal Grandmother     No Known Problems Paternal Grandfather     Colon cancer Neg Hx        SOCIAL HISTORY  Social History     Socioeconomic History    Marital status:    Tobacco Use    Smoking status: Never     Passive exposure: Never    Smokeless tobacco: Never   Vaping Use    Vaping Use: Never used   Substance and Sexual Activity    Alcohol use: Not Currently     Comment: ocassionally     Drug use: Never    Sexual activity: Defer         PHYSICAL EXAM   /76 (BP Location: Left arm, Patient Position: Sitting, Cuff Size: Adult)   Pulse 101   Temp 97.5 °F (36.4 °C) (Temporal)   Ht 190.5 cm (75\")   Wt (!) 164 kg (362 lb 3.2 oz)   BMI 45.27 kg/m²   Physical Exam  Constitutional:       Appearance: Normal appearance.   HENT:      Head: Normocephalic and atraumatic.   Pulmonary:      Effort: Pulmonary effort is normal.   Abdominal:      General: There is no distension.      Palpations: Abdomen is soft.      Tenderness: There is no abdominal tenderness.   Neurological:      Mental Status: He is alert and oriented to person, place, and " time.   Psychiatric:         Mood and Affect: Mood normal.         Thought Content: Thought content normal.           ASSESSMENT / PLAN  1.  Dyspepsia  - EGD  2.  Chronic diarrhea  ?  History of ulcerative colitis  - Obtain records from [facility]; colonoscopy and pathology report  - Colonoscopy    Return for Follow up after procedures.    I discussed the patients findings and my recommendations with patient    CORTNEY Ferguson

## 2024-02-28 DIAGNOSIS — E55.9 VITAMIN D INSUFFICIENCY: ICD-10-CM

## 2024-02-28 RX ORDER — ERGOCALCIFEROL 1.25 MG/1
50000 CAPSULE ORAL WEEKLY
Qty: 8 CAPSULE | Refills: 0 | OUTPATIENT
Start: 2024-02-28

## 2024-02-29 NOTE — TELEPHONE ENCOUNTER
Tried to reach patient no answer left voicemail to return call    RELAY:  Patient needs to be retested prior to continuing refill at this dose.  Thanks.

## 2024-03-01 NOTE — TELEPHONE ENCOUNTER
Patient has been notified of providers message and wanted to see if you could place order for lab draw

## 2024-03-03 DIAGNOSIS — F51.01 PRIMARY INSOMNIA: ICD-10-CM

## 2024-03-03 DIAGNOSIS — F41.0 PANIC ATTACKS: ICD-10-CM

## 2024-03-03 DIAGNOSIS — F41.1 GENERALIZED ANXIETY DISORDER: ICD-10-CM

## 2024-03-04 NOTE — TELEPHONE ENCOUNTER
REFILL REQUEST    mirtazapine (REMERON) 7.5 MG tablet (12/29/2023)     LAST OFFICE VISIT-    Telemedicine with Sherrie Oneill APRN (02/05/2024)     NEXT FOLLOW UP APPOINTMENT-    Appointment with Sherrie Oneill APRN (03/19/2024)     ORDER PENDING.

## 2024-03-04 NOTE — TELEPHONE ENCOUNTER
PT(PATIENT) VERIFIED     PT(PATIENT) CALLED TO REQUEST AN UPDATE ON REFILL STATUS     PT(PATIENT) ALSO REQUESTED A REFILL OF   diazePAM (Valium) 5 MG tablet (01/10/2024)     PT(PATIENT) CONFIRMED PHARMACY   [facility] #[id number] - [place], [place]     NEXT APPT WITH PROVIDER   Appointment with Sherrie Oneill APRN (2024)     PROVIDER PLEASE ADVISE

## 2024-03-04 NOTE — PROGRESS NOTES
Progress Note    Date: 03/05/2024  Time In: 11:00  Time Out: 11:54    Patient Legal Name: Arslan Butler  Patient Age: 33 y.o.    Mode of visit: Video  Location of provider: [address], [place] [address]  Location of patient: home    Patient is seen remotely using [email or web address]. Patient is being seen via telehealth and patient confirms that they are in a secure environment for this session. The patient's condition being diagnosed/treated is appropriate for telemedicine. The provider identified herself as well as her credentials. The patient gave consent to be seen remotely, and when consent is given they understand that the consent allows for patient identifiable information to be sent to a third party as needed. They may refuse to be seen remotely at any time. The electronic data is encrypted and password protected, and the patient has been advised of the potential risks to privacy not withstanding such measures. Patient consented to the use of video for the purpose of a telehealth session today. The visit included audio and video interaction. No technical issues occurred during this visit.    CHIEF COMPLAINT: anxiety/social anxiety, depression    Subjective   History of Present Illness   Arslan is a 33 y.o. male who presents today as a follow-up for continued psychotherapy. Patient reported he has gotten a job with [employer] but doesn't  start for a couple of months to start.  Patient stated the grounding technique discussed before was not effective.  Patient shared he is not sure what causes his social anxiety and it started in 2019. 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  Patient reported he can go in public with his wife but not alone. Patient shared he has had 4-5 panic attacks in the last 2 weeks. Patient described being concerned about not being able to pay his bills. Patient explained they are living with an aunt right now. Patient reported his brother  "had an accident  in  2020 and has brain damage from being under water for 7 minutes. Patient worked at [employer] before from 2012-17. Neither her nor is wife is working right now.  Patient advised his bipolar is under control with medications. Patient has also been diagnosed with ADHD and has started taking medication for it. \"I can think more clearly now.\" Patient is voluntarily requesting to participate in outpatient therapy at [facility].        History obtained from referring provider's note on 12/1/23:  Past Psychiatric History:  Began Treatment: 2014            Diagnoses: Bipolar depression, anxiety, panic attacks  Psychiatrist: Yes  Therapist: Yes  Admission History: 3 admissions  Medication Trials: Ativan, klonopin, xanax, valium, gabapentin, vistaril, buspar, prozac, lithium, abilify, effexor, vraylar, rexulti, wellbutrin, zoloft, lexapro, celexa, seroquel, clonidine, lamictal  Suicide Attempts: Several attempts, tried to OD on pills, cut wrists  Self Harm: Hx of cutting   Substance use/abuse:Denies  Withdrawal Symptoms: Not applicable  Longest Period Sober: Not applicable  AA: Not applicable  Trauma/Childhood/ACE:  parents     Assessment    Mental Status Exam     Appearance: appeared to have good hygiene  Behavior: calm  Cooperation:  engaged, cooperative, attentive, and friendly  Eye Contact:  good  Affect:  blunted  Mood: depressed and anxious  Speech: responsive  Thought Process:  organized  Thought Content: appropriate  Suicidal: denies  Homicidal:  denies  Hallucinations:  denies  Memory:  intact  Orientation:  person, place, time, and situation  Reliability:  reliable  Insight:  good  Judgment:  good     Clinical Intervention       ICD-10-CM ICD-9-CM   1. Social anxiety disorder  F40.10 300.23   2. Bipolar affective disorder, current episode depressed, current episode severity unspecified  F31.30 296.50        Individual psychotherapy was provided utilizing CBT and " person-centered techniques to restore adaptive functioning, provide symptom relief, build rapport, encourage expression of thoughts and feelings, establish new coping skills, identify goals for treatment, challenge avoidance, identify triggers, build confidence, assess symptoms, gather history, and practice exposures.  Therapist utilized open-ended questions to encourage the development of a positive therapeutic relationship and open communication. Therapist normalized/validated patient’s thoughts and feelings as appropriate. Therapist  recommended using category grounding technique I conjunction with 4-7-8 breathing to assist with social anxiety and panic attacks. Assigned patient to practice exposures by going to a small store to look around, recording his time spent, and writing about his experience afterwards to review with therapist. Cognitive distortions handout and video also assigned in patient instructions.     Plan   Plan & Goals     Continue building rapport and follow up on homework assignments.    Patient acknowledged and verbally consented to continue working toward resolving current treatment plan goals and was educated on the importance of participation in the therapeutic process.  Patient will remain compliant with medication regimen as prescribed. Discuss any medication side effects, questions or concerns with prescribing provider.  Call 911 or present to the nearest emergency room in an emergency situation.   National Suicide Prevention Lifeline: Call 988. The Lifeline provides 24/7, free and confidential support for people in distress, prevention and crisis resources.  Crisis Text Line  Text HOME To 507507    Return in about 2 weeks (around 3/19/2024).    ____________________  This document has been electronically signed by Christy Mccarthy LCSW  March 5, 2024 12:09 EST    Part of this note may be an electronic transcription/translation of spoken language to printed text using the Dragon Dict[employer]  System.

## 2024-03-05 ENCOUNTER — TELEMEDICINE (OUTPATIENT)
Dept: BEHAVIORAL HEALTH | Facility: CLINIC | Age: 34
End: 2024-03-05
Payer: COMMERCIAL

## 2024-03-05 DIAGNOSIS — F40.10 SOCIAL ANXIETY DISORDER: Primary | ICD-10-CM

## 2024-03-05 DIAGNOSIS — F31.30 BIPOLAR AFFECTIVE DISORDER, CURRENT EPISODE DEPRESSED, CURRENT EPISODE SEVERITY UNSPECIFIED: ICD-10-CM

## 2024-03-05 RX ORDER — DIAZEPAM 5 MG/1
5 TABLET ORAL DAILY PRN
Qty: 15 TABLET | Refills: 0 | Status: SHIPPED | OUTPATIENT
Start: 2024-03-05 | End: 2025-03-05

## 2024-03-05 RX ORDER — MIRTAZAPINE 7.5 MG/1
7.5 TABLET, FILM COATED ORAL NIGHTLY
Qty: 30 TABLET | Refills: 1 | Status: SHIPPED | OUTPATIENT
Start: 2024-03-05

## 2024-03-13 ENCOUNTER — OFFICE VISIT (OUTPATIENT)
Dept: INTERNAL MEDICINE | Facility: CLINIC | Age: 34
End: 2024-03-13
Payer: COMMERCIAL

## 2024-03-13 VITALS
RESPIRATION RATE: 18 BRPM | WEIGHT: 315 LBS | TEMPERATURE: 97.8 F | SYSTOLIC BLOOD PRESSURE: 126 MMHG | DIASTOLIC BLOOD PRESSURE: 84 MMHG | HEART RATE: 74 BPM | BODY MASS INDEX: 45.27 KG/M2

## 2024-03-13 DIAGNOSIS — F31.30 BIPOLAR AFFECTIVE DISORDER, CURRENT EPISODE DEPRESSED, CURRENT EPISODE SEVERITY UNSPECIFIED: ICD-10-CM

## 2024-03-13 DIAGNOSIS — K21.9 GASTROESOPHAGEAL REFLUX DISEASE, UNSPECIFIED WHETHER ESOPHAGITIS PRESENT: ICD-10-CM

## 2024-03-13 DIAGNOSIS — Z23 ENCOUNTER FOR VACCINATION: ICD-10-CM

## 2024-03-13 DIAGNOSIS — E66.01 CLASS 3 SEVERE OBESITY DUE TO EXCESS CALORIES WITH SERIOUS COMORBIDITY AND BODY MASS INDEX (BMI) OF 40.0 TO 44.9 IN ADULT: ICD-10-CM

## 2024-03-13 DIAGNOSIS — Z71.3 PRE-BARIATRIC SURGERY NUTRITION EVALUATION: ICD-10-CM

## 2024-03-13 DIAGNOSIS — F41.1 GENERALIZED ANXIETY DISORDER: ICD-10-CM

## 2024-03-13 DIAGNOSIS — K31.84 GASTROPARESIS: ICD-10-CM

## 2024-03-13 DIAGNOSIS — R73.03 PREDIABETES: Primary | ICD-10-CM

## 2024-03-13 DIAGNOSIS — K52.9 INFLAMMATORY BOWEL DISEASE: ICD-10-CM

## 2024-03-13 DIAGNOSIS — I10 ESSENTIAL HYPERTENSION: ICD-10-CM

## 2024-03-13 PROCEDURE — 1160F RVW MEDS BY RX/DR IN RCRD: CPT | Performed by: STUDENT IN AN ORGANIZED HEALTH CARE EDUCATION/TRAINING PROGRAM

## 2024-03-13 PROCEDURE — 1159F MED LIST DOCD IN RCRD: CPT | Performed by: STUDENT IN AN ORGANIZED HEALTH CARE EDUCATION/TRAINING PROGRAM

## 2024-03-13 PROCEDURE — 3079F DIAST BP 80-89 MM HG: CPT | Performed by: STUDENT IN AN ORGANIZED HEALTH CARE EDUCATION/TRAINING PROGRAM

## 2024-03-13 PROCEDURE — 3044F HG A1C LEVEL LT 7.0%: CPT | Performed by: STUDENT IN AN ORGANIZED HEALTH CARE EDUCATION/TRAINING PROGRAM

## 2024-03-13 PROCEDURE — 3074F SYST BP LT 130 MM HG: CPT | Performed by: STUDENT IN AN ORGANIZED HEALTH CARE EDUCATION/TRAINING PROGRAM

## 2024-03-13 PROCEDURE — 99214 OFFICE O/P EST MOD 30 MIN: CPT | Performed by: STUDENT IN AN ORGANIZED HEALTH CARE EDUCATION/TRAINING PROGRAM

## 2024-03-13 RX ORDER — OMEPRAZOLE 40 MG/1
40 CAPSULE, DELAYED RELEASE ORAL 2 TIMES DAILY
Qty: 60 CAPSULE | Refills: 1 | Status: SHIPPED | OUTPATIENT
Start: 2024-03-13

## 2024-03-13 NOTE — PROGRESS NOTES
"Progress Note    Date: 03/18/2024  Time In: 10:19  Time Out: 10:57    Patient Legal Name: Arslan Butler  Patient Age: 33 y.o.    Mode of visit: Video  Location of provider: [address], [place] [address]  Location of patient: in vehicle    Patient is seen remotely using [employer]. Patient is being seen via telehealth and patient confirms that they are in a secure environment for this session. The patient's condition being diagnosed/treated is appropriate for telemedicine. The provider identified herself as well as her credentials. The patient gave consent to be seen remotely, and when consent is given they understand that the consent allows for patient identifiable information to be sent to a third party as needed. They may refuse to be seen remotely at any time. The electronic data is encrypted and password protected, and the patient has been advised of the potential risks to privacy not withstanding such measures. Patient consented to the use of video for the purpose of a telehealth session today. The visit included audio and video interaction. Patient was running late and having technical difficulties and did not check in until 10:19 today.     CHIEF COMPLAINT: anxiety, depression    Subjective   History of Present Illness   Arslan is a 33 y.o. male who presents today as a follow-up for continued psychotherapy. Patient was running late and having technical difficulties and did not check in until 10:19 today. Patient reported his condition has remained the same since last visit.  Patient stated he has applied for disability again.  Patient shared he is still waiting to hear about placement at [facility]. Patient advised he was unable to find attached homework video.  Patient reported he has been trying to figure out the root cause of his anxiety but is \"montana stuck.\"  Patient shared he does use breathing to help with panic attacks. Patient described he may have had less panic attacks because he has not " "been out as much.  Patient explained his anxiety escalates when they go to a store and his wife ends up looking at other things. He said he starts to notice people around him. Patient acknowledged being bothered by changes in plans or unexpected loud noises. Patient stated his medication helps with depression but not with anxiety. Patient reported his wife stated he had \"a fit\" after [facility] as she stated he went to his room and isolated, not wanting to interact. Patient is voluntarily requesting to participate in outpatient therapy at [facility].      History obtained from referring provider's note on 12/1/23:  Past Psychiatric History:  Began Treatment: 2014            Diagnoses: Bipolar depression, anxiety, panic attacks  Psychiatrist: Yes  Therapist: Yes  Admission History: 3 admissions  Medication Trials: Ativan, klonopin, xanax, valium, gabapentin, vistaril, buspar, prozac, lithium, abilify, effexor, vraylar, rexulti, wellbutrin, zoloft, lexapro, celexa, seroquel, clonidine, lamictal  Suicide Attempts: Several attempts, tried to OD on pills, cut wrists  Self Harm: Hx of cutting   Substance use/abuse:Denies  Withdrawal Symptoms: Not applicable  Longest Period Sober: Not applicable  AA: Not applicable  Trauma/Childhood/ACE:  parents       Assessment    Mental Status Exam     Appearance: appeared to have good hygiene  Behavior: calm  Cooperation:  engaged, cooperative, attentive, and friendly  Eye Contact:  good  Affect:  blunted  Mood: depressed and anxious  Speech: responsive  Thought Process:  organized  Thought Content: appropriate  Suicidal: denies  Homicidal:  denies  Hallucinations:  denies  Memory:  intact  Orientation:  person, place, time, and situation  Reliability:  reliable  Insight:  good  Judgment:  good     Clinical Intervention       ICD-10-CM ICD-9-CM   1. Social anxiety disorder  F40.10 300.23   2. Bipolar affective disorder, current episode depressed, " current episode severity unspecified  F31.30 296.50        Individual psychotherapy was provided utilizing CBT and person-centered techniques to restore adaptive functioning, provide symptom relief, build rapport, encourage expression of thoughts and feelings, establish new coping skills, promote emotion regulation, identify goals for treatment, identify triggers, recognize patterns of behavior, acknowledge sources of feelings and behaviors, assess symptoms, challenge negative thinking patterns, and provide psychoeducation.  Therapist utilized open-ended questions to encourage the development of a positive therapeutic relationship and open communication.  Therapist normalized/validated patient’s thoughts and feelings as appropriate. Reviewed how to pull up assignments on [employer]. Advised to discuss linnea Balderas that his anxiety is not improving. Discussed exposures and challenged patient to do 2 store exposures before next session. Also, encouraged patient to be mindful of his anger and to choose not to be angry by using self-talk and mindfulness techniques.    Plan   Plan & Goals     Continue building rapport and follow up on exposure challenge.     Patient acknowledged and verbally consented to continue working toward resolving current treatment plan goals and was educated on the importance of participation in the therapeutic process.  Patient will remain compliant with medication regimen as prescribed. Discuss any medication side effects, questions or concerns with prescribing provider.  Call 911 or present to the nearest emergency room in an emergency situation.   National Suicide Prevention Lifeline: Call 988. The Lifeline provides 24/7, free and confidential support for people in distress, prevention and crisis resources.  Crisis Text Line  Text HOME To 146466    Return in about 2 weeks (around 4/1/2024).    ____________________  This document has been electronically signed by Christy Mccarthy LCSW  March 18, 2024  12:07 EDT    Part of this note may be an electronic transcription/translation of spoken language to printed text using the Dragon Dictation System.

## 2024-03-13 NOTE — PROGRESS NOTES
Follow Up Office Visit      Date: 2024   Patient Name: Arslan Butler  : 1990   MRN: [id number]     Chief Complaint:    Chief Complaint   Patient presents with   • Follow-up       History of Present Illness: Arslan Butler is a 33 y.o. male who is here today to follow up with chronic care management.    History of Present Illness  The patient presents to the office for follow-up of multiple medical concerns.    He went to the GI doctor who thinks he has gastroparesis, which might be a side effect of the Ozempic. He is scheduled for a colonoscopy and EGD on . He does not have a follow-up scheduled until 2024.    He is thinking about following back up with the weight loss doctor for bariatric surgery. He got approved for a bariatric surgeon, but he wanted to try it on his own. He thinks he will drop more weight faster. He got a message from the bariatric clinic who ordered testing. He is on Ozempic 2 mg.    He is trying disability again for his work situation. His shoulder messes him up with his mental health. His anxiety is not under better control. He is seeing a behavioral health specialist now. His therapist and psychiatrist think he might be autistic. He has ADHD. He is on Latuda, Depakote, Valium as needed, and Adderall. He is scheduled to meet with the behavioral health specialist on XX and XX. Last week, he got out of bed and slept the whole time. He feels like he is on all these medications, he should not be getting depressed.    He has sleep apnea. He is still on CPAP.    He still has a lot of discomfort, reflux symptoms, and burping. He is not taking omeprazole twice daily. He is on Ozempic.      Subjective      Review of Systems:   Review of Systems   All other systems reviewed and are negative.      I have reviewed the patients family history, social history, past medical history, past surgical history and have updated it as appropriate.     Medications:      Current Outpatient Medications:   •  amphetamine-dextroamphetamine XR (Adderall XR) 20 MG 24 hr capsule, Take 1 capsule by mouth Every Morning, Disp: 30 capsule, Rfl: 0  •  Cholecalciferol (Vitamin D3) 50 MCG (2000 UT) capsule, Take 1 capsule by mouth Daily., Disp: , Rfl:   •  diazePAM (Valium) 5 MG tablet, Take 1 tablet by mouth Daily As Needed for Anxiety (Panic Attacks)., Disp: 15 tablet, Rfl: 0  •  divalproex (DEPAKOTE ER) 250 MG 24 hr tablet, Take 1 tablet by mouth Daily., Disp: 90 tablet, Rfl: 1  •  ibuprofen (ADVIL,MOTRIN) 800 MG tablet, Take 1 tablet by mouth Every 8 (Eight) Hours As Needed for Mild Pain., Disp: 90 tablet, Rfl: 0  •  lisinopril (PRINIVIL,ZESTRIL) 10 MG tablet, Take 1 tablet by mouth Daily., Disp: 90 tablet, Rfl: 0  •  lurasidone HCl (LATUDA) 60 MG tablet tablet, Take 1 tablet by mouth Daily., Disp: 30 tablet, Rfl: 1  •  mirtazapine (REMERON) 7.5 MG tablet, TAKE 1 TABLET BY MOUTH EVERY NIGHT, Disp: 30 tablet, Rfl: 1  •  omeprazole (priLOSEC) 40 MG capsule, Take 1 capsule by mouth 2 (Two) Times a Day., Disp: 60 capsule, Rfl: 1  •  Semaglutide, 2 MG/DOSE, (OZEMPIC) 8 MG/3ML solution pen-injector, Inject 2 mg under the skin into the appropriate area as directed 1 (One) Time Per Week., Disp: 3 mL, Rfl: 3  •  vitamin D (ERGOCALCIFEROL) 1.25 MG (57180 UT) capsule capsule, Take 1 capsule by mouth 1 (One) Time Per Week., Disp: 8 capsule, Rfl: 0  •  COVID-19 mRNA Vaccine, Moderna, (Spikevax COVID-19 Vaccine) 100 MCG/0.5ML suspension, Inject 0.25 mL into the appropriate muscle as directed by prescriber 1 (One) Time for 1 dose., Disp: 0.25 mL, Rfl: 0    Allergies:   Allergies   Allergen Reactions   • Sulfamethoxazole-Trimethoprim Hives   • Metformin GI Intolerance       Objective     Physical Exam: Please see above  Vital Signs:   Vitals:    03/13/24 0933   BP: 126/84   BP Location: Right arm   Patient Position: Sitting   Cuff Size: Large Adult   Pulse: 74   Resp: 18   Temp: 97.8 °F (36.6 °C)    TempSrc: Temporal   Weight: (!) 164 kg (362 lb 3.2 oz)   PainSc: 0-No pain     Body mass index is 45.27 kg/m².          Physical Exam  Vitals and nursing note reviewed.   Constitutional:       General: He is not in acute distress.     Appearance: Normal appearance. He is obese. He is not ill-appearing or toxic-appearing.   HENT:      Nose: No congestion or rhinorrhea.   Eyes:      General:         Right eye: No discharge.         Left eye: No discharge.      Conjunctiva/sclera: Conjunctivae normal.   Pulmonary:      Effort: Pulmonary effort is normal. No respiratory distress.   Abdominal:      General: Abdomen is flat. There is no distension.   Musculoskeletal:      Cervical back: Normal range of motion.   Skin:     Coloration: Skin is not jaundiced.      Findings: No rash.   Neurological:      General: No focal deficit present.      Mental Status: He is alert. Mental status is at baseline.      Coordination: Coordination normal.      Gait: Gait normal.   Psychiatric:         Mood and Affect: Mood normal.         Behavior: Behavior normal.         Thought Content: Thought content normal.         Judgment: Judgment normal.         Procedures    Results:   Results  Laboratory Studies  Labs were reviewed with the patient.    Labs:   Hemoglobin A1C   Date Value Ref Range Status   02/13/2024 5.7 4.5 - 5.7 % Final   12/19/2023 6.00 (H) 4.80 - 5.60 % Final     TSH   Date Value Ref Range Status   02/13/2024 1.170 0.270 - 4.200 uIU/mL Final        Imaging:   No valid procedures specified.     Assessment / Plan      Assessment/Plan:   Problem List Items Addressed This Visit       Generalized anxiety disorder    Bipolar disorder    Overview     Previously failed lithium         Gastroesophageal reflux disease    Relevant Medications    omeprazole (priLOSEC) 40 MG capsule    Class 3 severe obesity due to excess calories with serious comorbidity and body mass index (BMI) of 40.0 to 44.9 in adult    Relevant Orders     Ambulatory Referral to Bariatric Surgery    NM Gastric Emptying    Essential hypertension    Prediabetes - Primary    Inflammatory bowel disease    Gastroparesis    Relevant Medications    omeprazole (priLOSEC) 40 MG capsule    Other Relevant Orders    NM Gastric Emptying    Pre-bariatric surgery nutrition evaluation    Relevant Orders    Complete PFT - Pre & Post Bronchodilator     Other Visit Diagnoses       Encounter for vaccination        Relevant Medications    COVID-19 mRNA Vaccine, Moderna, (Spikevax COVID-19 Vaccine) 100 MCG/0.5ML suspension            Assessment & Plan  1. Prediabetes.  His A1c is trending well. He is barely in the prediabetic range now. His weight has stagnated around his current level. He will continue Ozempic for an extended period of time. I will refer him to a weight loss doctor for bariatric surgery.    2. Anxiety.  He is on Latuda, Depakote, Valium as needed, and Adderall. He will continue to follow up with his behavioral health specialist.    3. Bipolar disorder.  He is working through a bipolar diagnosis as well. He will continue Latuda, Depakote, and Adderall.    4. Gastroparesis.  He is scheduled for a colonoscopy and EGD on 3/23/2024. I will order gastric emptying study. He will take omeprazole twice a day. If he notices continued symptoms after that, then we can add on sucralfate and Tums.    5. Sleep apnea.  He will continue CPAP. I will order a pulmonary function test under the bariatric surgery evaluation.    6. Health maintenance.  He is up to date on his health maintenance screening. I sent his COVID-19 vaccine to his pharmacy.    Follow-up  The patient will follow up in 08/2024 for health maintenance.    Follow Up:   Return in about 5 months (around 8/19/2024) for Annual.        Patient or patient representative verbalized consent for the use of Ambient Listening during the visit with  Reji Rader MD for chart documentation. 3/13/2024  10:03 EDT    Reji Rader  MD DEMOND Blair

## 2024-03-14 DIAGNOSIS — F90.1 ATTENTION DEFICIT HYPERACTIVITY DISORDER (ADHD), PREDOMINANTLY HYPERACTIVE TYPE: ICD-10-CM

## 2024-03-14 RX ORDER — DEXTROAMPHETAMINE SACCHARATE, AMPHETAMINE ASPARTATE MONOHYDRATE, DEXTROAMPHETAMINE SULFATE AND AMPHETAMINE SULFATE 5; 5; 5; 5 MG/1; MG/1; MG/1; MG/1
20 CAPSULE, EXTENDED RELEASE ORAL EVERY MORNING
Qty: 30 CAPSULE | Refills: 0 | Status: SHIPPED | OUTPATIENT
Start: 2024-03-14

## 2024-03-14 NOTE — TELEPHONE ENCOUNTER
REFILL REQUEST FOR ADDERALL XR 20 MG CAPSULES.  amphetamine-dextroamphetamine XR (Adderall XR) 20 MG 24 hr capsule (02/15/2024)     FOLLOW UP APPT ON 03/19/2024.  PT LAST SEEN ON 02/05/2024.

## 2024-03-18 ENCOUNTER — TELEMEDICINE (OUTPATIENT)
Dept: BEHAVIORAL HEALTH | Facility: CLINIC | Age: 34
End: 2024-03-18
Payer: COMMERCIAL

## 2024-03-18 DIAGNOSIS — F31.30 BIPOLAR AFFECTIVE DISORDER, CURRENT EPISODE DEPRESSED, CURRENT EPISODE SEVERITY UNSPECIFIED: ICD-10-CM

## 2024-03-18 DIAGNOSIS — F40.10 SOCIAL ANXIETY DISORDER: Primary | ICD-10-CM

## 2024-03-19 ENCOUNTER — TELEMEDICINE (OUTPATIENT)
Dept: BEHAVIORAL HEALTH | Facility: CLINIC | Age: 34
End: 2024-03-19
Payer: COMMERCIAL

## 2024-03-19 ENCOUNTER — TELEPHONE (OUTPATIENT)
Dept: PSYCHIATRY | Facility: CLINIC | Age: 34
End: 2024-03-19
Payer: COMMERCIAL

## 2024-03-19 DIAGNOSIS — F31.30 BIPOLAR AFFECTIVE DISORDER, CURRENT EPISODE DEPRESSED, CURRENT EPISODE SEVERITY UNSPECIFIED: Primary | ICD-10-CM

## 2024-03-19 DIAGNOSIS — F90.1 ATTENTION DEFICIT HYPERACTIVITY DISORDER (ADHD), PREDOMINANTLY HYPERACTIVE TYPE: ICD-10-CM

## 2024-03-19 DIAGNOSIS — F41.1 GENERALIZED ANXIETY DISORDER: ICD-10-CM

## 2024-03-19 DIAGNOSIS — I10 HYPERTENSION, UNSPECIFIED TYPE: ICD-10-CM

## 2024-03-19 DIAGNOSIS — E66.01 OBESITY, CLASS III, BMI 40-49.9 (MORBID OBESITY): Primary | ICD-10-CM

## 2024-03-19 DIAGNOSIS — F41.0 PANIC ATTACKS: ICD-10-CM

## 2024-03-19 DIAGNOSIS — F51.01 PRIMARY INSOMNIA: ICD-10-CM

## 2024-03-19 DIAGNOSIS — F40.10 SOCIAL ANXIETY DISORDER: ICD-10-CM

## 2024-03-19 DIAGNOSIS — G47.30 SLEEP APNEA, UNSPECIFIED TYPE: ICD-10-CM

## 2024-03-19 RX ORDER — MIRTAZAPINE 15 MG/1
15 TABLET, FILM COATED ORAL NIGHTLY
Qty: 30 TABLET | Refills: 1 | Status: SHIPPED | OUTPATIENT
Start: 2024-03-19

## 2024-03-19 NOTE — PROGRESS NOTES
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 Discontinued During This Encounter   Medication Reason    mirtazapine (REMERON) 7.5 MG tablet          PLAN:   Presentation seems most consistent with DSM-V criteria for:  Diagnoses and all orders for this visit:    1. Bipolar affective disorder, current episode depressed, current episode severity unspecified (Primary)  -     Ambulatory Referral to Psychology    2. Attention deficit hyperactivity disorder (ADHD), predominantly hyperactive type  -     Ambulatory Referral to Psychology    3. Generalized anxiety disorder  -     Ambulatory Referral to Psychology  -     mirtazapine (REMERON) 15 MG tablet; Take 1 tablet by mouth Every Night.  Dispense: 30 tablet; Refill: 1    4. Social anxiety disorder  -     Ambulatory Referral to Psychology    5. Panic attacks  -     Ambulatory Referral to Psychology    6. Primary insomnia  -     mirtazapine (REMERON) 15 MG tablet; Take 1 tablet by mouth Every Night.  Dispense: 30 tablet; Refill: 1       Continue Latuda to 60 mg daily  Continue Depakote ER 250mg at bedtime  Continue Adderall XR 20 mg daily at next refill  Continue Valium 5 mg qd as needed for anxiety and panic attacks  Ambulatory referral for neuropsychological testing: ADHD  Increase mirtazapine to 15 mg at bedtime  Follow-up 1 month  Medication Education:   LATUDA (LURASIDONE) Take with food. Risks, benefits, and alternatives  discussed with patient including akathisia, sedation, dizziness/falls risk, nausea, low risk of weight gain & metabolic risks for diabetes and dyslipidemia, and rare tardive dyskinesia.  After discussion of these risks and benefits, the patient voiced understanding and agreed to proceed. Instructed to take medication with meal of minimum of 350 calories to improve consistent efficacy and absorption.   DEPAKOTE (VALPROATE) Risks, benefits, alternatives discussed with patient including nausea and vomiting, GI upset, sedation, dizziness/falls risk, increased appetite. After discussion of these risks and benefits, the patient voiced understanding and agreed to proceed. Patient also informed of the need to take as prescribed, and for periodic labwork.  ADDERALL (AMPHETAMINE) Risks, benefits, side effects discussed with patient including elevated heart rate, elevated blood pressure, irritability, insomnia, sexual dysfunction, appetite suppressing properties, psychosis.  After discussion of these risks and benefits, the patient voiced understanding and agreed to proceed. Shauna reviewed, UDS ordered, and controlled substance agreement signed & witnessed.  VALIUM (DIAZEPAM)  Risks, benefits, and alternatives discussed with patient including sedation/falls risk, dizziness, disinhibition, headache, fatigue, dry mouth, blurred vision, constipation, nausea, diarrhea, heartburn, unusual taste in mouth, urinary retention, increased appetite, restlessness, sexual dysfunction, sweating, weight gain, cardiac arrhythmias, seizures, and fall risk.  After discussion of these risks and benefits, patient voiced understanding and agreed to proceed.  Shauna reviewed, UDS ordered, and controlled substance agreement signed & witnessed.  Medications:   New Medications Ordered This Visit   Medications    mirtazapine (REMERON) 15 MG tablet     Sig: Take 1 tablet by mouth Every Night.     Dispense:  30 tablet     Refill:  1      SHAUNA reviewed.    Discussed medication options and treatment plan of prescribed medication as well as the risks, benefits, and side effects.  Patient verbalized understanding and is agreeable to this plan.   Patient is agreeable to call the office with any worsening of symptoms or onset of side effects.   Patient is agreeable to call 911 or go to the nearest ER should he/she begin having SI/HI.   Continue psychotherapeutic modalities as indicated.  Continue to challenge patterns of living conducive to pathology, strengthen defenses, promote problems solving, restore adaptive functioning and provide symptom relief.   Patient acknowledged and verbally consented to continue with current treatment plan and was educated on the importance of compliance with treatment and follow-up appointments.  Addressed all questions and concerns.     Psychotherapy:      Psychotherapy time 19 minutes.  This time is exclusive to the therapy session and separate from the time spent on activities used to meet the criteria for the E/M service (history, exam, medical decision-making).  Goal is to strengthen defenses, promote problems solving, restore adaptive functioning, and provide symptom relief. The therapeutic alliance was strengthened to encourage the patient to express their thoughts and feelings. Esteem building was enhanced through praise, reassurance, normalizing and encouragement. Coping skills were enhanced to build distress tolerance skills and emotional regulation. Allowed patient to freely discuss issues without interruption or judgement with unconditional positive regard, active listening skills, and empathy. Provided a safe, confidential environment to facilitate the development of a positive therapeutic relationship and encourage open, honest communication. Assisted patient in processing session content, acknowledged and normalized patient’s thoughts, feelings, and concerns by utilizing a person-centered approach in efforts to build  appropriate rapport and a positive therapeutic relationship with open and honest communication. Plan to continue supportive psychotherapy in next appointment to provide symptom relief.      Functional status:Good  Prognosis: Good  Progress: Continued improvement    Safety/Risk Assessment: Risk of self-harm acutely and chronically is moderate.    Risk factors include anxiety disorder, mood disorder, and recent psychosocial stressors.   Protective factors include no family history, denies access to guns/weapons, no present SI, no history of suicide attempts or self-harm in the past, minimal AODA, healthcare seeking, future orientation, willingness to engage in care.    Risk assessment could be further elevated in the event of treatment noncompliance and/or AODA.    Follow-up: Return in about 6 weeks (around 4/30/2024) for Next scheduled follow up.         This document has been electronically signed by CORTNEY rGay  March 30, 2024 14:12 EDT    Please note that portions of this note were completed with a voice recognition program.  Copied text in this note has been reviewed and is accurate as of 03/30/24

## 2024-03-19 NOTE — PATIENT INSTRUCTIONS
1.  Please return to clinic at your next scheduled visit.  Please contact the clinic ([phone]) at least 24 hours prior in the event you need to cancel.  2.  Do no harm to yourself or others.    3.  Avoid alcohol and drugs.    4.  Take all medications as prescribed.  Please contact the clinic with any concerns. If you are in need of medication refills, please call the clinic at [phone].    5. Should you want to get in touch with your provider, CORTNEY Gray, please contact the office ([phone]), and staff will be able to page Sherrie directly.  6. In the event you have personal crisis, contact the following crisis numbers: Suicide Prevention Hotline 1-498.328.1204; [facility] Helpline 7-188-856-XHGG; [facility] Emergency Room [phone]; text HELLO to 175888; or 788.     SPECIFIC RECOMMENDATIONS:     1.      Medications discussed at this encounter:     New Medications Ordered This Visit   Medications    mirtazapine (REMERON) 15 MG tablet     Sig: Take 1 tablet by mouth Every Night.     Dispense:  30 tablet     Refill:  1                       2.      Psychotherapy recommendations: We will continue therapy at future visits.     3.     Return to clinic: Return in about 6 weeks (around 4/30/2024) for Next scheduled follow up.

## 2024-04-03 ENCOUNTER — TELEPHONE (OUTPATIENT)
Dept: GASTROENTEROLOGY | Facility: CLINIC | Age: 34
End: 2024-04-03

## 2024-04-03 NOTE — TELEPHONE ENCOUNTER
Provider: AKILAH PERSON    Caller: TUSHAR MIDDLETON    Relationship to Patient: SELF    Phone Number: [phone]    Reason for Call: PATIENT CALLED IN AND STATED THAT HE WOULD LIKE TO RESCHEDULE HIS UPCOMING PROCEDURE SCHEDULED FOR 04/23/2024 DUE TO HIS DAUGHTER HAVING SURGERY ON THAT DAY. PLEASE CALL BACK ANYTIME, OKAY TO LEAVE VM.

## 2024-04-07 DIAGNOSIS — F41.1 GENERALIZED ANXIETY DISORDER: ICD-10-CM

## 2024-04-07 DIAGNOSIS — F31.30 BIPOLAR AFFECTIVE DISORDER, CURRENT EPISODE DEPRESSED, CURRENT EPISODE SEVERITY UNSPECIFIED: ICD-10-CM

## 2024-04-10 RX ORDER — LURASIDONE HYDROCHLORIDE 60 MG/1
60 TABLET, FILM COATED ORAL DAILY
Qty: 30 TABLET | Refills: 1 | Status: SHIPPED | OUTPATIENT
Start: 2024-04-10

## 2024-04-10 NOTE — TELEPHONE ENCOUNTER
PT(PATIENT) VERIFIED     PT(PATIENT) ADVISED PROVIDER HAS BEEN OUT OF THE OFFICE SICK     STAFF APOLOGIZED TO THE PT(PATIENT) FOR THE DELAY     PT(PATIENT) ADVISED REFILL WOULD BE FORWARDED TO PROVIDER FOR REVIEW    Quality 226: Preventive Care And Screening: Tobacco Use: Screening And Cessation Intervention: Patient screened for tobacco use and is an ex/non-smoker Detail Level: Detailed

## 2024-04-16 ENCOUNTER — OFFICE VISIT (OUTPATIENT)
Dept: INTERNAL MEDICINE | Facility: CLINIC | Age: 34
End: 2024-04-16
Payer: COMMERCIAL

## 2024-04-16 VITALS
DIASTOLIC BLOOD PRESSURE: 88 MMHG | RESPIRATION RATE: 18 BRPM | BODY MASS INDEX: 45.4 KG/M2 | TEMPERATURE: 97.7 F | WEIGHT: 315 LBS | SYSTOLIC BLOOD PRESSURE: 124 MMHG | HEART RATE: 76 BPM

## 2024-04-16 DIAGNOSIS — E66.01 CLASS 3 SEVERE OBESITY DUE TO EXCESS CALORIES WITH SERIOUS COMORBIDITY AND BODY MASS INDEX (BMI) OF 40.0 TO 44.9 IN ADULT: Primary | ICD-10-CM

## 2024-04-16 DIAGNOSIS — F41.1 GENERALIZED ANXIETY DISORDER: ICD-10-CM

## 2024-04-16 DIAGNOSIS — M12.812 ROTATOR CUFF ARTHROPATHY OF LEFT SHOULDER: ICD-10-CM

## 2024-04-16 DIAGNOSIS — G56.32 NEUROPATHY OF LEFT RADIAL NERVE: ICD-10-CM

## 2024-04-16 DIAGNOSIS — R73.03 PREDIABETES: ICD-10-CM

## 2024-04-16 PROCEDURE — 3074F SYST BP LT 130 MM HG: CPT | Performed by: STUDENT IN AN ORGANIZED HEALTH CARE EDUCATION/TRAINING PROGRAM

## 2024-04-16 PROCEDURE — 3079F DIAST BP 80-89 MM HG: CPT | Performed by: STUDENT IN AN ORGANIZED HEALTH CARE EDUCATION/TRAINING PROGRAM

## 2024-04-16 PROCEDURE — 99215 OFFICE O/P EST HI 40 MIN: CPT | Performed by: STUDENT IN AN ORGANIZED HEALTH CARE EDUCATION/TRAINING PROGRAM

## 2024-04-16 PROCEDURE — 3044F HG A1C LEVEL LT 7.0%: CPT | Performed by: STUDENT IN AN ORGANIZED HEALTH CARE EDUCATION/TRAINING PROGRAM

## 2024-04-16 NOTE — PROGRESS NOTES
Follow Up Office Visit      Date: 2024   Patient Name: Arslan Butler  : 1990   MRN: [id number]     Chief Complaint:    Chief Complaint   Patient presents with    Shoulder Pain       History of Present Illness: Arslan Butler is a 33 y.o. male who is here today to follow up with chronic care management.    History of Present Illness  The patient presents for evaluation of multiple medical concerns.    The patient reports a lack of physical activity and suspects overeating. He is currently on a regimen of Ozempic 2 mg, which induces mild nausea. He is also under the care of a gastroenterologist.    The patient has been experiencing left shoulder pain for the past 1 to 2 years. He was referred to an orthopedic specialist who identified muscle misalignment in his left shoulder and recommended a shoulder replacement, but the patient was too young for this procedure. He was informed that his tendons were small and potential interventions could include wire thickening and mesh placement. Despite finding suitable employment, he has been unable to continue due to his shoulder pain. He experiences pain when lifting his shoulder past 90 degrees and has recently noticed radiating pain down into his hand, which he suspects may be related to his neck. He also reports numbness and tingling, particularly in the 2nd and 3rd fingers on the backside of his left hand. 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 The pain is exclusively on his left side. Previous physical therapy sessions have not provided relief.    The patient is grappling with anxiety, both mentally and physical, and is collaborating with behavioral health. He is scheduled to see a therapist tomorrow. His current medication regimen includes Latuda, Depakote, mirtazapine, and diazepam. He has previously failed lithium and has tried various combinations. He is also meeting with Tara Oneill for  medication adjustments.      Subjective      Review of Systems:   Review of Systems   All other systems reviewed and are negative.      I have reviewed the patients family history, social history, past medical history, past surgical history and have updated it as appropriate.     Medications:     Current Outpatient Medications:     amphetamine-dextroamphetamine XR (Adderall XR) 20 MG 24 hr capsule, Take 1 capsule by mouth Every Morning, Disp: 30 capsule, Rfl: 0    Cholecalciferol (Vitamin D3) 50 MCG (2000 UT) capsule, Take 1 capsule by mouth Daily., Disp: , Rfl:     diazePAM (Valium) 5 MG tablet, Take 1 tablet by mouth Daily As Needed for Anxiety (Panic Attacks)., Disp: 15 tablet, Rfl: 0    divalproex (DEPAKOTE ER) 250 MG 24 hr tablet, Take 1 tablet by mouth Daily., Disp: 90 tablet, Rfl: 1    ibuprofen (ADVIL,MOTRIN) 800 MG tablet, Take 1 tablet by mouth Every 8 (Eight) Hours As Needed for Mild Pain., Disp: 90 tablet, Rfl: 0    lisinopril (PRINIVIL,ZESTRIL) 10 MG tablet, Take 1 tablet by mouth Daily., Disp: 90 tablet, Rfl: 0    lurasidone HCl (LATUDA) 60 MG tablet tablet, TAKE 1 TABLET BY MOUTH DAILY, Disp: 30 tablet, Rfl: 1    mirtazapine (REMERON) 15 MG tablet, Take 1 tablet by mouth Every Night., Disp: 30 tablet, Rfl: 1    Semaglutide, 2 MG/DOSE, (OZEMPIC) 8 MG/3ML solution pen-injector, Inject 2 mg under the skin into the appropriate area as directed 1 (One) Time Per Week., Disp: 3 mL, Rfl: 3    Allergies:   Allergies   Allergen Reactions    Sulfamethoxazole-Trimethoprim Hives    Metformin GI Intolerance       Objective     Physical Exam: Please see above  Vital Signs:   Vitals:    04/16/24 1011   BP: 124/88   BP Location: Right arm   Patient Position: Sitting   Cuff Size: Large Adult   Pulse: 76   Resp: 18   Temp: 97.7 °F (36.5 °C)   TempSrc: Temporal   Weight: (!) 165 kg (363 lb 3.2 oz)   PainSc: 10-Worst pain ever     Body mass index is 45.4 kg/m².          Physical Exam  Vitals and nursing note reviewed.    Constitutional:       General: He is not in acute distress.     Appearance: Normal appearance. He is obese. He is not ill-appearing or toxic-appearing.   HENT:      Nose: No congestion or rhinorrhea.   Eyes:      General:         Right eye: No discharge.         Left eye: No discharge.      Conjunctiva/sclera: Conjunctivae normal.   Pulmonary:      Effort: Pulmonary effort is normal. No respiratory distress.   Abdominal:      General: Abdomen is flat. There is no distension.   Musculoskeletal:      Cervical back: Normal range of motion.   Skin:     Coloration: Skin is not jaundiced.      Findings: No rash.   Neurological:      General: No focal deficit present.      Mental Status: He is alert. Mental status is at baseline.      Coordination: Coordination normal.      Gait: Gait normal.   Psychiatric:         Mood and Affect: Mood normal.         Behavior: Behavior normal.         Thought Content: Thought content normal.         Judgment: Judgment normal.         Procedures    Results:   Results      Labs:   Hemoglobin A1C   Date Value Ref Range Status   02/13/2024 5.7 4.5 - 5.7 % Final   12/19/2023 6.00 (H) 4.80 - 5.60 % Final     TSH   Date Value Ref Range Status   02/13/2024 1.170 0.270 - 4.200 uIU/mL Final        Imaging:   No valid procedures specified.     Assessment / Plan      Assessment/Plan:   Problem List Items Addressed This Visit       Generalized anxiety disorder    Class 3 severe obesity due to excess calories with serious comorbidity and body mass index (BMI) of 40.0 to 44.9 in adult - Primary    Relevant Orders    Ambulatory Referral to Exercise Education    Prediabetes    Neuropathy of left radial nerve    Relevant Orders    EMG & Nerve Conduction Test    Ambulatory Referral to Hand Surgery    Rotator cuff arthropathy of left shoulder    Relevant Orders    Ambulatory Referral to Orthopedic Surgery       Assessment & Plan  1. Bariatric surgery status.  The patient's weight has remained stable,  which is anticipated to aid in bariatric surgery in conjunction with Ozempic. However, his A1c levels have significantly declined, nearing the end of the prediabetic range. A form will be completed for the patient from the bariatric clinic. A referral will be made to an exercise educator.    2. Rotator cuff arthropathy in the left shoulder.  The patient's hand symptoms may be associated with radial nerve neuropathy or carpal tunnel syndrome. The patient will be referred to physical therapy to improve his shoulder strength, nerves, and tendon support. A referral to orthopedics will be made. Additionally, a referral to a hand specialist will be made. An EMG test of the left arm will be ordered.    3. Anxiety.  The patient will communicate with Tara regarding his worsening anxiety symptoms, which may necessitate an earlier appointment.    Follow-up  The patient is scheduled for a follow-up visit in 08/2024, or sooner if necessary.    Follow Up:   Return in about 4 months (around 8/28/2024) for Next scheduled follow up.    I spent 40 minutes caring for Arslan on this date of service. This time includes time spent by me in the following activities: preparing for the visit, reviewing tests, obtaining and/or reviewing a separately obtained history, performing a medically appropriate examination and/or evaluation, counseling and educating the patient/family/caregiver, ordering medications, tests, or procedures, referring and communicating with other health care professionals, and documenting information in the medical record.     Patient or patient representative verbalized consent for the use of Ambient Listening during the visit with  Reji Rader MD for chart documentation. 4/16/2024  10:40 EDT    Reji Rader MD  [facility]

## 2024-04-17 ENCOUNTER — TELEMEDICINE (OUTPATIENT)
Dept: BEHAVIORAL HEALTH | Facility: CLINIC | Age: 34
End: 2024-04-17
Payer: COMMERCIAL

## 2024-04-17 ENCOUNTER — TELEPHONE (OUTPATIENT)
Dept: PSYCHIATRY | Facility: CLINIC | Age: 34
End: 2024-04-17
Payer: COMMERCIAL

## 2024-04-17 DIAGNOSIS — F41.1 GENERALIZED ANXIETY DISORDER: ICD-10-CM

## 2024-04-17 DIAGNOSIS — Z79.899 MEDICATION MANAGEMENT: ICD-10-CM

## 2024-04-17 DIAGNOSIS — F31.30 BIPOLAR AFFECTIVE DISORDER, CURRENT EPISODE DEPRESSED, CURRENT EPISODE SEVERITY UNSPECIFIED: ICD-10-CM

## 2024-04-17 DIAGNOSIS — F51.01 PRIMARY INSOMNIA: Primary | ICD-10-CM

## 2024-04-17 DIAGNOSIS — F90.1 ATTENTION DEFICIT HYPERACTIVITY DISORDER (ADHD), PREDOMINANTLY HYPERACTIVE TYPE: ICD-10-CM

## 2024-04-17 DIAGNOSIS — F40.10 SOCIAL ANXIETY DISORDER: Primary | ICD-10-CM

## 2024-04-17 RX ORDER — TRAZODONE HYDROCHLORIDE 50 MG/1
50 TABLET ORAL NIGHTLY
Qty: 30 TABLET | Refills: 1 | Status: SHIPPED | OUTPATIENT
Start: 2024-04-17

## 2024-04-17 RX ORDER — DEXTROAMPHETAMINE SACCHARATE, AMPHETAMINE ASPARTATE MONOHYDRATE, DEXTROAMPHETAMINE SULFATE AND AMPHETAMINE SULFATE 5; 5; 5; 5 MG/1; MG/1; MG/1; MG/1
20 CAPSULE, EXTENDED RELEASE ORAL EVERY MORNING
Qty: 30 CAPSULE | Refills: 0 | Status: SHIPPED | OUTPATIENT
Start: 2024-04-17

## 2024-04-17 NOTE — TELEPHONE ENCOUNTER
While many of the medications have a possible side effect of weight gain, it is very likely related to mirtazapine. It doesn't look as though patient has been on trazodone. Please inform patient to discontinue mirtazapine and may start trazodone 50 mg at bedtime for insomnia.    MIRTAZAPINE (REMERON) Risks, benefits, alternatives discussed with patient including GI upset, sedation, dizziness with falls risk, increased appetite.    DESYREL (TRAZODONE) Risks, benefits, side effects discussed with patient including GI upset, sedation, dizziness/falls risk, grogginess the following day, prolongation of the QTc interval.     We can discuss other medications further at our next appointment.   Appointment with Sherrie Oneill APRN (05/13/2024)

## 2024-04-17 NOTE — TELEPHONE ENCOUNTER
CONTROLLED MEDICATION REFILL REQUEST    STATE REGULATION APPT EVERY 3 MONTHS     UDS(URINE DRUG SCREEN) EVERY 6 MONTHS OR UP TO PROVIDER PREFERENCE      NEW NARC CONSENT EVERY YEAR      MEDICATION: amphetamine-dextroamphetamine XR (Adderall XR) 20 MG 24 hr capsule (03/14/2024)     NEXT OFFICE VISIT: Appointment with Sherrie Oneill APRN (05/13/2024)     LAST OFFICE VISIT: Telemedicine with Sherrie Oneill APRN (03/19/2024)     NARC CONSENT: NONE IN [facility]    URINE DRUG SCREEN(STANDING ORDER): Urine Drug Screen - Urine, Clean Catch (10/25/2023 09:45)     PROVIDER PLEASE ADVISE

## 2024-04-17 NOTE — TELEPHONE ENCOUNTER
VERBALLY SPOKE WITH PATIENT AND RELAYED THE PROVIDERS MESSAGE. PATIENT AGREED TO NEW MEDICATION CHANGES. ADVISED PATIENT TO CALL WITH ANY FURTHER QUESTIONS OR CONCERNS.     PATIENT DUE FOR UDS. ORDER PENDING. PATIENT AGREED TO COMPLETE THIS.

## 2024-04-17 NOTE — TELEPHONE ENCOUNTER
PT(PATIENT) VERIFIED     lurasidone HCl (LATUDA) 60 MG tablet tablet (04/10/2024)  mirtazapine (REMERON) 15 MG tablet (2024)  divalproex (DEPAKOTE ER) 250 MG 24 hr tablet (02/15/2024)    PT(PATIENT) STATES HE IS CONCERNED WITH WEIGHT GAIN, AS DESPITE WEIGHT LOSS EFFORTS HE IS STILL GAINING WEIGHT     PT(PATIENT) STATES HE SAW HIS PCP, AND WAS ADVISED TO DISCUSS THE ABOVE MEDICATIONS AND POSSIBLE SIDE EFFECTS WITH PROVIDER     PT(PATIENT) STATES HE HAS ALSO HAD MOOD SWINGS AND FEELS A LITTLE DEPRESSED     NEXT APPT WITH PROVIDER   Appointment with Sherrie Oneill APRN (2024)     PROVIDER PLEASE ADVISE

## 2024-04-17 NOTE — PROGRESS NOTES
Progress Note    Date: 04/17/2024  Time In: 10:00  Time Out: 10:54    Patient Legal Name: Arslan Butler  Patient Age: 33 y.o.    Mode of visit: Video  Location of provider: [address], [place] [address]  Location of patient: home    Patient is seen remotely using [employer] / [employer]. Patient is being seen via telehealth and patient confirms that they are in a secure environment for this session. The patient's condition being diagnosed/treated is appropriate for telemedicine. The provider identified herself as well as her credentials. The patient gave consent to be seen remotely, and when consent is given they understand that the consent allows for patient identifiable information to be sent to a third party as needed. They may refuse to be seen remotely at any time. The electronic data is encrypted and password protected, and the patient has been advised of the potential risks to privacy not withstanding such measures. Patient consented to the use of video for the purpose of a telehealth session today. The visit included audio and video interaction. No technical issues occurred during this visit.    CHIEF COMPLAINT: anxiety, depression    Subjective   History of Present Illness   Arslan is a 33 y.o. male who presents today as a follow-up for continued psychotherapy. Patient reported he found another job and tried working there but he was unable to do the job.  Patient stated he applied for disability in March.  Patient shared his wife is really frustrated with him not being able to hold a job. Patient advised he has gotten on the wait list for psychological evaluation to rule out ASD.  Patient reported panic attacks have increased because he has been fighting with his wife over not being able to work.  Patient shared he feels like his meds are not working at all. Patient reported he is thinking about having surgery done on his shoulder that he was supposed to have had a couple of years ago.Patient reported he  is nervous about a planned trip to [place] and as the time grows closer he becomes more anxious. Patient is voluntarily requesting to participate in outpatient therapy at [facility].      History obtained from referring provider's note on 12/1/23:  Past Psychiatric History:  Began Treatment: 2014            Diagnoses: Bipolar depression, anxiety, panic attacks  Psychiatrist: Yes  Therapist: Yes  Admission History: 3 admissions  Medication Trials: Ativan, klonopin, xanax, valium, gabapentin, vistaril, buspar, prozac, lithium, abilify, effexor, vraylar, rexulti, wellbutrin, zoloft, lexapro, celexa, seroquel, clonidine, lamictal  Suicide Attempts: Several attempts, tried to OD on pills, cut wrists  Self Harm: Hx of cutting   Substance use/abuse:Denies  Withdrawal Symptoms: Not applicable  Longest Period Sober: Not applicable  AA: Not applicable  Trauma/Childhood/ACE:  parents     Assessment    Mental Status Exam     Appearance: appeared to have good hygiene  Behavior: calm  Cooperation:  engaged, cooperative, attentive, and friendly  Eye Contact:  good  Affect:  blunted  Mood: depressed and anxious  Speech: responsive  Thought Process:  organized  Thought Content: appropriate  Suicidal: denies  Homicidal:  denies  Hallucinations:  denies  Memory:  intact  Orientation:  person, place, time, and situation  Reliability:  reliable  Insight:  good  Judgment:  good     Clinical Intervention       ICD-10-CM ICD-9-CM   1. Social anxiety disorder  F40.10 300.23   2. Bipolar affective disorder, current episode depressed, current episode severity unspecified  F31.30 296.50        Individual psychotherapy was provided utilizing CBT and person-centered techniques to restore adaptive functioning, build rapport, encourage expression of thoughts and feelings, support self-esteem, establish new coping skills, assess symptoms, gather history, challenge negative thinking patterns, and provide psychoeducation.   Therapist utilized open-ended questions to encourage the development of a positive therapeutic relationship and open communication.  Therapist normalized/validated patient’s thoughts and feelings as appropriate. Encouraged patient to contact Sherrie about his concerns with his medications. Recommended he continue with exposures in stores for desensitization to crowds. Suggested patient reconsider starting another job until he is able to better manage his anxiety.    Plan   Plan & Goals     Moving forward, we will continue to build rapport and reinforce and build upon effective coping strategies utilizing CBT and person-centered techniques.    Patient acknowledged and verbally consented to continue working toward resolving current treatment plan goals and was educated on the importance of participation in the therapeutic process.  Patient will remain compliant with medication regimen as prescribed. Discuss any medication side effects, questions or concerns with prescribing provider.  Call 911 or present to the nearest emergency room in an emergency situation.   National Suicide Prevention Lifeline: Call 988. The Lifeline provides 24/7, free and confidential support for people in distress, prevention and crisis resources.  Crisis Text Line  Text HOME To 435041    Return in about 3 weeks (around 5/8/2024).    ____________________  This document has been electronically signed by Christy Mccarthy LCSW  April 17, 2024 12:37 EDT    Part of this note may be an electronic transcription/translation of spoken language to printed text using the Dragon Dictation System.

## 2024-04-19 DIAGNOSIS — E11.65 TYPE 2 DIABETES MELLITUS WITH HYPERGLYCEMIA, WITHOUT LONG-TERM CURRENT USE OF INSULIN: ICD-10-CM

## 2024-04-19 DIAGNOSIS — I10 ESSENTIAL HYPERTENSION: ICD-10-CM

## 2024-04-19 DIAGNOSIS — E66.01 CLASS 3 SEVERE OBESITY DUE TO EXCESS CALORIES WITH SERIOUS COMORBIDITY AND BODY MASS INDEX (BMI) OF 45.0 TO 49.9 IN ADULT: ICD-10-CM

## 2024-04-19 RX ORDER — SEMAGLUTIDE 2.68 MG/ML
INJECTION, SOLUTION SUBCUTANEOUS
Qty: 3 ML | Refills: 3 | Status: SHIPPED | OUTPATIENT
Start: 2024-04-19

## 2024-04-22 ENCOUNTER — OFFICE VISIT (OUTPATIENT)
Dept: ORTHOPEDIC SURGERY | Facility: CLINIC | Age: 34
End: 2024-04-22
Payer: COMMERCIAL

## 2024-04-22 VITALS
WEIGHT: 315 LBS | HEIGHT: 75 IN | BODY MASS INDEX: 39.17 KG/M2 | DIASTOLIC BLOOD PRESSURE: 86 MMHG | SYSTOLIC BLOOD PRESSURE: 134 MMHG

## 2024-04-22 DIAGNOSIS — Z98.890 HISTORY OF SHOULDER SURGERY: ICD-10-CM

## 2024-04-22 DIAGNOSIS — G89.29 CHRONIC LEFT SHOULDER PAIN: Primary | ICD-10-CM

## 2024-04-22 DIAGNOSIS — S43.432A TEAR OF LEFT GLENOID LABRUM, INITIAL ENCOUNTER: ICD-10-CM

## 2024-04-22 DIAGNOSIS — M75.102 TEAR OF LEFT ROTATOR CUFF, UNSPECIFIED TEAR EXTENT, UNSPECIFIED WHETHER TRAUMATIC: ICD-10-CM

## 2024-04-22 DIAGNOSIS — M25.512 CHRONIC LEFT SHOULDER PAIN: Primary | ICD-10-CM

## 2024-04-22 PROCEDURE — 3079F DIAST BP 80-89 MM HG: CPT | Performed by: PHYSICIAN ASSISTANT

## 2024-04-22 PROCEDURE — 1159F MED LIST DOCD IN RCRD: CPT | Performed by: PHYSICIAN ASSISTANT

## 2024-04-22 PROCEDURE — 99214 OFFICE O/P EST MOD 30 MIN: CPT | Performed by: PHYSICIAN ASSISTANT

## 2024-04-22 PROCEDURE — 3075F SYST BP GE 130 - 139MM HG: CPT | Performed by: PHYSICIAN ASSISTANT

## 2024-04-22 PROCEDURE — 1160F RVW MEDS BY RX/DR IN RCRD: CPT | Performed by: PHYSICIAN ASSISTANT

## 2024-04-22 NOTE — PROGRESS NOTES
[facility]    Subjective     Chief Complaint   Patient presents with    Left Shoulder - Pain        HPI  Arslan Butler is a 33 y.o. male.  New patient presents for evaluation of left shoulder pain with decreased range of motion, weakness.  Symptoms/pain have been ongoing since 2014.  LAYLA: Reports initial injury happened when he was working at [employer] and caught a falling door.  Since then he has had 2 rotator cuff repairs--Dr. Humphries in 2014 followed by Dr. Kerns in 2016.  Reports he has had continued pain since injury and surgeries.    Pain scale: 7/10.  Severity of the pain moderate to severe at times.  Quality of the pain aching, burning, throbbing, shooting.  Associated symptoms pain, popping/catching, grinding, stiffness, numbness and tingling into extremity.  Activity related to pain sleeping, working, carrying objects, movement of joint.  Pain eased by ice, nothing.  Prior treatments history of steroid injections (x 3) in 2015, trial of PT in 2021 (no help).  Currently taking ibuprofen and gabapentin.  Other medications tried include diclofenac.    Patient reports he has an appointment with hand surgeon due to numbness and tingling in digits.    Notes difficulty with lifting, reaching, dressing, overhead activities.    Denies fever, chills, night sweats or other constitutional symptoms.  Reports symptoms affecting activities of daily living, quality of life.    Past Medical History:   Diagnosis Date    Anxiety and depression 12/17/2021    Bipolar disorder 12/15/2021    Chronic pain disorder     Diabetes mellitus 2018    on Ozempic, last A1C 7.2    Fatty liver     on US    Gastroesophageal reflux disease 12/15/2021    pepto or tums prn    H/O shoulder surgery 03/29/2023    Hx of tonsillectomy 03/29/2023    Hyperlipidemia 12/15/2021    Iron deficiency anemia secondary to inadequate dietary iron intake 08/05/2022    Left arm numbness 01/17/2022    suspected related to shoulder surgery     Left rotator cuff tear 01/17/2022    Male hypogonadism 05/11/2022    MRSA infection     after tattoo    Obesity 12/15/2021    Panic disorder     Peptic ulceration 2020    noted on EGD in [place]    Rotator cuff injury, left, subsequent encounter 02/24/2022    Sciatic nerve pain     Self-injurious behavior     Sleep apnea     Suicide attempt     Testosterone deficiency in male 05/11/2022      Past Surgical History:   Procedure Laterality Date    ENDOSCOPY AND COLONOSCOPY  2019    peptic ulcer    ROTATOR CUFF REPAIR Left 2014    ROTATOR CUFF REPAIR Left 2016    TONSILLECTOMY  1997      Family History   Problem Relation Age of Onset    Suicide Attempts Mother     Self-Injurious Behavior  Mother     Seizures Mother     Schizophrenia Mother     Paranoid behavior Mother     Depression Mother     Bipolar disorder Mother     Alcohol abuse Mother     Obesity Mother     Alcohol abuse Father     Hypertension Father     Heart attack Father     Obesity Sister     Obesity Brother     Stroke Maternal Grandmother     Heart attack Maternal Grandfather     Diabetes Maternal Grandfather     Hypertension Maternal Grandfather     Mental illness Paternal Grandmother     Diabetes Paternal Grandmother     Heart attack Paternal Grandmother     No Known Problems Paternal Grandfather     Colon cancer Neg Hx      Social History     Socioeconomic History    Marital status:    Tobacco Use    Smoking status: Never     Passive exposure: Never    Smokeless tobacco: Never   Vaping Use    Vaping status: Never Used   Substance and Sexual Activity    Alcohol use: Not Currently     Comment: ocassionally     Drug use: Never    Sexual activity: Defer      Current Outpatient Medications on File Prior to Visit   Medication Sig Dispense Refill    amphetamine-dextroamphetamine XR (Adderall XR) 20 MG 24 hr capsule Take 1 capsule by mouth Every Morning 30 capsule 0    Cholecalciferol (Vitamin D3) 50 MCG (2000 UT) capsule Take 1 capsule by mouth Daily.    "   diazePAM (Valium) 5 MG tablet Take 1 tablet by mouth Daily As Needed for Anxiety (Panic Attacks). 15 tablet 0    divalproex (DEPAKOTE ER) 250 MG 24 hr tablet Take 1 tablet by mouth Daily. 90 tablet 1    ibuprofen (ADVIL,MOTRIN) 800 MG tablet Take 1 tablet by mouth Every 8 (Eight) Hours As Needed for Mild Pain. 90 tablet 0    lisinopril (PRINIVIL,ZESTRIL) 10 MG tablet Take 1 tablet by mouth Daily. 90 tablet 0    lurasidone HCl (LATUDA) 60 MG tablet tablet TAKE 1 TABLET BY MOUTH DAILY 30 tablet 1    Ozempic, 2 MG/DOSE, 8 MG/3ML solution pen-injector INJECT 2MG UNDER THE SKIN INTO THE APPROPRIATE AREA AS DIRECTED ONCE PER WEEK 3 mL 3    traZODone (DESYREL) 50 MG tablet Take 1 tablet by mouth Every Night. 30 tablet 1     No current facility-administered medications on file prior to visit.      Allergies   Allergen Reactions    Sulfamethoxazole-Trimethoprim Hives    Metformin GI Intolerance        The following portions of the patient's history were reviewed and updated as appropriate: allergies, current medications, past family history, past medical history, past social history, past surgical history, and problem list.    Review of Systems   Constitutional: Negative.    HENT: Negative.     Eyes: Negative.    Respiratory: Negative.     Cardiovascular: Negative.    Gastrointestinal: Negative.    Endocrine: Negative.    Genitourinary: Negative.    Musculoskeletal:  Positive for arthralgias.   Skin: Negative.    Allergic/Immunologic: Negative.    Neurological: Negative.    Hematological: Negative.    Psychiatric/Behavioral: Negative.          Objective      Physical Exam  /86   Ht 190.5 cm (75\")   Wt (!) 164 kg (360 lb 9.6 oz)   BMI 45.07 kg/m²     Body mass index is 45.07 kg/m².    GENERAL APPEARANCE: awake, alert & oriented x 3, in no acute distress and well developed, well nourished  PSYCH: normal mood and affect  LUNGS:  breathing nonlabored, no wheezing  EYES: sclera anicteric, pupils equal  CARDIOVASCULAR: " palpable pulses. Capillary refill less than 2 seconds  INTEGUMENTARY: skin intact, no clubbing, cyanosis  NEUROLOGIC:  Normal Sensation         Ortho Exam  Left shoulder  Posture: Sitting on table with head forward and shoulders rounded.  Skin: Intact without any erythema, warmth or swelling.  Positive atrophy along supraspinatus fossa.  Tenderness: Diffuse/global tenderness noted throughout the shoulder (posterior, lateral, anterior).  Positive bicipital groove/tendon.  Positive AC joint.  Motion: Active FF almost 90 (passive 100), Abd 65, ER (elbows at side) 25, IR left hip pocket.  Scapular dyskinesis negative.  Impingement: Neer positive.  Starr positive.  Rotator cuff: Nu/Empty can positive. Drop arm positive. Lift-off/modified lift-off positive. Bear hug positive.  Labrum: [place]'s positive  Biceps: Speed's positive.  ACJ: Adduction cross body positive.  Deltoid: Intact  Strength: 3+/5 SS, IS, SubSc  Motor: Grossly intact to Ax/MSC/R/U/M/AIN/PIN  Sensory: Grossly intact to light touch Ax/MSC/R/U/M nerve distributions with numbness and tingling noted into digits.  Vascular: 2+ radial pulse with brisk capillary refill into each digit.      Imaging/Studies  Ordered left shoulder plain films.  Imaging read/interpreted by Dr. Ernandez.    Imaging Results (Last 7 Days)       Procedure Component Value Units Date/Time    XR Shoulder 2+ View Left [[id number]] Resulted: 04/22/24 1541     Updated: 04/22/24 1541    Narrative:      Left Shoulder X-Ray  Indication: Pain  AP, scapular Y, and axillary lateral views    Findings:  No fracture  Small acromial bone spur  Normal soft tissues  Normal joint spaces    No prior studies were available for comparison.            Assessment/Plan        ICD-10-CM ICD-9-CM   1. Chronic left shoulder pain  M25.512 719.41    G89.29 338.29   2. Tear of left rotator cuff, unspecified tear extent, unspecified whether traumatic  M75.102 840.4   3. Tear of left glenoid labrum, initial encounter   S43.432A 840.8   4. History of shoulder surgery  Z98.890 V45.89       Orders Placed This Encounter   Procedures    XR Shoulder 2+ View Left        -Chronic left shoulder pain due to rotator cuff tear, glenoid labral tear in setting of prior shoulder surgeries (2014, 2016).  -Reviewed plain film imaging with the patient.  -Attempted formal PT but reports only made symptoms worse so he had to stop.  -Based on history, exam patient sent for an MRI to further assess for rotator cuff tear (condition of tendons, extent of tears and repairability), labrum, presence/absence of muscle atrophy and will help with possible surgical planning.  -Recommend OTC NSAIDs/pain medication as needed.  -Will contact patient after MRI completed to discuss options.  -Questions and concerns answered.      Medical Decision Making  Management Options : over-the-counter medicine  Data/Risk: radiology tests      Ilda Palumbo PA-C  04/23/24  08:18 EDT               EMR Dragon/Transcription disclaimer:  Much of this encounter note is an electronic transcription of spoken language to printed text. Electronic transcription of spoken language may permit erroneous, or at times, nonsensical words or phrases to be inadvertently transcribed. Although I have reviewed the note for such errors, some may still exist.

## 2024-04-30 ENCOUNTER — TELEPHONE (OUTPATIENT)
Dept: ORTHOPEDIC SURGERY | Facility: CLINIC | Age: 34
End: 2024-04-30

## 2024-04-30 NOTE — TELEPHONE ENCOUNTER
Caller: PATIENT    Relationship: SELF    Best call back number: [phone]    What is the best time to reach you: ANY    Who are you requesting to speak with (clinical staff, provider,  specific staff member):     What was the call regarding: MRI LEFT SHOULDER 5/2/24 NEEDS TO BE RESCHEDULED.     Is it okay if the provider responds through MyChart: CALL

## 2024-05-13 ENCOUNTER — TELEPHONE (OUTPATIENT)
Dept: BEHAVIORAL HEALTH | Facility: CLINIC | Age: 34
End: 2024-05-13

## 2024-05-13 DIAGNOSIS — F90.1 ATTENTION DEFICIT HYPERACTIVITY DISORDER (ADHD), PREDOMINANTLY HYPERACTIVE TYPE: ICD-10-CM

## 2024-05-13 RX ORDER — DEXTROAMPHETAMINE SACCHARATE, AMPHETAMINE ASPARTATE MONOHYDRATE, DEXTROAMPHETAMINE SULFATE AND AMPHETAMINE SULFATE 5; 5; 5; 5 MG/1; MG/1; MG/1; MG/1
20 CAPSULE, EXTENDED RELEASE ORAL EVERY MORNING
Qty: 30 CAPSULE | Refills: 0 | Status: CANCELLED | OUTPATIENT
Start: 2024-05-13

## 2024-05-13 NOTE — TELEPHONE ENCOUNTER
PATIENT HAD A FOLLOW UP APPOINTMENT TODAY 05/13/24 BUT HAD TO RESCHEDULE. PATIENT NEEDS A REFILL ON HIS ADDERALL XR 20MG    amphetamine-dextroamphetamine XR (Adderall XR) 20 MG 24 hr capsule (04/17/2024)     PATIENT NEXT FOLLOW UP   Appointment with Sherrie Oneill APRN (06/28/2024)

## 2024-05-16 RX ORDER — PEG-3350, SODIUM SULFATE, SODIUM CHLORIDE, POTASSIUM CHLORIDE, SODIUM ASCORBATE AND ASCORBIC ACID 7.5-2.691G
KIT ORAL
Qty: 1 EACH | Refills: 0 | Status: SHIPPED | OUTPATIENT
Start: 2024-05-16

## 2024-06-09 DIAGNOSIS — F41.1 GENERALIZED ANXIETY DISORDER: ICD-10-CM

## 2024-06-09 DIAGNOSIS — F31.30 BIPOLAR AFFECTIVE DISORDER, CURRENT EPISODE DEPRESSED, CURRENT EPISODE SEVERITY UNSPECIFIED: ICD-10-CM

## 2024-06-10 RX ORDER — LURASIDONE HYDROCHLORIDE 60 MG/1
60 TABLET, FILM COATED ORAL DAILY
Qty: 30 TABLET | Refills: 1 | Status: SHIPPED | OUTPATIENT
Start: 2024-06-10

## 2024-06-10 NOTE — TELEPHONE ENCOUNTER
lurasidone HCl (LATUDA) 60 MG tablet tablet (04/10/2024)     LAST SEEN BY PROVIDER  Telemedicine with Sherrie Oneill APRN (03/19/2024)     NEXT FOLLOW UP   Appointment with Sherrie Oneill APRN (06/28/2024)

## 2024-06-17 ENCOUNTER — TELEPHONE (OUTPATIENT)
Dept: GASTROENTEROLOGY | Facility: CLINIC | Age: 34
End: 2024-06-17
Payer: COMMERCIAL

## 2024-06-28 ENCOUNTER — READMISSION MANAGEMENT (OUTPATIENT)
Dept: CALL CENTER | Facility: HOSPITAL | Age: 34
End: 2024-06-28
Payer: COMMERCIAL

## 2024-06-28 ENCOUNTER — TELEPHONE (OUTPATIENT)
Dept: INTERNAL MEDICINE | Facility: CLINIC | Age: 34
End: 2024-06-28
Payer: COMMERCIAL

## 2024-06-28 ENCOUNTER — TELEMEDICINE (OUTPATIENT)
Dept: BEHAVIORAL HEALTH | Facility: CLINIC | Age: 34
End: 2024-06-28
Payer: COMMERCIAL

## 2024-06-28 DIAGNOSIS — F41.1 GENERALIZED ANXIETY DISORDER: ICD-10-CM

## 2024-06-28 DIAGNOSIS — F90.1 ATTENTION DEFICIT HYPERACTIVITY DISORDER (ADHD), PREDOMINANTLY HYPERACTIVE TYPE: Primary | ICD-10-CM

## 2024-06-28 DIAGNOSIS — F31.30 BIPOLAR AFFECTIVE DISORDER, CURRENT EPISODE DEPRESSED, CURRENT EPISODE SEVERITY UNSPECIFIED: ICD-10-CM

## 2024-06-28 DIAGNOSIS — F41.0 PANIC ATTACKS: ICD-10-CM

## 2024-06-28 DIAGNOSIS — F40.10 SOCIAL ANXIETY DISORDER: ICD-10-CM

## 2024-06-28 DIAGNOSIS — F51.01 PRIMARY INSOMNIA: ICD-10-CM

## 2024-06-28 RX ORDER — DIAZEPAM 5 MG/1
5 TABLET ORAL DAILY PRN
Qty: 15 TABLET | Refills: 0 | Status: SHIPPED | OUTPATIENT
Start: 2024-06-28 | End: 2025-06-28

## 2024-06-28 RX ORDER — LURASIDONE HYDROCHLORIDE 80 MG/1
80 TABLET, FILM COATED ORAL DAILY
Qty: 30 TABLET | Refills: 1 | Status: SHIPPED | OUTPATIENT
Start: 2024-06-28

## 2024-06-28 RX ORDER — DEXTROAMPHETAMINE SACCHARATE, AMPHETAMINE ASPARTATE, DEXTROAMPHETAMINE SULFATE AND AMPHETAMINE SULFATE 2.5; 2.5; 2.5; 2.5 MG/1; MG/1; MG/1; MG/1
10 TABLET ORAL
Qty: 30 TABLET | Refills: 0 | Status: SHIPPED | OUTPATIENT
Start: 2024-06-28 | End: 2025-06-28

## 2024-06-28 NOTE — PROGRESS NOTES
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 1.7 ng/dL Final      Pain Management Panel  More data exists         Latest Ref Rng & Units 2/13/2024 10/25/2023   Pain Management Panel   Creatinine, Urine mg/dL 143.5  -   Amphetamine, Urine Qual Negative - Negative    Barbiturates Screen, Urine Negative - Negative    Benzodiazepine Screen, Urine Negative - Positive    Buprenorphine, Screen, Urine Negative - Negative    Cocaine Screen, Urine Negative - Negative    Fentanyl, Urine Negative - Negative    Methadone Screen , Urine Negative - Negative    Methamphetamine, Ur Negative - Negative       Imaging Results:  No Images in the past 120 days found..  Current Medications:   Current Outpatient Medications   Medication Sig Dispense Refill    diazePAM (Valium) 5 MG tablet Take 1 tablet by mouth Daily As Needed for Anxiety (Panic Attacks). 15 tablet 0    lurasidone HCl (LATUDA) 80 MG tablet tablet Take 1 tablet by mouth Daily. 30 tablet 1    albuterol sulfate  (90 Base) MCG/ACT inhaler Inhale 2 puffs Every 4 (Four) Hours As Needed for Wheezing. 6.7 g 0    amphetamine-dextroamphetamine (Adderall) 10 MG tablet Take 1 tablet by mouth Daily With Lunch. 30 tablet 0    amphetamine-dextroamphetamine XR (Adderall XR) 20 MG 24 hr capsule Take 1 capsule by mouth Every Morning 30 capsule 0     buPROPion XL (WELLBUTRIN XL) 150 MG 24 hr tablet Take 1 tablet by mouth.      Cholecalciferol (Vitamin D3) 50 MCG (2000 UT) capsule Take 1 capsule by mouth Daily.      Cholecalciferol 1.25 MG (98236 UT) tablet Take 2,000 Units by mouth Daily.      divalproex (DEPAKOTE ER) 250 MG 24 hr tablet Take 1 tablet by mouth Daily. 90 tablet 1    ibuprofen (ADVIL,MOTRIN) 800 MG tablet Take 1 tablet by mouth Every 8 (Eight) Hours As Needed for Mild Pain. 90 tablet 0    lisinopril (PRINIVIL,ZESTRIL) 10 MG tablet Take 1 tablet by mouth Daily. 90 tablet 0    loratadine (CLARITIN) 10 MG tablet Take 1 tablet by mouth Daily.      mirtazapine (REMERON) 15 MG tablet Take 1 tablet by mouth.      Ozempic, 2 MG/DOSE, 8 MG/3ML solution pen-injector INJECT 2MG UNDER THE SKIN INTO THE APPROPRIATE AREA AS DIRECTED ONCE PER WEEK 3 mL 3    traZODone (DESYREL) 50 MG tablet Take 1 tablet by mouth Every Night. 30 tablet 1     No current facility-administered medications for this visit.     Problem List:  Patient Active Problem List   Diagnosis    Generalized anxiety disorder    Bipolar disorder    Gastroesophageal reflux disease    Hyperlipidemia    Class 3 severe obesity due to excess calories with serious comorbidity and body mass index (BMI) of 40.0 to 44.9 in adult    Erectile dysfunction    Chronic midline low back pain without sciatica    Allergic rhinitis    Chronic pain of left ankle    Chronic pain in left foot    Social anxiety disorder    Attention deficit hyperactivity disorder (ADHD), predominantly inattentive type    Essential hypertension    Vitamin D insufficiency    Prediabetes    Inflammatory bowel disease    Hearing loss of right ear    DONAVAN (obstructive sleep apnea)    Gastroparesis    Pre-bariatric surgery nutrition evaluation    Neuropathy of left radial nerve    Rotator cuff arthropathy of left shoulder     Allergy:   Allergies   Allergen Reactions    Sulfamethoxazole-Trimethoprim Hives    Metformin GI Intolerance       Discontinued Medications:  Medications Discontinued During This Encounter   Medication Reason    diazePAM (Valium) 5 MG tablet Reorder    lurasidone HCl (LATUDA) 60 MG tablet tablet        PLAN:   Presentation seems most consistent with DSM-V criteria for:  Diagnoses and all orders for this visit:    1. Attention deficit hyperactivity disorder (ADHD), predominantly hyperactive type (Primary)  -     Ambulatory Referral to Psychology  -     amphetamine-dextroamphetamine (Adderall) 10 MG tablet; Take 1 tablet by mouth Daily With Lunch.  Dispense: 30 tablet; Refill: 0    2. Bipolar affective disorder, current episode depressed, current episode severity unspecified  -     Ambulatory Referral to Psychology  -     lurasidone HCl (LATUDA) 80 MG tablet tablet; Take 1 tablet by mouth Daily.  Dispense: 30 tablet; Refill: 1    3. Social anxiety disorder  -     Ambulatory Referral to Psychology    4. Generalized anxiety disorder  -     Ambulatory Referral to Psychology  -     diazePAM (Valium) 5 MG tablet; Take 1 tablet by mouth Daily As Needed for Anxiety (Panic Attacks).  Dispense: 15 tablet; Refill: 0  -     lurasidone HCl (LATUDA) 80 MG tablet tablet; Take 1 tablet by mouth Daily.  Dispense: 30 tablet; Refill: 1    5. Panic attacks  -     Ambulatory Referral to Psychology  -     diazePAM (Valium) 5 MG tablet; Take 1 tablet by mouth Daily As Needed for Anxiety (Panic Attacks).  Dispense: 15 tablet; Refill: 0    6. Primary insomnia  -     Ambulatory Referral to Psychology    Start Adderall IR 10 mg booster dose at noon  Increase Latuda to 80 mg daily  Continue Depakote ER 250mg at bedtime  Continue Adderall XR 20 mg daily at next refill  Continue Valium 5 mg qd as needed for anxiety and panic attacks  Ambulatory referral for neuropsychological testing: ADHD  Continue mirtazapine to 15 mg at bedtime  Follow-up 1 month  Medication Education:   LATUDA (LURASIDONE) Take with food. Risks, benefits, and alternatives discussed with  patient including akathisia, sedation, dizziness/falls risk, nausea, low risk of weight gain & metabolic risks for diabetes and dyslipidemia, and rare tardive dyskinesia.  After discussion of these risks and benefits, the patient voiced understanding and agreed to proceed. Instructed to take medication with meal of minimum of 350 calories to improve consistent efficacy and absorption.   DEPAKOTE (VALPROATE) Risks, benefits, alternatives discussed with patient including nausea and vomiting, GI upset, sedation, dizziness/falls risk, increased appetite. After discussion of these risks and benefits, the patient voiced understanding and agreed to proceed. Patient also informed of the need to take as prescribed, and for periodic labwork.  ADDERALL (AMPHETAMINE) Risks, benefits, side effects discussed with patient including elevated heart rate, elevated blood pressure, irritability, insomnia, sexual dysfunction, appetite suppressing properties, psychosis.  After discussion of these risks and benefits, the patient voiced understanding and agreed to proceed. Srinath reviewed, UDS ordered, and controlled substance agreement signed & witnessed.  VALIUM (DIAZEPAM)  Risks, benefits, and alternatives discussed with patient including sedation/falls risk, dizziness, disinhibition, headache, fatigue, dry mouth, blurred vision, constipation, nausea, diarrhea, heartburn, unusual taste in mouth, urinary retention, increased appetite, restlessness, sexual dysfunction, sweating, weight gain, cardiac arrhythmias, seizures, and fall risk.  After discussion of these risks and benefits, patient voiced understanding and agreed to proceed.  Srinath reviewed, UDS ordered, and controlled substance agreement signed & witnessed.  MIRTAZAPINE (REMERON) Risks, benefits, alternatives discussed with patient including GI upset, sedation, dizziness with falls risk, increased appetite.  After discussion of these risks and benefits, the patient voiced  understanding and agreed to proceed.    Medications:   New Medications Ordered This Visit   Medications    amphetamine-dextroamphetamine (Adderall) 10 MG tablet     Sig: Take 1 tablet by mouth Daily With Lunch.     Dispense:  30 tablet     Refill:  0     Booster dose at noon in addition to Adderall XR in am. Thx 4 All U Do!    diazePAM (Valium) 5 MG tablet     Sig: Take 1 tablet by mouth Daily As Needed for Anxiety (Panic Attacks).     Dispense:  15 tablet     Refill:  0    lurasidone HCl (LATUDA) 80 MG tablet tablet     Sig: Take 1 tablet by mouth Daily.     Dispense:  30 tablet     Refill:  1     Increase dose. Thx 4 all u Do!      SHAUNA reviewed.   Discussed medication options and treatment plan of prescribed medication as well as the risks, benefits, and side effects.  Patient is agreeable to call the office with any worsening of symptoms or onset of side effects.   Patient is agreeable to call 911 or go to the nearest ER should he/she begin having SI/HI.   Patient acknowledged, is agreeable to continue with current treatment plan, and was educated on the importance of compliance with treatment and follow-up appointments.  Addressed all questions and concerns.     Psychotherapy:    Provided minimal supportive therapy.    Functional status: Serious symptoms OR any serious impairment in social, occupational, or school functioning (41-50)  Prognosis: Fair with expectation for some response to treatment  Progress: waxing and waning      Follow-up: Return in about 6 weeks (around 8/9/2024).     This document has been electronically signed by CORTNEY Gray  July 11, 2024 15:52 EDT  Please note that portions of this note were completed with a voice recognition program.  Copied text in this note has been reviewed and is accurate as of 07/11/24

## 2024-06-28 NOTE — TELEPHONE ENCOUNTER
? Hospital where   Discharged  [facility]  ? Date of discharge  6/27/2024  ? Date and time of   scheduled appt.   7/11/2024 at 10:30 AM with CATE Cruz

## 2024-06-28 NOTE — OUTREACH NOTE
Prep Survey      Flowsheet Row Responses   [facility] facility patient discharged from? Non-BH   Is LACE score < 7 ? Non-BH Discharge   Eligibility TCM Hospital [facility]   Date of Discharge 06/27/24   Discharge diagnosis unknown   Does the patient have one of the following disease processes/diagnoses(primary or secondary)? Other   Prep survey completed? Yes            Alejandra An Registered Nurse

## 2024-06-28 NOTE — PATIENT INSTRUCTIONS
1.  Please return to clinic at your next scheduled visit.  Please contact the clinic ([phone]) at least 24 hours prior in the event you need to cancel.  2.  Do no harm to yourself or others.    3.  Avoid alcohol and drugs.    4.  Take all medications as prescribed.  Please contact the clinic with any concerns. If you are in need of medication refills, please call the clinic at [phone].    5. Should you want to get in touch with your provider, CORTNEY Gray, please contact the office ([phone]), and staff will be able to page Sherrie directly.  6. In the event you have personal crisis, contact the following crisis numbers: Suicide Prevention Hotline 1-827.631.9929; [facility] Helpline 7-544-644-NIAC; [facility] Room [phone]; text HELLO to 073460; or 265.     SPECIFIC RECOMMENDATIONS:     1.      Medications discussed at this encounter:     New Medications Ordered This Visit   Medications    amphetamine-dextroamphetamine (Adderall) 10 MG tablet     Sig: Take 1 tablet by mouth Daily With Lunch.     Dispense:  30 tablet     Refill:  0     Booster dose at noon in addition to Adderall XR in am. Thx 4 All U Do!    diazePAM (Valium) 5 MG tablet     Sig: Take 1 tablet by mouth Daily As Needed for Anxiety (Panic Attacks).     Dispense:  15 tablet     Refill:  0    lurasidone HCl (LATUDA) 80 MG tablet tablet     Sig: Take 1 tablet by mouth Daily.     Dispense:  30 tablet     Refill:  1     Increase dose. Thx 4 all u Do!                       2.      Psychotherapy recommendations: We will continue therapy at future visits.     3.     Return to clinic: Return in about 6 weeks (around 8/9/2024).

## 2024-07-01 ENCOUNTER — TELEPHONE (OUTPATIENT)
Dept: INTERNAL MEDICINE | Facility: CLINIC | Age: 34
End: 2024-07-01
Payer: COMMERCIAL

## 2024-07-01 ENCOUNTER — TRANSITIONAL CARE MANAGEMENT TELEPHONE ENCOUNTER (OUTPATIENT)
Dept: CALL CENTER | Facility: HOSPITAL | Age: 34
End: 2024-07-01
Payer: COMMERCIAL

## 2024-07-01 NOTE — TELEPHONE ENCOUNTER
Message from Plan  This request has been approved using information available on the patient's profile. CaseId:[id number];Status:Approved;Review Type:Prior Auth;Coverage Start Date:06/01/2024;Coverage End Date:07/01/2025;

## 2024-07-01 NOTE — OUTREACH NOTE
Call Center TCM Note      Flowsheet Row Responses   [facility] patient discharged from? Non-  [[facility]]   Does the patient have one of the following disease processes/diagnoses(primary or secondary)? Other   TCM attempt successful? Yes   Call start time 1422   Call end time 1425   Discharge diagnosis Pneumonia   Person spoke with today (if not patient) and relationship patient   Meds reviewed with patient/caregiver? Yes   Is the patient having any side effects they believe may be caused by any medication additions or changes? No   Does the patient have all medications ordered at discharge? Yes   Is the patient taking all medications as directed (includes completed medication regime)? Yes   Comments Anita has a hospital followup scheduled with Esperanza Benson on 7/5/2024. Patient was seen in the ED at [facility]   Does the patient have an appointment with their PCP within 7-14 days of discharge? Yes   Psychosocial issues? No   Did the patient receive a copy of their discharge instructions? Yes   Nursing interventions Reviewed instructions with patient   What is the patient's perception of their health status since discharge? Improving   Is the patient/caregiver able to teach back signs and symptoms related to disease process for when to call PCP? Yes   Is the patient/caregiver able to teach back signs and symptoms related to disease process for when to call 911? Yes   Is the patient/caregiver able to teach back the hierarchy of who to call/visit for symptoms/problems? PCP, Specialist, Home health nurse, Urgent Care, ED, 911 Yes   If the patient is a current smoker, are they able to teach back resources for cessation? Not a smoker   TCM call completed? Yes   Wrap up additional comments No needs at this time.   Call end time 1425   Would this patient benefit from a Referral to Amb Social Work? No   Is the patient interested in additional calls from an ambulatory ? Dawna Yee  KELLI Nuñez RN    7/1/2024, 14:25 EDT

## 2024-07-05 ENCOUNTER — OFFICE VISIT (OUTPATIENT)
Dept: INTERNAL MEDICINE | Facility: CLINIC | Age: 34
End: 2024-07-05
Payer: COMMERCIAL

## 2024-07-05 VITALS
WEIGHT: 315 LBS | HEART RATE: 82 BPM | DIASTOLIC BLOOD PRESSURE: 88 MMHG | RESPIRATION RATE: 18 BRPM | HEIGHT: 75 IN | BODY MASS INDEX: 39.17 KG/M2 | SYSTOLIC BLOOD PRESSURE: 122 MMHG | TEMPERATURE: 97.7 F

## 2024-07-05 DIAGNOSIS — J18.9 PNEUMONIA OF LEFT LOWER LOBE DUE TO INFECTIOUS ORGANISM: Primary | ICD-10-CM

## 2024-07-05 DIAGNOSIS — E11.65 TYPE 2 DIABETES MELLITUS WITH HYPERGLYCEMIA, WITHOUT LONG-TERM CURRENT USE OF INSULIN: ICD-10-CM

## 2024-07-05 DIAGNOSIS — I10 ESSENTIAL HYPERTENSION: ICD-10-CM

## 2024-07-05 DIAGNOSIS — E66.01 CLASS 3 SEVERE OBESITY DUE TO EXCESS CALORIES WITH SERIOUS COMORBIDITY AND BODY MASS INDEX (BMI) OF 45.0 TO 49.9 IN ADULT: ICD-10-CM

## 2024-07-05 PROCEDURE — 99214 OFFICE O/P EST MOD 30 MIN: CPT | Performed by: PHYSICIAN ASSISTANT

## 2024-07-05 RX ORDER — ALBUTEROL SULFATE 90 UG/1
2 AEROSOL, METERED RESPIRATORY (INHALATION) EVERY 4 HOURS PRN
Qty: 6.7 G | Refills: 0 | Status: SHIPPED | OUTPATIENT
Start: 2024-07-05

## 2024-07-05 RX ORDER — AMOXICILLIN AND CLAVULANATE POTASSIUM 875; 125 MG/1; MG/1
1 TABLET, FILM COATED ORAL 2 TIMES DAILY
COMMUNITY
Start: 2024-06-30 | End: 2024-07-10

## 2024-07-05 RX ORDER — DOXYCYCLINE 100 MG/1
100 TABLET ORAL
COMMUNITY
Start: 2024-06-30 | End: 2024-07-10

## 2024-07-05 NOTE — PROGRESS NOTES
Office Note     Name: Arslan Butler    : 1990     MRN: [id number]     Chief Complaint  Chest Pain (Pt went to [facility] ED 24)    Subjective   History of Present Illness  The patient is a 33-year-old male who presents today for follow-up of ER visits for left lower lobe pneumonia.    The patient was initially evaluated at [facility] ER on 2024, during which he reported experiencing chest pain. His laboratory results were within normal limits, and his EKG was within normal limits. His chest x-ray was also normal. At that time, he was diagnosed with costochondral pain, for which he was prescribed Toradol and discharged. Subsequently, he was evaluated at [facility] ER on 2024. His laboratory results were within normal limits, however, a CT scan of the chest revealed left lower lobe pneumonia. He was subsequently prescribed doxycycline and Augmentin.    The patient reports overall well-being, however, he occasionally experiences coughing fits and is contemplating whether an inhaler would be beneficial. He is currently managing his symptoms with over-the-counter cold and cough treatments.    Results  Imaging  Chest X-ray was normal. CT chest showed left lower lobe pneumonia.      Past Medical History:   Past Medical History:   Diagnosis Date    Anxiety and depression 2021    Bipolar disorder 12/15/2021    Chronic pain disorder     Diabetes mellitus 2018    on Ozempic, last A1C 7.2    Fatty liver     on US    Gastroesophageal reflux disease 12/15/2021    pepto or tums prn    H/O shoulder surgery 2023    Hx of tonsillectomy 2023    Hyperlipidemia 12/15/2021    Iron deficiency anemia secondary to inadequate dietary iron intake 2022    Left arm numbness 2022    suspected related to shoulder surgery    Left rotator cuff tear 2022    Male hypogonadism 2022    MRSA infection     after tattoo    Obesity 12/15/2021    Panic disorder     Peptic ulceration      noted on EGD in [place]    Rotator cuff injury, left, subsequent encounter 02/24/2022    Sciatic nerve pain     Self-injurious behavior     Sleep apnea     Suicide attempt     Testosterone deficiency in male 05/11/2022       Past Surgical History:   Past Surgical History:   Procedure Laterality Date    ENDOSCOPY AND COLONOSCOPY  2019    peptic ulcer    ROTATOR CUFF REPAIR Left 2014    ROTATOR CUFF REPAIR Left 2016    TONSILLECTOMY  1997       Immunizations:   Immunization History   Administered Date(s) Administered    COVID-19 (MODERNA) 1st,2nd,3rd Dose Monovalent 04/10/2021, 05/05/2021, 08/19/2021, 09/17/2021    DTP 03/11/1996    Hep B, Adolescent or Pediatric 06/22/2001, 07/23/2001, 08/02/2002    MMR 03/11/1996    OPV 03/11/1996    Pneumococcal Polysaccharide (PPSV23) 08/05/2022    Tdap 08/05/2022        Medications:     Current Outpatient Medications:     amoxicillin-clavulanate (AUGMENTIN) 875-125 MG per tablet, Take 1 tablet by mouth 2 (Two) Times a Day., Disp: , Rfl:     amphetamine-dextroamphetamine (Adderall) 10 MG tablet, Take 1 tablet by mouth Daily With Lunch., Disp: 30 tablet, Rfl: 0    amphetamine-dextroamphetamine XR (Adderall XR) 20 MG 24 hr capsule, Take 1 capsule by mouth Every Morning, Disp: 30 capsule, Rfl: 0    buPROPion XL (WELLBUTRIN XL) 150 MG 24 hr tablet, Take 1 tablet by mouth., Disp: , Rfl:     Cholecalciferol (Vitamin D3) 50 MCG (2000 UT) capsule, Take 1 capsule by mouth Daily., Disp: , Rfl:     Cholecalciferol 1.25 MG (63529 UT) tablet, Take 2,000 Units by mouth Daily., Disp: , Rfl:     diazePAM (Valium) 5 MG tablet, Take 1 tablet by mouth Daily As Needed for Anxiety (Panic Attacks)., Disp: 15 tablet, Rfl: 0    divalproex (DEPAKOTE ER) 250 MG 24 hr tablet, Take 1 tablet by mouth Daily., Disp: 90 tablet, Rfl: 1    doxycycline (ADOXA) 100 MG tablet, Take 1 tablet by mouth., Disp: , Rfl:     ibuprofen (ADVIL,MOTRIN) 800 MG tablet, Take 1 tablet by mouth Every 8 (Eight) Hours As Needed for  Mild Pain., Disp: 90 tablet, Rfl: 0    lisinopril (PRINIVIL,ZESTRIL) 10 MG tablet, Take 1 tablet by mouth Daily., Disp: 90 tablet, Rfl: 0    loratadine (CLARITIN) 10 MG tablet, Take 1 tablet by mouth Daily., Disp: , Rfl:     lurasidone HCl (LATUDA) 80 MG tablet tablet, Take 1 tablet by mouth Daily., Disp: 30 tablet, Rfl: 1    mirtazapine (REMERON) 15 MG tablet, Take 1 tablet by mouth., Disp: , Rfl:     Ozempic, 2 MG/DOSE, 8 MG/3ML solution pen-injector, INJECT 2MG UNDER THE SKIN INTO THE APPROPRIATE AREA AS DIRECTED ONCE PER WEEK, Disp: 3 mL, Rfl: 3    traZODone (DESYREL) 50 MG tablet, Take 1 tablet by mouth Every Night., Disp: 30 tablet, Rfl: 1    albuterol sulfate  (90 Base) MCG/ACT inhaler, Inhale 2 puffs Every 4 (Four) Hours As Needed for Wheezing., Disp: 6.7 g, Rfl: 0    Allergies:   Allergies   Allergen Reactions    Sulfamethoxazole-Trimethoprim Hives    Metformin GI Intolerance       Family History:   Family History   Problem Relation Age of Onset    Suicide Attempts Mother     Self-Injurious Behavior  Mother     Seizures Mother     Schizophrenia Mother     Paranoid behavior Mother     Depression Mother     Bipolar disorder Mother     Alcohol abuse Mother     Obesity Mother     Alcohol abuse Father     Hypertension Father     Heart attack Father     Obesity Sister     Obesity Brother     Stroke Maternal Grandmother     Heart attack Maternal Grandfather     Diabetes Maternal Grandfather     Hypertension Maternal Grandfather     Mental illness Paternal Grandmother     Diabetes Paternal Grandmother     Heart attack Paternal Grandmother     No Known Problems Paternal Grandfather     Colon cancer Neg Hx        Social History:   Social History     Socioeconomic History    Marital status:    Tobacco Use    Smoking status: Never     Passive exposure: Never    Smokeless tobacco: Never   Vaping Use    Vaping status: Never Used   Substance and Sexual Activity    Alcohol use: Not Currently     Comment:  "ocassionally     Drug use: Never    Sexual activity: Defer       Objective     Vital Signs  /88 (BP Location: Left arm, Patient Position: Sitting, Cuff Size: Large Adult)   Pulse 82   Temp 97.7 °F (36.5 °C) (Infrared)   Resp 18   Ht 190.5 cm (75\")   Wt (!) 160 kg (352 lb)   BMI 44.00 kg/m²   Estimated body mass index is 44 kg/m² as calculated from the following:    Height as of this encounter: 190.5 cm (75\").    Weight as of this encounter: 160 kg (352 lb).            Physical Exam  Vitals and nursing note reviewed.   Constitutional:       Appearance: Normal appearance.   Cardiovascular:      Rate and Rhythm: Normal rate and regular rhythm.      Pulses: Normal pulses.      Heart sounds: Normal heart sounds.   Pulmonary:      Effort: Pulmonary effort is normal.      Breath sounds: Normal breath sounds.      Comments: Mild fine crackles left lower lobe  Skin:     General: Skin is warm and dry.   Neurological:      Mental Status: He is alert.   Psychiatric:         Mood and Affect: Mood normal.         Behavior: Behavior normal.          Physical Exam      Assessment and Plan     Assessment & Plan      Problem List Items Addressed This Visit       Essential hypertension     Other Visit Diagnoses       Pneumonia of left lower lobe due to infectious organism    -  Primary    Relevant Medications    amoxicillin-clavulanate (AUGMENTIN) 875-125 MG per tablet    doxycycline (ADOXA) 100 MG tablet    albuterol sulfate  (90 Base) MCG/ACT inhaler    Other Relevant Orders    XR Chest PA & Lateral    Type 2 diabetes mellitus with hyperglycemia, without long-term current use of insulin        Class 3 severe obesity due to excess calories with serious comorbidity and body mass index (BMI) of 45.0 to 49.9 in adult            Patient plans to repeat CXR ine one week      Reviewed medications, potential side effects and signs and symptoms to report. Discussed risk versus benefits of treatment plan with patient and/or " family-including medications, labs and radiology that may be ordered. Addressed questions and concerns during visit. Patient and/or family verbalized understanding and agree with plan. Instructed to call the office with any questions and report to ER with any life-threatening symptoms.     Follow Up  No follow-ups on file.    Patient or patient representative verbalized consent for the use of Ambient Listening during the visit with  Esperanza Benson PA-C for chart documentation. 7/5/2024  11:55 EDT    USMAN Cruz [facility]  [address] [address] [phone]

## 2024-07-08 ENCOUNTER — TELEPHONE (OUTPATIENT)
Dept: INTERNAL MEDICINE | Facility: CLINIC | Age: 34
End: 2024-07-08
Payer: COMMERCIAL

## 2024-07-08 NOTE — TELEPHONE ENCOUNTER
Caller: Arslan Butler    Relationship: Self    Best call back number: [phone]    What form or medical record are you requesting: WORK EXCUSE    Who is requesting this form or medical record from you: JOB    How would you like to receive the form or medical records (pick-up, mail, fax):     Timeframe paperwork needed: TOMORROW, 07/09/2024    Additional notes: PATIENT WAS SEEN ON FRIDAY 07/05/2024 AND WAS GIVEN A NOTE TO RETURN TO WORK ON MONDAY, BUT IF HIS CONDITION HAD NOT IMPROVED TO LET AZAR ACOSTA KNOW AND SHE WOULD EXTEND THE WORK NOTE TO WEDNESDAY 07/10/2024. PATIENT WAS NOT FEELING BETTER TO RETURN TO WORK TODAY SO HE WOULD LIKE HER TO WRITE A RETURN TO WORK WITH NO RESTRICTIONS FOR WEDNESDAY 07/10/2024

## 2024-07-12 DIAGNOSIS — F90.1 ATTENTION DEFICIT HYPERACTIVITY DISORDER (ADHD), PREDOMINANTLY HYPERACTIVE TYPE: Primary | ICD-10-CM

## 2024-07-12 RX ORDER — DEXTROAMPHETAMINE SACCHARATE, AMPHETAMINE ASPARTATE MONOHYDRATE, DEXTROAMPHETAMINE SULFATE AND AMPHETAMINE SULFATE 5; 5; 5; 5 MG/1; MG/1; MG/1; MG/1
20 CAPSULE, EXTENDED RELEASE ORAL EVERY MORNING
Qty: 30 CAPSULE | Refills: 0 | Status: SHIPPED | OUTPATIENT
Start: 2024-07-12

## 2024-08-05 ENCOUNTER — TELEMEDICINE (OUTPATIENT)
Dept: BEHAVIORAL HEALTH | Facility: CLINIC | Age: 34
End: 2024-08-05
Payer: COMMERCIAL

## 2024-08-05 DIAGNOSIS — F90.1 ATTENTION DEFICIT HYPERACTIVITY DISORDER (ADHD), PREDOMINANTLY HYPERACTIVE TYPE: Primary | ICD-10-CM

## 2024-08-05 DIAGNOSIS — F51.01 PRIMARY INSOMNIA: ICD-10-CM

## 2024-08-05 DIAGNOSIS — F41.1 GENERALIZED ANXIETY DISORDER: ICD-10-CM

## 2024-08-05 PROCEDURE — 99214 OFFICE O/P EST MOD 30 MIN: CPT

## 2024-08-05 PROCEDURE — 90833 PSYTX W PT W E/M 30 MIN: CPT

## 2024-08-05 RX ORDER — DEXTROAMPHETAMINE SACCHARATE, AMPHETAMINE ASPARTATE, DEXTROAMPHETAMINE SULFATE AND AMPHETAMINE SULFATE 2.5; 2.5; 2.5; 2.5 MG/1; MG/1; MG/1; MG/1
10 TABLET ORAL
Qty: 30 TABLET | Refills: 0 | Status: SHIPPED | OUTPATIENT
Start: 2024-08-05 | End: 2025-08-05

## 2024-08-05 RX ORDER — TRAZODONE HYDROCHLORIDE 50 MG/1
50 TABLET ORAL NIGHTLY
Qty: 30 TABLET | Refills: 1 | Status: SHIPPED | OUTPATIENT
Start: 2024-08-05

## 2024-08-05 RX ORDER — DEXTROAMPHETAMINE SACCHARATE, AMPHETAMINE ASPARTATE MONOHYDRATE, DEXTROAMPHETAMINE SULFATE AND AMPHETAMINE SULFATE 6.25; 6.25; 6.25; 6.25 MG/1; MG/1; MG/1; MG/1
25 CAPSULE, EXTENDED RELEASE ORAL DAILY
Qty: 30 CAPSULE | Refills: 0 | Status: SHIPPED | OUTPATIENT
Start: 2024-08-17
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
## 2024-08-05 NOTE — PATIENT INSTRUCTIONS
1.  Please return to clinic at your next scheduled visit.  Please contact the clinic ([phone]) at least 24 hours prior in the event you need to cancel.  2.  Do no harm to yourself or others.    3.  Avoid alcohol and drugs.    4.  Take all medications as prescribed.  Please contact the clinic with any concerns. If you are in need of medication refills, please call the clinic at [phone].    5. Should you want to get in touch with your provider, CORTNEY Gray, please contact the office ([phone]), and staff will be able to page Sherrie directly.  6. In the event you have personal crisis, contact the following crisis numbers: Suicide Prevention Hotline 1-729.646.9523; [facility] Helpline 7-017-923-ENVQ; [facility] [phone]; text HELLO to 417434; or 977.     SPECIFIC RECOMMENDATIONS:     1.      Medications discussed at this encounter:     New Medications Ordered This Visit   Medications    amphetamine-dextroamphetamine XR (ADDERALL XR) 25 MG 24 hr capsule     Sig: Take 1 capsule by mouth Daily     Dispense:  30 capsule     Refill:  0     Increase dose. Thx 4 All U Do!    amphetamine-dextroamphetamine (Adderall) 10 MG tablet     Sig: Take 1 tablet by mouth Daily With Lunch.     Dispense:  30 tablet     Refill:  0     Booster dose at noon in addition to Adderall XR in am. Thx 4 All U Do!    traZODone (DESYREL) 50 MG tablet     Sig: Take 1 tablet by mouth Every Night.     Dispense:  30 tablet     Refill:  1                       2.      Psychotherapy recommendations: We will continue therapy at future visits.     3.     Return to clinic: Return in about 6 weeks (around 9/16/2024).

## 2024-08-14 ENCOUNTER — TELEPHONE (OUTPATIENT)
Dept: INTERNAL MEDICINE | Facility: CLINIC | Age: 34
End: 2024-08-14

## 2024-08-14 NOTE — TELEPHONE ENCOUNTER
Reached patient at [phone]   Reminded him to call our scheduling department at [phone] to reschedule his Pulmonary Function Test appointment

## 2024-08-16 DIAGNOSIS — F41.1 GENERALIZED ANXIETY DISORDER: ICD-10-CM

## 2024-08-16 DIAGNOSIS — F31.30 BIPOLAR AFFECTIVE DISORDER, CURRENT EPISODE DEPRESSED, CURRENT EPISODE SEVERITY UNSPECIFIED: ICD-10-CM

## 2024-08-16 RX ORDER — DIVALPROEX SODIUM 250 MG/1
250 TABLET, EXTENDED RELEASE ORAL DAILY
Qty: 90 TABLET | Refills: 1 | Status: SHIPPED | OUTPATIENT
Start: 2024-08-16 | End: 2025-08-16

## 2024-08-16 NOTE — TELEPHONE ENCOUNTER
PT PHONED IN.    PT REQUESTING REFILL ON DEPAKOTE  MG TABLETS.    divalproex (DEPAKOTE ER) 250 MG 24 hr tablet (02/15/2024)     FOLLOW UP APPT ON 09/23/2024.  PT LAST SEEN ON 08/05/2024.    LAST UDS RESULTS:  Urine Drug Screen - Urine, Clean Catch (07/12/2024 16:00)     PT REMINDED TO COMPLETE UDS.

## 2024-08-28 NOTE — TELEPHONE ENCOUNTER
"I left a voice mail patient at [phone]   Reminded him to call our scheduling department at [phone] to reschedule his Pulmonary Function Test appointment   Voice mail ok per  Verbal    Relay     \"Please Remind him to call our scheduling department at [phone] to reschedule his Pulmonary Function Test appointment \"                 "

## 2024-09-03 DIAGNOSIS — F41.1 GENERALIZED ANXIETY DISORDER: ICD-10-CM

## 2024-09-03 DIAGNOSIS — F31.30 BIPOLAR AFFECTIVE DISORDER, CURRENT EPISODE DEPRESSED, CURRENT EPISODE SEVERITY UNSPECIFIED: ICD-10-CM

## 2024-09-03 RX ORDER — LURASIDONE HYDROCHLORIDE 80 MG/1
TABLET, FILM COATED ORAL
Qty: 30 TABLET | Refills: 0 | Status: SHIPPED | OUTPATIENT
Start: 2024-09-03

## 2024-09-03 NOTE — TELEPHONE ENCOUNTER
Lurasidone HCl 80 MG Oral Tablet     Last ordered: 2 months ago (6/28/2024) by CORTNEY Gray     Follow up 09/23/2024

## 2024-09-11 DIAGNOSIS — E66.01 CLASS 3 SEVERE OBESITY DUE TO EXCESS CALORIES WITH SERIOUS COMORBIDITY AND BODY MASS INDEX (BMI) OF 45.0 TO 49.9 IN ADULT: ICD-10-CM

## 2024-09-11 DIAGNOSIS — I10 ESSENTIAL HYPERTENSION: ICD-10-CM

## 2024-09-11 DIAGNOSIS — E11.65 TYPE 2 DIABETES MELLITUS WITH HYPERGLYCEMIA, WITHOUT LONG-TERM CURRENT USE OF INSULIN: ICD-10-CM

## 2024-09-11 RX ORDER — SEMAGLUTIDE 2.68 MG/ML
INJECTION, SOLUTION SUBCUTANEOUS
Qty: 3 ML | Refills: 5 | Status: SHIPPED | OUTPATIENT
Start: 2024-09-11

## 2024-09-12 NOTE — TELEPHONE ENCOUNTER
Reached patient at [phone]   Reminded him to call our scheduling department at [phone] to reschedule his Pulmonary Function Test appointment

## 2024-09-18 DIAGNOSIS — F90.1 ATTENTION DEFICIT HYPERACTIVITY DISORDER (ADHD), PREDOMINANTLY HYPERACTIVE TYPE: ICD-10-CM

## 2024-09-19 RX ORDER — DEXTROAMPHETAMINE SACCHARATE, AMPHETAMINE ASPARTATE MONOHYDRATE, DEXTROAMPHETAMINE SULFATE AND AMPHETAMINE SULFATE 6.25; 6.25; 6.25; 6.25 MG/1; MG/1; MG/1; MG/1
25 CAPSULE, EXTENDED RELEASE ORAL DAILY
Qty: 30 CAPSULE | Refills: 0 | Status: SHIPPED | OUTPATIENT
Start: 2024-09-19

## 2024-09-23 ENCOUNTER — TELEMEDICINE (OUTPATIENT)
Dept: BEHAVIORAL HEALTH | Facility: CLINIC | Age: 34
End: 2024-09-23
Payer: COMMERCIAL

## 2024-09-23 DIAGNOSIS — F41.1 GENERALIZED ANXIETY DISORDER: ICD-10-CM

## 2024-09-23 DIAGNOSIS — F31.30 BIPOLAR AFFECTIVE DISORDER, CURRENT EPISODE DEPRESSED, CURRENT EPISODE SEVERITY UNSPECIFIED: Primary | ICD-10-CM

## 2024-09-23 DIAGNOSIS — F90.1 ATTENTION DEFICIT HYPERACTIVITY DISORDER (ADHD), PREDOMINANTLY HYPERACTIVE TYPE: ICD-10-CM

## 2024-09-23 DIAGNOSIS — F51.01 PRIMARY INSOMNIA: ICD-10-CM

## 2024-09-23 DIAGNOSIS — F41.0 PANIC ATTACKS: ICD-10-CM

## 2024-09-23 RX ORDER — DEXTROAMPHETAMINE SACCHARATE, AMPHETAMINE ASPARTATE, DEXTROAMPHETAMINE SULFATE AND AMPHETAMINE SULFATE 2.5; 2.5; 2.5; 2.5 MG/1; MG/1; MG/1; MG/1
10 TABLET ORAL
Qty: 30 TABLET | Refills: 0 | Status: SHIPPED | OUTPATIENT
Start: 2024-09-23 | End: 2025-09-23

## 2024-09-23 NOTE — PROGRESS NOTES
[facility]  Medication Management Follow-up  Virtual MyChart Visit  This provider is located at [address] [place] [address], [address], [place] [address]. The Patient is located at home. Patient is being seen remotely using MyChart. Patient is being seen via telehealth and confirm that they are in a secure environment for this session. The patient's condition being diagnosed/treated is appropriate for telemedicine. The provider identified herself as well as her credentials. They may refuse to be seen remotely at any time. The electronic data is encrypted and password protected, and the patient has been advised of the potential risks to privacy not withstanding such measures. Virtual visit via Zoom audio and video due to the COVID-19 pandemic.  Patient is accepting of and agreeable to visit.  The visit consisted of the patient and I. PT Identifiers used: Name and .    Record Review is below for 2024 :   2024TSH 0.62, free T4 is 1.3, RBC 4.51, hemoglobin 13.3, hematocrit 38.8, glucose 116  EKG Results:  None in record  Head Imaging:  None in record  Vital Signs:   There were no vitals taken for this visit.    Chief Complaint: Bipolar depression. Anxiety.     History of Present Illness:   Arslan Butler is a 34 y.o. male who presents today for follow-up and medication management for:    ICD-10-CM ICD-9-CM   1. Bipolar affective disorder, current episode depressed, current episode severity unspecified  F31.30 296.50   2. Generalized anxiety disorder  F41.1 300.02   3. Attention deficit hyperactivity disorder (ADHD), predominantly hyperactive type  F90.1 314.01   4. Primary insomnia  F51.01 307.42   5. Panic attacks  F41.0 300.01       2024 Patient is taking medications as prescribed and is tolerating them well.   Depression and Anxiety  Progression of symptoms, frequency, and intensity is waxing and waning. Patient continues to experience feelings of sadness, low mood, lost of interest in  usual activities, inability to focus and concentrate that may interfere with daily tasks,  psychomotor agitation, excessive anxiety and worry, anxiety, difficulty controlling the worry, restlessness, feeling keyed up or on edge, irritability, and these symptoms are causing significant distress or impairment. Patient denies feeling worthless, guilty, hopelessness,. Denies thinking about death and dying, suicidal ideation, planning, or intent to self-harm.  Denies AVH.  Clinically significant distress or impairment in social, occupational, or other important areas of functioning is waxing and waning.  Panic Attack  Was terminated from his recent position because he missed too many days related to his anxiety disorder. He would lay in bed all day and not be able to do anything productive. Progression of symptoms, frequency, and intensity is waxing and waning. The problem occurs few times per week. Associated symptoms include sense of impending doom, unbearable foreboding that something terrible is going to happen, intense fear of losing control, palpitations, racing/pounding heartbeat, chest discomfort, shortness of breath, choking sensation, detachment, depersonalization, and dissociation and these symptoms are causing significant distress or impairment.   ADHD  Progression of symptoms, frequency, and intensity is gradually improving. Patient reports improvement in the ability to carry out very short and simple instructions, maintain attention and concentration for extended periods, make simple work-related decisions, and complete a normal workday and workweek without interruptions from psychologically based symptoms. Clinically significant distress or impairment in social, occupational, or other important areas of functioning is gradually improving.  CBT/Supportive  Allowed patient to process topics such as struggling with keeping employment related to anxiety, has applied for SSDI x6 and been denied every time,  appeals have also been denied. Currently staying with family, this is frustrating to his wife. Individual psychotherapy was provided utilizing solution focused techniques to restore adaptive functioning, provide symptom relief, discuss values and strengths, manage stress, identify triggers, recognize patterns of behavior, acknowledge sources of feelings and behaviors, assess symptoms, provide support, and discuss interpersonal conflicts. The therapeutic alliance was strengthened to encourage the patient to express their thoughts and feelings.       08/05/2024 Patient is taking medications as prescribed and is tolerating them well.   Depression and Anxiety  Has been maintaining employment, however, has been having difficulty with desire, motivation to get up and go to work. Progression of symptoms, frequency, and intensity is waxing and waning. Patient continues to experience feelings of sadness, low mood, lost of interest in usual activities, anhedonia, low energy, feeling worthless, hopelessness, irritability, lack of motivation and these symptoms are causing significant distress or impairment. Denies thinking about death and dying, suicidal ideation, planning, or intent to self-harm.  Denies AVH.  Clinically significant distress or impairment in social, occupational, or other important areas of functioning is waxing and waning.  Panic Attack  Progression of symptoms, frequency, and intensity is waxing and waning but better overall. The problem occurs intermittently. Associated symptoms include sense of impending doom, unbearable foreboding that something terrible is going to happen, intense fear of losing control, palpitations, racing/pounding heartbeat, chest discomfort, shortness of breath, choking sensation, detachment, depersonalization, dissociation, excessive perspiration, and derealization and these symptoms are causing significant distress or impairment.   ADHD  Progression of symptoms, frequency, and  intensity is improving. Patient reports improvement in the ability to carry out very short and simple instructions, maintain attention and concentration for extended periods, make simple work-related decisions, and complete a normal workday and workweek without interruptions from psychologically based symptoms. Clinically significant distress or impairment in social, occupational, or other important areas of functioning is improving.  CBT/Supportive  Allowed patient to process topics such as working on changing behavior to influence emotions.  Trying to do what he would do if he felt motivated.  Practicing arguing the opposite of negative thoughts and challenging them.  Instead of focusing on failure, consider reasons why there can be success. Individual psychotherapy was provided utilizing solution focused techniques to restore adaptive functioning, provide symptom relief, discuss values and strengths, manage stress, identify triggers, recognize patterns of behavior, acknowledge sources of feelings and behaviors, assess symptoms, provide support, and discuss interpersonal conflicts. The therapeutic alliance was strengthened to encourage the patient to express their thoughts and feelings.   Continue Adderall IR 10 mg booster dose at noon  Continue Latuda to 80 mg daily  Continue Depakote  mg at bedtime  Continue Adderall XR to 25 mg daily at next refill  Continue Valium 5 mg qd as needed for anxiety and panic attacks  Ambulatory referral for neuropsychological testing: ADHD  Follow-up 1 month    06/28/2024 Patient is taking medications as prescribed and is tolerating them well.   Bipolar Depression and Anxiety/Social Anxiety  Was denied for SSI after his appeal. Currently off work due to shoulder injury. Progression of symptoms, frequency, and intensity is gradually worsening. Patient continues to experience low mood, lost of interest in usual activities, psychomotor agitation, excessive anxiety and worry,  "anxiety, difficulty controlling the worry, restlessness, feeling keyed up or on edge, irritability, panic attacks, and these symptoms are causing significant distress or impairment. Patient denies feeling worthless, guilty, hopelessness,. Denies thinking about death and dying, suicidal ideation, planning, or intent to self-harm.  Denies AVH.  Clinically significant distress or impairment in social, occupational, or other important areas of functioning is gradually worsening.  Panic Attack and anxiety and social anxiety   He has only needed to take valium a few times each week. Patient shared he does use breathing to help with panic attacks. This is a chronic problem. The current episode started more than 1 year ago. The problem occurs daily. The problem has been gradually worsening. Associated symptoms include sense of impending doom, unbearable foreboding that something terrible is going to happen, intense fear of losing control, palpitations, racing/pounding heartbeat, chest discomfort, shortness of breath, choking sensation, detachment, depersonalization, and dissociation.   ADHD  Progression of symptoms, frequency, and intensity is waxing and waning. Noticing a \"crash\" later in the day and afternoon. Patient reports waxing and waning in the ability to carry out very short and simple instructions, maintain attention and concentration for extended periods, make simple work-related decisions, and complete a normal workday and workweek without interruptions from psychologically based symptoms. Clinically significant distress or impairment in social, occupational, or other important areas of functioning is waxing and waning.  Insomnia is well-controlled with mirtazapine  Start Adderall IR 10 mg booster dose at noon  Increase Latuda to 80 mg daily  Continue Depakote  mg at bedtime  Continue Adderall XR 20 mg daily at next refill  Continue Valium 5 mg qd as needed for anxiety and panic attacks  Ambulatory " referral for neuropsychological testing: ADHD  Continue mirtazapine to 15 mg at bedtime  Follow-up 1 month    03/19/2024 Patient is taking medications as prescribed and is tolerating them well.   Patient stated he has applied for disability again.  Patient stated he is still waiting to hear about placement at [facility]. Neither him or his wife are working currently and they are staying with her aunt.   Depressed and irritable moods mostly related to situational stressors, he feels predominantly well-controlled with current medications.   ADHD  Has been taking increased dose of Adderall XR for approximately 1.5 months.  Patient reports moderate impairment in the ability to carry out very short and simple instructions, maintain attention and concentration for extended periods, make simple work-related decisions, and complete a normal workday and workweek without interruptions from psychologically based symptoms. Symptoms are persistent and significantly interfere with major life activities and/or result in significant suffering.  Panic Attack and anxiety and social anxiety   Patient reported he has been trying to figure out the root cause of his anxiety. He has only needed to take valium a few times each week. Patient shared he does use breathing to help with panic attacks. Patient described he may have had less panic attacks because he has not been out as much. This is a chronic problem. The current episode started more than 1 year ago. The problem occurs daily. The problem has been gradually worsening. Associated symptoms include sense of impending doom, unbearable foreboding that something terrible is going to happen, intense fear of losing control, palpitations, racing/pounding heartbeat, chest discomfort, shortness of breath, choking sensation, detachment, depersonalization, and dissociation.   Insomnia is well-controlled with mirtazapine  Denies suicidal ideation.  Denies AVH.  We will continue to monitor for mood,  behavior, and safety.  Continue Latuda to 60 mg daily  Continue Depakote ER 250mg at bedtime  Continue Adderall XR 20 mg daily at next refill  Continue Valium 5 mg qd as needed for anxiety and panic attacks  Ambulatory referral for neuropsychological testing: ADHD  Increase mirtazapine to 15 mg at bedtime  Follow-up 1 month    Record Review is below for 03/19/2024 :   8/17/2023 TSH 0.695, free T41.26, lipid panel is reassuring, hemoglobin A1c 6.10, glucose 136, ALT 52, AST 42, CBC and CMP are otherwise reassuring.  EKG Results:  SCANNED EKG (12/29/2019)   Head Imaging:  None in record    01/26/2024 Patient is taking medications as prescribed and is tolerating them well.   Increase in Adderall is helping, he went and passed testing that he failed previously because he could not focus.   He is looking forward to hopefully getting a new position at the [employer], it is one of the jobs that he was able to maintain for several years. Moods have been stable. He is thinking more clearly since treating ADHD with Adderall.   Panic attacks have decreased in frequency and intensity.   Trouble sleeping, falling asleep, several nighttime awakenings  Some marital challenges, they have been fighting a lot, mostly related to financial concerns.   Discussing referral for more intense individual psychotherapy.   Denies suicidal ideation.  Denies AVH.  We will continue to monitor for mood, behavior, and safety.  Continue Latuda to 60 mg daily  Continue Depakote ER 250mg at bedtime  Plan to increase Adderall XR to 20 mg daily at next refill  Continue Valium 5 mg qd as needed for anxiety and panic attacks  Continue mirtazapine 7.5 mg at bedtime  Follow-up 1 month    Record Review is below for 01/26/2024 :   8/17/2023 TSH 0.695, free T41.26, lipid panel is reassuring, hemoglobin A1c 6.10, glucose 136, ALT 52, AST 42, CBC and CMP are otherwise reassuring.  EKG Results:  SCANNED EKG (12/29/2019)   Head Imaging:  None in  "record    12/29/2023 Patient is taking medications as prescribed and is tolerating them well.   \"It's been going okay.\" He lost his job at [employer], he had a major panic attack and left. His whole shirt was soaked, his heart was racing. He is looking for another job. He did not have his rescue diazepam with him. Moods have been stable. He is thinking more clearly since treating ADHD with Adderall. Trouble sleeping, falling asleep, several nighttime awakenings.   Denies  intrusive thoughts, but having intense anxiety, mostly related to losing another job. He has applied for disability in the past and been denied.   Denies suicidal ideation.  Denies AVH.  We will continue to monitor for mood, behavior, and safety.  Continue Latuda to 60 mg daily  Continue Depakote ER 250mg at bedtime  Plan to increase Adderall XR to 15 mg daily at next refill  Continue Valium 5 mg qd as needed for anxiety and panic attacks  Start mirtazapine 7.5 mg at bedtime  Follow-up 1 month    Record Review is below for 12/29/2023 :   8/17/2023 TSH 0.695, free T41.26, lipid panel is reassuring, hemoglobin A1c 6.10, glucose 136, ALT 52, AST 42, CBC and CMP are otherwise reassuring.  EKG Results:  SCANNED EKG (12/29/2019)   Head Imaging:  None in record    12/01/2023 Patient is taking medications as prescribed and is tolerating them well.   He hasn't experienced any major mood fluctuations, jaimee, or hypomania. It has only been one week but we wanted to closely monitor mood.  He hasn't noticed a great difference but his wife is saying he seems less flustered.   We are still going to target ADHD, anxiety symptoms with Adderall.   He says he has felt a slight increase in anxiety, but denies panic attacks.   He has found a new job with [employer] and will start in a couple weeks.   Denies suicidal ideation.  Denies AVH.  We will continue to monitor for mood, behavior, and safety.  Continue Latuda to 60 mg daily  Continue Depakote ER 250mg at " bedtime  Continue Adderall XR to 10 mg daily  Start Valium 5 mg qd as needed for anxiety and panic attacks  Follow-up 1 month    Record Review is below for 12/01/2023 : I have thoroughly reviewed the patient's electronic medical record to include previous encounters, care everywhere, notes, medications, labs, SHAUNA and UDS (if applicable), imaging, and EKG's.  Pertinent information is included in this note.  8/17/2023 TSH 0.695, free T41.26, lipid panel is reassuring, hemoglobin A1c 6.10, glucose 136, ALT 52, AST 42, CBC and CMP are otherwise reassuring.  EKG Results:  SCANNED EKG (12/29/2019)   Head Imaging:  None in record      11/03/2023 Patient is taking medications as prescribed and is tolerating them well.   He has had a positive strep and flu test and has been sick all week. He hasn't experienced any major mood fluctuations, jaimee, or hypomania. It has only been one week but we wanted to closely monitor mood.  He hasn't noticed a great difference but his wife is saying he seems less flustered.   We are still going to target ADHD, anxiety symptoms with Adderall.   He says he has felt a slight increase in anxiety, but denies panic attacks.   He has found a new job with [employer] and will start in a couple weeks.   Denies suicidal ideation.  Denies AVH.  We will continue to monitor for mood, behavior, and safety.  Continue Latuda to 60 mg daily  Continue Depakote ER 250mg at bedtime  Continue Adderall XR 5 mg daily  Follow-up 1 month    Record Review is below for 11/03/2023 : I have thoroughly reviewed the patient's electronic medical record to include previous encounters, care everywhere, notes, medications, labs, SHAUNA and UDS (if applicable), imaging, and EKG's.  Pertinent information is included in this note.  8/17/2023 TSH 0.695, free T41.26, lipid panel is reassuring, hemoglobin A1c 6.10, glucose 136, ALT 52, AST 42, CBC and CMP are otherwise reassuring.  EKG Results:  SCANNED EKG (12/29/2019)   Head  "Imaging:  None in record      10/24/2023 Patient is taking medications as prescribed and is tolerating them well.   Recently had to move to his mom's house because after he lost his jobs they lost their house and car.   He has still been self-sabotaging with his thoughts and has a lot of self-doubt. We continue to discuss the possibility of treating ADHD now that we have targeted mood stabilization with Latuda and Depakote ER. Patient has failed several classifications of psychotropic medications and has not ever been treated for ADHD. He has a compelling childhood assessment. Denies suicidal ideation.  Denies AVH.  We will continue to monitor for mood, behavior, and safety.  Continue Latuda to 60 mg daily  Continue Depakote ER 250mg at bedtime  Start Adderall XR 5 mg daily  UDS  CSA  Follow-up 1 week    Record Review is below for 10/24/2023 : I have thoroughly reviewed the patient's electronic medical record to include previous encounters, care everywhere, notes, medications, labs, SHAUNA and UDS (if applicable), imaging, and EKG's.  Pertinent information is included in this note.  8/17/2023 TSH 0.695, free T41.26, lipid panel is reassuring, hemoglobin A1c 6.10, glucose 136, ALT 52, AST 42, CBC and CMP are otherwise reassuring.  EKG Results:  SCANNED EKG (12/29/2019)   Head Imaging:  None in record      09/21/2023 Patient is taking medications as prescribed and is tolerating them well.   Has been through 4 jobs since we last talked. He says that he would start the job and then would talk himself for attendance. He is regretting quitting the most recent job because it was a fairly simple job.   His wife, Belia, is noticing that he is having more \"ups\" in a good way.   We are discussing the possibility of treating patient for a compelling childhood assessment for ADHD since we have been well with some mood stabilization.  He is still struggling with anxiety.  Panic attacks a few times a week. Denies suicidal " ideation.  Denies AVH.  We will continue to monitor for mood, behavior, and safety.  Increase Latuda to 60 mg daily  Continue Depakote  mg at bedtime  Follow-up 1 month    Record Review is below for 09/21/2023 : I have thoroughly reviewed the patient's electronic medical record to include previous encounters, care everywhere, notes, medications, labs, SHAUNA and UDS (if applicable), imaging, and EKG's.  Pertinent information is included in this note.  8/17/2023 TSH 0.695, free T41.26, lipid panel is reassuring, hemoglobin A1c 6.10, glucose 136, ALT 52, AST 42, CBC and CMP are otherwise reassuring.  EKG Results:  SCANNED EKG (12/29/2019)   Head Imaging:  None in record    08/22/2023 Patient is taking medications as prescribed and is tolerating them well.   Still having mood swings, anger outbursts, latuda is helping with depression.   Having some altercations with wife about finances.   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  Depression  Visit Type: follow-up (Bipolar, PETR, Panic Attacks)  Patient presents with the following symptoms: depressed mood, excessive worry, irritability, muscle tension, nervousness/anxiety, psychomotor agitation and restlessness.  Patient is not experiencing: anhedonia, compulsions, suicidal ideas, suicidal planning and thoughts of death.  Frequency of symptoms: most days   Severity: causing significant distress   Sleep quality: fair  Denies suicidal ideation.  Denies AVH.  We will continue to monitor for mood, behavior, and safety.  Continue Latuda 40mg daily  Start Depakote  mg at bedtime  Follow-up 1 month    Record Review is below for 08/22/2023 : I have thoroughly reviewed the patient's electronic medical record to include previous encounters, care everywhere, notes, medications, labs, SHAUNA and UDS (if applicable), imaging, and EKG's.  Pertinent  "information is included in this note.  8/17/2023 TSH 0.695, free T41.26, lipid panel is reassuring, hemoglobin A1c 6.10, glucose 136, ALT 52, AST 42, CBC and CMP are otherwise reassuring.  EKG Results:  SCANNED EKG (12/29/2019)   Head Imaging:  None in record      07/31/2023 Depression and anxiety for several years.   Bipolar  Depression  Visit Type: initial  Onset of symptoms: more than 1 year ago  Progression since onset: gradually worsening  Patient presents with the following symptoms: anhedonia, decreased concentration, depressed mood, excessive worry, fatigue, feelings of hopelessness, irritability, muscle tension, nervousness/anxiety, obsessions, panic, psychomotor agitation and restlessness.  Patient is not experiencing: suicidal ideas, suicidal planning and thoughts of death.  Frequency of symptoms: most days   Severity: interfering with daily activities   Aggravated by: family issues  Sleep quality: fair  Not taking valium three times daily, mostly once daily, valium doesn't really help with panic attacks.   Paranoia (I.e.accusing his wife of talking to her ex-, afraid of passing his mental health issue to his children). Catastrophism of things. Has tried  TMS in [place] 2 years ago without success, was unable to complete full course of treatment. Panic attacks three times daily.   Patient has a compelling childhood assessment for potentially undiagnosed ADHD.  We discussed neuropsychological testing to confirm diagnosis.  Denies suicidal ideation.  Denies AVH.  PHQ-9 is 27 and PETR-7 is 21.  Latuda 20 mg daily  Follow-up 3 weeks    ADHD:  Symptoms are persistent and significantly interfere with major life activities and/or result in significant suffering  With focus on K5 through 6th grade only   Grades: \"pretty bad\"  Behavioral issues: Trouble for getting out of his seat, not listening, talking   Special Classes:Yes, speech, hearing  Inattention:Yes  Referral for ADHD testing:Father did not " believe in mental health problems     Often fails to give close attention to details or makes careless mistakes in schoolwork, at work, or during other activities:Yes  Often has difficulty sustaining attention in tasks:Yes  Often does not seem to listen when spoken to directly:Yes  Often does not follow through on instructions and fails to finish duties in the workplace:Yes  Often has difficulty organizing tasks and activities:Yes  Often avoids, dislikes or is reluctant to engage in tasks that require sustained mental effort:Yes  Often loses things necessary for tasks or activities:Yes  Is often easily distracted by extraneous stimuli: Yes  Is often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

## 2024-10-01 ENCOUNTER — TELEPHONE (OUTPATIENT)
Dept: PSYCHIATRY | Facility: CLINIC | Age: 34
End: 2024-10-01
Payer: COMMERCIAL

## 2024-10-01 DIAGNOSIS — F41.1 GENERALIZED ANXIETY DISORDER: ICD-10-CM

## 2024-10-01 DIAGNOSIS — F51.01 PRIMARY INSOMNIA: ICD-10-CM

## 2024-10-01 NOTE — TELEPHONE ENCOUNTER
PT IS REQUESTING A REFILL ON HIS TRAZODONE.    HOWEVER, PT REPORTS THAT AT HIS LAST APPT PROVIDER TOLD HIM HE COULD TRY TAKING 2, 50 MG TABLETS IF HE NEEDED TO AND TO LET PROVIDER KNOW IF THAT WORKED FOR HIM.    PT STATED THAT  MG TOTAL DID WORK BETTER FOR HIM AT NIGHT AND HE WOULD LIKE TO CONTINUE ON THAT DOSAGE.

## 2024-10-02 RX ORDER — TRAZODONE HYDROCHLORIDE 100 MG/1
100 TABLET ORAL NIGHTLY
Qty: 30 TABLET | Refills: 1 | Status: SHIPPED | OUTPATIENT
Start: 2024-10-02

## 2024-10-02 NOTE — TELEPHONE ENCOUNTER
Medication sent for increased dose to patient's preferred pharmacy in record.  Please inform patient that he will now only need to take one tablet nightly as it is a 100 mg tablet.

## 2024-10-03 NOTE — TELEPHONE ENCOUNTER
I CALLED AND SPOKE TO THE PT.    I RELAYED OF PROVIDERS MESSAGE VERBATIM.    UNDERSTANDING EXPRESSED.

## 2024-10-07 NOTE — PATIENT INSTRUCTIONS
1.  Please return to clinic at your next scheduled visit.  Please contact the clinic ([phone]) at least 24 hours prior in the event you need to cancel.  2.  Do no harm to yourself or others.    3.  Avoid alcohol and drugs.    4.  Take all medications as prescribed.  Please contact the clinic with any concerns. If you are in need of medication refills, please call the clinic at [phone].    5. Should you want to get in touch with your provider, CORTNEY Gray, please contact the office ([phone]), and staff will be able to page Sherrie directly.  6. In the event you have personal crisis, contact the following crisis numbers: Suicide Prevention Hotline 1-385.778.4492; [facility] Helpline 4-678-786-OMTS; [facility] Emergency Room [phone]; text HELLO to 722837; or 980.     SPECIFIC RECOMMENDATIONS:     1.      Medications discussed at this encounter:     New Medications Ordered This Visit   Medications    amphetamine-dextroamphetamine (Adderall) 10 MG tablet     Sig: Take 1 tablet by mouth Daily With Lunch.     Dispense:  30 tablet     Refill:  0     Booster dose at noon in addition to Adderall XR in am. Thx 4 All U Do!                       2.      Psychotherapy recommendations: We will continue therapy at future visits.     3.     Return to clinic: Return in about 6 weeks (around 11/4/2024).

## 2024-10-08 ENCOUNTER — TELEPHONE (OUTPATIENT)
Dept: PSYCHIATRY | Facility: CLINIC | Age: 34
End: 2024-10-08
Payer: COMMERCIAL

## 2024-10-08 DIAGNOSIS — F41.1 GENERALIZED ANXIETY DISORDER: ICD-10-CM

## 2024-10-08 DIAGNOSIS — F41.0 PANIC ATTACKS: ICD-10-CM

## 2024-10-08 RX ORDER — DIAZEPAM 5 MG
5 TABLET ORAL DAILY PRN
Qty: 15 TABLET | Refills: 0 | Status: SHIPPED | OUTPATIENT
Start: 2024-10-08 | End: 2025-10-08

## 2024-10-08 NOTE — TELEPHONE ENCOUNTER
Emotional support animal letter is completed and in patient's chart.  Medication sent to patient's preferred pharmacy in record.

## 2024-10-08 NOTE — TELEPHONE ENCOUNTER
PT CALLED WANTS TO SEE ABOUT GETTING AN UPDATED NOTE FOR A SUPPORT ANIMAL  PT ALSO NEEDING REFILL FOR VALIUM AS HE HAS BEEN HAVING INCREASED ANXIETY    LOV 09/23/24  RF 06/28/24  FU 11/18/24

## 2024-10-10 DIAGNOSIS — F41.1 GENERALIZED ANXIETY DISORDER: ICD-10-CM

## 2024-10-10 DIAGNOSIS — F31.30 BIPOLAR AFFECTIVE DISORDER, CURRENT EPISODE DEPRESSED, CURRENT EPISODE SEVERITY UNSPECIFIED: ICD-10-CM

## 2024-10-10 RX ORDER — LURASIDONE HYDROCHLORIDE 80 MG/1
80 TABLET, FILM COATED ORAL DAILY
Qty: 30 TABLET | Refills: 0 | Status: SHIPPED | OUTPATIENT
Start: 2024-10-10 | End: 2024-11-09

## 2024-10-10 NOTE — TELEPHONE ENCOUNTER
REFILL REQUEST FROM THE PT AND OR PHARMACY FOR PTS:     Lurasidone HCl (LATUDA) 80 MG tablet tablet (09/03/2024)     FOLLOW UP APPT- 11/18/2024.  PT LAST SEEN ON- 09/23/2024.    LAST UDS RESULTS:  Urine Drug Screen - Urine, Clean Catch (10/25/2023 09:45)     Urine Drug Screen - Urine, Clean Catch (07/12/2024 16:00) - CURRENT ORDER UNCOMPLETED.    ROUTING TO COVERING PROVIDER.

## 2024-10-24 ENCOUNTER — TELEPHONE (OUTPATIENT)
Dept: PSYCHIATRY | Facility: CLINIC | Age: 34
End: 2024-10-24
Payer: COMMERCIAL

## 2024-10-24 ENCOUNTER — LAB (OUTPATIENT)
Dept: LAB | Facility: HOSPITAL | Age: 34
End: 2024-10-24
Payer: COMMERCIAL

## 2024-10-24 DIAGNOSIS — F90.1 ATTENTION DEFICIT HYPERACTIVITY DISORDER (ADHD), PREDOMINANTLY HYPERACTIVE TYPE: ICD-10-CM

## 2024-10-24 LAB
AMPHET+METHAMPHET UR QL: POSITIVE
AMPHETAMINES UR QL: NEGATIVE
BARBITURATES UR QL SCN: NEGATIVE
BENZODIAZ UR QL SCN: POSITIVE
BUPRENORPHINE SERPL-MCNC: NEGATIVE NG/ML
CANNABINOIDS SERPL QL: NEGATIVE
COCAINE UR QL: NEGATIVE
FENTANYL UR-MCNC: NEGATIVE NG/ML
METHADONE UR QL SCN: NEGATIVE
OPIATES UR QL: NEGATIVE
OXYCODONE UR QL SCN: NEGATIVE
PCP UR QL SCN: NEGATIVE
TRICYCLICS UR QL SCN: NEGATIVE

## 2024-10-24 PROCEDURE — 80307 DRUG TEST PRSMV CHEM ANLYZR: CPT

## 2024-10-24 RX ORDER — DEXTROAMPHETAMINE SACCHARATE, AMPHETAMINE ASPARTATE MONOHYDRATE, DEXTROAMPHETAMINE SULFATE AND AMPHETAMINE SULFATE 6.25; 6.25; 6.25; 6.25 MG/1; MG/1; MG/1; MG/1
25 CAPSULE, EXTENDED RELEASE ORAL DAILY
Qty: 30 CAPSULE | Refills: 0 | Status: SHIPPED | OUTPATIENT
Start: 2024-10-24

## 2024-10-24 RX ORDER — DEXTROAMPHETAMINE SACCHARATE, AMPHETAMINE ASPARTATE, DEXTROAMPHETAMINE SULFATE AND AMPHETAMINE SULFATE 2.5; 2.5; 2.5; 2.5 MG/1; MG/1; MG/1; MG/1
10 TABLET ORAL
Qty: 30 TABLET | Refills: 0 | Status: SHIPPED | OUTPATIENT
Start: 2024-10-24 | End: 2025-10-24

## 2024-10-24 NOTE — TELEPHONE ENCOUNTER
Appointment with Sherrie Oneill APRN (11/18/2024)     Urine Drug Screen - Urine, Clean Catch (07/12/2024 16:00)     PT SAYS THAT THEY WILL GO GET THE UDS ASAP    No CONTROLLED SUBSTANCE CONTRACT ON FILE  Patient has been having transportation issues, we are working on controlled substance agreement, we did discuss verbally during telehealth encounter.              Patient needs a refill.  Order pended    PROVIDER PLEASE ADVISE

## 2024-11-06 ENCOUNTER — OFFICE VISIT (OUTPATIENT)
Dept: INTERNAL MEDICINE | Facility: CLINIC | Age: 34
End: 2024-11-06
Payer: COMMERCIAL

## 2024-11-06 VITALS
WEIGHT: 315 LBS | RESPIRATION RATE: 16 BRPM | SYSTOLIC BLOOD PRESSURE: 112 MMHG | TEMPERATURE: 97.3 F | HEIGHT: 75 IN | DIASTOLIC BLOOD PRESSURE: 80 MMHG | BODY MASS INDEX: 39.17 KG/M2 | HEART RATE: 84 BPM

## 2024-11-06 DIAGNOSIS — J02.9 SORE THROAT: ICD-10-CM

## 2024-11-06 DIAGNOSIS — F41.1 GENERALIZED ANXIETY DISORDER: ICD-10-CM

## 2024-11-06 DIAGNOSIS — R68.89 FLU-LIKE SYMPTOMS: Primary | ICD-10-CM

## 2024-11-06 DIAGNOSIS — F31.30 BIPOLAR AFFECTIVE DISORDER, CURRENT EPISODE DEPRESSED, CURRENT EPISODE SEVERITY UNSPECIFIED: ICD-10-CM

## 2024-11-06 DIAGNOSIS — R11.10 VOMITING, UNSPECIFIED VOMITING TYPE, UNSPECIFIED WHETHER NAUSEA PRESENT: ICD-10-CM

## 2024-11-06 LAB
EXPIRATION DATE: NORMAL
EXPIRATION DATE: NORMAL
FLUAV AG UPPER RESP QL IA.RAPID: NOT DETECTED
FLUBV AG UPPER RESP QL IA.RAPID: NOT DETECTED
INTERNAL CONTROL: NORMAL
INTERNAL CONTROL: NORMAL
Lab: NORMAL
Lab: NORMAL
S PYO AG THROAT QL: NEGATIVE
SARS-COV-2 AG UPPER RESP QL IA.RAPID: NOT DETECTED

## 2024-11-06 PROCEDURE — 3074F SYST BP LT 130 MM HG: CPT | Performed by: NURSE PRACTITIONER

## 2024-11-06 PROCEDURE — 1160F RVW MEDS BY RX/DR IN RCRD: CPT | Performed by: NURSE PRACTITIONER

## 2024-11-06 PROCEDURE — 1125F AMNT PAIN NOTED PAIN PRSNT: CPT | Performed by: NURSE PRACTITIONER

## 2024-11-06 PROCEDURE — 3044F HG A1C LEVEL LT 7.0%: CPT | Performed by: NURSE PRACTITIONER

## 2024-11-06 PROCEDURE — 87428 SARSCOV & INF VIR A&B AG IA: CPT | Performed by: NURSE PRACTITIONER

## 2024-11-06 PROCEDURE — 99213 OFFICE O/P EST LOW 20 MIN: CPT | Performed by: NURSE PRACTITIONER

## 2024-11-06 PROCEDURE — 3079F DIAST BP 80-89 MM HG: CPT | Performed by: NURSE PRACTITIONER

## 2024-11-06 PROCEDURE — 1159F MED LIST DOCD IN RCRD: CPT | Performed by: NURSE PRACTITIONER

## 2024-11-06 PROCEDURE — 87880 STREP A ASSAY W/OPTIC: CPT | Performed by: NURSE PRACTITIONER

## 2024-11-06 RX ORDER — ONDANSETRON 4 MG/1
4 TABLET, FILM COATED ORAL EVERY 8 HOURS PRN
Qty: 20 TABLET | Refills: 0 | Status: SHIPPED | OUTPATIENT
Start: 2024-11-06

## 2024-11-06 RX ORDER — LURASIDONE HYDROCHLORIDE 80 MG/1
80 TABLET, FILM COATED ORAL DAILY
Qty: 30 TABLET | Refills: 0 | Status: SHIPPED | OUTPATIENT
Start: 2024-11-06

## 2024-11-06 NOTE — TELEPHONE ENCOUNTER
Lurasidone HCl 80 MG Oral Tablet   Last ordered: 3 weeks ago (10/10/2024) by Sha Henriquez MD   Follow up 11/18/2024

## 2024-11-06 NOTE — PROGRESS NOTES
Patient Name: Arslan Butler  : 1990   MRN: [id number]     Chief Complaint:    Chief Complaint   Patient presents with    Abdominal Pain    Vomiting    Diarrhea    Fever       History of Present Illness: Arslan Butler is a 34 y.o. male.  History of Present Illness  The patient is a 34-year-old male who presents for evaluation of fever, nausea, vomiting, cough, diarrhea, stomach cramps, and extreme tiredness.    He reports experiencing severe sweating and has been self-medicating with over-the-counter remedies such as Pepto-Bismol and Tylenol, which have not provided relief. He denies consuming any potentially harmful food.         Subjective     Review of System: Review of Systems     Medications:     Current Outpatient Medications:     amphetamine-dextroamphetamine (Adderall) 10 MG tablet, Take 1 tablet by mouth Daily With Lunch., Disp: 30 tablet, Rfl: 0    amphetamine-dextroamphetamine XR (ADDERALL XR) 25 MG 24 hr capsule, Take 1 capsule by mouth Daily, Disp: 30 capsule, Rfl: 0    Cholecalciferol (Vitamin D3) 50 MCG (2000 UT) capsule, Take 1 capsule by mouth Daily., Disp: , Rfl:     Cholecalciferol 1.25 MG (52713 UT) tablet, Take 2,000 Units by mouth Daily., Disp: , Rfl:     diazePAM (Valium) 5 MG tablet, Take 1 tablet by mouth Daily As Needed for Anxiety (Panic Attacks)., Disp: 15 tablet, Rfl: 0    divalproex (DEPAKOTE ER) 250 MG 24 hr tablet, Take 1 tablet by mouth Daily., Disp: 90 tablet, Rfl: 1    ibuprofen (ADVIL,MOTRIN) 800 MG tablet, Take 1 tablet by mouth Every 8 (Eight) Hours As Needed for Mild Pain., Disp: 90 tablet, Rfl: 0    lisinopril (PRINIVIL,ZESTRIL) 10 MG tablet, Take 1 tablet by mouth Daily., Disp: 90 tablet, Rfl: 0    loratadine (CLARITIN) 10 MG tablet, Take 1 tablet by mouth Daily., Disp: , Rfl:     Semaglutide, 2 MG/DOSE, (Ozempic, 2 MG/DOSE,) 8 MG/3ML solution pen-injector, INJECT 2 MG SUBCUTANEOUSLY ONCE A WEEK, Disp: 3 mL, Rfl: 5    traZODone (DESYREL) 100 MG tablet, Take 1 tablet by  "mouth Every Night., Disp: 30 tablet, Rfl: 1    Lurasidone HCl (LATUDA) 80 MG tablet tablet, Take 1 tablet by mouth once daily, Disp: 30 tablet, Rfl: 0    ondansetron (ZOFRAN) 4 MG tablet, Take 1 tablet by mouth Every 8 (Eight) Hours As Needed for Nausea or Vomiting., Disp: 20 tablet, Rfl: 0    Allergies:   Allergies   Allergen Reactions    Sulfamethoxazole-Trimethoprim Hives    Metformin GI Intolerance       Objective     Physical Exam:   Vital Signs:   Vitals:    11/06/24 1326   BP: 112/80   BP Location: Right arm   Patient Position: Sitting   Cuff Size: Adult   Pulse: 84   Resp: 16   Temp: 97.3 °F (36.3 °C)   TempSrc: Infrared   Weight: (!) 156 kg (343 lb 12.8 oz)   Height: 190.5 cm (75\")   PainSc:   7           Physical Exam  Physical Exam  Patient appears ill.  Tympanic membranes (TMs) are scarred. Pharynx is clear.  No cervical adenopathy in the neck.  Lungs are clear.  Heart rate is regular.  Abdomen is soft and nontender.       Results  Laboratory Studies  Covid, flu, and strep tests were negative.     Assessment / Plan      Assessment/Plan:   Diagnoses and all orders for this visit:    1. Flu-like symptoms (Primary)    2. Sore throat    3. Vomiting, unspecified vomiting type, unspecified whether nausea present  -     POC Rapid Strep A  -     POCT SARS-CoV-2 + Flu Antigen REED  -     ondansetron (ZOFRAN) 4 MG tablet; Take 1 tablet by mouth Every 8 (Eight) Hours As Needed for Nausea or Vomiting.  Dispense: 20 tablet; Refill: 0       Assessment & Plan  1. Flu-like virus.  The patient presents with fever, nausea, vomiting, cough, diarrhea, stomach cramps, and extreme tiredness. Physical examination reveals scarred TMs, clear pharynx, no cervical adenopathy, regular heart rate, clear lungs, and a soft, non-tender abdomen. Tests for COVID-19, influenza, and strep were conducted, all of which returned negative results. He is advised to maintain hydration, continue with supportive care, and manage fever. A " prescription for Zofran tablets was provided. If there is no improvement or if symptoms worsen, he should seek immediate medical attention at the emergency room.           Follow Up:   prn  Patient or patient representative verbalized consent for the use of Ambient Listening during the visit with  CORTNEY Du for chart documentation. 11/6/2024  14:07 CORTNEY Metzger  [facility]

## 2024-11-18 ENCOUNTER — TELEMEDICINE (OUTPATIENT)
Dept: BEHAVIORAL HEALTH | Facility: CLINIC | Age: 34
End: 2024-11-18
Payer: COMMERCIAL

## 2024-11-18 DIAGNOSIS — F40.10 SOCIAL ANXIETY DISORDER: ICD-10-CM

## 2024-11-18 DIAGNOSIS — F41.1 GENERALIZED ANXIETY DISORDER: ICD-10-CM

## 2024-11-18 DIAGNOSIS — F51.01 PRIMARY INSOMNIA: ICD-10-CM

## 2024-11-18 DIAGNOSIS — F90.1 ATTENTION DEFICIT HYPERACTIVITY DISORDER (ADHD), PREDOMINANTLY HYPERACTIVE TYPE: ICD-10-CM

## 2024-11-18 DIAGNOSIS — F41.0 PANIC ATTACKS: ICD-10-CM

## 2024-11-18 DIAGNOSIS — F31.30 BIPOLAR AFFECTIVE DISORDER, CURRENT EPISODE DEPRESSED, CURRENT EPISODE SEVERITY UNSPECIFIED: Primary | ICD-10-CM

## 2024-11-18 PROCEDURE — 1160F RVW MEDS BY RX/DR IN RCRD: CPT

## 2024-11-18 PROCEDURE — 1159F MED LIST DOCD IN RCRD: CPT

## 2024-11-18 PROCEDURE — 99214 OFFICE O/P EST MOD 30 MIN: CPT

## 2024-11-18 RX ORDER — DEXTROAMPHETAMINE SACCHARATE, AMPHETAMINE ASPARTATE MONOHYDRATE, DEXTROAMPHETAMINE SULFATE AND AMPHETAMINE SULFATE 7.5; 7.5; 7.5; 7.5 MG/1; MG/1; MG/1; MG/1
30 CAPSULE, EXTENDED RELEASE ORAL EVERY MORNING
Qty: 30 CAPSULE | Refills: 0 | Status: SHIPPED | OUTPATIENT
Start: 2024-11-22

## 2024-11-18 NOTE — PROGRESS NOTES
[facility]  Medication Management Follow-up  Virtual MyChart Visit  Mode of Visit: Video  Location of patient: -HOME-  Location of provider: [facility] office [address], [address], [place] [address].  You have chosen to receive care through a telehealth visit.  The patient has signed the video visit consent form.  The visit included audio and video interaction. No technical issues occurred during this visit.  Patient is being seen via telehealth and confirm that they are in a secure environment for this session. The patient's condition being diagnosed/treated is appropriate for telemedicine. The provider identified herself as well as her credentials. The electronic data is encrypted and password protected, and the patient has been advised of the potential risks to privacy not withstanding such measures.    Record Review is below for 11/18/2024 :   11/12/2024 glucose 108, BUN 6, AST 38, CBC and CMP are otherwise reassuring  EKG Results:  None in record  Head Imaging:  None in record  Vital Signs:   There were no vitals taken for this visit.    Chief Complaint: Bipolar depression. ADHD.     History of Present Illness:   Arslan Butler is a 34 y.o. male who presents today for follow-up and medication management for:    ICD-10-CM ICD-9-CM   1. Bipolar affective disorder, current episode depressed, current episode severity unspecified  F31.30 296.50   2. Generalized anxiety disorder  F41.1 300.02   3. Attention deficit hyperactivity disorder (ADHD), predominantly hyperactive type  F90.1 314.01   4. Primary insomnia  F51.01 307.42   5. Panic attacks  F41.0 300.01   6. Social anxiety disorder  F40.10 300.23       11/18/2024 Patient is taking medications as prescribed and is tolerating them well.   Bipolar Depression and Anxiety  Progression of symptoms, frequency, and intensity is waxing and waning but better overall. Patient continues to experience feelings of sadness, excessive  anxiety and worry, anxiety, difficulty controlling the worry, and these symptoms are causing significant distress or impairment. Patient denies episodes of jaimee/hypomania, low mood, lost of interest in usual activities, feeling worthless, guilty, hopelessness, psychomotor agitation,. Denies thinking about death and dying, suicidal ideation, planning, or intent to self-harm.  Denies AVH.  Clinically significant distress or impairment in social, occupational, or other important areas of functioning is waxing and waning but better overall.  Panic Attack  Has only needed to use Valium a couple times since our last encounter. Progression of symptoms, frequency, and intensity is waxing and waning but better overall. The problem occurs intermittently and occasionally. Associated symptoms include sense of impending doom, unbearable foreboding that something terrible is going to happen, intense fear of losing control, palpitations, racing/pounding heartbeat, chest discomfort, shortness of breath, choking sensation, detachment, depersonalization, dissociation, and derealization and these symptoms are causing significant distress or impairment.   ADHD  Feels like the Adderall XR has been wearing off earlier in the day. Progression of symptoms, frequency, and intensity is gradually worsening. Patient reports gradually worsening in the ability to carry out very short and simple instructions, maintain attention and concentration for extended periods, make simple work-related decisions, and complete a normal workday and workweek without interruptions from psychologically based symptoms. Clinically significant distress or impairment in social, occupational, or other important areas of functioning is gradually worsening.  CBT/Supportive  Allowed patient to process topics such as recently started a night shift, recently moved into their own duplex. Setting goals to stick with this position until a day shift becomes available,  working with his wife. Individual psychotherapy was provided utilizing solution focused techniques to restore adaptive functioning, provide symptom relief, discuss values and strengths, manage stress, identify triggers, recognize patterns of behavior, acknowledge sources of feelings and behaviors, assess symptoms, provide support, and discuss interpersonal conflicts. The therapeutic alliance was strengthened to encourage the patient to express their thoughts and feelings.       09/23/2024 Patient is taking medications as prescribed and is tolerating them well.   Depression and Anxiety  Progression of symptoms, frequency, and intensity is waxing and waning. Patient continues to experience feelings of sadness, low mood, lost of interest in usual activities, inability to focus and concentrate that may interfere with daily tasks,  psychomotor agitation, excessive anxiety and worry, anxiety, difficulty controlling the worry, restlessness, feeling keyed up or on edge, irritability, and these symptoms are causing significant distress or impairment. Patient denies feeling worthless, guilty, hopelessness,. Denies thinking about death and dying, suicidal ideation, planning, or intent to self-harm.  Denies AVH.  Clinically significant distress or impairment in social, occupational, or other important areas of functioning is waxing and waning.  Panic Attack  Was terminated from his recent position because he missed too many days related to his anxiety disorder. He would lay in bed all day and not be able to do anything productive. Progression of symptoms, frequency, and intensity is waxing and waning. The problem occurs few times per week. Associated symptoms include sense of impending doom, unbearable foreboding that something terrible is going to happen, intense fear of losing control, palpitations, racing/pounding heartbeat, chest discomfort, shortness of breath, choking sensation, detachment, depersonalization, and  dissociation and these symptoms are causing significant distress or impairment.   ADHD  Progression of symptoms, frequency, and intensity is gradually improving. Patient reports improvement in the ability to carry out very short and simple instructions, maintain attention and concentration for extended periods, make simple work-related decisions, and complete a normal workday and workweek without interruptions from psychologically based symptoms. Clinically significant distress or impairment in social, occupational, or other important areas of functioning is gradually improving.  CBT/Supportive  Allowed patient to process topics such as struggling with keeping employment related to anxiety, has applied for SSDI x6 and been denied every time, appeals have also been denied. Currently staying with family, this is frustrating to his wife. Individual psychotherapy was provided utilizing solution focused techniques to restore adaptive functioning, provide symptom relief, discuss values and strengths, manage stress, identify triggers, recognize patterns of behavior, acknowledge sources of feelings and behaviors, assess symptoms, provide support, and discuss interpersonal conflicts. The therapeutic alliance was strengthened to encourage the patient to express their thoughts and feelings.   Continue Adderall IR 10 mg booster dose at noon  Continue Latuda to 80 mg daily  Continue Depakote  mg at bedtime  Continue Adderall XR to 25 mg daily at next refill  Continue trazodone 50 mg at bedtime  Continue Valium 5 mg qd as needed for anxiety and panic attacks  Ambulatory referral for neuropsychological testing: ADHD  Follow-up 1 month    08/05/2024 Patient is taking medications as prescribed and is tolerating them well.   Depression and Anxiety  Has been maintaining employment, however, has been having difficulty with desire, motivation to get up and go to work. Progression of symptoms, frequency, and intensity is waxing and  waning. Patient continues to experience feelings of sadness, low mood, lost of interest in usual activities, anhedonia, low energy, feeling worthless, hopelessness, irritability, lack of motivation and these symptoms are causing significant distress or impairment. Denies thinking about death and dying, suicidal ideation, planning, or intent to self-harm.  Denies AVH.  Clinically significant distress or impairment in social, occupational, or other important areas of functioning is waxing and waning.  Panic Attack  Progression of symptoms, frequency, and intensity is waxing and waning but better overall. The problem occurs intermittently. Associated symptoms include sense of impending doom, unbearable foreboding that something terrible is going to happen, intense fear of losing control, palpitations, racing/pounding heartbeat, chest discomfort, shortness of breath, choking sensation, detachment, depersonalization, dissociation, excessive perspiration, and derealization and these symptoms are causing significant distress or impairment.   ADHD  Progression of symptoms, frequency, and intensity is improving. Patient reports improvement in the ability to carry out very short and simple instructions, maintain attention and concentration for extended periods, make simple work-related decisions, and complete a normal workday and workweek without interruptions from psychologically based symptoms. Clinically significant distress or impairment in social, occupational, or other important areas of functioning is improving.  CBT/Supportive  Allowed patient to process topics such as working on changing behavior to influence emotions.  Trying to do what he would do if he felt motivated.  Practicing arguing the opposite of negative thoughts and challenging them.  Instead of focusing on failure, consider reasons why there can be success. Individual psychotherapy was provided utilizing solution focused techniques to restore adaptive  functioning, provide symptom relief, discuss values and strengths, manage stress, identify triggers, recognize patterns of behavior, acknowledge sources of feelings and behaviors, assess symptoms, provide support, and discuss interpersonal conflicts. The therapeutic alliance was strengthened to encourage the patient to express their thoughts and feelings.   Continue Adderall IR 10 mg booster dose at noon  Continue Latuda to 80 mg daily  Continue Depakote  mg at bedtime  Continue Adderall XR to 25 mg daily at next refill  Continue Valium 5 mg qd as needed for anxiety and panic attacks  Ambulatory referral for neuropsychological testing: ADHD  Follow-up 1 month    06/28/2024 Patient is taking medications as prescribed and is tolerating them well.   Bipolar Depression and Anxiety/Social Anxiety  Was denied for SSI after his appeal. Currently off work due to shoulder injury. Progression of symptoms, frequency, and intensity is gradually worsening. Patient continues to experience low mood, lost of interest in usual activities, psychomotor agitation, excessive anxiety and worry, anxiety, difficulty controlling the worry, restlessness, feeling keyed up or on edge, irritability, panic attacks, and these symptoms are causing significant distress or impairment. Patient denies feeling worthless, guilty, hopelessness,. Denies thinking about death and dying, suicidal ideation, planning, or intent to self-harm.  Denies AVH.  Clinically significant distress or impairment in social, occupational, or other important areas of functioning is gradually worsening.  Panic Attack and anxiety and social anxiety   He has only needed to take valium a few times each week. Patient shared he does use breathing to help with panic attacks. This is a chronic problem. The current episode started more than 1 year ago. The problem occurs daily. The problem has been gradually worsening. Associated symptoms include sense of impending doom,  "unbearable foreboding that something terrible is going to happen, intense fear of losing control, palpitations, racing/pounding heartbeat, chest discomfort, shortness of breath, choking sensation, detachment, depersonalization, and dissociation.   ADHD  Progression of symptoms, frequency, and intensity is waxing and waning. Noticing a \"crash\" later in the day and afternoon. Patient reports waxing and waning in the ability to carry out very short and simple instructions, maintain attention and concentration for extended periods, make simple work-related decisions, and complete a normal workday and workweek without interruptions from psychologically based symptoms. Clinically significant distress or impairment in social, occupational, or other important areas of functioning is waxing and waning.  Insomnia is well-controlled with mirtazapine  Start Adderall IR 10 mg booster dose at noon  Increase Latuda to 80 mg daily  Continue Depakote  mg at bedtime  Continue Adderall XR 20 mg daily at next refill  Continue Valium 5 mg qd as needed for anxiety and panic attacks  Ambulatory referral for neuropsychological testing: ADHD  Continue mirtazapine to 15 mg at bedtime  Follow-up 1 month    03/19/2024 Patient is taking medications as prescribed and is tolerating them well.   Patient stated he has applied for disability again.  Patient stated he is still waiting to hear about placement at [facility]. Neither him or his wife are working currently and they are staying with her aunt.   Depressed and irritable moods mostly related to situational stressors, he feels predominantly well-controlled with current medications.   ADHD  Has been taking increased dose of Adderall XR for approximately 1.5 months.  Patient reports moderate impairment in the ability to carry out very short and simple instructions, maintain attention and concentration for extended periods, make simple work-related decisions, and complete a normal workday and " workweek without interruptions from psychologically based symptoms. Symptoms are persistent and significantly interfere with major life activities and/or result in significant suffering.  Panic Attack and anxiety and social anxiety   Patient reported he has been trying to figure out the root cause of his anxiety. He has only needed to take valium a few times each week. Patient shared he does use breathing to help with panic attacks. Patient described he may have had less panic attacks because he has not been out as much. This is a chronic problem. The current episode started more than 1 year ago. The problem occurs daily. The problem has been gradually worsening. Associated symptoms include sense of impending doom, unbearable foreboding that something terrible is going to happen, intense fear of losing control, palpitations, racing/pounding heartbeat, chest discomfort, shortness of breath, choking sensation, detachment, depersonalization, and dissociation.   Insomnia is well-controlled with mirtazapine  Denies suicidal ideation.  Denies AVH.  We will continue to monitor for mood, behavior, and safety.  Continue Latuda to 60 mg daily  Continue Depakote ER 250mg at bedtime  Continue Adderall XR 20 mg daily at next refill  Continue Valium 5 mg qd as needed for anxiety and panic attacks  Ambulatory referral for neuropsychological testing: ADHD  Increase mirtazapine to 15 mg at bedtime  Follow-up 1 month    Record Review is below for 03/19/2024 :   8/17/2023 TSH 0.695, free T41.26, lipid panel is reassuring, hemoglobin A1c 6.10, glucose 136, ALT 52, AST 42, CBC and CMP are otherwise reassuring.  EKG Results:  SCANNED EKG (12/29/2019)   Head Imaging:  None in record    01/26/2024 Patient is taking medications as prescribed and is tolerating them well.   Increase in Adderall is helping, he went and passed testing that he failed previously because he could not focus.   He is looking forward to hopefully getting a new  "position at the "[employer] it is one of the jobs that he was able to maintain for several years. Moods have been stable. He is thinking more clearly since treating ADHD with Adderall.   Panic attacks have decreased in frequency and intensity.   Trouble sleeping, falling asleep, several nighttime awakenings  Some marital challenges, they have been fighting a lot, mostly related to financial concerns.   Discussing referral for more intense individual psychotherapy.   Denies suicidal ideation.  Denies AVH.  We will continue to monitor for mood, behavior, and safety.  Continue Latuda to 60 mg daily  Continue Depakote ER 250mg at bedtime  Plan to increase Adderall XR to 20 mg daily at next refill  Continue Valium 5 mg qd as needed for anxiety and panic attacks  Continue mirtazapine 7.5 mg at bedtime  Follow-up 1 month    Record Review is below for 01/26/2024 :   8/17/2023 TSH 0.695, free T41.26, lipid panel is reassuring, hemoglobin A1c 6.10, glucose 136, ALT 52, AST 42, CBC and CMP are otherwise reassuring.  EKG Results:  SCANNED EKG (12/29/2019)   Head Imaging:  None in record    12/29/2023 Patient is taking medications as prescribed and is tolerating them well.   \"It's been going okay.\" He lost his job at [employer], he had a major panic attack and left. His whole shirt was soaked, his heart was racing. He is looking for another job. He did not have his rescue diazepam with him. Moods have been stable. He is thinking more clearly since treating ADHD with Adderall. Trouble sleeping, falling asleep, several nighttime awakenings.   Denies  intrusive thoughts, but having intense anxiety, mostly related to losing another job. He has applied for disability in the past and been denied.   Denies suicidal ideation.  Denies AVH.  We will continue to monitor for mood, behavior, and safety.  Continue Latuda to 60 mg daily  Continue Depakote ER 250mg at bedtime  Plan to increase Adderall XR to 15 mg daily at next refill  Continue " Valium 5 mg qd as needed for anxiety and panic attacks  Start mirtazapine 7.5 mg at bedtime  Follow-up 1 month    Record Review is below for 12/29/2023 :   8/17/2023 TSH 0.695, free T41.26, lipid panel is reassuring, hemoglobin A1c 6.10, glucose 136, ALT 52, AST 42, CBC and CMP are otherwise reassuring.  EKG Results:  SCANNED EKG (12/29/2019)   Head Imaging:  None in record    12/01/2023 Patient is taking medications as prescribed and is tolerating them well.   He hasn't experienced any major mood fluctuations, jaimee, or hypomania. It has only been one week but we wanted to closely monitor mood.  He hasn't noticed a great difference but his wife is saying he seems less flustered.   We are still going to target ADHD, anxiety symptoms with Adderall.   He says he has felt a slight increase in anxiety, but denies panic attacks.   He has found a new job with [employer] and will start in a couple weeks.   Denies suicidal ideation.  Denies AVH.  We will continue to monitor for mood, behavior, and safety.  Continue Latuda to 60 mg daily  Continue Depakote ER 250mg at bedtime  Continue Adderall XR to 10 mg daily  Start Valium 5 mg qd as needed for anxiety and panic attacks  Follow-up 1 month    Record Review is below for 12/01/2023 : I have thoroughly reviewed the patient's electronic medical record to include previous encounters, care everywhere, notes, medications, labs, SHAUNA and UDS (if applicable), imaging, and EKG's.  Pertinent information is included in this note.  8/17/2023 TSH 0.695, free T41.26, lipid panel is reassuring, hemoglobin A1c 6.10, glucose 136, ALT 52, AST 42, CBC and CMP are otherwise reassuring.  EKG Results:  SCANNED EKG (12/29/2019)   Head Imaging:  None in record      11/03/2023 Patient is taking medications as prescribed and is tolerating them well.   He has had a positive strep and flu test and has been sick all week. He hasn't experienced any major mood fluctuations, jaimee, or hypomania. It has only  been one week but we wanted to closely monitor mood.  He hasn't noticed a great difference but his wife is saying he seems less flustered.   We are still going to target ADHD, anxiety symptoms with Adderall.   He says he has felt a slight increase in anxiety, but denies panic attacks.   He has found a new job with [employer] and will start in a couple weeks.   Denies suicidal ideation.  Denies AVH.  We will continue to monitor for mood, behavior, and safety.  Continue Latuda to 60 mg daily  Continue Depakote ER 250mg at bedtime  Continue Adderall XR 5 mg daily  Follow-up 1 month    Record Review is below for 11/03/2023 : I have thoroughly reviewed the patient's electronic medical record to include previous encounters, care everywhere, notes, medications, labs, SHAUNA and UDS (if applicable), imaging, and EKG's.  Pertinent information is included in this note.  8/17/2023 TSH 0.695, free T41.26, lipid panel is reassuring, hemoglobin A1c 6.10, glucose 136, ALT 52, AST 42, CBC and CMP are otherwise reassuring.  EKG Results:  SCANNED EKG (12/29/2019)   Head Imaging:  None in record      10/24/2023 Patient is taking medications as prescribed and is tolerating them well.   Recently had to move to his mom's house because after he lost his jobs they lost their house and car.   He has still been self-sabotaging with his thoughts and has a lot of self-doubt. We continue to discuss the possibility of treating ADHD now that we have targeted mood stabilization with Latuda and Depakote ER. Patient has failed several classifications of psychotropic medications and has not ever been treated for ADHD. He has a compelling childhood assessment. Denies suicidal ideation.  Denies AVH.  We will continue to monitor for mood, behavior, and safety.  Continue Latuda to 60 mg daily  Continue Depakote ER 250mg at bedtime  Start Adderall XR 5 mg daily  UDS  CSA  Follow-up 1 week    Record Review is below for 10/24/2023 : I have thoroughly reviewed  "the patient's electronic medical record to include previous encounters, care everywhere, notes, medications, labs, SHAUNA and UDS (if applicable), imaging, and EKG's.  Pertinent information is included in this note.  8/17/2023 TSH 0.695, free T41.26, lipid panel is reassuring, hemoglobin A1c 6.10, glucose 136, ALT 52, AST 42, CBC and CMP are otherwise reassuring.  EKG Results:  SCANNED EKG (12/29/2019)   Head Imaging:  None in record      09/21/2023 Patient is taking medications as prescribed and is tolerating them well.   Has been through 4 jobs since we last talked. He says that he would start the job and then would talk himself for attendance. He is regretting quitting the most recent job because it was a fairly simple job.   His wife, Belia, is noticing that he is having more \"ups\" in a good way.   We are discussing the possibility of treating patient for a compelling childhood assessment for ADHD since we have been well with some mood stabilization.  He is still struggling with anxiety.  Panic attacks a few times a week. Denies suicidal ideation.  Denies AVH.  We will continue to monitor for mood, behavior, and safety.  Increase Latuda to 60 mg daily  Continue Depakote  mg at bedtime  Follow-up 1 month    Record Review is below for 09/21/2023 : I have thoroughly reviewed the patient's electronic medical record to include previous encounters, care everywhere, notes, medications, labs, SHAUNA and UDS (if applicable), imaging, and EKG's.  Pertinent information is included in this note.  8/17/2023 TSH 0.695, free T41.26, lipid panel is reassuring, hemoglobin A1c 6.10, glucose 136, ALT 52, AST 42, CBC and CMP are otherwise reassuring.  EKG Results:  SCANNED EKG (12/29/2019)   Head Imaging:  None in record    08/22/2023 Patient is taking medications as prescribed and is tolerating them well.   Still having mood swings, anger outbursts, latuda is helping with depression.   Having some altercations with wife " about finances.   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  Depression  Visit Type: follow-up (Bipolar, PETR, Panic Attacks)  Patient presents with the following symptoms: depressed mood, excessive worry, irritability, muscle tension, nervousness/anxiety, psychomotor agitation and restlessness.  Patient is not experiencing: anhedonia, compulsions, suicidal ideas, suicidal planning and thoughts of death.  Frequency of symptoms: most days   Severity: causing significant distress   Sleep quality: fair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

## 2024-11-18 NOTE — PATIENT INSTRUCTIONS
1.  Please return to clinic at your next scheduled visit.  Please contact the clinic ([phone]) at least 24 hours prior in the event you need to cancel.  2.  Do no harm to yourself or others.    3.  Avoid alcohol and drugs.    4.  Take all medications as prescribed.  Please contact the clinic with any concerns. If you are in need of medication refills, please call the clinic at [phone].    5. Should you want to get in touch with your provider, CORTNEY Gray, please contact the office ([phone]), and staff will be able to page Sherrie directly.  6. In the event you have personal crisis, contact the following crisis numbers: Suicide Prevention Hotline 1-753.243.9584; [facility] Helpline 5-603-550-SWCP; [facility] Emergency Room [phone]; text HELLO to 717074; or 785.     SPECIFIC RECOMMENDATIONS:     1.      Medications discussed at this encounter:     New Medications Ordered This Visit   Medications    amphetamine-dextroamphetamine XR (Adderall XR) 30 MG 24 hr capsule     Sig: Take 1 capsule by mouth Every Morning     Dispense:  30 capsule     Refill:  0     Increase dose. Thx 4 All U Do!                       2.      Psychotherapy recommendations: We will continue therapy at future visits.     3.     Return to clinic: Return in about 2 months (around 1/18/2025).

## 2024-11-20 ENCOUNTER — OFFICE VISIT (OUTPATIENT)
Dept: INTERNAL MEDICINE | Facility: CLINIC | Age: 34
End: 2024-11-20
Payer: COMMERCIAL

## 2024-11-20 VITALS
HEART RATE: 88 BPM | OXYGEN SATURATION: 96 % | RESPIRATION RATE: 14 BRPM | TEMPERATURE: 97.1 F | BODY MASS INDEX: 39.17 KG/M2 | SYSTOLIC BLOOD PRESSURE: 136 MMHG | DIASTOLIC BLOOD PRESSURE: 84 MMHG | HEIGHT: 75 IN | WEIGHT: 315 LBS

## 2024-11-20 DIAGNOSIS — E11.65 TYPE 2 DIABETES MELLITUS WITH HYPERGLYCEMIA, WITHOUT LONG-TERM CURRENT USE OF INSULIN: Primary | ICD-10-CM

## 2024-11-20 DIAGNOSIS — E66.813 CLASS 3 SEVERE OBESITY DUE TO EXCESS CALORIES WITH SERIOUS COMORBIDITY AND BODY MASS INDEX (BMI) OF 45.0 TO 49.9 IN ADULT: ICD-10-CM

## 2024-11-20 DIAGNOSIS — M12.812 ROTATOR CUFF ARTHROPATHY OF LEFT SHOULDER: ICD-10-CM

## 2024-11-20 DIAGNOSIS — F90.0 ATTENTION DEFICIT HYPERACTIVITY DISORDER (ADHD), PREDOMINANTLY INATTENTIVE TYPE: ICD-10-CM

## 2024-11-20 DIAGNOSIS — E78.2 MIXED HYPERLIPIDEMIA: ICD-10-CM

## 2024-11-20 DIAGNOSIS — F41.1 GENERALIZED ANXIETY DISORDER: ICD-10-CM

## 2024-11-20 DIAGNOSIS — I10 ESSENTIAL HYPERTENSION: ICD-10-CM

## 2024-11-20 DIAGNOSIS — K76.0 HEPATIC STEATOSIS: ICD-10-CM

## 2024-11-20 DIAGNOSIS — E66.01 CLASS 3 SEVERE OBESITY DUE TO EXCESS CALORIES WITH SERIOUS COMORBIDITY AND BODY MASS INDEX (BMI) OF 40.0 TO 44.9 IN ADULT: ICD-10-CM

## 2024-11-20 DIAGNOSIS — A07.2 DIARRHEA DUE TO CRYPTOSPORIDIUM: ICD-10-CM

## 2024-11-20 DIAGNOSIS — E66.813 CLASS 3 SEVERE OBESITY DUE TO EXCESS CALORIES WITH SERIOUS COMORBIDITY AND BODY MASS INDEX (BMI) OF 40.0 TO 44.9 IN ADULT: ICD-10-CM

## 2024-11-20 DIAGNOSIS — E55.9 VITAMIN D INSUFFICIENCY: ICD-10-CM

## 2024-11-20 DIAGNOSIS — E66.01 CLASS 3 SEVERE OBESITY DUE TO EXCESS CALORIES WITH SERIOUS COMORBIDITY AND BODY MASS INDEX (BMI) OF 45.0 TO 49.9 IN ADULT: ICD-10-CM

## 2024-11-20 LAB
25(OH)D3 SERPL-MCNC: 21.2 NG/ML (ref 30–100)
ALBUMIN SERPL-MCNC: 3.8 G/DL (ref 3.5–5.2)
ALBUMIN/GLOB SERPL: 1.3 G/DL
ALP SERPL-CCNC: 64 U/L (ref 39–117)
ALT SERPL W P-5'-P-CCNC: 43 U/L (ref 1–41)
ANION GAP SERPL CALCULATED.3IONS-SCNC: 7.6 MMOL/L (ref 5–15)
AST SERPL-CCNC: 32 U/L (ref 1–40)
BILIRUB SERPL-MCNC: 0.4 MG/DL (ref 0–1.2)
BUN SERPL-MCNC: 9 MG/DL (ref 6–20)
BUN/CREAT SERPL: 8.7 (ref 7–25)
CALCIUM SPEC-SCNC: 9 MG/DL (ref 8.6–10.5)
CHLORIDE SERPL-SCNC: 109 MMOL/L (ref 98–107)
CHOLEST SERPL-MCNC: 91 MG/DL (ref 0–200)
CO2 SERPL-SCNC: 23.4 MMOL/L (ref 22–29)
CREAT SERPL-MCNC: 1.03 MG/DL (ref 0.76–1.27)
CRP SERPL-MCNC: 0.52 MG/DL (ref 0–0.5)
EGFRCR SERPLBLD CKD-EPI 2021: 98.4 ML/MIN/1.73
EXPIRATION DATE: NORMAL
GLOBULIN UR ELPH-MCNC: 2.9 GM/DL
GLUCOSE SERPL-MCNC: 99 MG/DL (ref 65–99)
HBA1C MFR BLD: 5.5 % (ref 4.5–5.7)
HDLC SERPL-MCNC: 22 MG/DL (ref 40–60)
INR PPP: 1 (ref 0.9–1.1)
LDLC SERPL CALC-MCNC: 48 MG/DL (ref 0–100)
LDLC/HDLC SERPL: 2.11 {RATIO}
Lab: NORMAL
POTASSIUM SERPL-SCNC: 3.8 MMOL/L (ref 3.5–5.2)
PROT SERPL-MCNC: 6.7 G/DL (ref 6–8.5)
SODIUM SERPL-SCNC: 140 MMOL/L (ref 136–145)
TRIGL SERPL-MCNC: 113 MG/DL (ref 0–150)
VLDLC SERPL-MCNC: 21 MG/DL (ref 5–40)

## 2024-11-20 PROCEDURE — 82465 ASSAY BLD/SERUM CHOLESTEROL: CPT | Performed by: STUDENT IN AN ORGANIZED HEALTH CARE EDUCATION/TRAINING PROGRAM

## 2024-11-20 PROCEDURE — 1160F RVW MEDS BY RX/DR IN RCRD: CPT | Performed by: STUDENT IN AN ORGANIZED HEALTH CARE EDUCATION/TRAINING PROGRAM

## 2024-11-20 PROCEDURE — 83036 HEMOGLOBIN GLYCOSYLATED A1C: CPT | Performed by: STUDENT IN AN ORGANIZED HEALTH CARE EDUCATION/TRAINING PROGRAM

## 2024-11-20 PROCEDURE — 85610 PROTHROMBIN TIME: CPT | Performed by: STUDENT IN AN ORGANIZED HEALTH CARE EDUCATION/TRAINING PROGRAM

## 2024-11-20 PROCEDURE — 1126F AMNT PAIN NOTED NONE PRSNT: CPT | Performed by: STUDENT IN AN ORGANIZED HEALTH CARE EDUCATION/TRAINING PROGRAM

## 2024-11-20 PROCEDURE — 83010 ASSAY OF HAPTOGLOBIN QUANT: CPT | Performed by: STUDENT IN AN ORGANIZED HEALTH CARE EDUCATION/TRAINING PROGRAM

## 2024-11-20 PROCEDURE — 86140 C-REACTIVE PROTEIN: CPT | Performed by: STUDENT IN AN ORGANIZED HEALTH CARE EDUCATION/TRAINING PROGRAM

## 2024-11-20 PROCEDURE — 99214 OFFICE O/P EST MOD 30 MIN: CPT | Performed by: STUDENT IN AN ORGANIZED HEALTH CARE EDUCATION/TRAINING PROGRAM

## 2024-11-20 PROCEDURE — 82570 ASSAY OF URINE CREATININE: CPT | Performed by: STUDENT IN AN ORGANIZED HEALTH CARE EDUCATION/TRAINING PROGRAM

## 2024-11-20 PROCEDURE — 1159F MED LIST DOCD IN RCRD: CPT | Performed by: STUDENT IN AN ORGANIZED HEALTH CARE EDUCATION/TRAINING PROGRAM

## 2024-11-20 PROCEDURE — 3079F DIAST BP 80-89 MM HG: CPT | Performed by: STUDENT IN AN ORGANIZED HEALTH CARE EDUCATION/TRAINING PROGRAM

## 2024-11-20 PROCEDURE — 80061 LIPID PANEL: CPT | Performed by: STUDENT IN AN ORGANIZED HEALTH CARE EDUCATION/TRAINING PROGRAM

## 2024-11-20 PROCEDURE — 83883 ASSAY NEPHELOMETRY NOT SPEC: CPT | Performed by: STUDENT IN AN ORGANIZED HEALTH CARE EDUCATION/TRAINING PROGRAM

## 2024-11-20 PROCEDURE — 82043 UR ALBUMIN QUANTITATIVE: CPT | Performed by: STUDENT IN AN ORGANIZED HEALTH CARE EDUCATION/TRAINING PROGRAM

## 2024-11-20 PROCEDURE — 3075F SYST BP GE 130 - 139MM HG: CPT | Performed by: STUDENT IN AN ORGANIZED HEALTH CARE EDUCATION/TRAINING PROGRAM

## 2024-11-20 PROCEDURE — 82306 VITAMIN D 25 HYDROXY: CPT | Performed by: STUDENT IN AN ORGANIZED HEALTH CARE EDUCATION/TRAINING PROGRAM

## 2024-11-20 PROCEDURE — 80053 COMPREHEN METABOLIC PANEL: CPT | Performed by: STUDENT IN AN ORGANIZED HEALTH CARE EDUCATION/TRAINING PROGRAM

## 2024-11-20 PROCEDURE — 82977 ASSAY OF GGT: CPT | Performed by: STUDENT IN AN ORGANIZED HEALTH CARE EDUCATION/TRAINING PROGRAM

## 2024-11-20 PROCEDURE — 3044F HG A1C LEVEL LT 7.0%: CPT | Performed by: STUDENT IN AN ORGANIZED HEALTH CARE EDUCATION/TRAINING PROGRAM

## 2024-11-20 PROCEDURE — 84478 ASSAY OF TRIGLYCERIDES: CPT | Performed by: STUDENT IN AN ORGANIZED HEALTH CARE EDUCATION/TRAINING PROGRAM

## 2024-11-20 PROCEDURE — 82172 ASSAY OF APOLIPOPROTEIN: CPT | Performed by: STUDENT IN AN ORGANIZED HEALTH CARE EDUCATION/TRAINING PROGRAM

## 2024-11-20 RX ORDER — METHYLPREDNISOLONE 4 MG/1
TABLET ORAL
COMMUNITY
Start: 2024-11-20 | End: 2024-11-20

## 2024-11-20 RX ORDER — LOPERAMIDE HYDROCHLORIDE 2 MG/1
2 CAPSULE ORAL 4 TIMES DAILY PRN
COMMUNITY
Start: 2024-11-19 | End: 2024-11-29

## 2024-11-20 RX ORDER — SEMAGLUTIDE 2.68 MG/ML
2 INJECTION, SOLUTION SUBCUTANEOUS WEEKLY
Qty: 9 ML | Refills: 3 | Status: SHIPPED | OUTPATIENT
Start: 2024-11-20

## 2024-11-20 NOTE — PROGRESS NOTES
Follow Up Office Visit      Date: 2024   Patient Name: Arslan Butler  : 1990   MRN: [id number]     Chief Complaint:    Chief Complaint   Patient presents with    ADD    Med Refill     Claritin, Ozempic       History of Present Illness: Arslan Butler is a 34 y.o. male who is here today to follow up with chronic care management.    History of Present Illness  The patient presents for evaluation of multiple medical concerns. He is accompanied by an adult female.    Weight Management  He reports no weight loss despite being on Ozempic and is seeking a refill. He had previously considered bariatric surgery but decided against it due to concerns about rapid weight loss and potential skin removal.    Non-compliance with Lisinopril  He has not been taking his prescribed lisinopril 10 mg.    Cryptosporidium Infection  He was diagnosed with cryptosporidium, which he believes he contracted from his roommate. Despite treatment with Imodium, he continues to experience symptoms, including loose stools after eating. A CT scan revealed enlarged intestines.  - Onset: After nicole from his roommate.  - Character: Loose stools after eating.  - Alleviating/Aggravating Factors: Treated with Imodium but continues to experience symptoms.  - Severity: CT scan revealed enlarged intestines.    Shoulder Issues  He was informed that he requires a shoulder replacement but was deemed too young for the procedure. Physical therapy exacerbated his condition. He did not receive any injections in his shoulder. He was told that he has a missing muscle in his shoulder.  - Onset: Not specified.  - Character: Condition exacerbated by physical therapy.  - Alleviating/Aggravating Factors: Physical therapy exacerbated condition; no injections received.  - Severity: Requires shoulder replacement but deemed too young; missing muscle in shoulder.    Management of ADHD, Anxiety, and Bipolar Disorder  He is currently under the care of a  specialist for ADHD, anxiety, and bipolar disorder. He reports that his condition is well-managed with Latuda. His Adderall dosage is being gradually increased.  - Alleviating/Aggravating Factors: Well-managed with Latuda; Adderall dosage being gradually increased.    IMMUNIZATIONS  - Up to date on Tdap vaccine      Subjective      Review of Systems:   Review of Systems   All other systems reviewed and are negative.      I have reviewed the patients family history, social history, past medical history, past surgical history and have updated it as appropriate.     Medications:     Current Outpatient Medications:     amphetamine-dextroamphetamine (Adderall) 10 MG tablet, Take 1 tablet by mouth Daily With Lunch., Disp: 30 tablet, Rfl: 0    [START ON 11/22/2024] amphetamine-dextroamphetamine XR (Adderall XR) 30 MG 24 hr capsule, Take 1 capsule by mouth Every Morning, Disp: 30 capsule, Rfl: 0    diazePAM (Valium) 5 MG tablet, Take 1 tablet by mouth Daily As Needed for Anxiety (Panic Attacks)., Disp: 15 tablet, Rfl: 0    divalproex (DEPAKOTE ER) 250 MG 24 hr tablet, Take 1 tablet by mouth Daily., Disp: 90 tablet, Rfl: 1    loperamide (IMODIUM) 2 MG capsule, Take 1 capsule by mouth 4 (Four) Times a Day As Needed for Diarrhea., Disp: , Rfl:     loratadine (CLARITIN) 10 MG tablet, Take 1 tablet by mouth Daily., Disp: , Rfl:     Lurasidone HCl (LATUDA) 80 MG tablet tablet, Take 1 tablet by mouth once daily, Disp: 30 tablet, Rfl: 0    ondansetron (ZOFRAN) 4 MG tablet, Take 1 tablet by mouth Every 8 (Eight) Hours As Needed for Nausea or Vomiting., Disp: 20 tablet, Rfl: 0    Semaglutide, 2 MG/DOSE, (Ozempic, 2 MG/DOSE,) 8 MG/3ML solution pen-injector, Inject 2 mg under the skin into the appropriate area as directed 1 (One) Time Per Week., Disp: 9 mL, Rfl: 3    traZODone (DESYREL) 100 MG tablet, Take 1 tablet by mouth Every Night., Disp: 30 tablet, Rfl: 1    vitamin D (ERGOCALCIFEROL) 1.25 MG (21731 UT) capsule capsule, Take 1  "capsule by mouth 1 (One) Time Per Week., Disp: 6 capsule, Rfl: 0    Allergies:   Allergies   Allergen Reactions    Sulfamethoxazole-Trimethoprim Hives and Rash    Metformin GI Intolerance       Objective     Physical Exam: Please see above  Vital Signs:   Vitals:    11/20/24 1125   BP: 136/84   BP Location: Right arm   Patient Position: Sitting   Cuff Size: Large Adult   Pulse: 88   Resp: 14   Temp: 97.1 °F (36.2 °C)   TempSrc: Infrared   SpO2: 96%  Comment: RA   Weight: (!) 157 kg (345 lb 9.6 oz)   Height: 190.5 cm (75\")   PainSc: 0-No pain     Body mass index is 43.2 kg/m².          Physical Exam  Vitals and nursing note reviewed.   Constitutional:       General: He is not in acute distress.     Appearance: Normal appearance. He is obese. He is not ill-appearing or toxic-appearing.   HENT:      Nose: No congestion or rhinorrhea.   Eyes:      General:         Right eye: No discharge.         Left eye: No discharge.      Conjunctiva/sclera: Conjunctivae normal.   Pulmonary:      Effort: Pulmonary effort is normal. No respiratory distress.   Abdominal:      General: Abdomen is flat. There is no distension.   Musculoskeletal:      Cervical back: Normal range of motion.   Skin:     Coloration: Skin is not jaundiced.      Findings: No rash.   Neurological:      General: No focal deficit present.      Mental Status: He is alert. Mental status is at baseline.      Coordination: Coordination normal.      Gait: Gait normal.   Psychiatric:         Mood and Affect: Mood normal.         Behavior: Behavior normal.         Thought Content: Thought content normal.         Judgment: Judgment normal.         Procedures    Results:   Results  - Laboratory Studies:    - A1c: 5.7%    Labs:   Hemoglobin A1C   Date Value Ref Range Status   11/20/2024 5.5 4.5 - 5.7 % Final   12/19/2023 6.00 (H) 4.80 - 5.60 % Final     TSH   Date Value Ref Range Status   02/13/2024 1.170 0.270 - 4.200 uIU/mL Final        Imaging:   No valid procedures " specified.     Assessment / Plan      Assessment/Plan:   Problem List Items Addressed This Visit       Generalized anxiety disorder    Hyperlipidemia    Relevant Orders    Lipid Panel (Completed)    Class 3 severe obesity due to excess calories with serious comorbidity and body mass index (BMI) of 40.0 to 44.9 in adult    Attention deficit hyperactivity disorder (ADHD), predominantly inattentive type    Essential hypertension    Relevant Medications    Semaglutide, 2 MG/DOSE, (Ozempic, 2 MG/DOSE,) 8 MG/3ML solution pen-injector    Vitamin D insufficiency    Relevant Medications    vitamin D (ERGOCALCIFEROL) 1.25 MG (65529 UT) capsule capsule    Other Relevant Orders    Vitamin D,25-Hydroxy (Completed)    Rotator cuff arthropathy of left shoulder    Type 2 diabetes mellitus with hyperglycemia, without long-term current use of insulin - Primary    Relevant Medications    Semaglutide, 2 MG/DOSE, (Ozempic, 2 MG/DOSE,) 8 MG/3ML solution pen-injector    Other Relevant Orders    POC Glycosylated Hemoglobin (Hb A1C) (Completed)    Microalbumin / Creatinine Urine Ratio - Urine, Clean Catch (Completed)    Hepatic steatosis    Relevant Orders    Comprehensive Metabolic Panel (Completed)    POC INR (Completed)    WEEKS Fibrosure Plus     Other Visit Diagnoses       Class 3 severe obesity due to excess calories with serious comorbidity and body mass index (BMI) of 45.0 to 49.9 in adult        Relevant Medications    Semaglutide, 2 MG/DOSE, (Ozempic, 2 MG/DOSE,) 8 MG/3ML solution pen-injector    Diarrhea due to cryptosporidium        Relevant Orders    Calprotectin, Fecal - Stool, Per Rectum    C-reactive Protein (Completed)    Comprehensive Metabolic Panel (Completed)    Occult Blood X 3, Stool - Stool, Per Rectum    Gastrointestinal Panel, PCR - Stool, Per Rectum    Stool Culture (Reference Lab) - Stool, Per Rectum    Clostridioides difficile Toxin, PCR - Stool, Per Rectum            Assessment & Plan  1. Weight management  -  Commendable weight loss trajectory  - A1c levels significantly improved (most recent A1c: 5.7%)  - Recheck A1c and urine microalbumin today  - Assess vitamin D levels  - Check cholesterol in preparation for upcoming health maintenance visit  - Monitor blood pressure at home and report readings above 140/90  - Perform C. difficile test due to frequency of symptoms  - Conduct GI PCR to examine stool for abnormalities  - Perform stool culture  - Check fecal calprotectin and C-reactive protein as general inflammatory markers  - Order metabolic panel to ensure no excessive dehydration  - Check stool for blood  - Conduct FibroSure test to assess for cirrhosis or fibrosis  - Check INR  - Continue weight loss efforts  - Inform clinic if symptoms persist despite normal test results  - Continue on Ozempic with several refills provided for a year's supply    2. Rotator cuff arthropathy  - Perform exercises that do not exacerbate pain (e.g., using an elastic band for resistance training)  - Gradually increase resistance to build strength  - Consider referral to orthopedics if pain becomes debilitating    3. Anxiety  - Continue current regimen of Latuda    4. ADHD  - Continue current regimen of Adderall 30 mg extended release and 10 mg in the afternoon    5. Cryptosporidium infection  - Experiencing loose stools with frequent episodes, especially after eating  - Additional testing: C. difficile test, GI PCR, stool culture, fecal calprotectin, C-reactive protein, metabolic panel, stool blood test  - Inform clinic if symptoms persist despite normal test results    6. Fatty liver disease  - Conduct FibroSure test to assess for cirrhosis or fibrosis  - Check INR  - Weight loss recommended as primary treatment    Follow-up  - Return in 1 month for follow-up    Follow Up:   Return in about 4 weeks (around 12/18/2024) for Annual physical.        Patient or patient representative verbalized consent for the use of Ambient Listening  during the visit with  Reji Rader MD for chart documentation. 11/21/2024  12:33 EST    Reji Rader MD  [facility] [place]

## 2024-11-21 LAB
ALBUMIN UR-MCNC: <1.2 MG/DL
CREAT UR-MCNC: 318.8 MG/DL
MICROALBUMIN/CREAT UR: NORMAL MG/G{CREAT}

## 2024-11-21 RX ORDER — ERGOCALCIFEROL 1.25 MG/1
50000 CAPSULE, LIQUID FILLED ORAL WEEKLY
Qty: 6 CAPSULE | Refills: 0 | Status: SHIPPED | OUTPATIENT
Start: 2024-11-21

## 2024-11-25 ENCOUNTER — TELEPHONE (OUTPATIENT)
Dept: INTERNAL MEDICINE | Facility: CLINIC | Age: 34
End: 2024-11-25
Payer: COMMERCIAL

## 2024-11-25 NOTE — TELEPHONE ENCOUNTER
RELAY:   Your attached results shows that your vitamin D level remains low and therefore additional supplementation would be recommended.  I sent a vitamin D supplement to your pharmacy and following this you can continue with simply a standard multivitamin.  The remainder of your lab results including cholesterol, generalized inflammatory marker, metabolic panel, and urine microalbumin were all within normal limits or only slightly abnormal and not clinically significant.  Your liver enzymes were only slightly elevated and we will continue to monitor this during future appointments.  I will continue to send you results as I receive them.  If you have any additional questions or concerns, please let us know.  We look forward to seeing you soon!

## 2024-11-25 NOTE — TELEPHONE ENCOUNTER
----- Message from Reji Rader sent at 11/25/2024  7:59 AM EST -----  Please relay the following message to the patient:      Your attached results shows that your vitamin D level remains low and therefore additional supplementation would be recommended.  I sent a vitamin D supplement to your pharmacy and following this you can continue with simply a standard multivitamin.  The remainder of your lab results including cholesterol, generalized inflammatory marker, metabolic panel, and urine microalbumin were all within normal limits or only slightly abnormal and not clinically significant.  Your liver enzymes were only slightly elevated and we will continue to monitor this during future appointments.  I will continue to send you results as I receive them.  If you have any additional questions or concerns, please let us know.  We look forward to seeing you soon!

## 2024-11-26 LAB
A2 MACROGLOB SERPL-MCNC: 110 MG/DL (ref 110–276)
ALT SERPL W P-5'-P-CCNC: 51 IU/L (ref 0–55)
APO A-I SERPL-MCNC: 77 MG/DL (ref 101–178)
AST SERPL W P-5'-P-CCNC: 36 IU/L (ref 0–40)
BILIRUB SERPL-MCNC: 0.2 MG/DL (ref 0–1.2)
CHOLEST SERPL-MCNC: 97 MG/DL (ref 100–199)
FIBROSIS SCORING:: ABNORMAL
FIBROSIS STAGE SERPL QL: ABNORMAL
GGT SERPL-CCNC: 52 IU/L (ref 0–65)
GLUCOSE SERPL-MCNC: 104 MG/DL (ref 70–99)
HAPTOGLOB SERPL-MCNC: 175 MG/DL (ref 17–317)
LABORATORY COMMENT REPORT: ABNORMAL
LIVER FIBR SCORE SERPL CALC.FIBROSURE: 0.06 (ref 0–0.21)
LIVER STEATOSIS GRADE SERPL QL: ABNORMAL
LIVER STEATOSIS SCORE SERPL: 0.69 (ref 0–0.4)
NASH GRADE SERPL QL: ABNORMAL
NASH INTERPRETATION SERPL-IMP: ABNORMAL
NASH SCORE SERPL: 0.41 (ref 0–0.25)
NASH SCORING: ABNORMAL
STEATOSIS SCORING: ABNORMAL
TEST PERFORMANCE INFO SPEC: ABNORMAL
TEST PERFORMANCE INFO SPEC: ABNORMAL
TRIGL SERPL-MCNC: 142 MG/DL (ref 0–149)

## 2024-12-02 ENCOUNTER — TELEPHONE (OUTPATIENT)
Dept: INTERNAL MEDICINE | Facility: CLINIC | Age: 34
End: 2024-12-02
Payer: COMMERCIAL

## 2024-12-02 DIAGNOSIS — F31.30 BIPOLAR AFFECTIVE DISORDER, CURRENT EPISODE DEPRESSED, CURRENT EPISODE SEVERITY UNSPECIFIED: ICD-10-CM

## 2024-12-02 DIAGNOSIS — F41.1 GENERALIZED ANXIETY DISORDER: ICD-10-CM

## 2024-12-02 RX ORDER — LURASIDONE HYDROCHLORIDE 80 MG/1
80 TABLET, FILM COATED ORAL DAILY
Qty: 30 TABLET | Refills: 2 | Status: SHIPPED | OUTPATIENT
Start: 2024-12-02

## 2024-12-02 NOTE — TELEPHONE ENCOUNTER
REFILL REQUEST:     Lurasidone HCl (LATUDA) 80 MG tablet tablet (11/06/2024)     F/UP-  01/27/2025.  LOV: 11/18/2024.

## 2024-12-02 NOTE — TELEPHONE ENCOUNTER
Left voice mail message for Pt to call back    Relay  Reji Rader MD [facility]  Please relay the following message to the patient:      Your attached results shows mild liver dysfunction in association with fatty liver disease.  We will discuss this in further detail during your next appointment, but the mainstay of treatment with a mild level of liver dysfunction at this time is typically additional weight loss with increasing exercise output.  I know that we had discussed this previously during other appointments, but we will go into further detail during your appointment on 1/23/2025.  I will continue to send you results as I receive them.  If you have any additional questions or concerns, please let us know.  We look forward to seeing you soon!

## 2024-12-02 NOTE — TELEPHONE ENCOUNTER
----- Message from Reji Rader sent at 12/2/2024  2:03 PM EST -----  Please relay the following message to the patient:      Your attached results shows mild liver dysfunction in association with fatty liver disease.  We will discuss this in further detail during your next appointment, but the mainstay of treatment with a mild level of liver dysfunction at this time is typically additional weight loss with increasing exercise output.  I know that we had discussed this previously during other appointments, but we will go into further detail during your appointment on 1/23/2025.  I will continue to send you results as I receive them.  If you have any additional questions or concerns, please let us know.  We look forward to seeing you soon!

## 2024-12-03 NOTE — TELEPHONE ENCOUNTER
2nd attempt     Relay  Reji Rader MD [facility]  Please relay the following message to the patient:      Your attached results shows mild liver dysfunction in association with fatty liver disease.  We will discuss this in further detail during your next appointment, but the mainstay of treatment with a mild level of liver dysfunction at this time is typically additional weight loss with increasing exercise output.  I know that we had discussed this previously during other appointments, but we will go into further detail during your appointment on 1/23/2025.  I will continue to send you results as I receive them.  If you have any additional questions or concerns, please let us know.  We look forward to seeing you soon!

## 2024-12-05 ENCOUNTER — TELEPHONE (OUTPATIENT)
Dept: INTERNAL MEDICINE | Facility: CLINIC | Age: 34
End: 2024-12-05

## 2024-12-05 DIAGNOSIS — M54.50 CHRONIC MIDLINE LOW BACK PAIN WITHOUT SCIATICA: Primary | ICD-10-CM

## 2024-12-05 DIAGNOSIS — G89.29 CHRONIC MIDLINE LOW BACK PAIN WITHOUT SCIATICA: Primary | ICD-10-CM

## 2024-12-05 NOTE — TELEPHONE ENCOUNTER
Caller: Arslan Butler    Relationship: Self    Best call back number: [phone]    What orders are you requesting (i.e. lab or imaging): PHYSICAL THERAPY FOR BACK     Where will you receive your lab/imaging services: [facility]

## 2024-12-09 ENCOUNTER — TELEPHONE (OUTPATIENT)
Dept: INTERNAL MEDICINE | Facility: CLINIC | Age: 34
End: 2024-12-09
Payer: COMMERCIAL

## 2024-12-09 NOTE — TELEPHONE ENCOUNTER
Reached patient at [phone]   Reminded him to call our scheduling department at [phone] to reschedule the Pulmonary Function Test appointment that he missed    I had also left a voice mail on 11/21/2024  I left a voice mail patient at [phone]   Reminded him to call our scheduling department at [phone] to reschedule his Pulmonary Function Test appointment   Voice mail ok per  Verbal

## 2024-12-12 ENCOUNTER — OFFICE VISIT (OUTPATIENT)
Dept: INTERNAL MEDICINE | Facility: CLINIC | Age: 34
End: 2024-12-12
Payer: COMMERCIAL

## 2024-12-12 VITALS
HEART RATE: 74 BPM | DIASTOLIC BLOOD PRESSURE: 96 MMHG | RESPIRATION RATE: 18 BRPM | SYSTOLIC BLOOD PRESSURE: 124 MMHG | BODY MASS INDEX: 42.97 KG/M2 | WEIGHT: 315 LBS | TEMPERATURE: 97.1 F

## 2024-12-12 DIAGNOSIS — E66.01 CLASS 3 SEVERE OBESITY DUE TO EXCESS CALORIES WITH SERIOUS COMORBIDITY AND BODY MASS INDEX (BMI) OF 40.0 TO 44.9 IN ADULT: ICD-10-CM

## 2024-12-12 DIAGNOSIS — M51.360 DEGENERATION OF INTERVERTEBRAL DISC OF LUMBAR REGION WITH DISCOGENIC BACK PAIN: Primary | ICD-10-CM

## 2024-12-12 DIAGNOSIS — E66.813 CLASS 3 SEVERE OBESITY DUE TO EXCESS CALORIES WITH SERIOUS COMORBIDITY AND BODY MASS INDEX (BMI) OF 40.0 TO 44.9 IN ADULT: ICD-10-CM

## 2024-12-12 DIAGNOSIS — E55.9 VITAMIN D INSUFFICIENCY: ICD-10-CM

## 2024-12-12 PROBLEM — Z71.3 PRE-BARIATRIC SURGERY NUTRITION EVALUATION: Status: RESOLVED | Noted: 2024-03-13 | Resolved: 2024-12-12

## 2024-12-12 RX ORDER — LIDOCAINE 4 G/G
1 PATCH TOPICAL EVERY 24 HOURS
Qty: 20 EACH | Refills: 0 | Status: SHIPPED | OUTPATIENT
Start: 2024-12-12

## 2024-12-12 RX ORDER — MELOXICAM 15 MG/1
15 TABLET ORAL DAILY
Qty: 14 TABLET | Refills: 0 | Status: SHIPPED | OUTPATIENT
Start: 2024-12-12

## 2024-12-12 RX ORDER — CYCLOBENZAPRINE HCL 10 MG
10 TABLET ORAL
Qty: 14 TABLET | Refills: 0 | Status: SHIPPED | OUTPATIENT
Start: 2024-12-12

## 2024-12-12 NOTE — PROGRESS NOTES
Follow Up Office Visit      Date: 2024   Patient Name: Arslan Butler  : 1990   MRN: [id number]     Chief Complaint:    Chief Complaint   Patient presents with    Back Pain     Hospital fu       History of Present Illness: Arslan Butler is a 34 y.o. male who is here today to follow up with emergency department visit.    History of Present Illness  The patient is a 34-year-old male here for an emergency department follow-up visit for significant lower back pain.    Lower Back Pain  He reports no improvement in his condition since his last visit to the emergency department. He has been experiencing persistent lower back pain, which he attributes to a recent move where he was lifting heavy items without assistance. The pain has escalated, making it difficult for him to bend down or sit comfortably. Despite receiving a Toradol injection at the hospital, there was no relief. A steroid pack administered two weeks prior also failed to alleviate the pain. He has attempted various over-the-counter remedies including ibuprofen, lidocaine patches, Robaxin, Tylenol, ice packs, and heating pads, all to no avail. He has a physical therapy session scheduled. The severity of his pain has rendered him unable to work for the past two weeks. 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 He has requested a doctor's note for his employer.  - Onset: Recent move involving lifting heavy items without assistance.  - Location: Lower back.  - Duration: Persistent since the recent move.  - Character: Significant pain, making it difficult to bend down or sit comfortably.  - Alleviating/Aggravating Factors: No relief from Toradol injection, steroid pack, ibuprofen, lidocaine patches, Robaxin, Tylenol, ice packs, or heating pads.  - Timing: Persistent since the recent move; no improvement since the last visit to the emergency department.  - Severity: Severe enough to render him  unable to work for the past two weeks.    MEDICATIONS  - Current: ibuprofen, lidocaine patches, Robaxin, Tylenol      Subjective      Review of Systems:   Review of Systems   All other systems reviewed and are negative.      I have reviewed the patients family history, social history, past medical history, past surgical history and have updated it as appropriate.     Medications:     Current Outpatient Medications:     amphetamine-dextroamphetamine (Adderall) 10 MG tablet, Take 1 tablet by mouth Daily With Lunch., Disp: 30 tablet, Rfl: 0    amphetamine-dextroamphetamine XR (Adderall XR) 30 MG 24 hr capsule, Take 1 capsule by mouth Every Morning, Disp: 30 capsule, Rfl: 0    diazePAM (Valium) 5 MG tablet, Take 1 tablet by mouth Daily As Needed for Anxiety (Panic Attacks)., Disp: 15 tablet, Rfl: 0    divalproex (DEPAKOTE ER) 250 MG 24 hr tablet, Take 1 tablet by mouth Daily., Disp: 90 tablet, Rfl: 1    loratadine (CLARITIN) 10 MG tablet, Take 1 tablet by mouth Daily., Disp: , Rfl:     Lurasidone HCl (LATUDA) 80 MG tablet tablet, Take 1 tablet by mouth once daily, Disp: 30 tablet, Rfl: 2    ondansetron (ZOFRAN) 4 MG tablet, Take 1 tablet by mouth Every 8 (Eight) Hours As Needed for Nausea or Vomiting., Disp: 20 tablet, Rfl: 0    Semaglutide, 2 MG/DOSE, (Ozempic, 2 MG/DOSE,) 8 MG/3ML solution pen-injector, Inject 2 mg under the skin into the appropriate area as directed 1 (One) Time Per Week., Disp: 9 mL, Rfl: 3    traZODone (DESYREL) 100 MG tablet, Take 1 tablet by mouth Every Night., Disp: 30 tablet, Rfl: 1    vitamin D (ERGOCALCIFEROL) 1.25 MG (64863 UT) capsule capsule, Take 1 capsule by mouth 1 (One) Time Per Week., Disp: 6 capsule, Rfl: 0    cyclobenzaprine (FLEXERIL) 10 MG tablet, Take 1 tablet by mouth every night at bedtime., Disp: 14 tablet, Rfl: 0    Lidocaine 4 %, Place 1 patch on the skin as directed by provider Daily. Remove & Discard patch within 12 hours or as directed by MD, Disp: 20 each, Rfl: 0     meloxicam (Mobic) 15 MG tablet, Take 1 tablet by mouth Daily., Disp: 14 tablet, Rfl: 0    Allergies:   Allergies   Allergen Reactions    Sulfamethoxazole-Trimethoprim Hives and Rash    Metformin GI Intolerance       Objective     Physical Exam: Please see above  Vital Signs:   Vitals:    12/12/24 0923   BP: 124/96   BP Location: Right arm   Patient Position: Sitting   Cuff Size: Large Adult   Pulse: 74   Resp: 18   Temp: 97.1 °F (36.2 °C)   TempSrc: Temporal   Weight: (!) 156 kg (343 lb 12.8 oz)   PainSc:   8   PainLoc: Back     Body mass index is 42.97 kg/m².          Physical Exam  Vitals and nursing note reviewed.   Constitutional:       General: He is not in acute distress.     Appearance: Normal appearance. He is normal weight. He is not ill-appearing or toxic-appearing.   HENT:      Nose: No congestion or rhinorrhea.   Eyes:      General:         Right eye: No discharge.         Left eye: No discharge.      Conjunctiva/sclera: Conjunctivae normal.   Pulmonary:      Effort: Pulmonary effort is normal. No respiratory distress.   Abdominal:      General: Abdomen is flat. There is no distension.   Musculoskeletal:         General: Tenderness (Significant tenderness throughout the lumbar spine with any active range of motion activity or walking) present.      Cervical back: Normal range of motion.   Skin:     Coloration: Skin is not jaundiced.      Findings: No rash.   Neurological:      General: No focal deficit present.      Mental Status: He is alert. Mental status is at baseline.      Coordination: Coordination normal.      Gait: Gait normal.   Psychiatric:         Mood and Affect: Mood normal.         Behavior: Behavior normal.         Thought Content: Thought content normal.         Judgment: Judgment normal.         Procedures    Results:   Results  - Laboratory Studies:    - A1c: 5.5%    - Imaging:    - CT lumbar spine:      - Mild disc herniation and degenerative disc disease at L5-S1    Labs:   Hemoglobin  A1C   Date Value Ref Range Status   11/20/2024 5.5 4.5 - 5.7 % Final   12/19/2023 6.00 (H) 4.80 - 5.60 % Final     TSH   Date Value Ref Range Status   02/13/2024 1.170 0.270 - 4.200 uIU/mL Final        Imaging:   No valid procedures specified.     Assessment / Plan      Assessment/Plan:   Problem List Items Addressed This Visit       Class 3 severe obesity due to excess calories with serious comorbidity and body mass index (BMI) of 40.0 to 44.9 in adult    Degeneration of intervertebral disc of lumbar region with discogenic back pain - Primary    Overview     Confirmed with CT lumbar spine on 12/1/2024         Relevant Medications    meloxicam (Mobic) 15 MG tablet    cyclobenzaprine (FLEXERIL) 10 MG tablet    Lidocaine 4 %    Other Relevant Orders    Ambulatory Referral to Pain Management Clinic    Vitamin D insufficiency       Assessment & Plan  1. Lower back pain  - Symptoms likely associated with degenerative disc disease observed on CT lumbar spine  - L5-S1 area showed mild disc herniation and degenerative disc disease without severe spinal cord stenosis  - No concern for cauda equina syndrome or significant spinal cord impact  - Weight loss has improved diabetes control and may alleviate lumbar degenerative disc disease symptoms over time  - Advised to continue weight loss efforts, avoid heavy lifting, and engage in low weight-bearing exercises (e.g., water jogging, swimming, elliptical machine)  - Recommended Yoga and Pilates classes to improve core strength and range of motion  - Avoid bed rest and sedentary behavior  - Advised to avoid additional steroid packs due to potential blood sugar elevation  - Prescription for Flexeril to be taken at night and meloxicam 15 mg once daily for 2 weeks  - Referral to interventional pain management  - Encouraged to continue using lidocaine patches  - Work excuse note to be provided for this week    Follow-up  - Patient to follow up on 01/23/2025    Follow Up:   Return in  about 6 weeks (around 1/23/2025) for Next scheduled follow up.        Patient or patient representative verbalized consent for the use of Ambient Listening during the visit with  Rjei Rader MD for chart documentation. 12/12/2024  09:57 EST    Reji Rader MD  [facility] [place]

## 2024-12-12 NOTE — PATIENT INSTRUCTIONS
"DASH Eating Plan  DASH stands for Dietary Approaches to Stop Hypertension. The DASH eating plan is a healthy eating plan that has been shown to:  Reduce high blood pressure (hypertension).  Reduce your risk for type 2 diabetes, heart disease, and stroke.  Help with weight loss.  What are tips for following this plan?  Reading food labels  Check food labels for the amount of salt (sodium) per serving. Choose foods with less than 5 percent of the Daily Value of sodium. Generally, foods with less than 300 milligrams (mg) of sodium per serving fit into this eating plan.  To find whole grains, look for the word \"whole\" as the first word in the ingredient list.  Shopping  Buy products labeled as \"low-sodium\" or \"no salt added.\"  Buy fresh foods. Avoid canned foods and pre-made or frozen meals.  Cooking  Avoid adding salt when cooking. Use salt-free seasonings or herbs instead of table salt or sea salt. Check with your health care provider or pharmacist before using salt substitutes.  Do not olmedo foods. Cook foods using healthy methods such as baking, boiling, grilling, roasting, and broiling instead.  Cook with heart-healthy oils, such as olive, canola, avocado, soybean, or sunflower oil.  Meal planning  Eat a balanced diet that includes:  4 or more servings of fruits and 4 or more servings of vegetables each day. Try to fill one-half of your plate with fruits and vegetables.  6-8 servings of whole grains each day.  Less than 6 oz (170 g) of lean meat, poultry, or fish each day. A 3-oz (85-g) serving of meat is about the same size as a deck of cards. One egg equals 1 oz (28 g).  2-3 servings of low-fat dairy each day. One serving is 1 cup (237 mL).  1 serving of nuts, seeds, or beans 5 times each week.  2-3 servings of heart-healthy fats. Healthy fats called omega-3 fatty acids are found in foods such as walnuts, flaxseeds, fortified milks, and eggs. These fats are also found in cold-water fish, such as sardines, salmon, " and mackerel.  Limit how much you eat of:  Canned or prepackaged foods.  Food that is high in trans fat, such as some fried foods.  Food that is high in saturated fat, such as fatty meat.  Desserts and other sweets, sugary drinks, and other foods with added sugar.  Full-fat dairy products.  Do not salt foods before eating.  Do not eat more than 4 egg yolks a week.  Try to eat at least 2 vegetarian meals a week.  Eat more home-cooked food and less restaurant, buffet, and fast food.  Lifestyle  When eating at a restaurant, ask that your food be prepared with less salt or no salt, if possible.  If you drink alcohol:  Limit how much you use to:  0-1 drink a day for women who are not pregnant.  0-2 drinks a day for men.  Be aware of how much alcohol is in your drink. In the [place] one drink equals one 12 oz bottle of beer (355 mL), one 5 oz glass of wine (148 mL), or one 1½ oz glass of hard liquor (44 mL).  General information  Avoid eating more than 2,300 mg of salt a day. If you have hypertension, you may need to reduce your sodium intake to 1,500 mg a day.  Work with your health care provider to maintain a healthy body weight or to lose weight. Ask what an ideal weight is for you.  Get at least 30 minutes of exercise that causes your heart to beat faster (aerobic exercise) most days of the week. Activities may include walking, swimming, or biking.  Work with your health care provider or dietitian to adjust your eating plan to your individual calorie needs.  What foods should I eat?  Fruits  All fresh, dried, or frozen fruit. Canned fruit in natural juice (without added sugar).  Vegetables  Fresh or frozen vegetables (raw, steamed, roasted, or grilled). Low-sodium or reduced-sodium tomato and vegetable juice. Low-sodium or reduced-sodium tomato sauce and tomato paste. Low-sodium or reduced-sodium canned vegetables.  Grains  Whole-grain or whole-wheat bread. Whole-grain or whole-wheat pasta. Brown rice. Oatmeal. Quinoa.  Bulgur. Whole-grain and low-sodium cereals. Kristie bread. Low-fat, low-sodium crackers. Whole-wheat flour tortillas.  Meats and other proteins  Skinless chicken or turkey. Ground chicken or turkey. Pork with fat trimmed off. Fish and seafood. Egg whites. Dried beans, peas, or lentils. Unsalted nuts, nut butters, and seeds. Unsalted canned beans. Lean cuts of beef with fat trimmed off. Low-sodium, lean precooked or cured meat, such as sausages or meat loaves.  Dairy  Low-fat (1%) or fat-free (skim) milk. Reduced-fat, low-fat, or fat-free cheeses. Nonfat, low-sodium ricotta or cottage cheese. Low-fat or nonfat yogurt. Low-fat, low-sodium cheese.  Fats and oils  Soft margarine without trans fats. Vegetable oil. Reduced-fat, low-fat, or light mayonnaise and salad dressings (reduced-sodium). Canola, safflower, olive, avocado, soybean, and sunflower oils. Avocado.  Seasonings and condiments  Herbs. Spices. Seasoning mixes without salt.  Other foods  Unsalted popcorn and pretzels. Fat-free sweets.  The items listed above may not be a complete list of foods and beverages you can eat. Contact a dietitian for more information.  What foods should I avoid?  Fruits  Canned fruit in a light or heavy syrup. Fried fruit. Fruit in cream or butter sauce.  Vegetables  Creamed or fried vegetables. Vegetables in a cheese sauce. Regular canned vegetables (not low-sodium or reduced-sodium). Regular canned tomato sauce and paste (not low-sodium or reduced-sodium). Regular tomato and vegetable juice (not low-sodium or reduced-sodium). Pickles. Olives.  Grains  Baked goods made with fat, such as croissants, muffins, or some breads. Dry pasta or rice meal packs.  Meats and other proteins  Fatty cuts of meat. Ribs. Fried meat. [place]ago[place]. Bologna, salami, and other precooked or cured meats, such as sausages or meat loaves. Fat from the back of a pig (fatback). Bratwurst. Salted nuts and seeds. Canned beans with added salt. Canned or smoked fish.  Whole eggs or egg yolks. Chicken or turkey with skin.  Dairy  Whole or 2% milk, cream, and half-and-half. Whole or full-fat cream cheese. Whole-fat or sweetened yogurt. Full-fat cheese. Nondairy creamers. Whipped toppings. Processed cheese and cheese spreads.  Fats and oils  Butter. Stick margarine. Lard. Shortening. Ghee. Zaragoza fat. Tropical oils, such as coconut, palm kernel, or palm oil.  Seasonings and condiments  Onion salt, garlic salt, seasoned salt, table salt, and sea salt. Worcestershire sauce. Tartar sauce. Barbecue sauce. Teriyaki sauce. Soy sauce, including reduced-sodium. Steak sauce. Canned and packaged gravies. Fish sauce. Oyster sauce. Cocktail sauce. Store-bought horseradish. Ketchup. Mustard. Meat flavorings and tenderizers. Bouillon cubes. Hot sauces. Pre-made or packaged marinades. Pre-made or packaged taco seasonings. Relishes. Regular salad dressings.  Other foods  Salted popcorn and pretzels.  The items listed above may not be a complete list of foods and beverages you should avoid. Contact a dietitian for more information.  Where to find more information  National Heart, Lung, and Blood [place]: www.nhlbi.nih.gov  American Heart Association: www.heart.org  Academy of Nutrition and Dietetics: www.eatright.org  National Kidney Foundation: www.kidney.org  Summary  The DASH eating plan is a healthy eating plan that has been shown to reduce high blood pressure (hypertension). It may also reduce your risk for type 2 diabetes, heart disease, and stroke.  When on the DASH eating plan, aim to eat more fresh fruits and vegetables, whole grains, lean proteins, low-fat dairy, and heart-healthy fats.  With the DASH eating plan, you should limit salt (sodium) intake to 2,300 mg a day. If you have hypertension, you may need to reduce your sodium intake to 1,500 mg a day.  Work with your health care provider or dietitian to adjust your eating plan to your individual calorie needs.  This information is not  intended to replace advice given to you by your health care provider. Make sure you discuss any questions you have with your health care provider.  Document Revised: 11/20/2020 Document Reviewed: 11/20/2020  Elsevier Patient Education © 2022 [employer] Inc.

## 2024-12-20 ENCOUNTER — TELEPHONE (OUTPATIENT)
Dept: PSYCHIATRY | Facility: CLINIC | Age: 34
End: 2024-12-20
Payer: COMMERCIAL

## 2024-12-20 NOTE — TELEPHONE ENCOUNTER
PT PHONED IN.    PT INQUIRING IF WE RECEIVED FAX FROM [facility].    UPON REVIEW OF THE CHART THERE IS DOT PAPERWORK FROM [facility] THAT HAS BEEN UPLOADED.     BEHAVIORAL HEALTH - SCAN - DOT, [place] (12/10/2024)     I DID NOT SEE ANY CURRENT ENCOUNTERS IN PTS CHART IN REGARDS TO THESE DOCUMENTS SO I INQUIRED FROM PT IF PROVIDER WAS EXPECTING THESE DOCUMENTS OR MADE AWARE OF THEM.     PT CONFIRMED THAT HE HAD NOT YET MENTIONED TO PROVIDER GETTING THESE DOCUMENTS COMPLETED BY PROVIDER AT THIS TIME.     I INFORMED PT THAT PROVIDER WAS OUT OF OFFICE UNTIL 12/26/2024 AND THIS WOULD HAVE TO BE ADDRESSED UPON PROVIDERS RETURN.     UNDERSTANDING WAS EXPRESSED.    PUSHING THIS OUT FOR REVIEW UPON PROVIDERS RETURN TO OFFICE.

## 2024-12-24 NOTE — TELEPHONE ENCOUNTER
Called patient at [phone]. Unable to reach. LVM  Reminded him to call our scheduling department at [phone] to reschedule the Pulmonary Function Test appointment that he missed

## 2024-12-27 NOTE — TELEPHONE ENCOUNTER
Received paperwork, attempted to phone patient to discuss in further detail prior to submitting. LVM to return phone call to office.

## 2024-12-27 NOTE — TELEPHONE ENCOUNTER
Phoned patient to clarify medications prior to submitting paperwork, patient reports that he has not been taking any diazepam or trazodone since our last encounter. Please submit paperwork as instructed on cover sheet.

## 2024-12-27 NOTE — TELEPHONE ENCOUNTER
Patient returned call wanting to speak with Sherrie. Told him she was w/ patients and would have to call him back.

## 2025-01-08 DIAGNOSIS — F90.1 ATTENTION DEFICIT HYPERACTIVITY DISORDER (ADHD), PREDOMINANTLY HYPERACTIVE TYPE: ICD-10-CM

## 2025-01-08 RX ORDER — DEXTROAMPHETAMINE SACCHARATE, AMPHETAMINE ASPARTATE MONOHYDRATE, DEXTROAMPHETAMINE SULFATE AND AMPHETAMINE SULFATE 7.5; 7.5; 7.5; 7.5 MG/1; MG/1; MG/1; MG/1
30 CAPSULE, EXTENDED RELEASE ORAL EVERY MORNING
Qty: 30 CAPSULE | Refills: 0 | Status: SHIPPED | OUTPATIENT
Start: 2025-01-08

## 2025-01-08 RX ORDER — DEXTROAMPHETAMINE SACCHARATE, AMPHETAMINE ASPARTATE, DEXTROAMPHETAMINE SULFATE AND AMPHETAMINE SULFATE 2.5; 2.5; 2.5; 2.5 MG/1; MG/1; MG/1; MG/1
10 TABLET ORAL
Qty: 30 TABLET | Refills: 0 | Status: SHIPPED | OUTPATIENT
Start: 2025-01-08 | End: 2026-01-08

## 2025-01-08 NOTE — TELEPHONE ENCOUNTER
Patient needs a refill.  Order pended to [facility] [place]      Urine Drug Screen - Urine, Clean Catch (10/24/2024 12:36)     Fentanyl, Urine - Urine, Clean Catch (10/24/2024 12:36)     Pt needs csa    Appointment with Sherrie Oneill APRN (01/27/2025)

## 2025-02-04 ENCOUNTER — TELEMEDICINE (OUTPATIENT)
Dept: BEHAVIORAL HEALTH | Facility: CLINIC | Age: 35
End: 2025-02-04
Payer: COMMERCIAL

## 2025-02-04 DIAGNOSIS — F41.0 PANIC ATTACKS: ICD-10-CM

## 2025-02-04 DIAGNOSIS — F90.1 ATTENTION DEFICIT HYPERACTIVITY DISORDER (ADHD), PREDOMINANTLY HYPERACTIVE TYPE: ICD-10-CM

## 2025-02-04 DIAGNOSIS — F31.30 BIPOLAR AFFECTIVE DISORDER, CURRENT EPISODE DEPRESSED, CURRENT EPISODE SEVERITY UNSPECIFIED: Primary | ICD-10-CM

## 2025-02-04 DIAGNOSIS — F40.10 SOCIAL ANXIETY DISORDER: ICD-10-CM

## 2025-02-04 DIAGNOSIS — F51.01 PRIMARY INSOMNIA: ICD-10-CM

## 2025-02-04 DIAGNOSIS — F41.1 GENERALIZED ANXIETY DISORDER: ICD-10-CM

## 2025-02-04 RX ORDER — DULOXETIN HYDROCHLORIDE 30 MG/1
CAPSULE, DELAYED RELEASE ORAL
Qty: 30 CAPSULE | Refills: 2 | Status: SHIPPED | OUTPATIENT
Start: 2025-02-04 | End: 2025-02-04

## 2025-02-04 RX ORDER — DEXTROAMPHETAMINE SACCHARATE, AMPHETAMINE ASPARTATE MONOHYDRATE, DEXTROAMPHETAMINE SULFATE AND AMPHETAMINE SULFATE 7.5; 7.5; 7.5; 7.5 MG/1; MG/1; MG/1; MG/1
30 CAPSULE, EXTENDED RELEASE ORAL EVERY MORNING
Qty: 30 CAPSULE | Refills: 0 | Status: SHIPPED | OUTPATIENT
Start: 2025-02-06

## 2025-02-04 RX ORDER — DULOXETIN HYDROCHLORIDE 30 MG/1
CAPSULE, DELAYED RELEASE ORAL
Qty: 60 CAPSULE | Refills: 2 | Status: SHIPPED | OUTPATIENT
Start: 2025-02-04

## 2025-02-04 RX ORDER — DIVALPROEX SODIUM 250 MG/1
250 TABLET, FILM COATED, EXTENDED RELEASE ORAL DAILY
Qty: 90 TABLET | Refills: 1 | Status: SHIPPED | OUTPATIENT
Start: 2025-02-04 | End: 2026-02-04

## 2025-02-04 RX ORDER — DULOXETIN HYDROCHLORIDE 30 MG/1
CAPSULE, DELAYED RELEASE ORAL
Qty: 60 CAPSULE | Refills: 2 | Status: SHIPPED | OUTPATIENT
Start: 2025-02-04 | End: 2025-02-04

## 2025-02-04 RX ORDER — DEXTROAMPHETAMINE SACCHARATE, AMPHETAMINE ASPARTATE, DEXTROAMPHETAMINE SULFATE AND AMPHETAMINE SULFATE 2.5; 2.5; 2.5; 2.5 MG/1; MG/1; MG/1; MG/1
10 TABLET ORAL
Qty: 30 TABLET | Refills: 0 | Status: SHIPPED | OUTPATIENT
Start: 2025-02-06 | End: 2026-02-06

## 2025-02-04 NOTE — PATIENT INSTRUCTIONS
1.  Please return to clinic at your next scheduled visit.  Please contact the clinic ([phone]) at least 24 hours prior in the event you need to cancel.  2.  Do no harm to yourself or others.    3.  Avoid alcohol and drugs.    4.  Take all medications as prescribed.  Please contact the clinic with any concerns. If you are in need of medication refills, please call the clinic at [phone].    5. Should you want to get in touch with your provider, CORTNEY Gray, please contact the office ([phone]), and staff will be able to page Sherrie directly.  6. In the event you have personal crisis, contact the following crisis numbers: Suicide Prevention Hotline 1-162.282.5706; [facility] Helpline 4-194-807-DOGO; [facility] Emergency Room [phone]; text HELLO to 822339; or 069.     SPECIFIC RECOMMENDATIONS:     1.      Medications discussed at this encounter:     New Medications Ordered This Visit   Medications    DULoxetine (Cymbalta) 30 MG capsule     Sig: Take one capsule by mouth daily for 1 week, then increase to two capsules by mouth daily.     Dispense:  60 capsule     Refill:  2    divalproex (DEPAKOTE ER) 250 MG 24 hr tablet     Sig: Take 1 tablet by mouth Daily.     Dispense:  90 tablet     Refill:  1     **Patient requests 90 days supply**    amphetamine-dextroamphetamine (Adderall) 10 MG tablet     Sig: Take 1 tablet by mouth Daily With Lunch.     Dispense:  30 tablet     Refill:  0     Booster dose at noon in addition to Adderall XR in am. Thx 4 All U Do!    amphetamine-dextroamphetamine XR (Adderall XR) 30 MG 24 hr capsule     Sig: Take 1 capsule by mouth Every Morning     Dispense:  30 capsule     Refill:  0     Thx 4 All U Do!                       2.      Psychotherapy recommendations: We will continue therapy at future visits.     3.     Return to clinic: Return in about 6 weeks (around 3/18/2025).   
Yes

## 2025-02-04 NOTE — PROGRESS NOTES
[facility]  Medication Management Follow-up  Virtual MyChart Visit  Mode of Visit: Video  Location of patient: -HOME-  Location of provider: [facility] office [address], [address], [place] [address].  You have chosen to receive care through a telehealth visit.  The patient has signed the video visit consent form.  The visit included audio and video interaction. No technical issues occurred during this visit.  Patient is being seen via telehealth and confirm that they are in a secure environment for this session. The patient's condition being diagnosed/treated is appropriate for telemedicine. The provider identified herself as well as her credentials. The electronic data is encrypted and password protected, and the patient has been advised of the potential risks to privacy not withstanding such measures.    Record Review is below for 02/04/2025 :   11/20/2024 ALT 43, CMP is otherwise reassuring  EKG Results:  No current or pertinent labs in record  Head Imaging:  No current or pertinent labs in record  Vital Signs:   There were no vitals taken for this visit.    Chief Complaint: Bipolar depression. Anxiety.     History of Present Illness:   Arslan Butler is a 34 y.o. male who presents today for follow-up and medication management for:    ICD-10-CM ICD-9-CM   1. Bipolar affective disorder, current episode depressed, current episode severity unspecified  F31.30 296.50   2. Generalized anxiety disorder  F41.1 300.02   3. Attention deficit hyperactivity disorder (ADHD), predominantly hyperactive type  F90.1 314.01   4. Primary insomnia  F51.01 307.42   5. Panic attacks  F41.0 300.01   6. Social anxiety disorder  F40.10 300.23       02/04/2025 Patient is taking medications as prescribed and is tolerating them well.   Bipolar Depression and Anxiety  Currently living with his mother and his wife is living with her mother, they both lost their jobs and lost their homes. Was denied  for disability benefits and they are also currently are homeless. Progression of symptoms, frequency, and intensity is waxing and waning but worse overall. Patient continues to experience feelings of sadness, low mood, lost of interest in usual activities, anhedonia, low energy, feeling worthless, guilty, hopelessness, excessive anxiety and worry, difficulty controlling the worry, restlessness, feeling keyed up or on edge, irritability, muscle tension, lack of motivation and these symptoms are causing significant distress or impairment. Denies thinking about death and dying, suicidal ideation, planning, or intent to self-harm.  Denies AVH.  Clinically significant distress or impairment in social, occupational, or other important areas of functioning is waxing and waning but worse overall.  Panic Attack  Has not been taking diazepam, has been working to cope through anxiety, didn't like the way it made him feel. Progression of symptoms, frequency, and intensity is waxing and waning. The problem occurs few times per month. Associated symptoms include sense of impending doom, unbearable foreboding that something terrible is going to happen, intense fear of losing control, palpitations, racing/pounding heartbeat, chest discomfort, shortness of breath, choking sensation, detachment, depersonalization, dissociation, derealization, and trembling/shaking and these symptoms are causing significant distress or impairment.     11/18/2024 Patient is taking medications as prescribed and is tolerating them well.   Bipolar Depression and Anxiety  Progression of symptoms, frequency, and intensity is waxing and waning but better overall. Patient continues to experience feelings of sadness, excessive anxiety and worry, anxiety, difficulty controlling the worry, and these symptoms are causing significant distress or impairment. Patient denies episodes of jaimee/hypomania, low mood, lost of interest in usual activities, feeling  worthless, guilty, hopelessness, psychomotor agitation,. Denies thinking about death and dying, suicidal ideation, planning, or intent to self-harm.  Denies AVH.  Clinically significant distress or impairment in social, occupational, or other important areas of functioning is waxing and waning but better overall.  Panic Attack  Has only needed to use Valium a couple times since our last encounter. Progression of symptoms, frequency, and intensity is waxing and waning but better overall. The problem occurs intermittently and occasionally. Associated symptoms include sense of impending doom, unbearable foreboding that something terrible is going to happen, intense fear of losing control, palpitations, racing/pounding heartbeat, chest discomfort, shortness of breath, choking sensation, detachment, depersonalization, dissociation, and derealization and these symptoms are causing significant distress or impairment.   ADHD  Feels like the Adderall XR has been wearing off earlier in the day. Progression of symptoms, frequency, and intensity is gradually worsening. Patient reports gradually worsening in the ability to carry out very short and simple instructions, maintain attention and concentration for extended periods, make simple work-related decisions, and complete a normal workday and workweek without interruptions from psychologically based symptoms. Clinically significant distress or impairment in social, occupational, or other important areas of functioning is gradually worsening.  CBT/Supportive  Allowed patient to process topics such as recently started a night shift, recently moved into their own duplex. Setting goals to stick with this position until a day shift becomes available, working with his wife. Individual psychotherapy was provided utilizing solution focused techniques to restore adaptive functioning, provide symptom relief, discuss values and strengths, manage stress, identify triggers, recognize  patterns of behavior, acknowledge sources of feelings and behaviors, assess symptoms, provide support, and discuss interpersonal conflicts. The therapeutic alliance was strengthened to encourage the patient to express their thoughts and feelings.   Continue Adderall IR 10 mg booster dose at noon  Continue Latuda to 80 mg daily  Continue Depakote ER 250mg at bedtime  Increase Adderall XR to 30 mg daily at next refill  Continue trazodone 50 mg at bedtime  Continue Valium 5 mg qd as needed for anxiety and panic attacks  Ambulatory referral for neuropsychological testing: ADHD  Follow-up 1 month    09/23/2024 Patient is taking medications as prescribed and is tolerating them well.   Depression and Anxiety  Progression of symptoms, frequency, and intensity is waxing and waning. Patient continues to experience feelings of sadness, low mood, lost of interest in usual activities, inability to focus and concentrate that may interfere with daily tasks,  psychomotor agitation, excessive anxiety and worry, anxiety, difficulty controlling the worry, restlessness, feeling keyed up or on edge, irritability, and these symptoms are causing significant distress or impairment. Patient denies feeling worthless, guilty, hopelessness,. Denies thinking about death and dying, suicidal ideation, planning, or intent to self-harm.  Denies AVH.  Clinically significant distress or impairment in social, occupational, or other important areas of functioning is waxing and waning.  Panic Attack  Was terminated from his recent position because he missed too many days related to his anxiety disorder. He would lay in bed all day and not be able to do anything productive. Progression of symptoms, frequency, and intensity is waxing and waning. The problem occurs few times per week. Associated symptoms include sense of impending doom, unbearable foreboding that something terrible is going to happen, intense fear of losing control, palpitations,  racing/pounding heartbeat, chest discomfort, shortness of breath, choking sensation, detachment, depersonalization, and dissociation and these symptoms are causing significant distress or impairment.   ADHD  Progression of symptoms, frequency, and intensity is gradually improving. Patient reports improvement in the ability to carry out very short and simple instructions, maintain attention and concentration for extended periods, make simple work-related decisions, and complete a normal workday and workweek without interruptions from psychologically based symptoms. Clinically significant distress or impairment in social, occupational, or other important areas of functioning is gradually improving.  CBT/Supportive  Allowed patient to process topics such as struggling with keeping employment related to anxiety, has applied for SSDI x6 and been denied every time, appeals have also been denied. Currently staying with family, this is frustrating to his wife. Individual psychotherapy was provided utilizing solution focused techniques to restore adaptive functioning, provide symptom relief, discuss values and strengths, manage stress, identify triggers, recognize patterns of behavior, acknowledge sources of feelings and behaviors, assess symptoms, provide support, and discuss interpersonal conflicts. The therapeutic alliance was strengthened to encourage the patient to express their thoughts and feelings.   Continue Adderall IR 10 mg booster dose at noon  Continue Latuda to 80 mg daily  Continue Depakote  mg at bedtime  Continue Adderall XR to 25 mg daily at next refill  Continue trazodone 50 mg at bedtime  Continue Valium 5 mg qd as needed for anxiety and panic attacks  Ambulatory referral for neuropsychological testing: ADHD  Follow-up 1 month    08/05/2024 Patient is taking medications as prescribed and is tolerating them well.   Depression and Anxiety  Has been maintaining employment, however, has been having  difficulty with desire, motivation to get up and go to work. Progression of symptoms, frequency, and intensity is waxing and waning. Patient continues to experience feelings of sadness, low mood, lost of interest in usual activities, anhedonia, low energy, feeling worthless, hopelessness, irritability, lack of motivation and these symptoms are causing significant distress or impairment. Denies thinking about death and dying, suicidal ideation, planning, or intent to self-harm.  Denies AVH.  Clinically significant distress or impairment in social, occupational, or other important areas of functioning is waxing and waning.  Panic Attack  Progression of symptoms, frequency, and intensity is waxing and waning but better overall. The problem occurs intermittently. Associated symptoms include sense of impending doom, unbearable foreboding that something terrible is going to happen, intense fear of losing control, palpitations, racing/pounding heartbeat, chest discomfort, shortness of breath, choking sensation, detachment, depersonalization, dissociation, excessive perspiration, and derealization and these symptoms are causing significant distress or impairment.   ADHD  Progression of symptoms, frequency, and intensity is improving. Patient reports improvement in the ability to carry out very short and simple instructions, maintain attention and concentration for extended periods, make simple work-related decisions, and complete a normal workday and workweek without interruptions from psychologically based symptoms. Clinically significant distress or impairment in social, occupational, or other important areas of functioning is improving.  CBT/Supportive  Allowed patient to process topics such as working on changing behavior to influence emotions.  Trying to do what he would do if he felt motivated.  Practicing arguing the opposite of negative thoughts and challenging them.  Instead of focusing on failure, consider reasons  why there can be success. Individual psychotherapy was provided utilizing solution focused techniques to restore adaptive functioning, provide symptom relief, discuss values and strengths, manage stress, identify triggers, recognize patterns of behavior, acknowledge sources of feelings and behaviors, assess symptoms, provide support, and discuss interpersonal conflicts. The therapeutic alliance was strengthened to encourage the patient to express their thoughts and feelings.   Continue Adderall IR 10 mg booster dose at noon  Continue Latuda to 80 mg daily  Continue Depakote  mg at bedtime  Continue Adderall XR to 25 mg daily at next refill  Continue Valium 5 mg qd as needed for anxiety and panic attacks  Ambulatory referral for neuropsychological testing: ADHD  Follow-up 1 month    06/28/2024 Patient is taking medications as prescribed and is tolerating them well.   Bipolar Depression and Anxiety/Social Anxiety  Was denied for SSI after his appeal. Currently off work due to shoulder injury. Progression of symptoms, frequency, and intensity is gradually worsening. Patient continues to experience low mood, lost of interest in usual activities, psychomotor agitation, excessive anxiety and worry, anxiety, difficulty controlling the worry, restlessness, feeling keyed up or on edge, irritability, panic attacks, and these symptoms are causing significant distress or impairment. Patient denies feeling worthless, guilty, hopelessness,. Denies thinking about death and dying, suicidal ideation, planning, or intent to self-harm.  Denies AVH.  Clinically significant distress or impairment in social, occupational, or other important areas of functioning is gradually worsening.  Panic Attack and anxiety and social anxiety   He has only needed to take valium a few times each week. Patient shared he does use breathing to help with panic attacks. This is a chronic problem. The current episode started more than 1 year ago. The  "problem occurs daily. The problem has been gradually worsening. Associated symptoms include sense of impending doom, unbearable foreboding that something terrible is going to happen, intense fear of losing control, palpitations, racing/pounding heartbeat, chest discomfort, shortness of breath, choking sensation, detachment, depersonalization, and dissociation.   ADHD  Progression of symptoms, frequency, and intensity is waxing and waning. Noticing a \"crash\" later in the day and afternoon. Patient reports waxing and waning in the ability to carry out very short and simple instructions, maintain attention and concentration for extended periods, make simple work-related decisions, and complete a normal workday and workweek without interruptions from psychologically based symptoms. Clinically significant distress or impairment in social, occupational, or other important areas of functioning is waxing and waning.  Insomnia is well-controlled with mirtazapine  Start Adderall IR 10 mg booster dose at noon  Increase Latuda to 80 mg daily  Continue Depakote  mg at bedtime  Continue Adderall XR 20 mg daily at next refill  Continue Valium 5 mg qd as needed for anxiety and panic attacks  Ambulatory referral for neuropsychological testing: ADHD  Continue mirtazapine to 15 mg at bedtime  Follow-up 1 month    03/19/2024 Patient is taking medications as prescribed and is tolerating them well.   Patient stated he has applied for disability again.  Patient stated he is still waiting to hear about placement at [facility]. Neither him or his wife are working currently and they are staying with her aunt.   Depressed and irritable moods mostly related to situational stressors, he feels predominantly well-controlled with current medications.   ADHD  Has been taking increased dose of Adderall XR for approximately 1.5 months.  Patient reports moderate impairment in the ability to carry out very short and simple instructions, maintain " attention and concentration for extended periods, make simple work-related decisions, and complete a normal workday and workweek without interruptions from psychologically based symptoms. Symptoms are persistent and significantly interfere with major life activities and/or result in significant suffering.  Panic Attack and anxiety and social anxiety   Patient reported he has been trying to figure out the root cause of his anxiety. He has only needed to take valium a few times each week. Patient shared he does use breathing to help with panic attacks. Patient described he may have had less panic attacks because he has not been out as much. This is a chronic problem. The current episode started more than 1 year ago. The problem occurs daily. The problem has been gradually worsening. Associated symptoms include sense of impending doom, unbearable foreboding that something terrible is going to happen, intense fear of losing control, palpitations, racing/pounding heartbeat, chest discomfort, shortness of breath, choking sensation, detachment, depersonalization, and dissociation.   Insomnia is well-controlled with mirtazapine  Denies suicidal ideation.  Denies AVH.  We will continue to monitor for mood, behavior, and safety.  Continue Latuda to 60 mg daily  Continue Depakote ER 250mg at bedtime  Continue Adderall XR 20 mg daily at next refill  Continue Valium 5 mg qd as needed for anxiety and panic attacks  Ambulatory referral for neuropsychological testing: ADHD  Increase mirtazapine to 15 mg at bedtime  Follow-up 1 month    Record Review is below for 03/19/2024 :   8/17/2023 TSH 0.695, free T41.26, lipid panel is reassuring, hemoglobin A1c 6.10, glucose 136, ALT 52, AST 42, CBC and CMP are otherwise reassuring.  EKG Results:  SCANNED EKG (12/29/2019)   Head Imaging:  None in record    01/26/2024 Patient is taking medications as prescribed and is tolerating them well.   Increase in Adderall is helping, he went and passed  "testing that he failed previously because he could not focus.   He is looking forward to hopefully getting a new position at the [employer], it is one of the jobs that he was able to maintain for several years. Moods have been stable. He is thinking more clearly since treating ADHD with Adderall.   Panic attacks have decreased in frequency and intensity.   Trouble sleeping, falling asleep, several nighttime awakenings  Some marital challenges, they have been fighting a lot, mostly related to financial concerns.   Discussing referral for more intense individual psychotherapy.   Denies suicidal ideation.  Denies AVH.  We will continue to monitor for mood, behavior, and safety.  Continue Latuda to 60 mg daily  Continue Depakote ER 250mg at bedtime  Plan to increase Adderall XR to 20 mg daily at next refill  Continue Valium 5 mg qd as needed for anxiety and panic attacks  Continue mirtazapine 7.5 mg at bedtime  Follow-up 1 month    Record Review is below for 01/26/2024 :   8/17/2023 TSH 0.695, free T41.26, lipid panel is reassuring, hemoglobin A1c 6.10, glucose 136, ALT 52, AST 42, CBC and CMP are otherwise reassuring.  EKG Results:  SCANNED EKG (12/29/2019)   Head Imaging:  None in record    12/29/2023 Patient is taking medications as prescribed and is tolerating them well.   \"It's been going okay.\" He lost his job at [employer], he had a major panic attack and left. His whole shirt was soaked, his heart was racing. He is looking for another job. He did not have his rescue diazepam with him. Moods have been stable. He is thinking more clearly since treating ADHD with Adderall. Trouble sleeping, falling asleep, several nighttime awakenings.   Denies  intrusive thoughts, but having intense anxiety, mostly related to losing another job. He has applied for disability in the past and been denied.   Denies suicidal ideation.  Denies AVH.  We will continue to monitor for mood, behavior, and safety.  Continue Latuda to 60 mg " daily  Continue Depakote ER 250mg at bedtime  Plan to increase Adderall XR to 15 mg daily at next refill  Continue Valium 5 mg qd as needed for anxiety and panic attacks  Start mirtazapine 7.5 mg at bedtime  Follow-up 1 month    Record Review is below for 12/29/2023 :   8/17/2023 TSH 0.695, free T41.26, lipid panel is reassuring, hemoglobin A1c 6.10, glucose 136, ALT 52, AST 42, CBC and CMP are otherwise reassuring.  EKG Results:  SCANNED EKG (12/29/2019)   Head Imaging:  None in record    12/01/2023 Patient is taking medications as prescribed and is tolerating them well.   He hasn't experienced any major mood fluctuations, jaimee, or hypomania. It has only been one week but we wanted to closely monitor mood.  He hasn't noticed a great difference but his wife is saying he seems less flustered.   We are still going to target ADHD, anxiety symptoms with Adderall.   He says he has felt a slight increase in anxiety, but denies panic attacks.   He has found a new job with [employer] and will start in a couple weeks.   Denies suicidal ideation.  Denies AVH.  We will continue to monitor for mood, behavior, and safety.  Continue Latuda to 60 mg daily  Continue Depakote ER 250mg at bedtime  Continue Adderall XR to 10 mg daily  Start Valium 5 mg qd as needed for anxiety and panic attacks  Follow-up 1 month    Record Review is below for 12/01/2023 : I have thoroughly reviewed the patient's electronic medical record to include previous encounters, care everywhere, notes, medications, labs, SHAUNA and UDS (if applicable), imaging, and EKG's.  Pertinent information is included in this note.  8/17/2023 TSH 0.695, free T41.26, lipid panel is reassuring, hemoglobin A1c 6.10, glucose 136, ALT 52, AST 42, CBC and CMP are otherwise reassuring.  EKG Results:  SCANNED EKG (12/29/2019)   Head Imaging:  None in record      11/03/2023 Patient is taking medications as prescribed and is tolerating them well.   He has had a positive strep and flu  test and has been sick all week. He hasn't experienced any major mood fluctuations, jaimee, or hypomania. It has only been one week but we wanted to closely monitor mood.  He hasn't noticed a great difference but his wife is saying he seems less flustered.   We are still going to target ADHD, anxiety symptoms with Adderall.   He says he has felt a slight increase in anxiety, but denies panic attacks.   He has found a new job with [employer] and will start in a couple weeks.   Denies suicidal ideation.  Denies AVH.  We will continue to monitor for mood, behavior, and safety.  Continue Latuda to 60 mg daily  Continue Depakote ER 250mg at bedtime  Continue Adderall XR 5 mg daily  Follow-up 1 month    Record Review is below for 11/03/2023 : I have thoroughly reviewed the patient's electronic medical record to include previous encounters, care everywhere, notes, medications, labs, SHAUNA and UDS (if applicable), imaging, and EKG's.  Pertinent information is included in this note.  8/17/2023 TSH 0.695, free T41.26, lipid panel is reassuring, hemoglobin A1c 6.10, glucose 136, ALT 52, AST 42, CBC and CMP are otherwise reassuring.  EKG Results:  SCANNED EKG (12/29/2019)   Head Imaging:  None in record      10/24/2023 Patient is taking medications as prescribed and is tolerating them well.   Recently had to move to his mom's house because after he lost his jobs they lost their house and car.   He has still been self-sabotaging with his thoughts and has a lot of self-doubt. We continue to discuss the possibility of treating ADHD now that we have targeted mood stabilization with Latuda and Depakote ER. Patient has failed several classifications of psychotropic medications and has not ever been treated for ADHD. He has a compelling childhood assessment. Denies suicidal ideation.  Denies AVH.  We will continue to monitor for mood, behavior, and safety.  Continue Latuda to 60 mg daily  Continue Depakote ER 250mg at bedtime  Start  "Adderall XR 5 mg daily  UDS  CSA  Follow-up 1 week    Record Review is below for 10/24/2023 : I have thoroughly reviewed the patient's electronic medical record to include previous encounters, care everywhere, notes, medications, labs, SHAUNA and UDS (if applicable), imaging, and EKG's.  Pertinent information is included in this note.  8/17/2023 TSH 0.695, free T41.26, lipid panel is reassuring, hemoglobin A1c 6.10, glucose 136, ALT 52, AST 42, CBC and CMP are otherwise reassuring.  EKG Results:  SCANNED EKG (12/29/2019)   Head Imaging:  None in record      09/21/2023 Patient is taking medications as prescribed and is tolerating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

## 2025-02-11 NOTE — PROGRESS NOTES
"Chief Complaint: \"Lower back and right leg pain\"        History of Present Illness:   Patient: Mr. Arslan Butler, 34 y.o. male   Referring Physician: Dr. Reji Rader   Reason for Referral: Consultation for chronic intractable Lower Back  and right leg pain.   Pain History: Arslan Butler reports a 3-year history of chronic progressive lower back pain. Arslan Butler has attended the emergency department for evaluation of his pain. Arslan Butler reports no improvement in his condition since onset. He complains of persistent lower back pain that has escalated, making it difficult for him to bend down or sit comfortably. A CT scan of the lumbar spine on 12/01/2024 revealed preservation of alignment and vertebral body heights. At L5-S1: Mild intervertebral disc space narrowing with circumferential annular disc bulge a more pronounced central disc protrusion. Moderate central canal stenosis. Mild bilateral neuroforaminal stenosis. Arslan Butler has failed to obtain pain relief with conservative measures for more than 3 years including oral analgesics (several ER visits, at least 15 visits, Toradol injection in the ED, steroid packs, various over-the-counter analgesics (I.e. ibuprofen, Tylenol, etc), Robaxin, topical analgesics (lidocaine patches), ice packs, heating pads, TENs, physician directed home exercise program HEP (ongoing, about 30 minutes/session, 5-7 x week for the past year or 2 -unless in periods when pain is severe and he visits the ER), to name a few. Pain has progressed in intensity over the past months.  Pain Description: Constant lower back pain with intermittent exacerbation, described as aching, dull, sharp, numbing and tingling sensation.   Radiation of Pain: The pain radiates into the right gluteal region, right posterior thigh, right calf  Pain intensity today: 5/10   Average pain intensity last week: 6/10  Pain intensity ranges from: 3/10 to 8/10  Aggravating factors: Pain increases with lifting, " pushing, pulling, bending forward, protracted sitting, standing, walking. Patient describes neurogenic claudication. Patient does not use a cane or walker   Alleviating factors: Pain decreases with lying down  Associated Symptoms:   Patient reports pain, but denies numbness or weakness in the right lower extremity.  Patient denies symptoms in the opposite limb  Patient denies any new bladder or bowel problems.   Patient denies difficulties with his balance or recent falls.   Pain interferes with general activities and affects patient's quality of life  Pain interferes with sleep: Falling asleep and causing sleep fragmentation   Muscle spasms: RLE  Stiffness: LB    Review of previous therapies and additional medical records:  Arslan Butler has already failed the following measures, including:   Conservative Measures:  Oral analgesics (several ER visits, at least 15 visits, Toradol injection in the ED, steroid packs, various over-the-counter analgesics (I.e. ibuprofen, Tylenol, etc), Robaxin, topical analgesics (lidocaine patches), ice packs, heating pads, TENs, physician directed home exercise program HEP (ongoing)  Interventional Measures: None  Surgical Measures: No history of previous cervical spine, lumbar spine or hip surgery   Arslan Butler has not undergone orthopedic spine or neurosurgical consultation for his chronic pain condition   Arslan Butler presents with significant comorbidities including anxiety and depression, bipolar disorder, diabetes mellitus, GERD, hyperlipidemia, obesity, panic disorder, attention deficit hyperactivity disorder, hypertension, hearing loss, obstructive sleep apnea on CPAP, type 2 diabetes, gastroparesis  In terms of current analgesics, Arslan Butler takes: Duloxetine. Patient also takes Adderall and Depakote  I have reviewed Srinath Report consistent with medication reconciliation.  SOAPP/ORT: Not completed by the patient    PHQ-2 Depression Screening  Little interest or  pleasure in doing things? Not at all   Feeling down, depressed, or hopeless? Not at all   PHQ-2 Total Score 0       Pain Self-Efficacy Questionnaire (PSEQ)   ITEM: Scale  0 = Does not agree   6 = Agree  02-18 2025        I can enjoy things despite the pain. 3        I can do most of the household chores (tidying up, washing dishes, etc) despite the pain. 4        I can socialize with my friends or family members as often as I used to do despite the pain. 6        I can cope with my pain in most situations. 1        I can do some form of work despite the pain (housework, paid and unpaid work). 1        I can still do many of the things I enjoy doing (hobbies, leisure activities, etc) despite pain. 1        I can cope with my pain without medications. 1        I can accomplish most of my goals in life despite the pain. 3        I can live in a normal lifestyle, despite the pain. 3        I can gradually become more active despite the pain. 0        TOTAL SCORE 23/60            Global Pain Scale 02-18 2025          Pain 15          Feelings 8          Clinical outcomes 13          Activities 10          GPS Total: 46            The [place] Back Pain Disability Scale    DATE 02-18 2025          Sleep through the night 3          Turn over in bed 3          Get out of bed 3          Make your bed 2          Put on socks (pantyhose) 4          Ride in a car 3          Sit in a chair for several hours 4          Stand up for 20-30 minutes 2          Climb one flight of stairs 2          Walk a few blocks (200-300 yards)  1          Walk several miles 5          Run one block (about 50 yards) 5          Take food out of the refrigerator 0          Reach up to high shelves 4          Move a chair 0          Pull or push heavy doors 2          Bend over to clean the bathtub 4          Throw a ball 0          Carry two bags of groceries 1          Lift and carry a heavy suitcase 3          Total score 51            Review of  Diagnostic Studies:   I have independently reviewed and interpreted the images with the patient and used the images and a tridimensional spine model to explain findings. I have also reviewed the reports.  CT scan of the lumbar spine on 12/01/2024 revealed preservation of alignment and vertebral body heights. Axial imaging:  L1-L2, L2-L3, L3-L4, L4-L5: No significant disc disease. There is no significant stenosis.  L5-S1: Mild intervertebral disc space narrowing with circumferential annular disc bulge a more pronounced central disc protrusion. Moderate central canal stenosis. Mild bilateral neuroforaminal stenosis.    Review of Systems      Patient Active Problem List   Diagnosis    Generalized anxiety disorder    Bipolar disorder    Gastroesophageal reflux disease    Hyperlipidemia    Class 3 severe obesity due to excess calories with serious comorbidity and body mass index (BMI) of 40.0 to 44.9 in adult    Erectile dysfunction    Degeneration of intervertebral disc of lumbosacral region with discogenic back pain    Allergic rhinitis    Chronic pain of left ankle    Chronic pain in left foot    Social anxiety disorder    Attention deficit hyperactivity disorder (ADHD), predominantly inattentive type    Essential hypertension    Vitamin D insufficiency    Inflammatory bowel disease    Hearing loss of right ear    DONAVAN (obstructive sleep apnea)    Gastroparesis    Neuropathy of left radial nerve    Rotator cuff arthropathy of left shoulder    Type 2 diabetes mellitus with hyperglycemia, without long-term current use of insulin    Hepatic steatosis    Spondylosis of lumbar region without myelopathy or radiculopathy    Lumbar radiculopathy    BMI 40.0-44.9, adult    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       Past Medical History:   Diagnosis Date    Anxiety and depression 12/17/2021    Bipolar disorder 12/15/2021    Chronic pain disorder     Diabetes mellitus 2018    on Ozempic,  last A1C 7.2    Fatty liver     on US    Gastroesophageal reflux disease 12/15/2021    pepto or tums prn    H/O shoulder surgery 03/29/2023    Hx of tonsillectomy 03/29/2023    Hyperlipidemia 12/15/2021    Iron deficiency anemia secondary to inadequate dietary iron intake 08/05/2022    Left arm numbness 01/17/2022    suspected related to shoulder surgery    Left rotator cuff tear 01/17/2022    Male hypogonadism 05/11/2022    MRSA infection     after tattoo    Obesity 12/15/2021    Panic disorder     Peptic ulceration 2020    noted on EGD in [place]    Rotator cuff injury, left, subsequent encounter 02/24/2022    Sciatic nerve pain     Self-injurious behavior     Sleep apnea     Suicide attempt     Testosterone deficiency in male 05/11/2022         Past Surgical History:   Procedure Laterality Date    ENDOSCOPY AND COLONOSCOPY  2019    peptic ulcer    ROTATOR CUFF REPAIR Left 2014    ROTATOR CUFF REPAIR Left 2016    TONSILLECTOMY  1997         Family History   Problem Relation Age of Onset    Suicide Attempts Mother     Self-Injurious Behavior  Mother     Seizures Mother     Schizophrenia Mother     Paranoid behavior Mother     Depression Mother     Bipolar disorder Mother     Alcohol abuse Mother     Obesity Mother     Alcohol abuse Father     Hypertension Father     Heart attack Father     Obesity Sister     Obesity Brother     Stroke Maternal Grandmother     Heart attack Maternal Grandfather     Diabetes Maternal Grandfather     Hypertension Maternal Grandfather     Mental illness Paternal Grandmother     Diabetes Paternal Grandmother     Heart attack Paternal Grandmother     No Known Problems Paternal Grandfather     Colon cancer Neg Hx          Social History     Socioeconomic History    Marital status:    Tobacco Use    Smoking status: Never     Passive exposure: Never    Smokeless tobacco: Never   Vaping Use    Vaping status: Never Used   Substance and Sexual Activity    Alcohol use: Not Currently      "Comment: ocassionally     Drug use: Never    Sexual activity: Defer           Current Outpatient Medications:     amphetamine-dextroamphetamine (Adderall) 10 MG tablet, Take 1 tablet by mouth Daily With Lunch., Disp: 30 tablet, Rfl: 0    amphetamine-dextroamphetamine XR (Adderall XR) 30 MG 24 hr capsule, Take 1 capsule by mouth Every Morning, Disp: 30 capsule, Rfl: 0    divalproex (DEPAKOTE ER) 250 MG 24 hr tablet, Take 1 tablet by mouth Daily., Disp: 90 tablet, Rfl: 1    DULoxetine (Cymbalta) 30 MG capsule, Take one capsule by mouth daily for 1 week, then increase to two capsules by mouth daily., Disp: 60 capsule, Rfl: 2    loratadine (CLARITIN) 10 MG tablet, Take 1 tablet by mouth Daily., Disp: , Rfl:     Lurasidone HCl (LATUDA) 80 MG tablet tablet, Take 1 tablet by mouth once daily, Disp: 30 tablet, Rfl: 2    Semaglutide, 2 MG/DOSE, (Ozempic, 2 MG/DOSE,) 8 MG/3ML solution pen-injector, Inject 2 mg under the skin into the appropriate area as directed 1 (One) Time Per Week., Disp: 9 mL, Rfl: 3    vitamin D (ERGOCALCIFEROL) 1.25 MG (20482 UT) capsule capsule, Take 1 capsule by mouth 1 (One) Time Per Week., Disp: 6 capsule, Rfl: 0      Allergies   Allergen Reactions    Sulfamethoxazole-Trimethoprim Hives and Rash    Metformin GI Intolerance         Ht 190.5 cm (75\")   Wt (!) 154 kg (339 lb)   BMI 42.37 kg/m²       Physical Exam:  Constitutional: Patient appears well-developed, well-nourished, well-hydrated  HEENT: Head: Normocephalic and atraumatic  Eyes: Conjunctivae and lids are normal  Pupils: Equal, round, reactive to light  Peripheral vascular exam: Posterior tibialis: right 2+ and left 2+. Dorsalis pedis: right 2+ and left 2+. No edema.   Musculoskeletal   Gait and station: Gait evaluation demonstrated a normal gait. Able to walk on heels, toes, tandem walking   Lumbar Spine: Passive and active range of motion are limited secondary to pain. Extension, flexion, lateral flexion, rotation of the lumbar spine " increased and reproduced pain. Lumbar facet joint loading maneuvers are equivocal.  Sacroiliac Joints Provocative Maneuvers: Negative   Piriformis maneuvers: Negative   Right Hip Joint: The range of motion of the hip joint is almost full and without pain   Left Hip Joint: The range of motion of the hip joint is almost full and without pain   Palpation of the bilateral psoas tendons and iliopsoas bursas: Unrevealing   Palpation of the bilateral greater trochanters: Unrevealing   Examination of the Iliotibial band: Unrevealing   Neurological:   Patient is alert and oriented to person, place, and time.   Speech: Normal.   Cortical function: Normal mental status.   Reflex Scores:  Right patellar: 2+  Left patellar: 2+  Right Achilles: 1+  Left Achilles: 1+  Motor strength: 5/5  Motor Tone: Normal  Involuntary movements: None.   Superficial/Primitive Reflexes: Primitive reflexes were absent.   Right Casillas: Absent  Left Casillas: Absent  Right ankle clonus: Absent  Left ankle clonus: Absent   Babinsky: Absent  Long tract signs: Negative. Straight leg raising test: Positive on the right at 30-40 degrees with positive Lasegue. Femoral stretch sign: Negative.   Sensory exam: Intact to light touch, intact pain and temperature sensation, intact vibration sensation and normal proprioception  Coordination: Finger to nose: Normal. Balance: Normal Romberg's sign: Negative   Skin and subcutaneous tissue: Skin is warm and intact. No rash noted. No cyanosis.   Psychiatric: Judgment and insight: Normal. Recent and remote memory: Intact. Mood and affect: Normal.     ASSESSMENT:   1. Lumbar radiculopathy    2. 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    3. Degeneration of intervertebral disc of lumbosacral region with lower extremity pain    4. Spondylosis of lumbar region without myelopathy or radiculopathy    5. Type 2 diabetes mellitus with hyperglycemia, without long-term current use of  insulin    6. DONAVAN (obstructive sleep apnea)    7. BMI 40.0-44.9, adult    8. Generalized anxiety disorder    9. Attention deficit hyperactivity disorder (ADHD), predominantly inattentive type          PLAN/MEDICAL DECISION MAKING:  Mr. Arslan Butler, 34 y.o. male presents with a 3-year history of chronic progressive lower back and right lower extremity pain. Arslan Butler has attended the emergency department for evaluation of his pain at least 15 times in the past years. Arslan Butler reports no improvement in his condition since onset. He complains of increasing lower back pain that has escalated, making it difficult for him to bend down or sit comfortably. Pain radiates down into his right lower extremity with a right L5-S1 distribution. A CT scan of the lumbar spine on 12/01/2024 revealed at L5-S1: Mild intervertebral disc space narrowing with circumferential annular disc bulge a more pronounced central disc protrusion. Moderate central canal stenosis and bilateral neuroforaminal stenosis. Arslan Butler has failed to obtain pain relief with conservative measures for more than 3 years including oral analgesics (Toradol injection in the ED, steroid packs, various over-the-counter analgesics (I.e. ibuprofen, Tylenol, etc), Robaxin, topical analgesics (lidocaine patches), ice packs, heating pads, TENs, physician directed home exercise program HEP (ongoing), to name a few. Pain has progressed in intensity over the past months. A comprehensive evaluation including history and physical exam along with pertinent physiologic and functional assessment was performed. Patient presents with intractable pain due to the diagnoses listed above. Patient has failed to respond to conservative modalities, as referenced under HPI. I have documented the impact of patient's moderate-to-severe pain contributing to significant impairment in daily activities, ADLs, and a negative impact on the patient's quality of life, as reflected on  Global Pain Scale 46/100; The [place] Back Pain Disability Scale 51/100; Tinetti Gait & Balance Assessment Tool  (low risk for falls). I have reviewed pertinent supporting diagnostic studies of patient's chronic pain condition as well as all available pertinent medical records to patient's chronic pain condition including previous therapies, as referenced above. PHQ-2 Depression Screening 0; Pain Self-Efficacy Questionnaire (PSEQ) 23/60. I had a lengthy conversation with Mr. Arslan Butler regarding his chronic pain condition and potential therapeutic options including risks, benefits, alternative therapies, to name a few. We have discussed using a stepwise approach starting with the least intense level of care as determined by the extent required to diagnose and or treat a patient's condition. The proposed treatments are consistent with the patient's medical condition and known to be safe and effective by current guidelines and the standard of care. The duration and frequency proposed are considered appropriate for the service in accordance with accepted standards of medical practice for the diagnosis and treatment of the patient's condition and intended to improve the patient's level of function. These services will be furnished in a setting appropriate to the patient's medical needs and condition. Therefore, I have proposed the following plan:    1.  Interventional pain management measures: Patient does not take blood thinners. Patient will be scheduled for diagnostic and therapeutic right L5-S1 and right S1 transforaminal epidural steroid injections using the lowest effective dose of steroids, under C-arm fluoroscopic guidance, with the use of contrast dye to confirm appropriate needle placement and spread of contrast dye. We may repeat therapeutic right L5-S1 and right S1 transforaminal epidural steroid injections depending on patient's outcome and following current guidelines. Epidurals will be limited to a  maximum of 4 sessions per spinal region in a rolling twelve (12) month period. Continuation of epidural steroid injections over 12 months would only be considered under the following provisions;  Patient is a high-risk surgical candidate, or the patient does not desire surgery, or recurrence of pain in the same location relieved with ESIs for at least three months and epidural provides at least 50% sustained improvement of pain and/or 50% objective improvement in function (using same scale as baseline)  Pain is severe enough to cause a significant degree of functional disability or vocational disability  The primary care provider will be notified regarding continuation of procedures and repeat steroid use   We may consider other options if he continues to struggle with pain    2. Diagnostic studies:   A. Lumbar Spine X-rays, full views including flexion and extension to assess lumbar stability  B. MRI of the lumbar spine without contrast (lumbar radiculopathy, increasing pain, > 15 ER visits for pain control)    3. Pharmacological measures: Reviewed and discussed;  Patient takes  Duloxetine. Patient also takes Adderall and Depakote.     4. Long-term rehabilitation efforts:  A. The patient does not have a history of falls. I performed a risk assessment for falls using the Tinetti gait & balance assessment tool (scored low risk for falls).   B. Patient will start a comprehensive physical therapy program at [facility] for [facility], water therapy, gait and balance training, neurodynamics, core strengthening, gluteal and abductor strengthening, ultrasound, ASTYM, E-STIM, myofascial release, cupping, dry needling, home exercise program, 2-3 x per week for 8 weeks   C. Contrast therapy: Apply ice-packs for 15-20 minutes, followed by heating pads for 15-20 minutes to affected area   D. Start a low impact exercise program such as water therapy, swimming, yoga, Pilates  E. Referral to [facility]. Patient's Body mass index is 42.37 kg/m². Patient counseled on the importance of weight loss to help with overall health and pain control.   FLORIDA Butler  reports that he has never smoked. He has never been exposed to tobacco smoke. He has never used smokeless tobacco.     5. The patient and his family have been instructed to contact my office with any questions or difficulties. The patient understands the plan and agrees to proceed accordingly.      The patient has a documented plan of care to address chronic pain. Arslan Butler reports a pain score of 5/10.  Given his pain assessment as noted, treatment options were discussed and the following options were decided upon as a follow-up plan to address the patient's pain: continuation of current treatment plan for pain, educational materials on pain management, home exercises and therapy, prescription for non-opiod analgesics, referral to Physical Therapy, referral to specialist for assistance in pain treatment guidance, steroid injections, use of non-medical modalities (ice, heat, stretching and/or behavior modifications), and interventional pain management measures .               Pain Management Panel  More data exists         Latest Ref Rng & Units 11/20/2024 10/24/2024   Pain Management Panel   Creatinine, Urine mg/dL 318.8  -   Amphetamine, Urine Qual Negative - Positive    Barbiturates Screen, Urine Negative - Negative    Benzodiazepine Screen, Urine Negative - Positive    Buprenorphine, Screen, Urine Negative - Negative    Cocaine Screen, Urine Negative - Negative    Fentanyl, Urine Negative - Negative    Methadone Screen , Urine Negative - Negative    Methamphetamine, Ur Negative - Negative       Details                    SHAUNA query complete. SHAUNA reviewed by Amarjit Hightower MD.     Pain Medications               divalproex (DEPAKOTE ER) 250 MG 24 hr tablet Take 1 tablet by mouth Daily.    DULoxetine (Cymbalta) 30 MG capsule  Take one capsule by mouth daily for 1 week, then increase to two capsules by mouth daily.    Lurasidone HCl (LATUDA) 80 MG tablet tablet Take 1 tablet by mouth once daily             No orders of the defined types were placed in this encounter.       Please note that portions of this note were completed with a voice recognition program.   Any copied data in any portion of my note from previous notes included in the HPI, PE, MDM and/or assessment and plan has been reviewed by myself and accurate as of this date.   The 21st Century Cures Act makes medical notes like this available to patients in the interest of transparency. This is a medical document intended as peer to peer communication. It is written in medical language and may contain abbreviations or verbiage that are unfamiliar. It may appear blunt or direct. Medical documents are intended to carry relevant information, facts as evident, and the clinical opinion of the practitioner.     Amarjit Hightower MD    Patient Care Team:  Reji Rader MD as PCP - General (Family Medicine)     No orders of the defined types were placed in this encounter.        Future Appointments   Date Time Provider Department [place]   3/24/2025  1:20 PM Sherrie Oneill APRN [facility] [place]   4/2/2025  9:15 AM Reji Rader MD [facility]

## 2025-02-17 PROBLEM — M47.816 SPONDYLOSIS OF LUMBAR REGION WITHOUT MYELOPATHY OR RADICULOPATHY: Status: ACTIVE | Noted: 2025-02-17

## 2025-02-17 PROBLEM — M51.370 DEGENERATION OF INTERVERTEBRAL DISC OF LUMBOSACRAL REGION WITH DISCOGENIC BACK PAIN: Status: ACTIVE | Noted: 2022-09-13

## 2025-02-18 ENCOUNTER — OFFICE VISIT (OUTPATIENT)
Dept: PAIN MEDICINE | Facility: CLINIC | Age: 35
End: 2025-02-18
Payer: COMMERCIAL

## 2025-02-18 VITALS — HEIGHT: 75 IN | WEIGHT: 315 LBS | BODY MASS INDEX: 39.17 KG/M2

## 2025-02-18 DIAGNOSIS — F90.0 ATTENTION DEFICIT HYPERACTIVITY DISORDER (ADHD), PREDOMINANTLY INATTENTIVE TYPE: ICD-10-CM

## 2025-02-18 DIAGNOSIS — M54.16 LUMBAR RADICULOPATHY: Primary | ICD-10-CM

## 2025-02-18 DIAGNOSIS — G47.33 OSA (OBSTRUCTIVE SLEEP APNEA): ICD-10-CM

## 2025-02-18 DIAGNOSIS — E11.65 TYPE 2 DIABETES MELLITUS WITH HYPERGLYCEMIA, WITHOUT LONG-TERM CURRENT USE OF INSULIN: ICD-10-CM

## 2025-02-18 DIAGNOSIS — M54.16 LUMBAR RADICULOPATHY: ICD-10-CM

## 2025-02-18 DIAGNOSIS — M51.371 DEGENERATION OF INTERVERTEBRAL DISC OF LUMBOSACRAL REGION WITH LOWER EXTREMITY PAIN: ICD-10-CM

## 2025-02-18 DIAGNOSIS — F41.1 GENERALIZED ANXIETY DISORDER: ICD-10-CM

## 2025-02-18 DIAGNOSIS — M47.816 SPONDYLOSIS OF LUMBAR REGION WITHOUT MYELOPATHY OR RADICULOPATHY: ICD-10-CM

## 2025-02-18 DIAGNOSIS — M99.73 CONNECTIVE TISSUE AND DISC STENOSIS OF INTERVERTEBRAL FORAMINA OF LUMBAR REGION: ICD-10-CM

## 2025-02-18 PROCEDURE — 1159F MED LIST DOCD IN RCRD: CPT | Performed by: ANESTHESIOLOGY

## 2025-02-18 PROCEDURE — 99204 OFFICE O/P NEW MOD 45 MIN: CPT | Performed by: ANESTHESIOLOGY

## 2025-02-18 PROCEDURE — 1125F AMNT PAIN NOTED PAIN PRSNT: CPT | Performed by: ANESTHESIOLOGY

## 2025-02-18 PROCEDURE — 1160F RVW MEDS BY RX/DR IN RCRD: CPT | Performed by: ANESTHESIOLOGY

## 2025-03-01 DIAGNOSIS — F41.1 GENERALIZED ANXIETY DISORDER: ICD-10-CM

## 2025-03-01 DIAGNOSIS — F31.30 BIPOLAR AFFECTIVE DISORDER, CURRENT EPISODE DEPRESSED, CURRENT EPISODE SEVERITY UNSPECIFIED: ICD-10-CM

## 2025-03-03 RX ORDER — LURASIDONE HYDROCHLORIDE 80 MG/1
80 TABLET, FILM COATED ORAL DAILY
Qty: 90 TABLET | Refills: 0 | Status: SHIPPED | OUTPATIENT
Start: 2025-03-03

## 2025-03-03 NOTE — TELEPHONE ENCOUNTER
NEXT VISIT WITH PROVIDER Appointment with Sherrie Oneill APRN (03/24/2025)     LAST SEEN BY PROVIDER Telemedicine with Sherrie Oneill APRN (02/04/2025)     LAST MED REFILLLurasidone HCl (LATUDA) 80 MG tablet tablet (12/02/2024)     PROVIDER PLEASE ADVISE

## 2025-03-05 ENCOUNTER — OUTSIDE FACILITY SERVICE (OUTPATIENT)
Dept: PAIN MEDICINE | Facility: CLINIC | Age: 35
End: 2025-03-05
Payer: COMMERCIAL

## 2025-03-05 PROCEDURE — 64484 NJX AA&/STRD TFRM EPI L/S EA: CPT | Performed by: ANESTHESIOLOGY

## 2025-03-05 PROCEDURE — 64483 NJX AA&/STRD TFRM EPI L/S 1: CPT | Performed by: ANESTHESIOLOGY

## 2025-03-06 ENCOUNTER — HOSPITAL ENCOUNTER (OUTPATIENT)
Facility: HOSPITAL | Age: 35
Discharge: HOME | End: 2025-03-06
Payer: COMMERCIAL

## 2025-03-06 DIAGNOSIS — M54.16 LUMBAR RADICULOPATHY: ICD-10-CM

## 2025-03-06 PROCEDURE — 72120 X-RAY BEND ONLY L-S SPINE: CPT

## 2025-03-06 PROCEDURE — 72148 MRI LUMBAR SPINE W/O DYE: CPT

## 2025-03-10 DIAGNOSIS — F90.1 ATTENTION DEFICIT HYPERACTIVITY DISORDER (ADHD), PREDOMINANTLY HYPERACTIVE TYPE: ICD-10-CM

## 2025-03-10 NOTE — TELEPHONE ENCOUNTER
CONTROLLED MEDICATION REFILL REQUEST    STATE REGULATION APPT EVERY 3 MONTHS     UDS(URINE DRUG SCREEN) EVERY 6 MONTHS OR UP TO PROVIDER PREFERENCE   (NEO SOLIMAN & ADA 1 PER YEAR)     NEW NARC CONSENT EVERY YEAR      MEDICATION: amphetamine-dextroamphetamine XR (Adderall XR) 30 MG 24 hr capsule (02/06/2025)  amphetamine-dextroamphetamine (Adderall) 10 MG tablet (02/06/2025)    PT(PATIENT) CONFIRMED PHARMACY: [facility] [place]      NEXT OFFICE VISIT: Appointment with Sherrie Oneill APRN (03/24/2025)     LAST OFFICE VISIT: Telemedicine with Sherrie Oneill APRN (02/04/2025)     NARC CONSENT: NONE IN EPIC     URINE DRUG SCREEN(STANDING ORDER)   (NEO SOLIMAN & ADA 1 PER YEAR): Urine Drug Screen - Urine, Clean Catch (10/24/2024 12:36)  Fentanyl, Urine - Urine, Clean Catch (10/24/2024 12:36)    PROVIDER PLEASE ADVISE

## 2025-03-11 RX ORDER — DEXTROAMPHETAMINE SACCHARATE, AMPHETAMINE ASPARTATE MONOHYDRATE, DEXTROAMPHETAMINE SULFATE AND AMPHETAMINE SULFATE 7.5; 7.5; 7.5; 7.5 MG/1; MG/1; MG/1; MG/1
30 CAPSULE, EXTENDED RELEASE ORAL EVERY MORNING
Qty: 30 CAPSULE | Refills: 0 | Status: SHIPPED | OUTPATIENT
Start: 2025-03-11

## 2025-03-11 RX ORDER — DEXTROAMPHETAMINE SACCHARATE, AMPHETAMINE ASPARTATE, DEXTROAMPHETAMINE SULFATE AND AMPHETAMINE SULFATE 2.5; 2.5; 2.5; 2.5 MG/1; MG/1; MG/1; MG/1
10 TABLET ORAL
Qty: 30 TABLET | Refills: 0 | Status: SHIPPED | OUTPATIENT
Start: 2025-03-11 | End: 2026-03-11

## 2025-03-12 ENCOUNTER — TELEPHONE (OUTPATIENT)
Dept: PSYCHIATRY | Facility: CLINIC | Age: 35
End: 2025-03-12

## 2025-03-12 NOTE — TELEPHONE ENCOUNTER
PATIENT WAS REFERRED FOR NERUOPSYCH TESTING ON 06/28/2024 SEVERAL ATTEMPT MADE TO FOLLOW UP ON REFERRAL ORDER, INCLUDING MAILING A CONTACT LETTER WITH NO SUCCESS, CLOSING OUT REFERRAL ORDER.

## 2025-03-19 ENCOUNTER — TELEPHONE (OUTPATIENT)
Dept: PAIN MEDICINE | Facility: CLINIC | Age: 35
End: 2025-03-19
Payer: COMMERCIAL

## 2025-03-19 NOTE — TELEPHONE ENCOUNTER
Called patient to check on how he is feeling after his procedure. And to advise him of a telehealth visit with Dr. Hightower on 5/20 @ 8:30. No answer, left voicemail for patient to call back and confirm.      HUB: if patient calls back please transfer her so that patients telehealth appointment can be scheduled. Do not add patient to Dr. Hightower's schedule.

## 2025-03-20 NOTE — TELEPHONE ENCOUNTER
Patient called to confirm appointment time of 5/20 at 8:30 am via telehealth with Dr Hightower, however, he states he does not feel he received any pain relief from his injection on 3/5/2025.    Still put him on schedule for 5/20, or do you wish to see him sooner?

## 2025-03-21 ENCOUNTER — TELEPHONE (OUTPATIENT)
Dept: INTERNAL MEDICINE | Facility: CLINIC | Age: 35
End: 2025-03-21
Payer: COMMERCIAL

## 2025-03-24 ENCOUNTER — TELEMEDICINE (OUTPATIENT)
Dept: BEHAVIORAL HEALTH | Facility: CLINIC | Age: 35
End: 2025-03-24
Payer: COMMERCIAL

## 2025-03-24 DIAGNOSIS — F31.30 BIPOLAR AFFECTIVE DISORDER, CURRENT EPISODE DEPRESSED, CURRENT EPISODE SEVERITY UNSPECIFIED: Primary | ICD-10-CM

## 2025-03-24 DIAGNOSIS — F41.1 GENERALIZED ANXIETY DISORDER: ICD-10-CM

## 2025-03-24 DIAGNOSIS — F90.1 ATTENTION DEFICIT HYPERACTIVITY DISORDER (ADHD), PREDOMINANTLY HYPERACTIVE TYPE: ICD-10-CM

## 2025-03-24 DIAGNOSIS — F41.0 PANIC ATTACKS: ICD-10-CM

## 2025-03-24 DIAGNOSIS — F51.01 PRIMARY INSOMNIA: ICD-10-CM

## 2025-03-24 PROCEDURE — 99214 OFFICE O/P EST MOD 30 MIN: CPT

## 2025-03-24 PROCEDURE — 1159F MED LIST DOCD IN RCRD: CPT

## 2025-03-24 PROCEDURE — 1160F RVW MEDS BY RX/DR IN RCRD: CPT

## 2025-03-24 RX ORDER — DIVALPROEX SODIUM 500 MG/1
500 TABLET, FILM COATED, EXTENDED RELEASE ORAL DAILY
Qty: 90 TABLET | Refills: 1 | Status: SHIPPED | OUTPATIENT
Start: 2025-03-24 | End: 2026-03-24

## 2025-03-24 NOTE — PROGRESS NOTES
[facility]  Medication Management Follow-up  Virtual MyChart Visit  Mode of Visit: Video  Location of patient: -HOME-  Location of provider: [facility] office [address], [address], [place] [address].  You have chosen to receive care through a telehealth visit.  The patient has signed the video visit consent form.  The visit included audio and video interaction. No technical issues occurred during this visit.  Patient is being seen via telehealth and confirm that they are in a secure environment for this session. The patient's condition being diagnosed/treated is appropriate for telemedicine. The provider identified herself as well as her credentials. The electronic data is encrypted and password protected, and the patient has been advised of the potential risks to privacy not withstanding such measures.    Record Review is below for 03/24/2025 :   11/20/2024ALT 43, CMP is otherwise reassuring  EKG Results:  No current or pertinent labs in record  Head Imaging:  No current or pertinent labs in record  Vital Signs:   There were no vitals taken for this visit.    Chief Complaint: Bipolar depression. ADHD.     History of Present Illness:   Arslan Butler is a 34 y.o. male who presents today for follow-up and medication management for:    ICD-10-CM ICD-9-CM   1. Bipolar affective disorder, current episode depressed, current episode severity unspecified  F31.30 296.50   2. Generalized anxiety disorder  F41.1 300.02   3. Attention deficit hyperactivity disorder (ADHD), predominantly hyperactive type  F90.1 314.01   4. Primary insomnia  F51.01 307.42   5. Panic attacks  F41.0 300.01       03/24/2025 Patient is taking medications as prescribed and is tolerating them well.   Bipolar Depression and Anxiety  Patient has not been able to fine another job, was working at [employer] the experienced an injury.  Patient states that his wife is noticing his moods are worsening later in  the day. Denies episodes of jaimee/hypomania. Progression of symptoms, frequency, and intensity is gradually worsening. Patient continues to experience inability to focus and concentrate that may interfere with daily tasks,  psychomotor agitation, excessive anxiety and worry, anxiety, difficulty controlling the worry, restlessness, feeling keyed up or on edge, irritability, muscle tension, decreased motivation and these symptoms are causing significant distress or impairment. Patient denies insomnia, feeling worthless, guilty, hopelessness,. Denies thinking about death and dying, suicidal ideation, planning, or intent to self-harm.  Denies AVH.  Clinically significant distress or impairment in social, occupational, or other important areas of functioning is gradually worsening.  ADHD  Progression of symptoms, frequency, and intensity is waxing and waning. Patient reports waxing and waning in the ability to carry out very short and simple instructions, maintain attention and concentration for extended periods, make simple work-related decisions, and complete a normal workday and workweek without interruptions from psychologically based symptoms. Clinically significant distress or impairment in social, occupational, or other important areas of functioning is waxing and waning.  Panic Attack  Progression of symptoms, frequency, and intensity is waxing and waning. The problem occurs few times per week. Associated symptoms include sense of impending doom, unbearable foreboding that something terrible is going to happen, intense fear of losing control, palpitations, racing/pounding heartbeat, chest discomfort, shortness of breath, choking sensation, detachment, depersonalization, dissociation, and derealization and these symptoms are causing significant distress or impairment.       02/04/2025 Patient is taking medications as prescribed and is tolerating them well.   Bipolar Depression and Anxiety  Currently living with  his mother and his wife is living with her mother, they both lost their jobs and lost their homes. Was denied for disability benefits and they are also currently are homeless. Progression of symptoms, frequency, and intensity is waxing and waning but worse overall. Patient continues to experience feelings of sadness, low mood, lost of interest in usual activities, anhedonia, low energy, feeling worthless, guilty, hopelessness, excessive anxiety and worry, difficulty controlling the worry, restlessness, feeling keyed up or on edge, irritability, muscle tension, lack of motivation and these symptoms are causing significant distress or impairment. Denies thinking about death and dying, suicidal ideation, planning, or intent to self-harm.  Denies AVH.  Clinically significant distress or impairment in social, occupational, or other important areas of functioning is waxing and waning but worse overall.  Panic Attack  Has not been taking diazepam, has been working to cope through anxiety, didn't like the way it made him feel. Progression of symptoms, frequency, and intensity is waxing and waning. The problem occurs few times per month. Associated symptoms include sense of impending doom, unbearable foreboding that something terrible is going to happen, intense fear of losing control, palpitations, racing/pounding heartbeat, chest discomfort, shortness of breath, choking sensation, detachment, depersonalization, dissociation, derealization, and trembling/shaking and these symptoms are causing significant distress or impairment.   Continue Adderall IR 10 mg booster dose at noon  Continue Latuda to 80 mg daily  Continue Depakote ER 250mg at bedtime  Increase Adderall XR to 30 mg daily at next refill  Continue trazodone 50 mg at bedtime  Continue Valium 5 mg qd as needed for anxiety and panic attacks  Ambulatory referral for neuropsychological testing: ADHD  Follow-up 1 month    11/18/2024 Patient is taking medications as  prescribed and is tolerating them well.   Bipolar Depression and Anxiety  Progression of symptoms, frequency, and intensity is waxing and waning but better overall. Patient continues to experience feelings of sadness, excessive anxiety and worry, anxiety, difficulty controlling the worry, and these symptoms are causing significant distress or impairment. Patient denies episodes of jaimee/hypomania, low mood, lost of interest in usual activities, feeling worthless, guilty, hopelessness, psychomotor agitation,. Denies thinking about death and dying, suicidal ideation, planning, or intent to self-harm.  Denies AVH.  Clinically significant distress or impairment in social, occupational, or other important areas of functioning is waxing and waning but better overall.  Panic Attack  Has only needed to use Valium a couple times since our last encounter. Progression of symptoms, frequency, and intensity is waxing and waning but better overall. The problem occurs intermittently and occasionally. Associated symptoms include sense of impending doom, unbearable foreboding that something terrible is going to happen, intense fear of losing control, palpitations, racing/pounding heartbeat, chest discomfort, shortness of breath, choking sensation, detachment, depersonalization, dissociation, and derealization and these symptoms are causing significant distress or impairment.   ADHD  Feels like the Adderall XR has been wearing off earlier in the day. Progression of symptoms, frequency, and intensity is gradually worsening. Patient reports gradually worsening in the ability to carry out very short and simple instructions, maintain attention and concentration for extended periods, make simple work-related decisions, and complete a normal workday and workweek without interruptions from psychologically based symptoms. Clinically significant distress or impairment in social, occupational, or other important areas of functioning is  gradually worsening.  CBT/Supportive  Allowed patient to process topics such as recently started a night shift, recently moved into their own duplex. Setting goals to stick with this position until a day shift becomes available, working with his wife. Individual psychotherapy was provided utilizing solution focused techniques to restore adaptive functioning, provide symptom relief, discuss values and strengths, manage stress, identify triggers, recognize patterns of behavior, acknowledge sources of feelings and behaviors, assess symptoms, provide support, and discuss interpersonal conflicts. The therapeutic alliance was strengthened to encourage the patient to express their thoughts and feelings.   Continue Adderall IR 10 mg booster dose at noon  Continue Latuda to 80 mg daily  Continue Depakote ER 250mg at bedtime  Increase Adderall XR to 30 mg daily at next refill  Continue trazodone 50 mg at bedtime  Continue Valium 5 mg qd as needed for anxiety and panic attacks  Ambulatory referral for neuropsychological testing: ADHD  Follow-up 1 month    09/23/2024 Patient is taking medications as prescribed and is tolerating them well.   Depression and Anxiety  Progression of symptoms, frequency, and intensity is waxing and waning. Patient continues to experience feelings of sadness, low mood, lost of interest in usual activities, inability to focus and concentrate that may interfere with daily tasks,  psychomotor agitation, excessive anxiety and worry, anxiety, difficulty controlling the worry, restlessness, feeling keyed up or on edge, irritability, and these symptoms are causing significant distress or impairment. Patient denies feeling worthless, guilty, hopelessness,. Denies thinking about death and dying, suicidal ideation, planning, or intent to self-harm.  Denies AVH.  Clinically significant distress or impairment in social, occupational, or other important areas of functioning is waxing and waning.  Panic  Attack  Was terminated from his recent position because he missed too many days related to his anxiety disorder. He would lay in bed all day and not be able to do anything productive. Progression of symptoms, frequency, and intensity is waxing and waning. The problem occurs few times per week. Associated symptoms include sense of impending doom, unbearable foreboding that something terrible is going to happen, intense fear of losing control, palpitations, racing/pounding heartbeat, chest discomfort, shortness of breath, choking sensation, detachment, depersonalization, and dissociation and these symptoms are causing significant distress or impairment.   ADHD  Progression of symptoms, frequency, and intensity is gradually improving. Patient reports improvement in the ability to carry out very short and simple instructions, maintain attention and concentration for extended periods, make simple work-related decisions, and complete a normal workday and workweek without interruptions from psychologically based symptoms. Clinically significant distress or impairment in social, occupational, or other important areas of functioning is gradually improving.  CBT/Supportive  Allowed patient to process topics such as struggling with keeping employment related to anxiety, has applied for SSDI x6 and been denied every time, appeals have also been denied. Currently staying with family, this is frustrating to his wife. Individual psychotherapy was provided utilizing solution focused techniques to restore adaptive functioning, provide symptom relief, discuss values and strengths, manage stress, identify triggers, recognize patterns of behavior, acknowledge sources of feelings and behaviors, assess symptoms, provide support, and discuss interpersonal conflicts. The therapeutic alliance was strengthened to encourage the patient to express their thoughts and feelings.   Continue Adderall IR 10 mg booster dose at noon  Continue Latuda  to 80 mg daily  Continue Depakote  mg at bedtime  Continue Adderall XR to 25 mg daily at next refill  Continue trazodone 50 mg at bedtime  Continue Valium 5 mg qd as needed for anxiety and panic attacks  Ambulatory referral for neuropsychological testing: ADHD  Follow-up 1 month    08/05/2024 Patient is taking medications as prescribed and is tolerating them well.   Depression and Anxiety  Has been maintaining employment, however, has been having difficulty with desire, motivation to get up and go to work. Progression of symptoms, frequency, and intensity is waxing and waning. Patient continues to experience feelings of sadness, low mood, lost of interest in usual activities, anhedonia, low energy, feeling worthless, hopelessness, irritability, lack of motivation and these symptoms are causing significant distress or impairment. Denies thinking about death and dying, suicidal ideation, planning, or intent to self-harm.  Denies AVH.  Clinically significant distress or impairment in social, occupational, or other important areas of functioning is waxing and waning.  Panic Attack  Progression of symptoms, frequency, and intensity is waxing and waning but better overall. The problem occurs intermittently. Associated symptoms include sense of impending doom, unbearable foreboding that something terrible is going to happen, intense fear of losing control, palpitations, racing/pounding heartbeat, chest discomfort, shortness of breath, choking sensation, detachment, depersonalization, dissociation, excessive perspiration, and derealization and these symptoms are causing significant distress or impairment.   ADHD  Progression of symptoms, frequency, and intensity is improving. Patient reports improvement in the ability to carry out very short and simple instructions, maintain attention and concentration for extended periods, make simple work-related decisions, and complete a normal workday and workweek without  interruptions from psychologically based symptoms. Clinically significant distress or impairment in social, occupational, or other important areas of functioning is improving.  CBT/Supportive  Allowed patient to process topics such as working on changing behavior to influence emotions.  Trying to do what he would do if he felt motivated.  Practicing arguing the opposite of negative thoughts and challenging them.  Instead of focusing on failure, consider reasons why there can be success. Individual psychotherapy was provided utilizing solution focused techniques to restore adaptive functioning, provide symptom relief, discuss values and strengths, manage stress, identify triggers, recognize patterns of behavior, acknowledge sources of feelings and behaviors, assess symptoms, provide support, and discuss interpersonal conflicts. The therapeutic alliance was strengthened to encourage the patient to express their thoughts and feelings.   Continue Adderall IR 10 mg booster dose at noon  Continue Latuda to 80 mg daily  Continue Depakote  mg at bedtime  Continue Adderall XR to 25 mg daily at next refill  Continue Valium 5 mg qd as needed for anxiety and panic attacks  Ambulatory referral for neuropsychological testing: ADHD  Follow-up 1 month    06/28/2024 Patient is taking medications as prescribed and is tolerating them well.   Bipolar Depression and Anxiety/Social Anxiety  Was denied for SSI after his appeal. Currently off work due to shoulder injury. Progression of symptoms, frequency, and intensity is gradually worsening. Patient continues to experience low mood, lost of interest in usual activities, psychomotor agitation, excessive anxiety and worry, anxiety, difficulty controlling the worry, restlessness, feeling keyed up or on edge, irritability, panic attacks, and these symptoms are causing significant distress or impairment. Patient denies feeling worthless, guilty, hopelessness,. Denies thinking about  "death and dying, suicidal ideation, planning, or intent to self-harm.  Denies AVH.  Clinically significant distress or impairment in social, occupational, or other important areas of functioning is gradually worsening.  Panic Attack and anxiety and social anxiety   He has only needed to take valium a few times each week. Patient shared he does use breathing to help with panic attacks. This is a chronic problem. The current episode started more than 1 year ago. The problem occurs daily. The problem has been gradually worsening. Associated symptoms include sense of impending doom, unbearable foreboding that something terrible is going to happen, intense fear of losing control, palpitations, racing/pounding heartbeat, chest discomfort, shortness of breath, choking sensation, detachment, depersonalization, and dissociation.   ADHD  Progression of symptoms, frequency, and intensity is waxing and waning. Noticing a \"crash\" later in the day and afternoon. Patient reports waxing and waning in the ability to carry out very short and simple instructions, maintain attention and concentration for extended periods, make simple work-related decisions, and complete a normal workday and workweek without interruptions from psychologically based symptoms. Clinically significant distress or impairment in social, occupational, or other important areas of functioning is waxing and waning.  Insomnia is well-controlled with mirtazapine  Start Adderall IR 10 mg booster dose at noon  Increase Latuda to 80 mg daily  Continue Depakote  mg at bedtime  Continue Adderall XR 20 mg daily at next refill  Continue Valium 5 mg qd as needed for anxiety and panic attacks  Ambulatory referral for neuropsychological testing: ADHD  Continue mirtazapine to 15 mg at bedtime  Follow-up 1 month    03/19/2024 Patient is taking medications as prescribed and is tolerating them well.   Patient stated he has applied for disability again.  Patient stated he " is still waiting to hear about placement at [facility]. Neither him or his wife are working currently and they are staying with her aunt.   Depressed and irritable moods mostly related to situational stressors, he feels predominantly well-controlled with current medications.   ADHD  Has been taking increased dose of Adderall XR for approximately 1.5 months.  Patient reports moderate impairment in the ability to carry out very short and simple instructions, maintain attention and concentration for extended periods, make simple work-related decisions, and complete a normal workday and workweek without interruptions from psychologically based symptoms. Symptoms are persistent and significantly interfere with major life activities and/or result in significant suffering.  Panic Attack and anxiety and social anxiety   Patient reported he has been trying to figure out the root cause of his anxiety. He has only needed to take valium a few times each week. Patient shared he does use breathing to help with panic attacks. Patient described he may have had less panic attacks because he has not been out as much. This is a chronic problem. The current episode started more than 1 year ago. The problem occurs daily. The problem has been gradually worsening. Associated symptoms include sense of impending doom, unbearable foreboding that something terrible is going to happen, intense fear of losing control, palpitations, racing/pounding heartbeat, chest discomfort, shortness of breath, choking sensation, detachment, depersonalization, and dissociation.   Insomnia is well-controlled with mirtazapine  Denies suicidal ideation.  Denies AVH.  We will continue to monitor for mood, behavior, and safety.  Continue Latuda to 60 mg daily  Continue Depakote ER 250mg at bedtime  Continue Adderall XR 20 mg daily at next refill  Continue Valium 5 mg qd as needed for anxiety and panic attacks  Ambulatory referral for neuropsychological testing:  "ADHD  Increase mirtazapine to 15 mg at bedtime  Follow-up 1 month    Record Review is below for 03/19/2024 :   8/17/2023 TSH 0.695, free T41.26, lipid panel is reassuring, hemoglobin A1c 6.10, glucose 136, ALT 52, AST 42, CBC and CMP are otherwise reassuring.  EKG Results:  SCANNED EKG (12/29/2019)   Head Imaging:  None in record    01/26/2024 Patient is taking medications as prescribed and is tolerating them well.   Increase in Adderall is helping, he went and passed testing that he failed previously because he could not focus.   He is looking forward to hopefully getting a new position at the "[employer]", it is one of the jobs that he was able to maintain for several years. Moods have been stable. He is thinking more clearly since treating ADHD with Adderall.   Panic attacks have decreased in frequency and intensity.   Trouble sleeping, falling asleep, several nighttime awakenings  Some marital challenges, they have been fighting a lot, mostly related to financial concerns.   Discussing referral for more intense individual psychotherapy.   Denies suicidal ideation.  Denies AVH.  We will continue to monitor for mood, behavior, and safety.  Continue Latuda to 60 mg daily  Continue Depakote ER 250mg at bedtime  Plan to increase Adderall XR to 20 mg daily at next refill  Continue Valium 5 mg qd as needed for anxiety and panic attacks  Continue mirtazapine 7.5 mg at bedtime  Follow-up 1 month    Record Review is below for 01/26/2024 :   8/17/2023 TSH 0.695, free T41.26, lipid panel is reassuring, hemoglobin A1c 6.10, glucose 136, ALT 52, AST 42, CBC and CMP are otherwise reassuring.  EKG Results:  SCANNED EKG (12/29/2019)   Head Imaging:  None in record    12/29/2023 Patient is taking medications as prescribed and is tolerating them well.   \"It's been going okay.\" He lost his job at [employer], he had a major panic attack and left. His whole shirt was soaked, his heart was racing. He is looking for another job. He did " not have his rescue diazepam with him. Moods have been stable. He is thinking more clearly since treating ADHD with Adderall. Trouble sleeping, falling asleep, several nighttime awakenings.   Denies  intrusive thoughts, but having intense anxiety, mostly related to losing another job. He has applied for disability in the past and been denied.   Denies suicidal ideation.  Denies AVH.  We will continue to monitor for mood, behavior, and safety.  Continue Latuda to 60 mg daily  Continue Depakote ER 250mg at bedtime  Plan to increase Adderall XR to 15 mg daily at next refill  Continue Valium 5 mg qd as needed for anxiety and panic attacks  Start mirtazapine 7.5 mg at bedtime  Follow-up 1 month    Record Review is below for 12/29/2023 :   8/17/2023 TSH 0.695, free T41.26, lipid panel is reassuring, hemoglobin A1c 6.10, glucose 136, ALT 52, AST 42, CBC and CMP are otherwise reassuring.  EKG Results:  SCANNED EKG (12/29/2019)   Head Imaging:  None in record    12/01/2023 Patient is taking medications as prescribed and is tolerating them well.   He hasn't experienced any major mood fluctuations, jaimee, or hypomania. It has only been one week but we wanted to closely monitor mood.  He hasn't noticed a great difference but his wife is saying he seems less flustered.   We are still going to target ADHD, anxiety symptoms with Adderall.   He says he has felt a slight increase in anxiety, but denies panic attacks.   He has found a new job with [employer] and will start in a couple weeks.   Denies suicidal ideation.  Denies AVH.  We will continue to monitor for mood, behavior, and safety.  Continue Latuda to 60 mg daily  Continue Depakote ER 250mg at bedtime  Continue Adderall XR to 10 mg daily  Start Valium 5 mg qd as needed for anxiety and panic attacks  Follow-up 1 month    Record Review is below for 12/01/2023 : I have thoroughly reviewed the patient's electronic medical record to include previous encounters, care everywhere,  notes, medications, labs, SHAUNA and UDS (if applicable), imaging, and EKG's.  Pertinent information is included in this note.  8/17/2023 TSH 0.695, free T41.26, lipid panel is reassuring, hemoglobin A1c 6.10, glucose 136, ALT 52, AST 42, CBC and CMP are otherwise reassuring.  EKG Results:  SCANNED EKG (12/29/2019)   Head Imaging:  None in record      11/03/2023 Patient is taking medications as prescribed and is tolerating them well.   He has had a positive strep and flu test and has been sick all week. He hasn't experienced any major mood fluctuations, jaimee, or hypomania. It has only been one week but we wanted to closely monitor mood.  He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

## 2025-03-24 NOTE — PATIENT INSTRUCTIONS
1.  Please return to clinic at your next scheduled visit.  Please contact the clinic ([phone]) at least 24 hours prior in the event you need to cancel.  2.  Do no harm to yourself or others.    3.  Avoid alcohol and drugs.    4.  Take all medications as prescribed.  Please contact the clinic with any concerns. If you are in need of medication refills, please call the clinic at [phone].    5. Should you want to get in touch with your provider, CORTNEY Gray, please contact the office ([phone]), and staff will be able to page Sherrie directly.  6. In the event you have personal crisis, contact the following crisis numbers: Suicide Prevention Hotline 1-520.826.8262; [facility] Helpline 9-649-626-AZYU; [facility] Emergency Room [phone]; text HELLO to 540382; or 608.     SPECIFIC RECOMMENDATIONS:     1.      Medications discussed at this encounter:     New Medications Ordered This Visit   Medications    divalproex (DEPAKOTE ER) 500 MG 24 hr tablet     Sig: Take 1 tablet by mouth Daily.     Dispense:  90 tablet     Refill:  1     Increase dose. Thx 4 All U Do!                       2.      Psychotherapy recommendations: We will continue therapy at future visits.     3.     Return to clinic: Return in about 2 months (around 5/24/2025).

## 2025-04-04 ENCOUNTER — TELEPHONE (OUTPATIENT)
Dept: INTERNAL MEDICINE | Facility: CLINIC | Age: 35
End: 2025-04-04
Payer: COMMERCIAL

## 2025-04-07 NOTE — PROGRESS NOTES
PROGRESS NOTE    Data:    Arslan Butler is a 33 y.o. male who met with the undersigned for a scheduled psychological evaluation from 10:30 - 11:15 am.      Clinical Maneuvering/Intervention:      Chief complaint and history of presenting illness/Problems: struggling with obesity for several years. Despite trying different weight loss plans and diets, the pt reported being unsuccessful in losing weight. A psychological evaluation was conducted in order to assess past and current level of functioning. Areas assessed included, but were not limited to: perception of social support, perception of ability to face and deal with challenges in life (positive functioning), anxiety symptoms, depressive symptoms, perspective on beliefs/belief system, coping skills for stress, intelligence level, addiction issues, etc. Therapeutic rapport was established. Interventions conducted today were geared towards assessing the pt's readiness for weight loss surgery and identifying and psychological contraindications for undergoing such a major life change. Social support was deemed strong (specific to weight loss surgery/weight loss in this manner and in a general sense): wife, mother, father, grandmother, therapist, and doctor. Current psychological struggles were described as low, but included bipolar disorder, ADD, and frustrations/low self-esteem situational to being overweight. Coping skills for distress and related to undergoing a major life change such as weight loss surgery/weight loss were deemed strong and included good sense of humor, humble person, follows directions well, asks for help as needed, maintains quality relationships with others, and believes in himself that he will be successful with weight loss surgery. The pt endorsed having characteristics of readiness to undergo major life changes inherent in the journey of weight loss surgery. The pt could speak to the detailed process of becoming ready mentally for this  Hide Additional Notes?: No major life change, having 'suffered' enough, and how the pt has started incorporating healthier foods into his diet. The pt expressed gratitude for today's visit.     Past Family and Social History:      History of family mental health problems: mother (bipolar, alcoholism, schizophrenia per collateral information), father (alcoholism)     Psychosocial history: treatment of psychiatric care in the past (N/A), alcohol/substance abuse treatment in the past (N/A) , alcohol/substance abuse problems (N/A), inpatient psychiatric care (one year ago, untreated bipolar symptoms; most recent suicidal ideations approximately one and a half years ago).    Mental Status Exam (MSE):  Hygiene:  good  Dress: normal  Attitude:  cooperative and proactive  Motor Activity: fidgety   Speech: normal  Mood:   nervous  Affect:  congruent  Thought Processes: normal  Thought Content:  normal  Suicidal Thoughts:  not endorsed  Homicidal Thoughts:  not endorsed  Crisis Safety Plan: not needed   Hallucinations:  none      Patient's Support Network Includes:  family, friends      Progress toward goal: there is evidence to suggest that he is taking measures to improve the quality of his life including seeking weight loss surgery.       Functional Status: moderate to high      Prognosis: good with weight loss surgery    Evaluation, Diagnoses, and Ability/Capacity to Respond to Treatment:      The pt presented to be struggling with bipolar disorder, symptoms well-managed with current psychotropic medication regime and frustrations/low self-esteem situational to being overweight (BMI = 46.81, morbid obesity). He has symptoms congruent with having ADD. Results of MSE demonstrated a functional status of moderate to high. Strengths: belief in self that he will be successful with weight loss surgery, etc (see detailed list of coping skills above). Needed for growth (CPT code requirement for Weaknesses): weight loss.      From a psychological standpoint,  Detail Level: Detailed the pt presents as a good candidate for bariatric surgery. He is motivated for the surgery, has showed readiness for the lifestyle change in terms of starting to adjust his eating habits, and seems to have appropriate expectations of how to prepare and how to live after surgery in order to lose weight successfully.    Treatment Plan:      Short term goals: Continue with psychotropic medication as prescribed. Start improving his health by following up with his bariatric surgeon in order to receive weight loss surgery as soon as feasible/appropriate and demonstrate success with compliance to adhering to the recommended diet. Long term goals: reach a healthy weight and overall improved mood and self-esteem via taking control of his health. Continue with psychotropic medication as prescribed.      Claudette Diaz, PhD, LP

## 2025-04-09 DIAGNOSIS — F90.1 ATTENTION DEFICIT HYPERACTIVITY DISORDER (ADHD), PREDOMINANTLY HYPERACTIVE TYPE: ICD-10-CM

## 2025-04-09 RX ORDER — DEXTROAMPHETAMINE SACCHARATE, AMPHETAMINE ASPARTATE MONOHYDRATE, DEXTROAMPHETAMINE SULFATE AND AMPHETAMINE SULFATE 7.5; 7.5; 7.5; 7.5 MG/1; MG/1; MG/1; MG/1
30 CAPSULE, EXTENDED RELEASE ORAL EVERY MORNING
Qty: 30 CAPSULE | Refills: 0 | Status: SHIPPED | OUTPATIENT
Start: 2025-04-09

## 2025-04-09 RX ORDER — DEXTROAMPHETAMINE SACCHARATE, AMPHETAMINE ASPARTATE, DEXTROAMPHETAMINE SULFATE AND AMPHETAMINE SULFATE 3.75; 3.75; 3.75; 3.75 MG/1; MG/1; MG/1; MG/1
15 TABLET ORAL
Qty: 30 TABLET | Refills: 0 | Status: SHIPPED | OUTPATIENT
Start: 2025-04-09 | End: 2026-04-09

## 2025-04-09 NOTE — TELEPHONE ENCOUNTER
REFILL REQUEST:     amphetamine-dextroamphetamine XR (Adderall XR) 30 MG 24 hr capsule (03/11/2025)     amphetamine-dextroamphetamine (Adderall) 10 MG tablet (03/11/2025)     F/UP- 06/03/2025.  LOV: 03/24/2025.    LAST UDS:  Urine Drug Screen - Urine, Clean Catch (10/24/2024 12:36)     PT STATES THAT THE ADDERALL 10 MG WAS GOING TO BE INCREASED TO ADDERALL 15 MG TABLETS.  Telemedicine with Sherrie Oneill APRN (03/24/2025)

## 2025-04-14 NOTE — TELEPHONE ENCOUNTER
Noted.  Thanks.  
RELAY--Called patient regarding overdue lab for stool studies, LMOM.   
(M6) obeys commands

## 2025-04-17 ENCOUNTER — TELEPHONE (OUTPATIENT)
Dept: PSYCHIATRY | Facility: CLINIC | Age: 35
End: 2025-04-17
Payer: COMMERCIAL

## 2025-04-17 NOTE — TELEPHONE ENCOUNTER
I have attempted to contact this patient by phone with the following results: Phone answered, but only silence on the other end.  Called x2

## 2025-04-17 NOTE — TELEPHONE ENCOUNTER
Pt's depakote was increased ayt his last appointment on 3/24.  Pt states that his mood has been all over the place since then and wants to know if he needs to wait for it to even out in his system, or if the medication is to much.  Provider please review and advise

## 2025-04-18 ENCOUNTER — TELEPHONE (OUTPATIENT)
Dept: INTERNAL MEDICINE | Facility: CLINIC | Age: 35
End: 2025-04-18
Payer: COMMERCIAL

## 2025-04-21 NOTE — TELEPHONE ENCOUNTER
I have attempted to contact this patient by phone with the following results: Phone answered, but only silence on the other end      Imaging Studies

## 2025-04-21 NOTE — TELEPHONE ENCOUNTER
WE HAVE TRIED THREE TIMES TO GET THIS PATIENT TO RESPOND TO OUR PHONE CALLS REGARDING THE ANSWERING MACHINE MESSAGE HE LEFT.  WE ARE UNABLE TO ATTAIN CLARIFICATION

## 2025-05-03 DIAGNOSIS — F40.10 SOCIAL ANXIETY DISORDER: ICD-10-CM

## 2025-05-03 DIAGNOSIS — F31.30 BIPOLAR AFFECTIVE DISORDER, CURRENT EPISODE DEPRESSED, CURRENT EPISODE SEVERITY UNSPECIFIED: ICD-10-CM

## 2025-05-03 DIAGNOSIS — F41.1 GENERALIZED ANXIETY DISORDER: ICD-10-CM

## 2025-05-05 RX ORDER — DULOXETIN HYDROCHLORIDE 30 MG/1
CAPSULE, DELAYED RELEASE ORAL
Qty: 60 CAPSULE | Refills: 0 | Status: SHIPPED | OUTPATIENT
Start: 2025-05-05

## 2025-05-05 NOTE — TELEPHONE ENCOUNTER
NEXT VISIT WITH PROVIDER Appointment with Sherrie Oneill APRN (06/03/2025)     LAST SEEN BY PROVIDER Telemedicine with Sherrie Oneill APRN (03/24/2025)     LAST MED REFILLDULoxetine (Cymbalta) 30 MG capsule (02/04/2025)         PROVIDER PLEASE ADVISE

## 2025-05-08 DIAGNOSIS — F90.1 ATTENTION DEFICIT HYPERACTIVITY DISORDER (ADHD), PREDOMINANTLY HYPERACTIVE TYPE: ICD-10-CM

## 2025-05-08 RX ORDER — DEXTROAMPHETAMINE SACCHARATE, AMPHETAMINE ASPARTATE, DEXTROAMPHETAMINE SULFATE AND AMPHETAMINE SULFATE 3.75; 3.75; 3.75; 3.75 MG/1; MG/1; MG/1; MG/1
15 TABLET ORAL
Qty: 30 TABLET | Refills: 0 | Status: SHIPPED | OUTPATIENT
Start: 2025-05-08 | End: 2026-05-08

## 2025-05-08 RX ORDER — DEXTROAMPHETAMINE SACCHARATE, AMPHETAMINE ASPARTATE MONOHYDRATE, DEXTROAMPHETAMINE SULFATE AND AMPHETAMINE SULFATE 7.5; 7.5; 7.5; 7.5 MG/1; MG/1; MG/1; MG/1
30 CAPSULE, EXTENDED RELEASE ORAL EVERY MORNING
Qty: 30 CAPSULE | Refills: 0 | Status: SHIPPED | OUTPATIENT
Start: 2025-05-08

## 2025-05-08 NOTE — TELEPHONE ENCOUNTER
CONTROLLED MEDICATION REFILL REQUEST    STATE REGULATION APPT EVERY 3 MONTHS     UDS(URINE DRUG SCREEN) EVERY 6 MONTHS OR UP TO PROVIDER PREFERENCE   (NEO SOLIMAN & ADA 1 PER YEAR)     NEW NARC CONSENT EVERY YEAR      MEDICATION: amphetamine-dextroamphetamine XR (Adderall XR) 30 MG 24 hr capsule (04/09/2025)   amphetamine-dextroamphetamine (Adderall) 15 MG tablet (04/09/2025)     PT(PATIENT) STATES HE FEELS LIKE THE MEDICATION IS WEARING OFF TOO FAST     PT(PATIENT) CONFIRMED PHARMACY: [facility]      NEXT OFFICE VISIT: Appointment with Sherrie Oneill APRN (06/03/2025)     LAST OFFICE VISIT: Telemedicine with Sherrie Oneill APRN (03/24/2025)     NARC CONSENT: NONE IN EPIC     URINE DRUG SCREEN(STANDING ORDER)   (NEO SOLIMAN & ADA 1 PER YEAR): Urine Drug Screen - Urine, Clean Catch (10/24/2024 12:36)  Fentanyl, Urine - Urine, Clean Catch (10/24/2024 12:36)    PROVIDER PLEASE ADVISE

## 2025-05-09 NOTE — TELEPHONE ENCOUNTER
MEDICATION: amphetamine-dextroamphetamine XR (Adderall XR) 30 MG 24 hr capsule (05/08/2025)     amphetamine-dextroamphetamine (Adderall) 15 MG tablet (05/08/2025)     NEXT OFFICE VISIT: Appointment with Sherrie Oneill APRN (06/03/2025)     LAST OFFICE VISIT: Telemedicine with Sherrie Oneill APRN (03/24/2025)     NARC CONSENT: CONSENTS/WAIVERS - SCAN (03/24/2025)     URINE DRUG SCREEN(STANDING ORDER)   (NEO SOLIMAN & ADA 1 PER YEAR): Urine Drug Screen - Urine, Clean Catch (10/24/2024 12:36)     PROVIDER PLEASE ADVISE

## 2025-05-14 ENCOUNTER — RESULTS FOLLOW-UP (OUTPATIENT)
Dept: INTERNAL MEDICINE | Facility: CLINIC | Age: 35
End: 2025-05-14

## 2025-05-14 ENCOUNTER — OFFICE VISIT (OUTPATIENT)
Dept: INTERNAL MEDICINE | Facility: CLINIC | Age: 35
End: 2025-05-14
Payer: COMMERCIAL

## 2025-05-14 VITALS
RESPIRATION RATE: 18 BRPM | TEMPERATURE: 98 F | BODY MASS INDEX: 39.17 KG/M2 | WEIGHT: 315 LBS | SYSTOLIC BLOOD PRESSURE: 140 MMHG | HEART RATE: 78 BPM | HEIGHT: 75 IN | DIASTOLIC BLOOD PRESSURE: 106 MMHG

## 2025-05-14 DIAGNOSIS — E11.65 TYPE 2 DIABETES MELLITUS WITH HYPERGLYCEMIA, WITHOUT LONG-TERM CURRENT USE OF INSULIN: ICD-10-CM

## 2025-05-14 DIAGNOSIS — F90.0 ATTENTION DEFICIT HYPERACTIVITY DISORDER (ADHD), PREDOMINANTLY INATTENTIVE TYPE: ICD-10-CM

## 2025-05-14 DIAGNOSIS — F41.1 GENERALIZED ANXIETY DISORDER: ICD-10-CM

## 2025-05-14 DIAGNOSIS — M51.370 DEGENERATION OF INTERVERTEBRAL DISC OF LUMBOSACRAL REGION WITH DISCOGENIC BACK PAIN: ICD-10-CM

## 2025-05-14 DIAGNOSIS — E66.01 CLASS 3 SEVERE OBESITY DUE TO EXCESS CALORIES WITH SERIOUS COMORBIDITY AND BODY MASS INDEX (BMI) OF 40.0 TO 44.9 IN ADULT: ICD-10-CM

## 2025-05-14 DIAGNOSIS — I10 ESSENTIAL HYPERTENSION: ICD-10-CM

## 2025-05-14 DIAGNOSIS — K75.81 NASH (NONALCOHOLIC STEATOHEPATITIS): ICD-10-CM

## 2025-05-14 DIAGNOSIS — Z00.00 HEALTHCARE MAINTENANCE: Primary | ICD-10-CM

## 2025-05-14 DIAGNOSIS — E66.813 CLASS 3 SEVERE OBESITY DUE TO EXCESS CALORIES WITH SERIOUS COMORBIDITY AND BODY MASS INDEX (BMI) OF 40.0 TO 44.9 IN ADULT: ICD-10-CM

## 2025-05-14 DIAGNOSIS — N52.2 DRUG-INDUCED ERECTILE DYSFUNCTION: ICD-10-CM

## 2025-05-14 LAB
ALBUMIN SERPL-MCNC: 4.5 G/DL (ref 3.5–5.2)
ALBUMIN/GLOB SERPL: 1.6 G/DL
ALP SERPL-CCNC: 64 U/L (ref 39–117)
ALT SERPL W P-5'-P-CCNC: 31 U/L (ref 1–41)
ANION GAP SERPL CALCULATED.3IONS-SCNC: 10.8 MMOL/L (ref 5–15)
AST SERPL-CCNC: 27 U/L (ref 1–40)
BILIRUB SERPL-MCNC: 0.4 MG/DL (ref 0–1.2)
BUN SERPL-MCNC: 9 MG/DL (ref 6–20)
BUN/CREAT SERPL: 8.1 (ref 7–25)
CALCIUM SPEC-SCNC: 9.2 MG/DL (ref 8.6–10.5)
CHLORIDE SERPL-SCNC: 108 MMOL/L (ref 98–107)
CHOLEST SERPL-MCNC: 140 MG/DL (ref 0–200)
CO2 SERPL-SCNC: 23.2 MMOL/L (ref 22–29)
CREAT SERPL-MCNC: 1.11 MG/DL (ref 0.76–1.27)
EGFRCR SERPLBLD CKD-EPI 2021: 89.9 ML/MIN/1.73
EXPIRATION DATE: NORMAL
GLOBULIN UR ELPH-MCNC: 2.8 GM/DL
GLUCOSE SERPL-MCNC: 99 MG/DL (ref 65–99)
HBA1C MFR BLD: 5.5 % (ref 4.5–5.7)
HDLC SERPL-MCNC: 35 MG/DL (ref 40–60)
INR PPP: 1 (ref 0.9–1.1)
LDLC SERPL CALC-MCNC: 83 MG/DL (ref 0–100)
LDLC/HDLC SERPL: 2.3 {RATIO}
Lab: NORMAL
POTASSIUM SERPL-SCNC: 4.3 MMOL/L (ref 3.5–5.2)
PROT SERPL-MCNC: 7.3 G/DL (ref 6–8.5)
SODIUM SERPL-SCNC: 142 MMOL/L (ref 136–145)
TRIGL SERPL-MCNC: 123 MG/DL (ref 0–150)
VLDLC SERPL-MCNC: 22 MG/DL (ref 5–40)

## 2025-05-14 PROCEDURE — 82043 UR ALBUMIN QUANTITATIVE: CPT | Performed by: STUDENT IN AN ORGANIZED HEALTH CARE EDUCATION/TRAINING PROGRAM

## 2025-05-14 PROCEDURE — 80053 COMPREHEN METABOLIC PANEL: CPT | Performed by: STUDENT IN AN ORGANIZED HEALTH CARE EDUCATION/TRAINING PROGRAM

## 2025-05-14 PROCEDURE — 80061 LIPID PANEL: CPT | Performed by: STUDENT IN AN ORGANIZED HEALTH CARE EDUCATION/TRAINING PROGRAM

## 2025-05-14 PROCEDURE — 82570 ASSAY OF URINE CREATININE: CPT | Performed by: STUDENT IN AN ORGANIZED HEALTH CARE EDUCATION/TRAINING PROGRAM

## 2025-05-14 RX ORDER — TADALAFIL 5 MG/1
5 TABLET ORAL DAILY PRN
Qty: 12 TABLET | Refills: 5 | Status: SHIPPED | OUTPATIENT
Start: 2025-05-14 | End: 2025-05-16

## 2025-05-14 NOTE — PATIENT INSTRUCTIONS
Dentistry    Address: [address] [place] [address]    Accepts Medicaid    Dental: Phone [phone], Fax [phone]    Oral Surgery: Phone [phone], Fax [phone]    Facial Pain Clinic: Phone [phone], Fax [phone]    Periodontal: Phone [phone]      [facility]    Address: [address], [place] [address]    Accepts Medicaid    Phone: [phone]      [facility]    Address: [address] [place] [address] or [address] [place] [address]    Walk-in, arrive by 8 AM (5-6 patients a day)    [facility]    Address: [address] [facility] [address] or [facility]    Phone: [phone]      [facility] Address: [address] , [address], [place] [address]    [facility] Address: [address] [place] [address]    Locations in [facility]    Phone: [phone]      [place] [facility]    Address: [address] [place] [address]    Phone: [phone]      [facility]    Address: [address] [facility] [address] [place] [address], [place] [id number]    Accepts [employer], [employer], and [employer] Medicaid    Phone: [phone]      [facility]    Accepts A[employer] and [employer] Medicaid    Address: [address] [place] [address]    Phone: [phone]      [facility]    Address: [address] , [address], [place] [id number]    Phone: [phone]      [facility]    Address: [address] [place] [address]    Can see patients for tongue ties    Phone: [phone]        Health Maintenance, Male  A healthy lifestyle and preventive care is important for your health and wellness. Ask your health care provider about what schedule of regular examinations is right for you.  What should I know about weight and diet?    Eat a Healthy Diet  Eat plenty of vegetables, fruits, whole grains, low-fat dairy products, and lean  protein.  Do not eat a lot of foods high in solid fats, added sugars, or salt.     Maintain a Healthy Weight  Regular exercise can help you achieve or maintain a healthy weight. You should:  Do at least 150 minutes of exercise each week. The exercise should increase your heart rate and make you sweat (moderate-intensity exercise).  Do strength-training exercises at least twice a week.     Watch Your Levels of Cholesterol and Blood Lipids  Have your blood tested for lipids and cholesterol every 5 years starting at 35 years of age. If you are at high risk for heart disease, you should start having your blood tested when you are 20 years old. You may need to have your cholesterol levels checked more often if:  Your lipid or cholesterol levels are high.  You are older than 50 years of age.  You are at high risk for heart disease.     What should I know about cancer screening?  Many types of cancers can be detected early and may often be prevented.  Lung Cancer  You should be screened every year for lung cancer if:  You are a current smoker who has smoked for at least 30 years.  You are a former smoker who has quit within the past 15 years.  Talk to your health care provider about your screening options, when you should start screening, and how often you should be screened.     Colorectal Cancer  Routine colorectal cancer screening usually begins at 50 years of age and should be repeated every 5-10 years until you are 75 years old. You may need to be screened more often if early forms of precancerous polyps or small growths are found. Your health care provider may recommend screening at an earlier age if you have risk factors for colon cancer.  Your health care provider may recommend using home test kits to check for hidden blood in the stool.  A small camera at the end of a tube can be used to examine your colon (sigmoidoscopy or colonoscopy). This checks for the earliest forms of colorectal cancer.     Prostate and  Testicular Cancer  Depending on your age and overall health, your health care provider may do certain tests to screen for prostate and testicular cancer.  Talk to your health care provider about any symptoms or concerns you have about testicular or prostate cancer.     Skin Cancer  Check your skin from head to toe regularly.  Tell your health care provider about any new moles or changes in moles, especially if:  There is a change in a mole’s size, shape, or color.  You have a mole that is larger than a pencil eraser.  Always use sunscreen. Apply sunscreen liberally and repeat throughout the day.  Protect yourself by wearing long sleeves, pants, a wide-brimmed hat, and sunglasses when outside.     What should I know about heart disease, diabetes, and high blood pressure?  If you are 18-39 years of age, have your blood pressure checked every 3-5 years. If you are 40 years of age or older, have your blood pressure checked every year. You should have your blood pressure measured twice--once when you are at a hospital or clinic, and once when you are not at a hospital or clinic. Record the average of the two measurements. To check your blood pressure when you are not at a hospital or clinic, you can use:  An automated blood pressure machine at a pharmacy.  A home blood pressure monitor.  Talk to your health care provider about your target blood pressure.  If you are between 45-79 years old, ask your health care provider if you should take aspirin to prevent heart disease.  Have regular diabetes screenings by checking your fasting blood sugar level.  If you are at a normal weight and have a low risk for diabetes, have this test once every three years after the age of 45.  If you are overweight and have a high risk for diabetes, consider being tested at a younger age or more often.  A one-time screening for abdominal aortic aneurysm (AAA) by ultrasound is recommended for men aged 65-75 years who are current or former  smokers.  What should I know about preventing infection?  Hepatitis B  If you have a higher risk for hepatitis B, you should be screened for this virus. Talk with your health care provider to find out if you are at risk for hepatitis B infection.  Hepatitis C  Blood testing is recommended for:  Everyone born from 1945 through 1965.  Anyone with known risk factors for hepatitis C.     Sexually Transmitted Diseases (STDs)  You should be screened each year for STDs including gonorrhea and chlamydia if:  You are sexually active and are younger than 24 years of age.  You are older than 24 years of age and your health care provider tells you that you are at risk for this type of infection.  Your sexual activity has changed since you were last screened and you are at an increased risk for chlamydia or gonorrhea. Ask your health care provider if you are at risk.  Talk with your health care provider about whether you are at high risk of being infected with HIV. Your health care provider may recommend a prescription medicine to help prevent HIV infection.     What else can I do?    Schedule regular health, dental, and eye exams.  Stay current with your vaccines (immunizations).  Do not use any tobacco products, such as cigarettes, chewing tobacco, and e-cigarettes. If you need help quitting, ask your health care provider.  Limit alcohol intake to no more than 2 drinks per day. One drink equals 12 ounces of beer, 5 ounces of wine, or 1½ ounces of hard liquor.  Do not use street drugs.  Do not share needles.  Ask your health care provider for help if you need support or information about quitting drugs.  Tell your health care provider if you often feel depressed.  Tell your health care provider if you have ever been abused or do not feel safe at home.      This information is not intended to replace advice given to you by your health care provider. Make sure you discuss any questions you have with your health care  provider.  Document Released: 06/15/2009 Document Revised: 08/16/2017 Document Reviewed: 09/20/2016  [employer] Patient Education © 2018 [employer]        Diabetes Mellitus and Nutrition, Adult  When you have diabetes, or diabetes mellitus, it is very important to have healthy eating habits because your blood sugar (glucose) levels are greatly affected by what you eat and drink. Eating healthy foods in the right amounts, at about the same times every day, can help you:  Manage your blood glucose.  Lower your risk of heart disease.  Improve your blood pressure.  Reach or maintain a healthy weight.  What can affect my meal plan?  Every person with diabetes is different, and each person has different needs for a meal plan. Your health care provider may recommend that you work with a dietitian to make a meal plan that is best for you. Your meal plan may vary depending on factors such as:  The calories you need.  The medicines you take.  Your weight.  Your blood glucose, blood pressure, and cholesterol levels.  Your activity level.  Other health conditions you have, such as heart or kidney disease.  How do carbohydrates affect me?  Carbohydrates, also called carbs, affect your blood glucose level more than any other type of food. Eating carbs raises the amount of glucose in your blood.  It is important to know how many carbs you can safely have in each meal. This is different for every person. Your dietitian can help you calculate how many carbs you should have at each meal and for each snack.  How does alcohol affect me?  Alcohol can cause a decrease in blood glucose (hypoglycemia), especially if you use insulin or take certain diabetes medicines by mouth. Hypoglycemia can be a life-threatening condition. Symptoms of hypoglycemia, such as sleepiness, dizziness, and confusion, are similar to symptoms of having too much alcohol.  Do not drink alcohol if:  Your health care provider tells you not to drink.  You  "are pregnant, may be pregnant, or are planning to become pregnant.  If you drink alcohol:  Limit how much you have to:  0-1 drink a day for women.  0-2 drinks a day for men.  Know how much alcohol is in your drink. In the [place] one drink equals one 12 oz bottle of beer (355 mL), one 5 oz glass of wine (148 mL), or one 1½ oz glass of hard liquor (44 mL).  Keep yourself hydrated with water, diet soda, or unsweetened iced tea. Keep in mind that regular soda, juice, and other mixers may contain a lot of sugar and must be counted as carbs.  What are tips for following this plan?  Reading food labels  Start by checking the serving size on the Nutrition Facts label of packaged foods and drinks. The number of calories and the amount of carbs, fats, and other nutrients listed on the label are based on one serving of the item. Many items contain more than one serving per package.  Check the total grams (g) of carbs in one serving.  Check the number of grams of saturated fats and trans fats in one serving. Choose foods that have a low amount or none of these fats.  Check the number of milligrams (mg) of salt (sodium) in one serving. Most people should limit total sodium intake to less than 2,300 mg per day.  Always check the nutrition information of foods labeled as \"low-fat\" or \"nonfat.\" These foods may be higher in added sugar or refined carbs and should be avoided.  Talk to your dietitian to identify your daily goals for nutrients listed on the label.  Shopping  Avoid buying canned, pre-made, or processed foods. These foods tend to be high in fat, sodium, and added sugar.  Shop around the outside edge of the grocery store. This is where you will most often find fresh fruits and vegetables, bulk grains, fresh meats, and fresh dairy products.  Cooking  Use low-heat cooking methods, such as baking, instead of high-heat cooking methods, such as deep frying.  Cook using healthy oils, such as olive, canola, or sunflower " oil.  Avoid cooking with butter, cream, or high-fat meats.  Meal planning  Eat meals and snacks regularly, preferably at the same times every day. Avoid going long periods of time without eating.  Eat foods that are high in fiber, such as fresh fruits, vegetables, beans, and whole grains.  Eat 4-6 oz (112-168 g) of lean protein each day, such as lean meat, chicken, fish, eggs, or tofu. One ounce (oz) (28 g) of lean protein is equal to:  1 oz (28 g) of meat, chicken, or fish.  1 egg.  ¼ cup (62 g) of tofu.  Eat some foods each day that contain healthy fats, such as avocado, nuts, seeds, and fish.  What foods should I eat?  Fruits  Berries. Apples. Oranges. Peaches. Apricots. Plums. Grapes. Mangoes. Papayas. Pomegranates. Kiwi. Cherries.  Vegetables  Leafy greens, including lettuce, spinach, kale, chard, gamaliel greens, mustard greens, and cabbage. Beets. Cauliflower. Broccoli. Carrots. Green beans. Tomatoes. Peppers. Onions. Cucumbers. [place] sprouts.  Grains  Whole grains, such as whole-wheat or whole-grain bread, crackers, tortillas, cereal, and pasta. Unsweetened oatmeal. Quinoa. Brown or wild rice.  Meats and other proteins  Seafood. Poultry without skin. Lean cuts of poultry and beef. Tofu. Nuts. Seeds.  Dairy  Low-fat or fat-free dairy products such as milk, yogurt, and cheese.  The items listed above may not be a complete list of foods and beverages you can eat and drink. Contact a dietitian for more information.  What foods should I avoid?  Fruits  Fruits canned with syrup.  Vegetables  Canned vegetables. Frozen vegetables with butter or cream sauce.  Grains  Refined white flour and flour products such as bread, pasta, snack foods, and cereals. Avoid all processed foods.  Meats and other proteins  Fatty cuts of meat. Poultry with skin. Breaded or fried meats. Processed meat. Avoid saturated fats.  Dairy  Full-fat yogurt, cheese, or milk.  Beverages  Sweetened drinks, such as soda or iced tea.  The items  listed above may not be a complete list of foods and beverages you should avoid. Contact a dietitian for more information.  Questions to ask a health care provider  Do I need to meet with a certified diabetes care and ?  Do I need to meet with a dietitian?  What number can I call if I have questions?  When are the best times to check my blood glucose?  Where to find more information:  American Diabetes Association: diabetes.org  Academy of Nutrition and Dietetics: eatright.org  National [place] of Diabetes and Digestive and Kidney Diseases: niddk.nih.gov  Association of Diabetes Care & Education Specialists: diabeteseducator.org  Summary  It is important to have healthy eating habits because your blood sugar (glucose) levels are greatly affected by what you eat and drink. It is important to use alcohol carefully.  A healthy meal plan will help you manage your blood glucose and lower your risk of heart disease.  Your health care provider may recommend that you work with a dietitian to make a meal plan that is best for you.  This information is not intended to replace advice given to you by your health care provider. Make sure you discuss any questions you have with your health care provider.  Document Revised: 07/21/2021 Document Reviewed: 07/21/2021  Elsevier Patient Education © 2022 [employer].

## 2025-05-14 NOTE — PROGRESS NOTES
Male Physical Note      Date: 2025   Patient Name: Arslan Butler  : 1990   MRN: [id number]     Chief Complaint:    Chief Complaint   Patient presents with    Annual Exam       History of Present Illness: Arslan Butler is a 34 y.o. male who is here today for their annual health maintenance and physical.  History of Present Illness  The patient is a 34-year-old male who presents for health maintenance and chronic care management.    Inability to Monitor Blood Glucose Levels  He reports an inability to monitor his blood glucose levels due to the absence of a testing device. He suspects a slight elevation in his blood glucose levels, as evidenced by increased thirst and difficulty with vision. He is currently on Ozempic 2 mg.  - Character: Increased thirst and difficulty with vision.  - Alleviating/Aggravating Factors: Absence of a testing device.    Interest in Phentermine for Weight Loss  He has expressed interest in phentermine for weight loss, citing positive outcomes observed in his peers. Despite consuming only one meal per day, he has not experienced any significant weight loss. He has previously consulted with a nutritionist and a weight management clinic and has considered surgical intervention but decided against it due to concerns about rapid weight loss and excess skin.  - Onset: Interest in phentermine due to positive outcomes observed in peers.  - Character: Consuming only one meal per day without significant weight loss.  - Alleviating/Aggravating Factors: Concerns about rapid weight loss and excess skin.    Psychiatric Issues  He is currently under the care of a psychiatrist and is on several medications, including Depakote, Latuda, Cymbalta, and Adderall. He reports experiencing erectile dysfunction, which he attributes to the initiation of Cymbalta. He also notes that an increase in his Depakote dosage from 250 mg to 500 mg coincided with the escalation of his Cymbalta dosage from 30  mg to 60 mg, after which he noticed a decrease in sexual function. He has been attempting to contact his psychiatrist regarding these issues but has not received a response. He reports mood instability following the increase in his Depakote dosage but believes his mood is now stabilizing. He also reports a loss of interest in activities, difficulty falling asleep, fatigue, and low energy levels. He does not endorse any changes in appetite or self-esteem issues. He reports difficulty concentrating when off his medications and notes that his current symptoms have impacted his ability to work. He does not endorse any recent suicidal or homicidal ideation.  - Onset: Erectile dysfunction following initiation of Cymbalta; mood instability following increase in Depakote dosage.  - Character: Erectile dysfunction, mood instability, loss of interest in activities, difficulty falling asleep, fatigue, low energy levels, difficulty concentrating.  - Alleviating/Aggravating Factors: Increase in Depakote dosage; escalation of Cymbalta dosage.  - Severity: Symptoms impacting ability to work.    Degenerative Disc Disease of the Lumbar Spine  He is currently working with a pain management specialist for his degenerative disc disease of the lumbar spine. Initially, he did not perceive any improvement, but subsequently experienced a period of relief lasting approximately one month. However, his back pain has since returned to its previous state. He has been informed that his weight is a contributing factor to his condition and that further weight loss could potentially alleviate his symptoms.  - Onset: Initially no improvement, followed by a period of relief lasting approximately one month.  - Character: Back pain.  - Alleviating/Aggravating Factors: Weight is a contributing factor.  - Timing: Pain returned to previous state after one month of relief.    ADHD  He has been diagnosed with ADHD and is currently on a medication regimen  that includes 30 mg of extended-release medication in the morning and 15 mg of immediate-release medication in the afternoon. He reports that this regimen has been effective in improving his focus and activity level.  - Character: Improved focus and activity level.  - Timin mg of extended-release medication in the morning and 15 mg of immediate-release medication in the afternoon.    WEEKS  He has been diagnosed with WEEKS, a form of liver dysfunction associated with fatty liver disease. His condition was previously assessed as mild.  - Character: Liver dysfunction associated with fatty liver disease.  - Severity: Previously assessed as mild.    He is currently seeking a dentist who accepts his insurance. He does not endorse feeling unsafe or being a victim of abuse. He is under the care of an ophthalmologist at [facility].    SOCIAL HISTORY  He does not smoke.    FAMILY HISTORY  His father has a hereditary condition that causes small bumps on the skin.      Subjective      Review of Systems:   Review of Systems   All other systems reviewed and are negative.      Past Medical History, Social History, Family History and Care Team were all reviewed with patient and updated as appropriate.     Medications:     Current Outpatient Medications:     amphetamine-dextroamphetamine (Adderall) 15 MG tablet, Take 1 tablet by mouth Daily With Lunch., Disp: 30 tablet, Rfl: 0    amphetamine-dextroamphetamine XR (Adderall XR) 30 MG 24 hr capsule, Take 1 capsule by mouth Every Morning, Disp: 30 capsule, Rfl: 0    divalproex (DEPAKOTE ER) 500 MG 24 hr tablet, Take 1 tablet by mouth Daily., Disp: 90 tablet, Rfl: 1    DULoxetine (CYMBALTA) 30 MG capsule, TAKE 1 CAPSULE BY MOUTH ONCE DAILY FOR  1  WEEK  THEN  INCREASE  TO  TWO  CAPSULES  DAILY, Disp: 60 capsule, Rfl: 0    loratadine (CLARITIN) 10 MG tablet, Take 1 tablet by mouth Daily., Disp: , Rfl:     Lurasidone HCl (LATUDA) 80 MG tablet tablet, TAKE ONE TABLET BY MOUTH DAILY,  Disp: 90 tablet, Rfl: 0    Semaglutide, 2 MG/DOSE, (Ozempic, 2 MG/DOSE,) 8 MG/3ML solution pen-injector, Inject 2 mg under the skin into the appropriate area as directed 1 (One) Time Per Week., Disp: 9 mL, Rfl: 3    tadalafil (CIALIS) 5 MG tablet, Take 1 tablet by mouth Daily As Needed for Erectile Dysfunction., Disp: 12 tablet, Rfl: 5    Allergies:   Allergies   Allergen Reactions    Sulfamethoxazole-Trimethoprim Hives and Rash    Metformin GI Intolerance       Immunizations:  Immunization History   Administered Date(s) Administered    COVID-19 (MODERNA) 1st,2nd,3rd Dose Monovalent 04/10/2021, 05/05/2021, 08/19/2021, 09/17/2021    DTP 03/11/1996    Hep B, Adolescent or Pediatric 06/22/2001, 07/23/2001, 08/02/2002    MMR 03/11/1996    OPV 03/11/1996    Pneumococcal Polysaccharide (PPSV23) 08/05/2022    Tdap 08/05/2022, 09/26/2024        Colorectal Screening: Up-to-date  Last Completed Colonoscopy    This patient has no relevant Health Maintenance data.       CT for Smoker (Age 50-80, 20pk yr within last 15 years): Up-to-date  Bone Density/DEXA (high risk): Not applicable  Hep C (Age 18-79 once): Previously negative  HIV (Age 15-65 once) : Previously negative  PSA (Over age 50, C Level Recommendation): Did not order  US Aorta (For male smokers, age 65): Not applicable  A1c: Ordered  Lipid panel: Ordered    Dermatology: pending  Ophthalmologist: Established  Dentist: Established    Tobacco Use: Low Risk  (5/14/2025)    Patient History     Smoking Tobacco Use: Never     Smokeless Tobacco Use: Never     Passive Exposure: Never       Social History     Substance and Sexual Activity   Alcohol Use Not Currently    Comment: ocassionally         Social History     Substance and Sexual Activity   Drug Use Never        Diet/Physical activity: Counseled on 05/14/25    PHQ-9 Depression Screening  Little interest or pleasure in doing things? More than half the days   Feeling down, depressed, or hopeless? Not at all   PHQ-2 Total  "Score 2   Trouble falling or staying asleep, or sleeping too much? Several days   Feeling tired or having little energy? More than half the days   Poor appetite or overeating? Not at all   Feeling bad about yourself - or that you are a failure or have let yourself or your family down? Not at all   Trouble concentrating on things, such as reading the newspaper or watching television? Not at all   Moving or speaking so slowly that other people could have noticed? Or the opposite - being so fidgety or restless that you have been moving around a lot more than usual? Not at all     Thoughts that you would be better off dead, or of hurting yourself in some way? Not at all   PHQ-9 Total Score 5   If you checked off any problems, how difficult have these problems made it for you to do your work, take care of things at home, or get along with other people? Very difficult           Intimate partner violence: (Screen on initial visit, older adult with injury or evidence of neglect): No concerns  Violence can be a problem in many people's lives, so I now ask every patient about trauma or abuse they may have experienced in a relationship.  Stress/Safety - Do you feel safe in your relationship?  Afraid/Abused - Have you ever been in a relationship where you were threatened, hurt, or afraid?  Friend/Family - Are your friends aware you have been hurt?  Emergency Plan - Do you have a safe place to go and the resources you need in an emergency?    Osteoporosis: No concerns  Men: history of low trauma fracture, androgen deprivation therapy for prostate cancer, hypogonadism, primary hyperparathyroidism, intestinal disorders.     Objective     Physical Exam:  Vital Signs:   Vitals:    05/14/25 0808   BP: (!) 140/106   BP Location: Right arm   Patient Position: Sitting   Cuff Size: Large Adult   Pulse: 78   Resp: 18   Temp: 98 °F (36.7 °C)   TempSrc: Temporal   Weight: (!) 158 kg (348 lb)   Height: 190.5 cm (75\")   PainSc: 6    PainLoc: " Back  Comment: see specialist on 5/20/25.     Body mass index is 43.5 kg/m².     Physical Exam  Vitals and nursing note reviewed.   Constitutional:       General: He is not in acute distress.     Appearance: Normal appearance. He is obese. He is not ill-appearing or toxic-appearing.   HENT:      Nose: No congestion or rhinorrhea.   Eyes:      General:         Right eye: No discharge.         Left eye: No discharge.      Conjunctiva/sclera: Conjunctivae normal.   Cardiovascular:      Rate and Rhythm: Normal rate and regular rhythm.      Heart sounds: Normal heart sounds. No murmur heard.     No friction rub.   Pulmonary:      Effort: Pulmonary effort is normal. No respiratory distress.      Breath sounds: Normal breath sounds. No wheezing or rhonchi.   Abdominal:      General: Abdomen is flat. Bowel sounds are normal. There is no distension.      Palpations: Abdomen is soft. There is no mass.      Tenderness: There is no abdominal tenderness. There is no guarding or rebound.   Musculoskeletal:      Cervical back: Normal range of motion.      Right lower leg: No edema.      Left lower leg: No edema.   Skin:     Coloration: Skin is not jaundiced.      Findings: No lesion or rash.   Neurological:      General: No focal deficit present.      Mental Status: He is alert. Mental status is at baseline.      Coordination: Coordination normal.      Gait: Gait normal.   Psychiatric:         Mood and Affect: Mood normal.         Behavior: Behavior normal.         Thought Content: Thought content normal.         Judgment: Judgment normal.         Procedures    Assessment / Plan      Assessment/Plan:   Problem List Items Addressed This Visit       Generalized anxiety disorder    Class 3 severe obesity due to excess calories with serious comorbidity and body mass index (BMI) of 40.0 to 44.9 in adult    Relevant Orders    Comprehensive Metabolic Panel    Lipid Panel    Erectile dysfunction    Relevant Medications    tadalafil  (CIALIS) 5 MG tablet    Degeneration of intervertebral disc of lumbosacral region with discogenic back pain    Overview   Confirmed with CT lumbar spine on 12/1/2024         Attention deficit hyperactivity disorder (ADHD), predominantly inattentive type    Essential hypertension    Type 2 diabetes mellitus with hyperglycemia, without long-term current use of insulin    Relevant Orders    Ambulatory Referral for Diabetic Eye Exam-Ophthalmology    POC Glycosylated Hemoglobin (Hb A1C)    Microalbumin / Creatinine Urine Ratio - Urine, Clean Catch    Comprehensive Metabolic Panel    WEEKS (nonalcoholic steatohepatitis)    Overview   N1, S2-3, F0 indicating mild WEEKS         Relevant Orders    Comprehensive Metabolic Panel    POC INR     Other Visit Diagnoses         Healthcare maintenance    -  Primary    Relevant Orders    Comprehensive Metabolic Panel    Lipid Panel          The ASCVD Risk score (Kya HERRING, et al., 2019) failed to calculate for the following reasons:    The 2019 ASCVD risk score is only valid for ages 40 to 79  Assessment & Plan  1. Diabetes Mellitus  - A1c levels have been consistently decreasing over the past 5 years, with a significant drop in the last 14 months  - Stable weight beneficial for blood pressure and diabetes management  - Current medication, Ozempic, effective in maintaining stability  - Continue Ozempic regimen if A1c levels remain within desired range  - Seek coverage for Mounjaro if maximum dose of Ozempic is insufficient  - Engage in aerobic exercises for at least 30 minutes, five times a week  - Adhere to a diabetic diet with reduced calorie intake  - Consume smaller, low-calorie meals throughout the day  - Consider intermittent fasting  - Consistent water intake throughout the day  - Check A1c and microalbumin levels today    2. Weight Management  - Interested in weight loss options like phentermine, but may increase blood pressure  - Discuss alternative medications such as  Wellbutrin or Contrave (bupropion and naltrexone) that would not affect blood pressure and could aid in weight loss and mental health  - Consider options after reviewing A1c results  - Referral to a nutritionist or weight management clinic suggested    3. Erectile Dysfunction  - Reports erectile dysfunction potentially related to current medications (Cymbalta and Depakote)  - Prescription for Cialis 25 mg as needed, with 12 tablets and refills sent to pharmacy  - Consider further adjustments to medication regimen if symptoms persist  - Contact behavioral health specialist to discuss potential medication adjustments    4. Degenerative Disk Disease  - Working with pain management for lumbar spine issues  - Weight loss recommended to alleviate symptoms  - Continued collaboration with pain management clinic advised  - Cardiologist recommends aerobic exercise 30 minutes a day, five times a week    5. Attention Deficit Hyperactivity Disorder  - Current ADHD medication regimen includes 30 mg extended release in the morning and 15 mg immediate release in the afternoon, effective in improving focus and activity levels  - No changes recommended at this time  - ADHD medications being managed well    6. Non-Alcoholic Steatohepatitis  - WEEKS condition previously mild  - Continued monitoring of liver function advised, with additional lab tests to be conducted today  - Weight loss and Ozempic expected to decrease risk associated with WEEKS  - Monitor liver function with today's lab tests    7. Health Maintenance  - Schedule appointment with ophthalmologist at [facility] for annual eye exam to monitor for diabetes-related changes  - Provide list of local dentists who accept his insurance  - Check metabolic panel and lipid profile today  - Screening for colon cancer to begin at age 45    Follow-up  - Patient to follow up in 3 months    Results  - Labs:    - A1c: 5.5%  Healthcare Maintenance:  Counseling provided based on age  appropriate USPSTF guidelines.       Asrlan Butler voices understanding and acceptance of this advice and will call back with any further questions or concerns. AVS with preventive healthcare tips printed for patient.     “Discussed risks/benefits to vaccination, reviewed components of the vaccine, discussed VIS, discussed informed consent, informed consent obtained. Patient/Parent was allowed to accept or refuse vaccine. Questions answered to satisfactory state of patient/Parent. We reviewed typical age appropriate and seasonally appropriate vaccinations. Reviewed immunization history and updated state vaccination form as needed. Patient was counseled on  seasonal vaccines    Follow Up:   Return in about 3 months (around 8/14/2025) for Recheck.    Patient or patient representative verbalized consent for the use of Ambient Listening during the visit with  Reji Rader MD for chart documentation. 5/14/2025  09:00 EDT    Reji Rader MD  [facility] [place]

## 2025-05-15 LAB
ALBUMIN UR-MCNC: <1.2 MG/DL
CREAT UR-MCNC: 245.8 MG/DL
MICROALBUMIN/CREAT UR: NORMAL MG/G{CREAT}

## 2025-05-16 RX ORDER — SILDENAFIL 50 MG/1
50 TABLET, FILM COATED ORAL DAILY PRN
Qty: 12 TABLET | Refills: 5 | Status: SHIPPED | OUTPATIENT
Start: 2025-05-16

## 2025-05-19 NOTE — TELEPHONE ENCOUNTER
Left voice mail message for Pt to call back    Relay  Result Note  Please relay the following message to the patient:       Your attached results indicates that your cholesterol profile is low risk at this time and no additional medications are needed for this.  Your metabolic panel is within normal limits with normal kidney function and liver enzymes.  You also do not have a significant amount of protein in your urine, which is a good indicator of adequate kidney function.  I will continue to send you results as I receive them.  If you have any additional questions or concerns, please let us know.  We look forward to seeing you soon!

## 2025-05-19 NOTE — TELEPHONE ENCOUNTER
Name: Arslan Butler    Relationship: Self    Best Callback Number: [phone]    HUB PROVIDED THE RELAY MESSAGE FROM THE OFFICE  Left voice mail message for Pt to call back     Relay  Result Note  Please relay the following message to the patient:       Your attached results indicates that your cholesterol profile is low risk at this time and no additional medications are needed for this.  Your metabolic panel is within normal limits with normal kidney function and liver enzymes.  You also do not have a significant amount of protein in your urine, which is a good indicator of adequate kidney function.  I will continue to send you results as I receive them.  If you have any additional questions or concerns, please let us know.  We look forward to seeing you soon!        PATIENT: VOICED UNDERSTANDING AND HAS NO FURTHER QUESTIONS AT THIS TIME    ADDITIONAL INFORMATION:

## 2025-05-27 DIAGNOSIS — Z31.9 INFERTILITY MANAGEMENT: Primary | ICD-10-CM

## 2025-06-01 DIAGNOSIS — F31.30 BIPOLAR AFFECTIVE DISORDER, CURRENT EPISODE DEPRESSED, CURRENT EPISODE SEVERITY UNSPECIFIED: ICD-10-CM

## 2025-06-01 DIAGNOSIS — F41.1 GENERALIZED ANXIETY DISORDER: ICD-10-CM

## 2025-06-02 RX ORDER — LURASIDONE HYDROCHLORIDE 80 MG/1
80 TABLET, FILM COATED ORAL DAILY
Qty: 90 TABLET | Refills: 0 | Status: SHIPPED | OUTPATIENT
Start: 2025-06-02

## 2025-06-02 NOTE — TELEPHONE ENCOUNTER
NEXT VISIT WITH PROVIDER Appointment with Sherrie Oneill APRN (06/03/2025)     LAST SEEN BY PROVIDER Telemedicine with Sherrie Oneill APRN (03/24/2025)     LAST MED REFILLLurasidone HCl (LATUDA) 80 MG tablet tablet (03/03/2025)       PROVIDER PLEASE ADVISE

## 2025-06-03 ENCOUNTER — TELEMEDICINE (OUTPATIENT)
Dept: BEHAVIORAL HEALTH | Facility: CLINIC | Age: 35
End: 2025-06-03
Payer: COMMERCIAL

## 2025-06-03 DIAGNOSIS — F41.1 GENERALIZED ANXIETY DISORDER: ICD-10-CM

## 2025-06-03 DIAGNOSIS — F40.10 SOCIAL ANXIETY DISORDER: ICD-10-CM

## 2025-06-03 DIAGNOSIS — F41.0 PANIC ATTACKS: ICD-10-CM

## 2025-06-03 DIAGNOSIS — F31.30 BIPOLAR AFFECTIVE DISORDER, CURRENT EPISODE DEPRESSED, CURRENT EPISODE SEVERITY UNSPECIFIED: Primary | ICD-10-CM

## 2025-06-03 DIAGNOSIS — F90.1 ATTENTION DEFICIT HYPERACTIVITY DISORDER (ADHD), PREDOMINANTLY HYPERACTIVE TYPE: ICD-10-CM

## 2025-06-03 RX ORDER — DULOXETIN HYDROCHLORIDE 60 MG/1
60 CAPSULE, DELAYED RELEASE ORAL 2 TIMES DAILY
Qty: 60 CAPSULE | Refills: 1 | Status: SHIPPED | OUTPATIENT
Start: 2025-06-03

## 2025-06-03 NOTE — PROGRESS NOTES
[facility]/Psychiatry  Medication Management Follow-up  Virtual MyChart Visit  Mode of Visit: Video  Location of patient: -HOME-  Location of provider: [facility] office [address], [address], [place] [address].  You have chosen to receive care through a telehealth visit.  The patient has signed the video visit consent form.  The visit included audio and video interaction. No technical issues occurred during this visit.  Patient is being seen via telehealth and confirm that they are in a secure environment for this session. The patient's condition being diagnosed/treated is appropriate for telemedicine. The provider identified herself as well as her credentials. The electronic data is encrypted and password protected, and the patient has been advised of the potential risks to privacy not withstanding such measures.    Record Review is below for 06/03/2025 :   5/14/2025CMP is reassuring, lipid panel is within normal limits  EKG Results:  No current or pertinent labs in record  Head Imaging:  No current or pertinent labs in record  Vital Signs:   There were no vitals taken for this visit.    Chief Complaint: Bipolar depression. ADHD.     History of Present Illness:   Arslan Butler is a 34 y.o. male who presents today for follow-up and medication management for:    ICD-10-CM ICD-9-CM   1. Bipolar affective disorder, current episode depressed, current episode severity unspecified  F31.30 296.50   2. Generalized anxiety disorder  F41.1 300.02   3. Social anxiety disorder  F40.10 300.23   4. Attention deficit hyperactivity disorder (ADHD), predominantly hyperactive type  F90.1 314.01   5. Panic attacks  F41.0 300.01       06/03/2025 Patient is taking medications as prescribed and is tolerating them well.   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 Progression of symptoms, frequency, and intensity is waxing and waning. Patient continues to experience feelings of sadness, low mood, lost of interest in usual activities, anhedonia, feeling worthless, guilty, hopelessness, psychomotor agitation, excessive anxiety and worry, anxiety, difficulty controlling the worry, restlessness, feeling keyed up or on edge, irritability, panic attacks, decreased motivation and these symptoms are causing significant distress or impairment. Denies thinking about death and dying, suicidal ideation, planning, or intent to self-harm.  Denies AVH.  Clinically significant distress or impairment in social, occupational, or other important areas of functioning is waxing and waning.  ADHD  Progression of symptoms, frequency, and intensity is waxing and waning. Patient reports waxing and waning in the ability to carry out very short and simple instructions, maintain attention and concentration for extended periods, make simple work-related decisions, and complete a normal workday and workweek without interruptions from psychologically based symptoms. Clinically significant distress or impairment in social, occupational, or other important areas of functioning is waxing and waning.  Panic Attack  Progression of symptoms, frequency, and intensity is waxing and waning. The problem occurs few times per week. Associated symptoms include sense of impending doom, unbearable foreboding that something terrible is going to happen, intense fear of losing control, palpitations, racing/pounding heartbeat, chest discomfort, shortness of breath, choking sensation, detachment, depersonalization, dissociation, and derealization and these symptoms are causing significant distress or impairment.    03/24/2025 Patient is taking medications as prescribed and is tolerating them well.   Bipolar Depression and Anxiety  Patient has not been able to find another job, was working at [employer] the  experienced an injury.  Patient states that his wife is noticing his moods are worsening later in the day. Denies episodes of jaimee/hypomania. Progression of symptoms, frequency, and intensity is gradually worsening. Patient continues to experience inability to focus and concentrate that may interfere with daily tasks,  psychomotor agitation, excessive anxiety and worry, anxiety, difficulty controlling the worry, restlessness, feeling keyed up or on edge, irritability, muscle tension, decreased motivation and these symptoms are causing significant distress or impairment. Patient denies insomnia, feeling worthless, guilty, hopelessness,. Denies thinking about death and dying, suicidal ideation, planning, or intent to self-harm.  Denies AVH.  Clinically significant distress or impairment in social, occupational, or other important areas of functioning is gradually worsening.  ADHD  Progression of symptoms, frequency, and intensity is waxing and waning. Patient reports waxing and waning in the ability to carry out very short and simple instructions, maintain attention and concentration for extended periods, make simple work-related decisions, and complete a normal workday and workweek without interruptions from psychologically based symptoms. Clinically significant distress or impairment in social, occupational, or other important areas of functioning is waxing and waning.  Panic Attack  Progression of symptoms, frequency, and intensity is waxing and waning. The problem occurs few times per week. Associated symptoms include sense of impending doom, unbearable foreboding that something terrible is going to happen, intense fear of losing control, palpitations, racing/pounding heartbeat, chest discomfort, shortness of breath, choking sensation, detachment, depersonalization, dissociation, and derealization and these symptoms are causing significant distress or impairment.     02/04/2025 Patient is taking medications as  prescribed and is tolerating them well.   Bipolar Depression and Anxiety  Currently living with his mother and his wife is living with her mother, they both lost their jobs and lost their homes. Was denied for disability benefits and they are also currently are homeless. Progression of symptoms, frequency, and intensity is waxing and waning but worse overall. Patient continues to experience feelings of sadness, low mood, lost of interest in usual activities, anhedonia, low energy, feeling worthless, guilty, hopelessness, excessive anxiety and worry, difficulty controlling the worry, restlessness, feeling keyed up or on edge, irritability, muscle tension, lack of motivation and these symptoms are causing significant distress or impairment. Denies thinking about death and dying, suicidal ideation, planning, or intent to self-harm.  Denies AVH.  Clinically significant distress or impairment in social, occupational, or other important areas of functioning is waxing and waning but worse overall.  Panic Attack  Has not been taking diazepam, has been working to cope through anxiety, didn't like the way it made him feel. Progression of symptoms, frequency, and intensity is waxing and waning. The problem occurs few times per month. Associated symptoms include sense of impending doom, unbearable foreboding that something terrible is going to happen, intense fear of losing control, palpitations, racing/pounding heartbeat, chest discomfort, shortness of breath, choking sensation, detachment, depersonalization, dissociation, derealization, and trembling/shaking and these symptoms are causing significant distress or impairment.   Continue Adderall IR 10 mg booster dose at noon  Continue Latuda to 80 mg daily  Continue Depakote ER 250mg at bedtime  Increase Adderall XR to 30 mg daily at next refill  Continue trazodone 50 mg at bedtime  Continue Valium 5 mg qd as needed for anxiety and panic attacks  Ambulatory referral for  neuropsychological testing: ADHD  Follow-up 1 month    11/18/2024 Patient is taking medications as prescribed and is tolerating them well.   Bipolar Depression and Anxiety  Progression of symptoms, frequency, and intensity is waxing and waning but better overall. Patient continues to experience feelings of sadness, excessive anxiety and worry, anxiety, difficulty controlling the worry, and these symptoms are causing significant distress or impairment. Patient denies episodes of jaimee/hypomania, low mood, lost of interest in usual activities, feeling worthless, guilty, hopelessness, psychomotor agitation,. Denies thinking about death and dying, suicidal ideation, planning, or intent to self-harm.  Denies AVH.  Clinically significant distress or impairment in social, occupational, or other important areas of functioning is waxing and waning but better overall.  Panic Attack  Has only needed to use Valium a couple times since our last encounter. Progression of symptoms, frequency, and intensity is waxing and waning but better overall. The problem occurs intermittently and occasionally. Associated symptoms include sense of impending doom, unbearable foreboding that something terrible is going to happen, intense fear of losing control, palpitations, racing/pounding heartbeat, chest discomfort, shortness of breath, choking sensation, detachment, depersonalization, dissociation, and derealization and these symptoms are causing significant distress or impairment.   ADHD  Feels like the Adderall XR has been wearing off earlier in the day. Progression of symptoms, frequency, and intensity is gradually worsening. Patient reports gradually worsening in the ability to carry out very short and simple instructions, maintain attention and concentration for extended periods, make simple work-related decisions, and complete a normal workday and workweek without interruptions from psychologically based symptoms. Clinically significant  distress or impairment in social, occupational, or other important areas of functioning is gradually worsening.  CBT/Supportive  Allowed patient to process topics such as recently started a night shift, recently moved into their own duplex. Setting goals to stick with this position until a day shift becomes available, working with his wife. Individual psychotherapy was provided utilizing solution focused techniques to restore adaptive functioning, provide symptom relief, discuss values and strengths, manage stress, identify triggers, recognize patterns of behavior, acknowledge sources of feelings and behaviors, assess symptoms, provide support, and discuss interpersonal conflicts. The therapeutic alliance was strengthened to encourage the patient to express their thoughts and feelings.   Continue Adderall IR 10 mg booster dose at noon  Continue Latuda to 80 mg daily  Continue Depakote ER 250mg at bedtime  Increase Adderall XR to 30 mg daily at next refill  Continue trazodone 50 mg at bedtime  Continue Valium 5 mg qd as needed for anxiety and panic attacks  Ambulatory referral for neuropsychological testing: ADHD  Follow-up 1 month    09/23/2024 Patient is taking medications as prescribed and is tolerating them well.   Depression and Anxiety  Progression of symptoms, frequency, and intensity is waxing and waning. Patient continues to experience feelings of sadness, low mood, lost of interest in usual activities, inability to focus and concentrate that may interfere with daily tasks,  psychomotor agitation, excessive anxiety and worry, anxiety, difficulty controlling the worry, restlessness, feeling keyed up or on edge, irritability, and these symptoms are causing significant distress or impairment. Patient denies feeling worthless, guilty, hopelessness,. Denies thinking about death and dying, suicidal ideation, planning, or intent to self-harm.  Denies AVH.  Clinically significant distress or impairment in social,  occupational, or other important areas of functioning is waxing and waning.  Panic Attack  Was terminated from his recent position because he missed too many days related to his anxiety disorder. He would lay in bed all day and not be able to do anything productive. Progression of symptoms, frequency, and intensity is waxing and waning. The problem occurs few times per week. Associated symptoms include sense of impending doom, unbearable foreboding that something terrible is going to happen, intense fear of losing control, palpitations, racing/pounding heartbeat, chest discomfort, shortness of breath, choking sensation, detachment, depersonalization, and dissociation and these symptoms are causing significant distress or impairment.   ADHD  Progression of symptoms, frequency, and intensity is gradually improving. Patient reports improvement in the ability to carry out very short and simple instructions, maintain attention and concentration for extended periods, make simple work-related decisions, and complete a normal workday and workweek without interruptions from psychologically based symptoms. Clinically significant distress or impairment in social, occupational, or other important areas of functioning is gradually improving.  CBT/Supportive  Allowed patient to process topics such as struggling with keeping employment related to anxiety, has applied for SSDI x6 and been denied every time, appeals have also been denied. Currently staying with family, this is frustrating to his wife. Individual psychotherapy was provided utilizing solution focused techniques to restore adaptive functioning, provide symptom relief, discuss values and strengths, manage stress, identify triggers, recognize patterns of behavior, acknowledge sources of feelings and behaviors, assess symptoms, provide support, and discuss interpersonal conflicts. The therapeutic alliance was strengthened to encourage the patient to express their thoughts  and feelings.   Continue Adderall IR 10 mg booster dose at noon  Continue Latuda to 80 mg daily  Continue Depakote  mg at bedtime  Continue Adderall XR to 25 mg daily at next refill  Continue trazodone 50 mg at bedtime  Continue Valium 5 mg qd as needed for anxiety and panic attacks  Ambulatory referral for neuropsychological testing: ADHD  Follow-up 1 month    08/05/2024 Patient is taking medications as prescribed and is tolerating them well.   Depression and Anxiety  Has been maintaining employment, however, has been having difficulty with desire, motivation to get up and go to work. Progression of symptoms, frequency, and intensity is waxing and waning. Patient continues to experience feelings of sadness, low mood, lost of interest in usual activities, anhedonia, low energy, feeling worthless, hopelessness, irritability, lack of motivation and these symptoms are causing significant distress or impairment. Denies thinking about death and dying, suicidal ideation, planning, or intent to self-harm.  Denies AVH.  Clinically significant distress or impairment in social, occupational, or other important areas of functioning is waxing and waning.  Panic Attack  Progression of symptoms, frequency, and intensity is waxing and waning but better overall. The problem occurs intermittently. Associated symptoms include sense of impending doom, unbearable foreboding that something terrible is going to happen, intense fear of losing control, palpitations, racing/pounding heartbeat, chest discomfort, shortness of breath, choking sensation, detachment, depersonalization, dissociation, excessive perspiration, and derealization and these symptoms are causing significant distress or impairment.   ADHD  Progression of symptoms, frequency, and intensity is improving. Patient reports improvement in the ability to carry out very short and simple instructions, maintain attention and concentration for extended periods, make simple  work-related decisions, and complete a normal workday and workweek without interruptions from psychologically based symptoms. Clinically significant distress or impairment in social, occupational, or other important areas of functioning is improving.  CBT/Supportive  Allowed patient to process topics such as working on changing behavior to influence emotions.  Trying to do what he would do if he felt motivated.  Practicing arguing the opposite of negative thoughts and challenging them.  Instead of focusing on failure, consider reasons why there can be success. Individual psychotherapy was provided utilizing solution focused techniques to restore adaptive functioning, provide symptom relief, discuss values and strengths, manage stress, identify triggers, recognize patterns of behavior, acknowledge sources of feelings and behaviors, assess symptoms, provide support, and discuss interpersonal conflicts. The therapeutic alliance was strengthened to encourage the patient to express their thoughts and feelings.   Continue Adderall IR 10 mg booster dose at noon  Continue Latuda to 80 mg daily  Continue Depakote  mg at bedtime  Continue Adderall XR to 25 mg daily at next refill  Continue Valium 5 mg qd as needed for anxiety and panic attacks  Ambulatory referral for neuropsychological testing: ADHD  Follow-up 1 month    06/28/2024 Patient is taking medications as prescribed and is tolerating them well.   Bipolar Depression and Anxiety/Social Anxiety  Was denied for SSI after his appeal. Currently off work due to shoulder injury. Progression of symptoms, frequency, and intensity is gradually worsening. Patient continues to experience low mood, lost of interest in usual activities, psychomotor agitation, excessive anxiety and worry, anxiety, difficulty controlling the worry, restlessness, feeling keyed up or on edge, irritability, panic attacks, and these symptoms are causing significant distress or impairment. Patient  "denies feeling worthless, guilty, hopelessness,. Denies thinking about death and dying, suicidal ideation, planning, or intent to self-harm.  Denies AVH.  Clinically significant distress or impairment in social, occupational, or other important areas of functioning is gradually worsening.  Panic Attack and anxiety and social anxiety   He has only needed to take valium a few times each week. Patient shared he does use breathing to help with panic attacks. This is a chronic problem. The current episode started more than 1 year ago. The problem occurs daily. The problem has been gradually worsening. Associated symptoms include sense of impending doom, unbearable foreboding that something terrible is going to happen, intense fear of losing control, palpitations, racing/pounding heartbeat, chest discomfort, shortness of breath, choking sensation, detachment, depersonalization, and dissociation.   ADHD  Progression of symptoms, frequency, and intensity is waxing and waning. Noticing a \"crash\" later in the day and afternoon. Patient reports waxing and waning in the ability to carry out very short and simple instructions, maintain attention and concentration for extended periods, make simple work-related decisions, and complete a normal workday and workweek without interruptions from psychologically based symptoms. Clinically significant distress or impairment in social, occupational, or other important areas of functioning is waxing and waning.  Insomnia is well-controlled with mirtazapine  Start Adderall IR 10 mg booster dose at noon  Increase Latuda to 80 mg daily  Continue Depakote  mg at bedtime  Continue Adderall XR 20 mg daily at next refill  Continue Valium 5 mg qd as needed for anxiety and panic attacks  Ambulatory referral for neuropsychological testing: ADHD  Continue mirtazapine to 15 mg at bedtime  Follow-up 1 month    03/19/2024 Patient is taking medications as prescribed and is tolerating them well. "   Patient stated he has applied for disability again.  Patient stated he is still waiting to hear about placement at [facility]. Neither him or his wife are working currently and they are staying with her aunt.   Depressed and irritable moods mostly related to situational stressors, he feels predominantly well-controlled with current medications.   ADHD  Has been taking increased dose of Adderall XR for approximately 1.5 months.  Patient reports moderate impairment in the ability to carry out very short and simple instructions, maintain attention and concentration for extended periods, make simple work-related decisions, and complete a normal workday and workweek without interruptions from psychologically based symptoms. Symptoms are persistent and significantly interfere with major life activities and/or result in significant suffering.  Panic Attack and anxiety and social anxiety   Patient reported he has been trying to figure out the root cause of his anxiety. He has only needed to take valium a few times each week. Patient shared he does use breathing to help with panic attacks. Patient described he may have had less panic attacks because he has not been out as much. This is a chronic problem. The current episode started more than 1 year ago. The problem occurs daily. The problem has been gradually worsening. Associated symptoms include sense of impending doom, unbearable foreboding that something terrible is going to happen, intense fear of losing control, palpitations, racing/pounding heartbeat, chest discomfort, shortness of breath, choking sensation, detachment, depersonalization, and dissociation.   Insomnia is well-controlled with mirtazapine  Denies suicidal ideation.  Denies AVH.  We will continue to monitor for mood, behavior, and safety.  Continue Latuda to 60 mg daily  Continue Depakote ER 250mg at bedtime  Continue Adderall XR 20 mg daily at next refill  Continue Valium 5 mg qd as needed for anxiety and  "panic attacks  Ambulatory referral for neuropsychological testing: ADHD  Increase mirtazapine to 15 mg at bedtime  Follow-up 1 month    Record Review is below for 03/19/2024 :   8/17/2023 TSH 0.695, free T41.26, lipid panel is reassuring, hemoglobin A1c 6.10, glucose 136, ALT 52, AST 42, CBC and CMP are otherwise reassuring.  EKG Results:  SCANNED EKG (12/29/2019)   Head Imaging:  None in record    01/26/2024 Patient is taking medications as prescribed and is tolerating them well.   Increase in Adderall is helping, he went and passed testing that he failed previously because he could not focus.   He is looking forward to hopefully getting a new position at the [employer], it is one of the jobs that he was able to maintain for several years. Moods have been stable. He is thinking more clearly since treating ADHD with Adderall.   Panic attacks have decreased in frequency and intensity.   Trouble sleeping, falling asleep, several nighttime awakenings  Some marital challenges, they have been fighting a lot, mostly related to financial concerns.   Discussing referral for more intense individual psychotherapy.   Denies suicidal ideation.  Denies AVH.  We will continue to monitor for mood, behavior, and safety.  Continue Latuda to 60 mg daily  Continue Depakote ER 250mg at bedtime  Plan to increase Adderall XR to 20 mg daily at next refill  Continue Valium 5 mg qd as needed for anxiety and panic attacks  Continue mirtazapine 7.5 mg at bedtime  Follow-up 1 month    Record Review is below for 01/26/2024 :   8/17/2023 TSH 0.695, free T41.26, lipid panel is reassuring, hemoglobin A1c 6.10, glucose 136, ALT 52, AST 42, CBC and CMP are otherwise reassuring.  EKG Results:  SCANNED EKG (12/29/2019)   Head Imaging:  None in record    12/29/2023 Patient is taking medications as prescribed and is tolerating them well.   \"It's been going okay.\" He lost his job at [employer], he had a major panic attack and left. His whole shirt was " soaked, his heart was racing. He is looking for another job. He did not have his rescue diazepam with him. Moods have been stable. He is thinking more clearly since treating ADHD with Adderall. Trouble sleeping, falling asleep, several nighttime awakenings.   Denies  intrusive thoughts, but having intense anxiety, mostly related to losing another job. He has applied for disability in the past and been denied.   Denies suicidal ideation.  Denies AVH.  We will continue to monitor for mood, behavior, and safety.  Continue Latuda to 60 mg daily  Continue Depakote ER 250mg at bedtime  Plan to increase Adderall XR to 15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

## 2025-06-09 DIAGNOSIS — F90.1 ATTENTION DEFICIT HYPERACTIVITY DISORDER (ADHD), PREDOMINANTLY HYPERACTIVE TYPE: ICD-10-CM

## 2025-06-09 RX ORDER — DEXTROAMPHETAMINE SACCHARATE, AMPHETAMINE ASPARTATE MONOHYDRATE, DEXTROAMPHETAMINE SULFATE AND AMPHETAMINE SULFATE 7.5; 7.5; 7.5; 7.5 MG/1; MG/1; MG/1; MG/1
30 CAPSULE, EXTENDED RELEASE ORAL EVERY MORNING
Qty: 30 CAPSULE | Refills: 0 | Status: SHIPPED | OUTPATIENT
Start: 2025-06-09

## 2025-06-09 RX ORDER — DEXTROAMPHETAMINE SACCHARATE, AMPHETAMINE ASPARTATE, DEXTROAMPHETAMINE SULFATE AND AMPHETAMINE SULFATE 3.75; 3.75; 3.75; 3.75 MG/1; MG/1; MG/1; MG/1
15 TABLET ORAL
Qty: 30 TABLET | Refills: 0 | Status: SHIPPED | OUTPATIENT
Start: 2025-06-09 | End: 2026-06-09

## 2025-06-09 NOTE — TELEPHONE ENCOUNTER
Appointment with Sherrie Oneill APRN (08/04/2025)     Urine Drug Screen - Urine, Clean Catch (10/24/2024 12:36)     PT DOES NOT HAVE A CONTROLLED SUBSTANCE CONTRACT ON FILE.  REMINDER ADDED TO NEXT APPT    Patient needs a refill.  Order pended

## 2025-06-11 DIAGNOSIS — F90.1 ATTENTION DEFICIT HYPERACTIVITY DISORDER (ADHD), PREDOMINANTLY HYPERACTIVE TYPE: ICD-10-CM

## 2025-06-11 RX ORDER — DEXTROAMPHETAMINE SACCHARATE, AMPHETAMINE ASPARTATE MONOHYDRATE, DEXTROAMPHETAMINE SULFATE AND AMPHETAMINE SULFATE 7.5; 7.5; 7.5; 7.5 MG/1; MG/1; MG/1; MG/1
30 CAPSULE, EXTENDED RELEASE ORAL EVERY MORNING
Qty: 30 CAPSULE | Refills: 0 | OUTPATIENT
Start: 2025-06-11

## 2025-06-11 RX ORDER — DEXTROAMPHETAMINE SACCHARATE, AMPHETAMINE ASPARTATE, DEXTROAMPHETAMINE SULFATE AND AMPHETAMINE SULFATE 3.75; 3.75; 3.75; 3.75 MG/1; MG/1; MG/1; MG/1
15 TABLET ORAL
Qty: 30 TABLET | Refills: 0 | OUTPATIENT
Start: 2025-06-11 | End: 2026-06-11

## 2025-06-11 NOTE — TELEPHONE ENCOUNTER
amphetamine-dextroamphetamine (Adderall) 15 MG tablet       Last ordered: 2 days ago (6/9/2025) by CORTNEY Gray         amphetamine-dextroamphetamine XR (Adderall XR) 30 MG 24 hr capsule       Last ordered: 2 days ago (6/9/2025) by CORTNEY Gray    FOLLOW UP 08/04/2025

## 2025-06-17 NOTE — PATIENT INSTRUCTIONS
1.  Please return to clinic at your next scheduled visit.  Please contact the clinic ([phone]) at least 24 hours prior in the event you need to cancel.  2.  Do no harm to yourself or others.    3.  Avoid alcohol and drugs.    4.  Take all medications as prescribed.  Please contact the clinic with any concerns. If you are in need of medication refills, please call the clinic at [phone].    5. Should you want to get in touch with your provider, CORTNEY Gray, please contact the office ([phone]), and staff will be able to page Sherrie directly.  6. In the event you have personal crisis, contact the following crisis numbers: Suicide Prevention Hotline 1-692.681.1943; [facility] 2-105-517-CEXC; [facility] Emergency Room [phone]; text HELLO to 215183; or 865.     SPECIFIC RECOMMENDATIONS:     1.      Medications discussed at this encounter:     New Medications Ordered This Visit   Medications    DULoxetine (CYMBALTA) 60 MG capsule     Sig: Take 1 capsule by mouth 2 (Two) Times a Day. TAKE 1 CAPSULE BY MOUTH ONCE DAILY FOR  1  WEEK  THEN  INCREASE  TO  TWO  CAPSULES  DAILY     Dispense:  60 capsule     Refill:  1     Increase dose. Thx 4 All U Do!                       2.      Psychotherapy recommendations: We will continue therapy at future visits.     3.     Return to clinic: Return in about 2 months (around 8/3/2025).

## 2025-07-02 ENCOUNTER — TELEPHONE (OUTPATIENT)
Dept: PSYCHIATRY | Facility: CLINIC | Age: 35
End: 2025-07-02
Payer: COMMERCIAL

## 2025-07-02 DIAGNOSIS — F31.30 BIPOLAR AFFECTIVE DISORDER, CURRENT EPISODE DEPRESSED, CURRENT EPISODE SEVERITY UNSPECIFIED: ICD-10-CM

## 2025-07-02 DIAGNOSIS — F90.1 ATTENTION DEFICIT HYPERACTIVITY DISORDER (ADHD), PREDOMINANTLY HYPERACTIVE TYPE: Primary | ICD-10-CM

## 2025-07-02 DIAGNOSIS — F41.1 GENERALIZED ANXIETY DISORDER: ICD-10-CM

## 2025-07-02 DIAGNOSIS — F41.0 PANIC ATTACKS: ICD-10-CM

## 2025-07-02 NOTE — TELEPHONE ENCOUNTER
PATIENT CALLING BECAUSE HE SPOKE WITH [facility] WHO STATED THEY COULD PERFORM NEUROPSYCH TESTING FOR AUTISM IF A REFERRAL IS PLACED    PATIENT IS REQUESTING REFERRAL, PLEASE ADVISE

## 2025-07-02 NOTE — TELEPHONE ENCOUNTER
PT CALLED THE CLINIC BACK IN [place].    THE NAME IS MONET BARBER THAT WILL BE DOING THE TESTING.

## 2025-07-02 NOTE — TELEPHONE ENCOUNTER
I CALLED PT AND SPOKE TO PT.    I RELAYED OF PROVIDERS MESSAGE.    PT IS GOING TO CALL THEM BACK TO GET THE PROVIDERS NAME IN REGARDS TO WHO IS GOING TO DO THE TESTING AND CALL US BACK.

## 2025-07-10 DIAGNOSIS — F90.1 ATTENTION DEFICIT HYPERACTIVITY DISORDER (ADHD), PREDOMINANTLY HYPERACTIVE TYPE: ICD-10-CM

## 2025-07-10 RX ORDER — DEXTROAMPHETAMINE SACCHARATE, AMPHETAMINE ASPARTATE, DEXTROAMPHETAMINE SULFATE AND AMPHETAMINE SULFATE 3.75; 3.75; 3.75; 3.75 MG/1; MG/1; MG/1; MG/1
15 TABLET ORAL
Qty: 30 TABLET | Refills: 0 | Status: SHIPPED | OUTPATIENT
Start: 2025-07-10 | End: 2026-07-10

## 2025-07-10 RX ORDER — DEXTROAMPHETAMINE SACCHARATE, AMPHETAMINE ASPARTATE MONOHYDRATE, DEXTROAMPHETAMINE SULFATE AND AMPHETAMINE SULFATE 7.5; 7.5; 7.5; 7.5 MG/1; MG/1; MG/1; MG/1
30 CAPSULE, EXTENDED RELEASE ORAL EVERY MORNING
Qty: 30 CAPSULE | Refills: 0 | Status: SHIPPED | OUTPATIENT
Start: 2025-07-10

## 2025-07-10 NOTE — TELEPHONE ENCOUNTER
CONTROLLED MEDICATION REFILL REQUEST    STATE REGULATION APPT EVERY 3 MONTHS     UDS(URINE DRUG SCREEN) EVERY 6 MONTHS OR UP TO PROVIDER PREFERENCE   (NEO SOLIMAN & ADA 1 PER YEAR)     NEW NARC CONSENT EVERY YEAR      MEDICATION: amphetamine-dextroamphetamine XR (Adderall XR) 30 MG 24 hr capsule (06/09/2025)  amphetamine-dextroamphetamine (Adderall) 15 MG tablet (06/09/2025)    ALSO, PT(PATIENT) STATES HE STOPPED TAKING THE DULoxetine (CYMBALTA) 60 MG capsule (06/03/2025)     PT(PATIENT) CONFIRMED PHARMACY: [facility] [place]      NEXT OFFICE VISIT: Appointment with Sherrie Oneill APRN (08/04/2025)     LAST OFFICE VISIT: Telemedicine with Sherrie Oneill APRN (06/03/2025)     NARC CONSENT: NONE IN EPIC     URINE DRUG SCREEN(STANDING ORDER)   (NEO SOLIMAN & ADA 1 PER YEAR): Urine Drug Screen - Urine, Clean Catch (10/24/2024 12:36)  Fentanyl, Urine - Urine, Clean Catch (10/24/2024 12:36)    PROVIDER PLEASE ADVISE

## 2025-07-23 ENCOUNTER — OFFICE VISIT (OUTPATIENT)
Age: 35
End: 2025-07-23
Payer: COMMERCIAL

## 2025-07-23 VITALS — HEIGHT: 75 IN | WEIGHT: 315 LBS | BODY MASS INDEX: 39.17 KG/M2

## 2025-07-23 DIAGNOSIS — M51.371 DEGENERATION OF INTERVERTEBRAL DISC OF LUMBOSACRAL REGION WITH LOWER EXTREMITY PAIN: ICD-10-CM

## 2025-07-23 DIAGNOSIS — F41.1 GENERALIZED ANXIETY DISORDER: ICD-10-CM

## 2025-07-23 DIAGNOSIS — M47.816 SPONDYLOSIS OF LUMBAR REGION WITHOUT MYELOPATHY OR RADICULOPATHY: ICD-10-CM

## 2025-07-23 DIAGNOSIS — F90.0 ATTENTION DEFICIT HYPERACTIVITY DISORDER (ADHD), PREDOMINANTLY INATTENTIVE TYPE: ICD-10-CM

## 2025-07-23 DIAGNOSIS — M54.16 LUMBAR RADICULOPATHY: ICD-10-CM

## 2025-07-23 DIAGNOSIS — E11.65 TYPE 2 DIABETES MELLITUS WITH HYPERGLYCEMIA, WITHOUT LONG-TERM CURRENT USE OF INSULIN: ICD-10-CM

## 2025-07-23 DIAGNOSIS — M99.73 CONNECTIVE TISSUE AND DISC STENOSIS OF INTERVERTEBRAL FORAMINA OF LUMBAR REGION: ICD-10-CM

## 2025-07-23 PROCEDURE — 99214 OFFICE O/P EST MOD 30 MIN: CPT | Performed by: NURSE PRACTITIONER

## 2025-07-23 PROCEDURE — 1160F RVW MEDS BY RX/DR IN RCRD: CPT | Performed by: NURSE PRACTITIONER

## 2025-07-23 PROCEDURE — 1125F AMNT PAIN NOTED PAIN PRSNT: CPT | Performed by: NURSE PRACTITIONER

## 2025-07-23 PROCEDURE — 1159F MED LIST DOCD IN RCRD: CPT | Performed by: NURSE PRACTITIONER

## 2025-07-23 RX ORDER — NORTRIPTYLINE HYDROCHLORIDE 10 MG/1
10 CAPSULE ORAL NIGHTLY
Qty: 60 CAPSULE | Refills: 0 | Status: SHIPPED | OUTPATIENT
Start: 2025-07-23

## 2025-07-23 NOTE — PROGRESS NOTES
"Chief Complaint: \"Right lower extremity pain\"        History of Present Illness:   Patient: Mr. Arslan Butler, 34 y.o. male originally referred by Dr. Reji Rader in consultation for chronic attachable lower back and right leg pain.  Patient reports a 3-year history of chronic progressive lower back pain.  He has been to the emergency department in regards to his pain previously.  He reports persistent lower back pain that makes it difficult for him to bend or sit comfortably.  At his initial visit, he presented with a CT scan of the lumbar spine, therefore an MRI of the lumbar spine without contrast was additionally ordered.  This was performed on 3/6/2025, which demonstrates at L3-L4 through L5-S1 there is facet hypertrophy.  At L4-L5 there is degenerative disc disease with a broad-based central disc protrusion creating moderate central spinal stenosis.  At L5-S1 there is degenerative disc disease with a broad-based central disc protrusion compressing the bilateral S1 nerve roots creating moderate central spinal stenosis with mild left foraminal stenosis.  He primarily complained of pain radiating on the right, into the right gluteal region and right posterior thigh and calf. He has failed to obtain pain relief with conservative measures for more than 3 years including oral analgesics (several ER visits, at least 15 visits, Toradol injection in the ED, steroid packs, various over-the-counter analgesics (I.e. ibuprofen, Tylenol, etc), Robaxin, topical analgesics (lidocaine patches), ice packs, heating pads, TENs, physician directed home exercise program HEP (ongoing, about 30 minutes/session, 5-7 x week for the past year or 2 -unless in periods when pain is severe and he visits the ER), to name a few. On 3/5/2025 he underwent a diagnostic and therapeutic right L5-S1 and right S1 transforaminal epidural steroid injections, from which today he tells me provided him with 60% pain relief and functional improvement " lasting 3-1/2 months.  Over the past month, he has noticed an increase in his right lower extremity pain.  He returns today for postprocedure follow-up and evaluation.  Pain Description: Constant lower back pain with intermittent exacerbation, described as aching, dull, sharp, numbing and tingling sensation.   Radiation of Pain: The pain radiates into the right gluteal region, right posterior thigh, right calf and into the right outer foot  Pain intensity today: 7/10   Average pain intensity last week: 6/10  Pain intensity ranges from: 5/10 to 8/10  Aggravating factors: Pain increases with lifting, pushing, bending forward, protracted sitting, standing, walking, lying dwon. Patient describes neurogenic claudication. Patient does not use a cane or walker   Alleviating factors: Pain decreases with ice  Associated Symptoms:   Patient reports pain and tingling, but denies weakness in the right lower extremity.  Patient denies any new bladder or bowel problems.   Patient denies difficulties with his balance or recent falls.   Pain interferes with general activities and affects patient's quality of life  Pain interferes with sleep: Falling asleep and causing sleep fragmentation   Muscle spasms  Stiffness    Review of previous therapies and additional medical records:  Arslan Butler has already failed the following measures, including:   Conservative Measures:  Oral analgesics (several ER visits, at least 15 visits, Toradol injection in the ED, steroid packs, various over-the-counter analgesics (I.e. ibuprofen, Tylenol, etc), Robaxin, topical analgesics (lidocaine patches), ice packs, heating pads, TENs, physician directed home exercise program HEP (ongoing)  Interventional Measures: 03/05/2025: DxTx right L5-S1 and right S1 transforaminal epidural steroid injections  Surgical Measures: No history of previous cervical spine, lumbar spine or hip surgery   Arslan Butler has not undergone orthopedic spine or neurosurgical  consultation for his chronic pain condition   Arslan Butler presents with significant comorbidities including anxiety and depression, bipolar disorder, diabetes mellitus, GERD, hyperlipidemia, obesity, panic disorder, attention deficit hyperactivity disorder, hypertension, hearing loss, obstructive sleep apnea on CPAP, type 2 diabetes, gastroparesis  In terms of current analgesics, Arslan Butler takes: Tylenol. Patient also takes Adderall, Latuda, and Depakote  I have reviewed Srinath Report consistent with medication reconciliation.  SOAPP/ORT: Not completed by the patient          Global Pain Scale 02-18 2025          Pain 15          Feelings 8          Clinical outcomes 13          Activities 10          GPS Total: 46            The [place] Back Pain Disability Scale    DATE 02-18 2025 07-23 2025         Sleep through the night 3 3         Turn over in bed 3 44         Get out of bed 3 2         Make your bed 2 3         Put on socks (pantyhose) 4 4         Ride in a car 3 5         Sit in a chair for several hours 4 2         Stand up for 20-30 minutes 2 2         Climb one flight of stairs 2 4         Walk a few blocks (200-300 yards)  1 5         Walk several miles 5 5         Run one block (about 50 yards) 5 5         Take food out of the refrigerator 0 0         Reach up to high shelves 4 5         Move a chair 0 0         Pull or push heavy doors 2 3         Bend over to clean the bathtub 4 4         Throw a ball 0 1         Carry two bags of groceries 1 2         Lift and carry a heavy suitcase 3 4         Total score 51 62           Review of New Diagnostic Studies:  MRI of the lumbar spine without contrast on 3/6/2025,  demonstrates at L3-L4 through L5-S1 there is facet hypertrophy.  At L4-L5 there is degenerative disc disease with a broad-based central disc protrusion creating moderate central spinal stenosis.  At L5-S1 there is degenerative disc disease with a broad-based central disc protrusion  compressing the bilateral S1 nerve roots creating moderate central spinal stenosis with mild left foraminal stenosis.    Lumbar spine x-rays with flexion-extension views 3/6/2025 mild multilevel degenerative disc disease with facet arthritis most advanced at L4-L5 and L5-S1.  No evidence of spondylolisthesis or instability.    Review of Diagnostic Studies:     CT scan of the lumbar spine on 12/01/2024 revealed preservation of alignment and vertebral body heights. Axial imaging:  L1-L2, L2-L3, L3-L4, L4-L5: No significant disc disease. There is no significant stenosis.  L5-S1: Mild intervertebral disc space narrowing with circumferential annular disc bulge a more pronounced central disc protrusion. Moderate central canal stenosis. Mild bilateral neuroforaminal stenosis.    Review of Systems   Musculoskeletal:  Positive for back pain.   All other systems reviewed and are negative.        Patient Active Problem List   Diagnosis    Generalized anxiety disorder    Bipolar disorder    Gastroesophageal reflux disease    Hyperlipidemia    Class 3 severe obesity due to excess calories with serious comorbidity and body mass index (BMI) of 40.0 to 44.9 in adult    Erectile dysfunction    Degeneration of intervertebral disc of lumbosacral region with discogenic back pain    Allergic rhinitis    Chronic pain of left ankle    Chronic pain in left foot    Social anxiety disorder    Attention deficit hyperactivity disorder (ADHD), predominantly inattentive type    Essential hypertension    Vitamin D insufficiency    Inflammatory bowel disease    Hearing loss of right ear    DONAVAN (obstructive sleep apnea)    Gastroparesis    Neuropathy of left radial nerve    Rotator cuff arthropathy of left shoulder    Type 2 diabetes mellitus with hyperglycemia, without long-term current use of insulin    WEEKS (nonalcoholic steatohepatitis)    Spondylosis of lumbar region without myelopathy or radiculopathy    Lumbar radiculopathy    BMI 40.0-44.9,  adult    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       Past Medical History:   Diagnosis Date    Anxiety and depression 12/17/2021    Bipolar disorder 12/15/2021    Chronic pain disorder     Diabetes mellitus 2018    on Ozempic, last A1C 7.2    Fatty liver     on US    Gastroesophageal reflux disease 12/15/2021    pepto or tums prn    H/O shoulder surgery 03/29/2023    Hx of tonsillectomy 03/29/2023    Hyperlipidemia 12/15/2021    Iron deficiency anemia secondary to inadequate dietary iron intake 08/05/2022    Left arm numbness 01/17/2022    suspected related to shoulder surgery    Left rotator cuff tear 01/17/2022    Male hypogonadism 05/11/2022    MRSA infection     after tattoo    Obesity 12/15/2021    Panic disorder     Peptic ulceration 2020    noted on EGD in [place]    Rotator cuff injury, left, subsequent encounter 02/24/2022    Sciatic nerve pain     Self-injurious behavior     Sleep apnea     Suicide attempt     Testosterone deficiency in male 05/11/2022         Past Surgical History:   Procedure Laterality Date    ENDOSCOPY AND COLONOSCOPY  2019    peptic ulcer    ROTATOR CUFF REPAIR Left 2014    ROTATOR CUFF REPAIR Left 2016    TONSILLECTOMY  1997         Family History   Problem Relation Age of Onset    Suicide Attempts Mother     Self-Injurious Behavior  Mother     Seizures Mother     Schizophrenia Mother     Paranoid behavior Mother     Depression Mother     Bipolar disorder Mother     Alcohol abuse Mother     Obesity Mother     Alcohol abuse Father     Hypertension Father     Heart attack Father     Obesity Sister     Obesity Brother     Stroke Maternal Grandmother     Heart attack Maternal Grandfather     Diabetes Maternal Grandfather     Hypertension Maternal Grandfather     Mental illness Paternal Grandmother     Diabetes Paternal Grandmother     Heart attack Paternal Grandmother     No Known Problems Paternal Grandfather     Colon cancer Neg Hx         "  Social History     Socioeconomic History    Marital status:    Tobacco Use    Smoking status: Never     Passive exposure: Never    Smokeless tobacco: Never   Vaping Use    Vaping status: Never Used   Substance and Sexual Activity    Alcohol use: Not Currently     Comment: ocassionally     Drug use: Never    Sexual activity: Defer           Current Outpatient Medications:     amphetamine-dextroamphetamine (Adderall) 15 MG tablet, Take 1 tablet by mouth Daily With Lunch., Disp: 30 tablet, Rfl: 0    amphetamine-dextroamphetamine XR (Adderall XR) 30 MG 24 hr capsule, Take 1 capsule by mouth Every Morning, Disp: 30 capsule, Rfl: 0    divalproex (DEPAKOTE ER) 500 MG 24 hr tablet, Take 1 tablet by mouth Daily., Disp: 90 tablet, Rfl: 1    Lurasidone HCl (LATUDA) 80 MG tablet tablet, Take 1 tablet by mouth once daily, Disp: 90 tablet, Rfl: 0    Semaglutide, 2 MG/DOSE, (Ozempic, 2 MG/DOSE,) 8 MG/3ML solution pen-injector, Inject 2 mg under the skin into the appropriate area as directed 1 (One) Time Per Week., Disp: 9 mL, Rfl: 3    sildenafil (Viagra) 50 MG tablet, Take 1 tablet by mouth Daily As Needed for Erectile Dysfunction., Disp: 12 tablet, Rfl: 5    loratadine (CLARITIN) 10 MG tablet, Take 1 tablet by mouth Daily. (Patient not taking: Reported on 7/23/2025), Disp: , Rfl:     nortriptyline (PAMELOR) 10 MG capsule, Take 1 capsule by mouth Every Night., Disp: 60 capsule, Rfl: 0      Allergies   Allergen Reactions    Sulfamethoxazole-Trimethoprim Hives and Rash    Metformin GI Intolerance         Ht 190.5 cm (75\")   Wt (!) 163 kg (360 lb)   BMI 45.00 kg/m²       Physical Exam:  Constitutional: Patient appears well-developed, well-nourished, well-hydrated  HEENT: Head: Normocephalic and atraumatic  Eyes: Conjunctivae and lids are normal  Pupils: Equal, round, reactive to light  Peripheral vascular exam: Posterior tibialis: right 2+ and left 2+. Dorsalis pedis: right 2+ and left 2+. No edema.   Musculoskeletal "   Gait and station: Gait evaluation demonstrated a normal gait.   Lumbar Spine: Passive and active range of motion are limited secondary to pain. Extension, flexion, lateral flexion, rotation of the lumbar spine increased and reproduced pain. Lumbar facet joint loading maneuvers are positive.  Sacroiliac Joints Provocative Maneuvers: Negative   Piriformis maneuvers: Negative   Hip Joints: The range of motion of the hip joints is almost full and without pain   Palpation of the bilateral psoas tendons and iliopsoas bursas: Unrevealing   Palpation of the bilateral greater trochanters: Unrevealing   Examination of the Iliotibial band: Unrevealing   Neurological:   Patient is alert and oriented to person, place, and time.   Speech: Normal.   Cortical function: Normal mental status.   Reflex Scores:  Right patellar: 2+  Left patellar: 2+  Right Achilles: 1+  Left Achilles: 1+  Motor strength: 5/5  Motor Tone: Normal  Involuntary movements: None.   Superficial/Primitive Reflexes: Primitive reflexes were absent.   Right Casillas: Absent  Left Casillas: Absent  Right ankle clonus: Absent  Left ankle clonus: Absent   Babinsky: Absent  Long tract signs: Negative. Straight leg raising test: Positive on the right. Femoral stretch sign: Negative.   Sensory exam: Intact to light touch, intact pain and temperature sensation, intact vibration sensation and normal proprioception  Coordination: Finger to nose: Normal. Balance: Normal Romberg's sign: Negative   Skin and subcutaneous tissue: Skin is warm and intact. No rash noted. No cyanosis.   Psychiatric: Judgment and insight: Normal. Recent and remote memory: Intact. Mood and affect: Normal.     ASSESSMENT:   1. Lumbar radiculopathy    2. 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    3. Degeneration of intervertebral disc of lumbosacral region with lower extremity pain    4. Spondylosis of lumbar region without myelopathy or radiculopathy    5.  Attention deficit hyperactivity disorder (ADHD), predominantly inattentive type    6. Type 2 diabetes mellitus with hyperglycemia, without long-term current use of insulin    7. Generalized anxiety disorder            PLAN/MEDICAL DECISION MAKING:  Mr. Arslan Butler, 34 y.o. male reports a 3-year history of chronic progressive lower back pain.  He has been to the emergency department in regards to his pain previously.  He reports persistent lower back pain that makes it difficult for him to bend or sit comfortably.  At his initial visit, he presented with a CT scan of the lumbar spine, therefore an MRI of the lumbar spine without contrast was additionally ordered.  This was performed on 3/6/2025, which demonstrates at L3-L4 through L5-S1 there is facet hypertrophy.  At L4-L5 there is degenerative disc disease with a broad-based central disc protrusion creating moderate central spinal stenosis.  At L5-S1 there is degenerative disc disease with a broad-based central disc protrusion compressing the bilateral S1 nerve roots creating moderate central spinal stenosis with mild left foraminal stenosis.  He primarily complained of pain radiating on the right, into the right gluteal region and right posterior thigh and calf. He has failed to obtain pain relief with conservative measures for more than 3 years including oral analgesics (several ER visits, at least 15 visits, Toradol injection in the ED, steroid packs, various over-the-counter analgesics (I.e. ibuprofen, Tylenol, etc), Robaxin, topical analgesics (lidocaine patches), ice packs, heating pads, TENs, physician directed home exercise program HEP (ongoing, about 30 minutes/session, 5-7 x week for the past year or 2 -unless in periods when pain is severe and he visits the ER), to name a few.  He reports significant benefit from epidural injection as referenced above.  He reports improvement in his overall right lower extremity pain.  Unfortunately, his pain has  resurfaced.  I had a lengthy conversation with Mr. Arslan Butler regarding his chronic pain condition and potential therapeutic options including risks, benefits, alternative therapies, to name a few. We have discussed using a stepwise approach starting with the least intense level of care as determined by the extent required to diagnose and or treat a patient's condition. The proposed treatments are consistent with the patient's medical condition and known to be safe and effective by current guidelines and the standard of care. The duration and frequency proposed are considered appropriate for the service in accordance with accepted standards of medical practice for the diagnosis and treatment of the patient's condition and intended to improve the patient's level of function. These services will be furnished in a setting appropriate to the patient's medical needs and condition. Therefore, I have proposed the following plan:  1.  Interventional pain management measures: Patient does not take blood thinners. Patient will be scheduled for right L5-S1 and right S1 transforaminal epidural steroid injections using the lowest effective dose of steroids, under C-arm fluoroscopic guidance, with the use of contrast dye to confirm appropriate needle placement and spread of contrast dye. We may repeat therapeutic right L5-S1 and right S1 transforaminal epidural steroid injections depending on patient's outcome and following current guidelines. Epidurals will be limited to a maximum of 4 sessions per spinal region in a rolling twelve (12) month period. Continuation of epidural steroid injections over 12 months would only be considered under the following provisions;  Patient is a high-risk surgical candidate, or the patient does not desire surgery, or recurrence of pain in the same location relieved with ESIs for at least three months and epidural provides at least 50% sustained improvement of pain and/or 50% objective  improvement in function (using same scale as baseline)  Pain is severe enough to cause a significant degree of functional disability or vocational disability  The primary care provider will be notified regarding continuation of procedures and repeat steroid use   I have discussed with him depending on his response, potential neurosurgical consultation.  2. Pharmacological measures: Reviewed and discussed;  Patient takes  Duloxetine. Patient also takes Adderall, Latuda and Depakote.   3. Long-term rehabilitation efforts:  A. The patient does not have a history of falls.   B. Patient will start a comprehensive physical therapy program for Alter-G, water therapy, gait and balance training, neurodynamics, core strengthening, gluteal and abductor strengthening, ultrasound, ASTYM, E-STIM, myofascial release, cupping, dry needling, home exercise program  C. Contrast therapy: Apply ice-packs for 15-20 minutes, followed by heating pads for 15-20 minutes to affected area   D. Start a low impact exercise program such as water therapy, swimming, yoga, Pilates  E. Patient's Body mass index is 45 kg/m². Patient counseled on the importance of weight loss to help with overall health and pain control.   FLORIDA Butler  reports that he has never smoked. He has never been exposed to tobacco smoke. He has never used smokeless tobacco.   4. The patient and his family have been instructed to contact my office with any questions or difficulties. The patient understands the plan and agrees to proceed accordingly.    Arslan Butler reports a pain score of 7.  Given his pain assessment as noted, treatment options were discussed and the following options were decided upon as a follow-up plan to address the patient's pain: continuation of current treatment plan for pain, patient not interested in pharmacological measures, referral to Physical Therapy, steroid injections, and use of non-medical modalities (ice, heat, stretching and/or  behavior modifications).      Pain Medications              divalproex (DEPAKOTE ER) 500 MG 24 hr tablet Take 1 tablet by mouth Daily.    Lurasidone HCl (LATUDA) 80 MG tablet tablet Take 1 tablet by mouth once daily    nortriptyline (PAMELOR) 10 MG capsule Take 1 capsule by mouth Every Night.             Orders Placed This Encounter   Procedures    External Facility Surgical/Procedural Request     Standing Status:   Future     Expected Date:   7/23/2025     Expiration Date:   7/23/2026     Provider:   IMTIAZ ASIF [[id number]]     Requested Location:   [address] [place] [address]     Procedure/ Order for Consent:   right L5-S1 and right S1 transforaminal epidural steroid injections     Laterality:   Right     Anesthesia type:   Sedation [260]     Pre-op diagnosis:   Lumbar radiculopathy        Please note that portions of this note were completed with a voice recognition program.   Any copied data in any portion of my note from previous notes included in the HPI, PE, MDM and/or assessment and plan has been reviewed by myself and accurate as of this date.   The 21st Century Cures Act makes medical notes like this available to patients in the interest of transparency. This is a medical document intended as peer to peer communication. It is written in medical language and may contain abbreviations or verbiage that are unfamiliar. It may appear blunt or direct. Medical documents are intended to carry relevant information, facts as evident, and the clinical opinion of the practitioner.     CORTNEY Nguyen    Patient Care Team:  Reji Rader MD as PCP - General (Family Medicine)     New Medications Ordered This Visit   Medications    nortriptyline (PAMELOR) 10 MG capsule     Sig: Take 1 capsule by mouth Every Night.     Dispense:  60 capsule     Refill:  0         Future Appointments   Date Time Provider Department Center   8/4/2025  3:00 PM Sherrie Oneill APRN [facility]   8/13/2025  8:15 AM Reji Rader  MD [facility]

## 2025-07-25 ENCOUNTER — TELEPHONE (OUTPATIENT)
Dept: PSYCHIATRY | Facility: CLINIC | Age: 35
End: 2025-07-25
Payer: COMMERCIAL

## 2025-07-25 NOTE — TELEPHONE ENCOUNTER
HE WENT TO PAIN MANAGEMENT     THEY HAVE PRESCRIBED HIM PAMELOR.    THEY ARE USING THIS TO TREAT HIM FOR HIS LOWER BACK NERVE PAIN.     BEFORE HE CAN TAKE IT THOUGH THEY ASKED HIM TO CONSULT WITH PROVIDER MAGALI CARMICHAEL.

## 2025-07-30 ENCOUNTER — DOCUMENTATION (OUTPATIENT)
Dept: PAIN MEDICINE | Facility: CLINIC | Age: 35
End: 2025-07-30

## 2025-07-30 ENCOUNTER — OUTSIDE FACILITY SERVICE (OUTPATIENT)
Dept: PAIN MEDICINE | Facility: CLINIC | Age: 35
End: 2025-07-30
Payer: COMMERCIAL

## 2025-07-30 PROCEDURE — 64483 NJX AA&/STRD TFRM EPI L/S 1: CPT | Performed by: ANESTHESIOLOGY

## 2025-07-30 PROCEDURE — 64484 NJX AA&/STRD TFRM EPI L/S EA: CPT | Performed by: ANESTHESIOLOGY

## 2025-07-30 NOTE — PROGRESS NOTES
PROCEDURE DATE: 07/30/2025      PREOPERATIVE DIAGNOSES:  1. Lumbar radiculopathy   2. 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 Degeneration of intervertebral disc of lumbosacral region   4. Spondylosis of lumbar region without myelopathy or radiculopathy   5. Type 2 diabetes mellitus with hyperglycemia  6. Attention deficit hyperactivity disorder   7. Generalized anxiety disorder     POSTOPERATIVE DIAGNOSES:  1. Lumbar radiculopathy   2. 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 Degeneration of intervertebral disc of lumbosacral region   4. Spondylosis of lumbar region without myelopathy or radiculopathy   5. Type 2 diabetes mellitus with hyperglycemia  6. Attention deficit hyperactivity disorder   7. Generalized anxiety disorder     PROCEDURES PERFORMED:  Therapeutic right L5-S1 transforaminal epidural steroid injection  Therapeutic right S1 transforaminal epidural steroid injection    ANESTHESIA: Local anesthesia plus IV sedation    COMPLICATIONS: None    MEDICAL NECESSITY: A comprehensive evaluation, including history and physical exam and pertinent physiologic and functional assessment, was performed. Patient presents with intractable pain due to the diagnoses listed above. Patient has failed to respond to conservative modalities, as referenced under HPI including the impact of patient's moderate-to-severe pain contributing to significant impairment in daily activities, ADLs, and a negative impact on quality of life. Supporting diagnostic studies of patient's chronic pain condition have been reviewed. We have discussed using a stepwise approach starting with the shortest or least intense level of treatment, care, or service as determined by the extent required to diagnose and or treat a patient's condition. The treatments proposed are consistent with the patient's medical condition and are known to be as safe and effective by current guidelines  and standard of care. See OV note for additional details.    PROCEDURE SUMMARY: After explaining all the risks and benefits of the procedure, an informed consent was obtained.  The patient's surgical site was confirmed with the patient and marked in the holding room accordingly. The patient was transferred to the procedure room and placed on the operating room table in the prone position. Time out was completed. Noninvasive monitors were applied. Administration of IV sedation was performed by a designated procedure room nurse, who monitored the patient's level of consciousness and physiological status. Pertinent information was reported on a sedation flow sheet, which is part of the patient's permanent medical records. Start sedation time: 08:28 AM . The patient's vital signs remained within normal limits. The lumbar spine area was prepped and draped in a sterile fashion.  Using C-arm fluoroscopic guidance, the six o'clock area of the right L5 pedicle and the 3 o'clock area of the right S1 neural foramina (targets) were identified. The skin and tissues overlying the targets were anesthetized with 5 ml of 1% lidocaine. Then, 20-gauge styleted needles were advanced into the most posterior and superior portion of the right L5-L1 neural foramina and the most posterior and lateral aspect of the right S1 neural foramina without any difficulties. The patient did not experience paresthesia. AP and lateral X-ray views were obtained confirming appropriate needle placement. Aspiration was negative for blood and/or CSF. One ml of Omnipaque-240 was injected per lumbar level and contrast dye was seen bathing the right L5 and right S1 nerve roots with spread into the anterior and posterior epidural space. X-rays were obtained and scanned in the patient's chart. Two ml of 0.25% bupivacaine with 4 mg of dexamethasone were injected per lumbar level. Fluoroscopy was utilized using low-dose of radiation applying collimation, pulsed  mode, and shielding following ALARA recommendations. Fluoroscopy time: 12 seconds. End sedation time: 08:32 AM. The patient tolerated the procedure well and without incident.  Upon completion of the procedure, the patient was transferred to the recovery room in stable condition. The patient was [facility] neurologically intact and with appropriate discharge instructions. Pain level before procedure: 9/10. Pain level after procedure: 4/10.    PLAN:   Follow-up in 12 weeks. We may repeat therapeutic right L5-S1 and right S1 transforaminal epidural steroid injections depending on the patient's outcome and following current guidelines.   The patient has been instructed to contact my office with any questions or difficulties. The patient understands the plan and agrees to proceed accordingly.     Please note that portions of this note were completed with a voice recognition program.           Signatures  Electronically signed by: Amarjit Hightower M.D.; JULY 30, 2025, 13:28 EST (Author)

## 2025-08-04 ENCOUNTER — TELEMEDICINE (OUTPATIENT)
Dept: BEHAVIORAL HEALTH | Facility: CLINIC | Age: 35
End: 2025-08-04
Payer: COMMERCIAL

## 2025-08-04 DIAGNOSIS — F40.10 SOCIAL ANXIETY DISORDER: ICD-10-CM

## 2025-08-04 DIAGNOSIS — F90.1 ATTENTION DEFICIT HYPERACTIVITY DISORDER (ADHD), PREDOMINANTLY HYPERACTIVE TYPE: ICD-10-CM

## 2025-08-04 DIAGNOSIS — F41.0 PANIC ATTACKS: ICD-10-CM

## 2025-08-04 DIAGNOSIS — F41.1 GENERALIZED ANXIETY DISORDER: ICD-10-CM

## 2025-08-04 DIAGNOSIS — F31.30 BIPOLAR AFFECTIVE DISORDER, CURRENT EPISODE DEPRESSED, CURRENT EPISODE SEVERITY UNSPECIFIED: Primary | ICD-10-CM

## 2025-08-04 RX ORDER — PAROXETINE 10 MG/1
10 TABLET, FILM COATED ORAL DAILY
Qty: 30 TABLET | Refills: 2 | Status: SHIPPED | OUTPATIENT
Start: 2025-08-04 | End: 2025-08-04 | Stop reason: ALTCHOICE

## 2025-08-05 DIAGNOSIS — M54.16 LUMBAR RADICULOPATHY: Primary | ICD-10-CM

## 2025-08-06 ENCOUNTER — TELEPHONE (OUTPATIENT)
Dept: PAIN MEDICINE | Facility: CLINIC | Age: 35
End: 2025-08-06
Payer: COMMERCIAL

## 2025-08-06 RX ORDER — NORTRIPTYLINE HYDROCHLORIDE 10 MG/1
10 CAPSULE ORAL NIGHTLY
COMMUNITY
End: 2025-08-07

## 2025-08-07 ENCOUNTER — OFFICE VISIT (OUTPATIENT)
Dept: BEHAVIORAL HEALTH | Facility: CLINIC | Age: 35
End: 2025-08-07
Payer: COMMERCIAL

## 2025-08-07 ENCOUNTER — DOCUMENTATION (OUTPATIENT)
Dept: BARIATRICS/WEIGHT MGMT | Facility: CLINIC | Age: 35
End: 2025-08-07
Payer: COMMERCIAL

## 2025-08-07 ENCOUNTER — OFFICE VISIT (OUTPATIENT)
Dept: BARIATRICS/WEIGHT MGMT | Facility: CLINIC | Age: 35
End: 2025-08-07
Payer: COMMERCIAL

## 2025-08-07 VITALS
DIASTOLIC BLOOD PRESSURE: 84 MMHG | RESPIRATION RATE: 18 BRPM | HEART RATE: 92 BPM | OXYGEN SATURATION: 98 % | BODY MASS INDEX: 39.17 KG/M2 | TEMPERATURE: 98.6 F | WEIGHT: 315 LBS | HEIGHT: 75 IN | SYSTOLIC BLOOD PRESSURE: 132 MMHG

## 2025-08-07 DIAGNOSIS — E11.65 TYPE 2 DIABETES MELLITUS WITH HYPERGLYCEMIA, WITHOUT LONG-TERM CURRENT USE OF INSULIN: ICD-10-CM

## 2025-08-07 DIAGNOSIS — G47.33 OSA (OBSTRUCTIVE SLEEP APNEA): ICD-10-CM

## 2025-08-07 DIAGNOSIS — E66.01 MORBID OBESITY WITH BMI OF 40.0-44.9, ADULT: Primary | ICD-10-CM

## 2025-08-07 DIAGNOSIS — Z71.89 ENCOUNTER FOR PSYCHOLOGICAL ASSESSMENT PRIOR TO BARIATRIC SURGERY: ICD-10-CM

## 2025-08-07 DIAGNOSIS — E66.813 OBESITY, CLASS III, BMI 40-49.9 (MORBID OBESITY): Primary | ICD-10-CM

## 2025-08-07 DIAGNOSIS — E55.9 VITAMIN D INSUFFICIENCY: ICD-10-CM

## 2025-08-07 DIAGNOSIS — F31.30 BIPOLAR I DISORDER, MOST RECENT EPISODE DEPRESSED: ICD-10-CM

## 2025-08-07 DIAGNOSIS — R12 HEARTBURN: ICD-10-CM

## 2025-08-07 DIAGNOSIS — Z86.59 HISTORY OF SUICIDAL IDEATION: ICD-10-CM

## 2025-08-07 DIAGNOSIS — Z86.59 HISTORY OF ADHD: ICD-10-CM

## 2025-08-07 PROCEDURE — 90791 PSYCH DIAGNOSTIC EVALUATION: CPT | Performed by: PSYCHOLOGIST

## 2025-08-08 LAB
25(OH)D3+25(OH)D2 SERPL-MCNC: 46.3 NG/ML (ref 30–100)
ALBUMIN SERPL-MCNC: 4.7 G/DL (ref 4.1–5.1)
ALP SERPL-CCNC: 79 IU/L (ref 44–121)
ALT SERPL-CCNC: 46 IU/L (ref 0–44)
AST SERPL-CCNC: 28 IU/L (ref 0–40)
BASOPHILS # BLD AUTO: 0 X10E3/UL (ref 0–0.2)
BASOPHILS NFR BLD AUTO: 0 %
BILIRUB SERPL-MCNC: 0.7 MG/DL (ref 0–1.2)
BUN SERPL-MCNC: 13 MG/DL (ref 6–20)
BUN/CREAT SERPL: 12 (ref 9–20)
CALCIUM SERPL-MCNC: 9.7 MG/DL (ref 8.7–10.2)
CHLORIDE SERPL-SCNC: 103 MMOL/L (ref 96–106)
CHOLEST SERPL-MCNC: 129 MG/DL (ref 100–199)
CO2 SERPL-SCNC: 23 MMOL/L (ref 20–29)
CREAT SERPL-MCNC: 1.11 MG/DL (ref 0.76–1.27)
EGFRCR SERPLBLD CKD-EPI 2021: 90 ML/MIN/1.73
EOSINOPHIL # BLD AUTO: 0.1 X10E3/UL (ref 0–0.4)
EOSINOPHIL NFR BLD AUTO: 1 %
ERYTHROCYTE [DISTWIDTH] IN BLOOD BY AUTOMATED COUNT: 13.8 % (ref 11.6–15.4)
FERRITIN SERPL-MCNC: 250 NG/ML (ref 30–400)
FOLATE SERPL-MCNC: 8.6 NG/ML
GLOBULIN SER CALC-MCNC: 2.9 G/DL (ref 1.5–4.5)
GLUCOSE SERPL-MCNC: 110 MG/DL (ref 70–99)
HBA1C MFR BLD: 6.2 % (ref 4.8–5.6)
HCT VFR BLD AUTO: 48 % (ref 37.5–51)
HDLC SERPL-MCNC: 34 MG/DL
HGB BLD-MCNC: 16.1 G/DL (ref 13–17.7)
IMM GRANULOCYTES # BLD AUTO: 0 X10E3/UL (ref 0–0.1)
IMM GRANULOCYTES NFR BLD AUTO: 0 %
IRON SERPL-MCNC: 88 UG/DL (ref 38–169)
LDLC SERPL CALC-MCNC: 73 MG/DL (ref 0–99)
LYMPHOCYTES # BLD AUTO: 2.1 X10E3/UL (ref 0.7–3.1)
LYMPHOCYTES NFR BLD AUTO: 31 %
MCH RBC QN AUTO: 30.3 PG (ref 26.6–33)
MCHC RBC AUTO-ENTMCNC: 33.5 G/DL (ref 31.5–35.7)
MCV RBC AUTO: 90 FL (ref 79–97)
MONOCYTES # BLD AUTO: 0.6 X10E3/UL (ref 0.1–0.9)
MONOCYTES NFR BLD AUTO: 9 %
NEUTROPHILS # BLD AUTO: 3.9 X10E3/UL (ref 1.4–7)
NEUTROPHILS NFR BLD AUTO: 59 %
PLATELET # BLD AUTO: 282 X10E3/UL (ref 150–450)
POTASSIUM SERPL-SCNC: 5.5 MMOL/L (ref 3.5–5.2)
PROT SERPL-MCNC: 7.6 G/DL (ref 6–8.5)
RBC # BLD AUTO: 5.31 X10E6/UL (ref 4.14–5.8)
SODIUM SERPL-SCNC: 141 MMOL/L (ref 134–144)
TRIGL SERPL-MCNC: 120 MG/DL (ref 0–149)
TSH SERPL DL<=0.005 MIU/L-ACNC: 1.14 UIU/ML (ref 0.45–4.5)
UREA BREATH TEST QL: POSITIVE
VIT B12 SERPL-MCNC: 558 PG/ML (ref 232–1245)
VLDLC SERPL CALC-MCNC: 22 MG/DL (ref 5–40)
WBC # BLD AUTO: 6.7 X10E3/UL (ref 3.4–10.8)

## 2025-08-11 ENCOUNTER — OFFICE VISIT (OUTPATIENT)
Dept: BEHAVIORAL HEALTH | Facility: CLINIC | Age: 35
End: 2025-08-11
Payer: COMMERCIAL

## 2025-08-11 DIAGNOSIS — Z86.59 HISTORY OF SUICIDAL IDEATION: ICD-10-CM

## 2025-08-11 DIAGNOSIS — Z71.3 WEIGHT LOSS COUNSELING, ENCOUNTER FOR: ICD-10-CM

## 2025-08-11 DIAGNOSIS — Z56.0 UNEMPLOYED: ICD-10-CM

## 2025-08-11 DIAGNOSIS — F31.30 BIPOLAR I DISORDER, MOST RECENT EPISODE DEPRESSED: Primary | ICD-10-CM

## 2025-08-11 PROCEDURE — 90791 PSYCH DIAGNOSTIC EVALUATION: CPT | Performed by: PSYCHOLOGIST

## 2025-08-11 SDOH — ECONOMIC STABILITY - INCOME SECURITY: UNEMPLOYMENT, UNSPECIFIED: Z56.0

## 2025-08-12 DIAGNOSIS — F90.1 ATTENTION DEFICIT HYPERACTIVITY DISORDER (ADHD), PREDOMINANTLY HYPERACTIVE TYPE: ICD-10-CM

## 2025-08-12 RX ORDER — DEXTROAMPHETAMINE SACCHARATE, AMPHETAMINE ASPARTATE MONOHYDRATE, DEXTROAMPHETAMINE SULFATE AND AMPHETAMINE SULFATE 7.5; 7.5; 7.5; 7.5 MG/1; MG/1; MG/1; MG/1
30 CAPSULE, EXTENDED RELEASE ORAL EVERY MORNING
Qty: 30 CAPSULE | Refills: 0 | Status: SHIPPED | OUTPATIENT
Start: 2025-08-12

## 2025-08-12 RX ORDER — DEXTROAMPHETAMINE SACCHARATE, AMPHETAMINE ASPARTATE, DEXTROAMPHETAMINE SULFATE AND AMPHETAMINE SULFATE 3.75; 3.75; 3.75; 3.75 MG/1; MG/1; MG/1; MG/1
15 TABLET ORAL
Qty: 30 TABLET | Refills: 0 | Status: SHIPPED | OUTPATIENT
Start: 2025-08-12 | End: 2026-08-12

## 2025-08-13 ENCOUNTER — OFFICE VISIT (OUTPATIENT)
Dept: INTERNAL MEDICINE | Facility: CLINIC | Age: 35
End: 2025-08-13
Payer: COMMERCIAL

## 2025-08-13 VITALS
DIASTOLIC BLOOD PRESSURE: 88 MMHG | HEART RATE: 76 BPM | TEMPERATURE: 97.5 F | BODY MASS INDEX: 45.5 KG/M2 | WEIGHT: 315 LBS | RESPIRATION RATE: 18 BRPM | SYSTOLIC BLOOD PRESSURE: 136 MMHG

## 2025-08-13 DIAGNOSIS — F90.1 ATTENTION DEFICIT HYPERACTIVITY DISORDER (ADHD), PREDOMINANTLY HYPERACTIVE TYPE: ICD-10-CM

## 2025-08-13 DIAGNOSIS — E11.65 TYPE 2 DIABETES MELLITUS WITH HYPERGLYCEMIA, WITHOUT LONG-TERM CURRENT USE OF INSULIN: Primary | ICD-10-CM

## 2025-08-13 DIAGNOSIS — M51.370 DEGENERATION OF INTERVERTEBRAL DISC OF LUMBOSACRAL REGION WITH DISCOGENIC BACK PAIN: ICD-10-CM

## 2025-08-13 DIAGNOSIS — N52.2 DRUG-INDUCED ERECTILE DYSFUNCTION: ICD-10-CM

## 2025-08-13 DIAGNOSIS — F90.0 ATTENTION DEFICIT HYPERACTIVITY DISORDER (ADHD), PREDOMINANTLY INATTENTIVE TYPE: ICD-10-CM

## 2025-08-13 DIAGNOSIS — E66.813 CLASS 3 SEVERE OBESITY DUE TO EXCESS CALORIES WITH SERIOUS COMORBIDITY AND BODY MASS INDEX (BMI) OF 40.0 TO 44.9 IN ADULT: ICD-10-CM

## 2025-08-13 PROCEDURE — 1125F AMNT PAIN NOTED PAIN PRSNT: CPT | Performed by: STUDENT IN AN ORGANIZED HEALTH CARE EDUCATION/TRAINING PROGRAM

## 2025-08-13 PROCEDURE — 1160F RVW MEDS BY RX/DR IN RCRD: CPT | Performed by: STUDENT IN AN ORGANIZED HEALTH CARE EDUCATION/TRAINING PROGRAM

## 2025-08-13 PROCEDURE — 1159F MED LIST DOCD IN RCRD: CPT | Performed by: STUDENT IN AN ORGANIZED HEALTH CARE EDUCATION/TRAINING PROGRAM

## 2025-08-13 PROCEDURE — 3044F HG A1C LEVEL LT 7.0%: CPT | Performed by: STUDENT IN AN ORGANIZED HEALTH CARE EDUCATION/TRAINING PROGRAM

## 2025-08-13 PROCEDURE — 99214 OFFICE O/P EST MOD 30 MIN: CPT | Performed by: STUDENT IN AN ORGANIZED HEALTH CARE EDUCATION/TRAINING PROGRAM

## 2025-08-13 RX ORDER — DEXTROAMPHETAMINE SACCHARATE, AMPHETAMINE ASPARTATE MONOHYDRATE, DEXTROAMPHETAMINE SULFATE AND AMPHETAMINE SULFATE 7.5; 7.5; 7.5; 7.5 MG/1; MG/1; MG/1; MG/1
30 CAPSULE, EXTENDED RELEASE ORAL EVERY MORNING
Qty: 30 CAPSULE | Refills: 0 | Status: CANCELLED | OUTPATIENT
Start: 2025-08-13

## 2025-08-13 RX ORDER — DEXTROAMPHETAMINE SACCHARATE, AMPHETAMINE ASPARTATE, DEXTROAMPHETAMINE SULFATE AND AMPHETAMINE SULFATE 3.75; 3.75; 3.75; 3.75 MG/1; MG/1; MG/1; MG/1
15 TABLET ORAL
Qty: 30 TABLET | Refills: 0 | Status: CANCELLED | OUTPATIENT
Start: 2025-08-13 | End: 2026-08-13

## 2025-08-18 ENCOUNTER — LAB REQUISITION (OUTPATIENT)
Dept: LAB | Facility: HOSPITAL | Age: 35
End: 2025-08-18
Payer: COMMERCIAL

## 2025-08-18 ENCOUNTER — OUTSIDE FACILITY SERVICE (OUTPATIENT)
Dept: BARIATRICS/WEIGHT MGMT | Facility: CLINIC | Age: 35
End: 2025-08-18
Payer: COMMERCIAL

## 2025-08-18 DIAGNOSIS — R12 HEARTBURN: ICD-10-CM

## 2025-08-18 PROCEDURE — 43239 EGD BIOPSY SINGLE/MULTIPLE: CPT | Performed by: SURGERY

## 2025-08-18 PROCEDURE — 88305 TISSUE EXAM BY PATHOLOGIST: CPT | Performed by: SURGERY

## 2025-08-19 ENCOUNTER — HOSPITAL ENCOUNTER (OUTPATIENT)
Dept: PULMONOLOGY | Facility: HOSPITAL | Age: 35
Discharge: HOME | End: 2025-08-19
Admitting: PHYSICIAN ASSISTANT
Payer: COMMERCIAL

## 2025-08-19 DIAGNOSIS — G47.33 OSA (OBSTRUCTIVE SLEEP APNEA): ICD-10-CM

## 2025-08-19 DIAGNOSIS — E66.813 OBESITY, CLASS III, BMI 40-49.9 (MORBID OBESITY): ICD-10-CM

## 2025-08-19 LAB
CYTO UR: NORMAL
LAB AP CASE REPORT: NORMAL
LAB AP CLINICAL INFORMATION: NORMAL
PATH REPORT.FINAL DX SPEC: NORMAL
PATH REPORT.GROSS SPEC: NORMAL

## 2025-08-19 PROCEDURE — 94010 BREATHING CAPACITY TEST: CPT

## 2025-08-19 PROCEDURE — 94729 DIFFUSING CAPACITY: CPT

## 2025-08-20 ENCOUNTER — OFFICE VISIT (OUTPATIENT)
Dept: BEHAVIORAL HEALTH | Facility: CLINIC | Age: 35
End: 2025-08-20
Payer: COMMERCIAL

## 2025-08-20 DIAGNOSIS — F31.30 BIPOLAR I DISORDER, MOST RECENT EPISODE DEPRESSED: Primary | ICD-10-CM

## 2025-08-20 DIAGNOSIS — Z56.0 UNEMPLOYED: ICD-10-CM

## 2025-08-20 DIAGNOSIS — Z71.3 WEIGHT LOSS COUNSELING, ENCOUNTER FOR: ICD-10-CM

## 2025-08-20 DIAGNOSIS — Z86.59 HISTORY OF SUICIDAL IDEATION: ICD-10-CM

## 2025-08-20 PROCEDURE — 90837 PSYTX W PT 60 MINUTES: CPT | Performed by: PSYCHOLOGIST

## 2025-08-20 SDOH — ECONOMIC STABILITY - INCOME SECURITY: UNEMPLOYMENT, UNSPECIFIED: Z56.0

## 2025-08-26 ENCOUNTER — OFFICE VISIT (OUTPATIENT)
Dept: CARDIOLOGY | Facility: CLINIC | Age: 35
End: 2025-08-26
Payer: COMMERCIAL

## 2025-08-26 VITALS
WEIGHT: 315 LBS | SYSTOLIC BLOOD PRESSURE: 132 MMHG | BODY MASS INDEX: 38.36 KG/M2 | DIASTOLIC BLOOD PRESSURE: 72 MMHG | HEART RATE: 89 BPM | OXYGEN SATURATION: 99 % | HEIGHT: 76 IN

## 2025-08-26 DIAGNOSIS — E66.813 CLASS 3 SEVERE OBESITY DUE TO EXCESS CALORIES WITH SERIOUS COMORBIDITY AND BODY MASS INDEX (BMI) OF 40.0 TO 44.9 IN ADULT: Primary | ICD-10-CM

## 2025-08-26 PROCEDURE — 99203 OFFICE O/P NEW LOW 30 MIN: CPT | Performed by: INTERNAL MEDICINE
